# Patient Record
Sex: FEMALE | Race: WHITE | Employment: OTHER | URBAN - METROPOLITAN AREA
[De-identification: names, ages, dates, MRNs, and addresses within clinical notes are randomized per-mention and may not be internally consistent; named-entity substitution may affect disease eponyms.]

---

## 2017-01-12 ENCOUNTER — PRE VISIT (OUTPATIENT)
Dept: CARDIOLOGY | Facility: CLINIC | Age: 82
End: 2017-01-12

## 2017-01-23 ENCOUNTER — OFFICE VISIT (OUTPATIENT)
Dept: CARDIOLOGY | Facility: CLINIC | Age: 82
End: 2017-01-23
Attending: INTERNAL MEDICINE
Payer: COMMERCIAL

## 2017-01-23 VITALS
SYSTOLIC BLOOD PRESSURE: 130 MMHG | BODY MASS INDEX: 28.71 KG/M2 | DIASTOLIC BLOOD PRESSURE: 78 MMHG | HEIGHT: 64 IN | WEIGHT: 168.2 LBS | HEART RATE: 60 BPM

## 2017-01-23 DIAGNOSIS — I87.2 VENOUS INSUFFICIENCY: Primary | ICD-10-CM

## 2017-01-23 DIAGNOSIS — I05.9 MITRAL VALVE DISORDER: ICD-10-CM

## 2017-01-23 DIAGNOSIS — R60.9 EDEMA, UNSPECIFIED TYPE: ICD-10-CM

## 2017-01-23 PROCEDURE — 99214 OFFICE O/P EST MOD 30 MIN: CPT | Mod: 25 | Performed by: INTERNAL MEDICINE

## 2017-01-23 PROCEDURE — 93005 ELECTROCARDIOGRAM TRACING: CPT | Performed by: INTERNAL MEDICINE

## 2017-01-23 NOTE — PROGRESS NOTES
HPI and Plan:   See dictation    Orders Placed This Encounter   Procedures     TSH     Follow-Up with Cardiologist     EKG 12-lead complete w/read - Clinics (performed today)     Echocardiogram       No orders of the defined types were placed in this encounter.       There are no discontinued medications.      Encounter Diagnoses   Name Primary?     Venous insufficiency Yes     Edema, unspecified type      Mitral valve disorder        CURRENT MEDICATIONS:  Current Outpatient Prescriptions   Medication Sig Dispense Refill     lidocaine (LIDODERM) 5 % patch Place 1 patch onto the skin every 24 hours       timolol hemihydrate (BETIMOL) 0.5 % SOLN ophthalmic solution Place 1 drop into both eyes 2 times daily       amLODIPine (NORVASC) 5 MG tablet Take 1 tablet (5 mg) by mouth daily 90 tablet 3     dofetilide (TIKOSYN) 500 MCG capsule Take 1 capsule (500 mcg) by mouth every 12 hours 60 capsule 11     irbesartan (AVAPRO) 300 MG tablet Take 1 tablet (300 mg) by mouth daily 90 tablet 3     metoprolol (TOPROL XL) 100 MG 24 hr tablet Take 1 tablet (100 mg) by mouth daily 90 tablet 3     simvastatin (ZOCOR) 40 MG tablet Take 1 tablet (40 mg) by mouth At Bedtime 90 tablet 3     multivitamin, therapeutic (THERA-VIT) TABS Take 1 tablet by mouth daily.         Omega-3 Fatty Acids (OMEGA-3 FISH OIL PO) Take 1,200 mg by mouth daily.         sertraline (ZOLOFT) 50 MG tablet Take 50 mg by mouth every evening.         aspirin 81 MG EC tablet Take 1 tablet by mouth daily. 30 tablet 11     SUMAtriptan Succinate (IMITREX PO) Take 25 mg by mouth as needed.         calcium carbonate-vitamin D 500-400 MG-UNIT TABS Take 1 tablet by mouth 2 times daily.         magnesium gluconate (MAGONATE) 500 MG tablet Take 500 mg by mouth daily.           ALLERGIES     Allergies   Allergen Reactions     Aspirin      Coumadin [Warfarin]      Spontaneous retroperitoneal bleed.     Penicillins        PAST MEDICAL HISTORY:  Past Medical History   Diagnosis  Date     Back pain      Diabetes mellitus (H)      Hypertension      Atrial fibrillation (H)      not on anticoagulation, hx RP bleed     Hyperlipidaemia      Chest pain      CAD (coronary artery disease)      CT angio: mild lesion in the LAD, as well as 1st diagonal, and mild plaque in the proximal and  mid RCA     Venous insufficiency      Tachy-susan syndrome (H) 2012     PPM       PAST SURGICAL HISTORY:  Past Surgical History   Procedure Laterality Date     Anesthesia cardioversion  7/23/2012     Procedure: ANESTHESIA CARDIOVERSION;;  Surgeon: Provider, Generic Anesthesia;  Location:  ANESTHESIA - [facility] - DO NOT SCHEDULE     Implant pacemaker  11/2012     Dual chamber       FAMILY HISTORY:  Family History   Problem Relation Age of Onset     HEART DISEASE Mother 65     mi     HEART DISEASE Father 45     pnemonia       SOCIAL HISTORY:  Social History     Social History     Marital Status:      Spouse Name: N/A     Number of Children: N/A     Years of Education: N/A     Social History Main Topics     Smoking status: Never Smoker      Smokeless tobacco: None     Alcohol Use: No     Drug Use: No     Sexual Activity: Not Asked     Other Topics Concern     Caffeine Concern Yes     no caffeine intake     Special Diet Yes     no sugar, salt or fats      Exercise Yes     treadmill, swimming daily      Seat Belt Yes     Social History Narrative       Review of Systems:  Skin:  Negative       Eyes:  Negative glasses    ENT:  Negative      Respiratory:  Negative   ERICKSON the past 1-2 Months   Cardiovascular:    palpitations;Positive for  (left ankle and foot)  Gastroenterology: Negative hematochezia;melena    Genitourinary:  Negative      Musculoskeletal:  Positive for arthritis;nocturnal cramping Negative for Statin Related Myalgias  Neurologic:  Positive for numbness or tingling of hands    Psychiatric:  Negative depression    Heme/Lymph/Imm:  Negative bleeding disorder    Endocrine:  Positive for diabetes      Physical  "Exam:  Vitals: /78 mmHg  Pulse 60  Ht 1.626 m (5' 4\")  Wt 76.295 kg (168 lb 3.2 oz)  BMI 28.86 kg/m2    Constitutional:  alert and oriented;in no acute distress        Skin:  warm and dry to the touch;no apparent skin lesions or masses noted        Head:  normocephalic        Eyes:           ENT:  no pallor or cyanosis        Neck:  carotid pulses are full and equal bilaterally;JVP normal;no carotid bruit        Chest:  clear to auscultation          Cardiac: regular rhythm;normal S1 and S2;no murmurs, gallops or rubs detected                  Abdomen:  abdomen soft;non-tender        Vascular: pulses full and equal, no bruits auscultated                                        Extremities and Back:      bilateral LE edema;1+;R greater than L          Neurological:  no gross motor deficits;affect appropriate, oriented to time, person and place              CC  Kingsley Brandon MD   [facility]  [address]  [place] [address]                "

## 2017-01-23 NOTE — LETTER
1/23/2017    Alton Willoughby  [address]  [facility] [address]    RE: [place]       Dear Colleague,    REASON FOR VISIT:  Followup for lower extremity edema.       PRIMARY CARE PROVIDER:  Dr. Alton Willoughby.      I had the pleasure of seeing Zayda Hawkins at the [facility] in [place] this afternoon.  She is a very pleasant 84-year-old female with history of atrial fibrillation, coronary artery disease and hypertension.  I evaluated her 2 years ago for bilateral lower extremity edema.  She reported significant left lower extremity varicosities which have been present for many years.  She reports undergoing laser ablation of the left great saphenous vein many years ago.  This helped her varicose veins in the left.  However, over the last few years she developed progressive bilateral lower extremity edema which at times was worse on the right and at times is worse on left.      She has been wearing compressions stockings for several years now and believes it has helped.  She should venous ultrasound a couple of years ago which showed significant reflux on the right great saphenous vein at the midthigh level.  She also had reflux on the right common femoral and right popliteal veins.  The left great saphenous vein had been surgically removed from the saphenofemoral junction to the ankle.      Since our last visit, the patient's lower extremity symptoms have remained stable.  She continues to wear compression stockings.  She attempts to walk on a daily basis.      She also had questions regarding her cardiac status.  Her last echocardiogram was from 2015.  That echo demonstrated normal LV size and function with an EF of 55%-60%, no wall motion abnormality and mild to moderate MR and TR.     Outpatient Encounter Prescriptions as of 1/23/2017   Medication Sig Dispense Refill     lidocaine (LIDODERM) 5 % patch Place 1 patch onto the skin  every 24 hours       timolol hemihydrate (BETIMOL) 0.5 % SOLN ophthalmic solution Place 1 drop into both eyes 2 times daily       amLODIPine (NORVASC) 5 MG tablet Take 1 tablet (5 mg) by mouth daily 90 tablet 3     dofetilide (TIKOSYN) 500 MCG capsule Take 1 capsule (500 mcg) by mouth every 12 hours 60 capsule 11     irbesartan (AVAPRO) 300 MG tablet Take 1 tablet (300 mg) by mouth daily 90 tablet 3     metoprolol (TOPROL XL) 100 MG 24 hr tablet Take 1 tablet (100 mg) by mouth daily 90 tablet 3     simvastatin (ZOCOR) 40 MG tablet Take 1 tablet (40 mg) by mouth At Bedtime 90 tablet 3     multivitamin, therapeutic (THERA-VIT) TABS Take 1 tablet by mouth daily.         Omega-3 Fatty Acids (OMEGA-3 FISH OIL PO) Take 1,200 mg by mouth daily.         sertraline (ZOLOFT) 50 MG tablet Take 50 mg by mouth every evening.         aspirin 81 MG EC tablet Take 1 tablet by mouth daily. 30 tablet 11     SUMAtriptan Succinate (IMITREX PO) Take 25 mg by mouth as needed.         calcium carbonate-vitamin D 500-400 MG-UNIT TABS Take 1 tablet by mouth 2 times daily.         magnesium gluconate (MAGONATE) 500 MG tablet Take 500 mg by mouth daily.         Facility-Administered Encounter Medications as of 1/23/2017   Medication Dose Route Frequency Provider Last Rate Last Dose     sodium chloride (PF) 0.9% PF flush 10 mL  10 mL Intravenous Q10 Min PRN Ip, Vladimir Peña MD   10 mL at 07/02/14 1110     sodium chloride bacteriostatic 0.9 % flush 0-1 mL  0-1 mL Intradermal Once PRN Ip, Vladimir Peña MD         sodium chloride (PF) 0.9% PF flush 5-10 mL  5-10 mL Intravenous q1 min prn Ip, Vladimir Peña MD         albuterol (PROAIR HFA, PROVENTIL HFA, VENTOLIN HFA) inhaler 2 puff  2 puff Inhalation Q5 Min PRN Ip, Vladimir Peña MD         aminophylline injection  mg   mg Intravenous Once PRN Ip, Vladimir Peña MD          IMPRESSION, REPORT AND PLAN:   1.  Bilateral lower extremity edema likely due to chronic venous  insufficiency.   2.  Atrial fibrillation, stable.   3.  Coronary artery disease, stable.   4.  Hypertension, stable.   5.  Mild valvular insufficiencies.        She remains stable from a lower extremity venous disease standpoint.  She continues to wear compression stockings, and her edema and symptoms remain stable.  As such, I recommended that she continue to wear compression stockings.  If symptoms progress in the future, we will offer her endovenous ablation.      She had an echo a few years ago which showed mild valvular insufficiencies.  We will repeat another echocardiogram in the next few weeks to follow up on the mitral and tricuspid valve regurgitations.      I appreciate the opportunity to be part of this patient's care.     Sincerely,    Kingsley Brandon MD    [facility]

## 2017-01-24 NOTE — PROGRESS NOTES
REASON FOR VISIT:  Followup for lower extremity edema.       PRIMARY CARE PROVIDER:  Dr. Alton Willoughby.      HISTORY OF PRESENT ILLNESS:  I had the pleasure of seeing Zayda Hawkins at the [facility] in [place] this afternoon.  She is a very pleasant 84-year-old female with history of atrial fibrillation, coronary artery disease and hypertension.  I evaluated her 2 years ago for bilateral lower extremity edema.  She reported significant left lower extremity varicosities which have been present for many years.  She reports undergoing laser ablation of the left great saphenous vein many years ago.  This helped her varicose veins in the left.  However, over the last few years she developed progressive bilateral lower extremity edema which at times was worse on the right and at times is worse on left.      She has been wearing compressions stockings for several years now and believes it has helped.  She should venous ultrasound a couple of years ago which showed significant reflux on the right great saphenous vein at the midthigh level.  She also had reflux on the right common femoral and right popliteal veins.  The left great saphenous vein had been surgically removed from the saphenofemoral junction to the ankle.      Since our last visit, the patient's lower extremity symptoms have remained stable.  She continues to wear compression stockings.  She attempts to walk on a daily basis.      She also had questions regarding her cardiac status.  Her last echocardiogram was from 2015.  That echo demonstrated normal LV size and function with an EF of 55%-60%, no wall motion abnormality and mild to moderate MR and TR.      IMPRESSION, REPORT AND PLAN:   1.  Bilateral lower extremity edema likely due to chronic venous insufficiency.   2.  Atrial fibrillation, stable.   3.  Coronary artery disease, stable.   4.  Hypertension, stable.   5.  Mild valvular insufficiencies.        She remains  stable from a lower extremity venous disease standpoint.  She continues to wear compression stockings, and her edema and symptoms remain stable.  As such, I recommended that she continue to wear compression stockings.  If symptoms progress in the future, we will offer her endovenous ablation.      She had an echo a few years ago which showed mild valvular insufficiencies.  We will repeat another echocardiogram in the next few weeks to follow up on the mitral and tricuspid valve regurgitations.      I appreciate the opportunity to be part of this patient's care.         DO BARKLEY MD             D: 2017 17:08   T: 2017 22:46   MT: [id number]      Name:     JOVANNA DE LEÓN   MRN:      [id number]        Account:      [id number]   :      1932           Service Date: 2017      Document: [id number]

## 2017-01-31 ENCOUNTER — HOSPITAL ENCOUNTER (OUTPATIENT)
Dept: CARDIOLOGY | Facility: CLINIC | Age: 82
Discharge: HOME | End: 2017-01-31
Attending: INTERNAL MEDICINE | Admitting: INTERNAL MEDICINE
Payer: COMMERCIAL

## 2017-01-31 DIAGNOSIS — R60.9 EDEMA, UNSPECIFIED TYPE: ICD-10-CM

## 2017-01-31 DIAGNOSIS — I05.9 MITRAL VALVE DISORDER: ICD-10-CM

## 2017-01-31 PROCEDURE — 93306 TTE W/DOPPLER COMPLETE: CPT

## 2017-01-31 PROCEDURE — 93306 TTE W/DOPPLER COMPLETE: CPT | Mod: 26 | Performed by: INTERNAL MEDICINE

## 2017-02-02 ENCOUNTER — TELEPHONE (OUTPATIENT)
Dept: CARDIOLOGY | Facility: CLINIC | Age: 82
End: 2017-02-02

## 2017-02-02 NOTE — TELEPHONE ENCOUNTER
Pts  called back. Relayed Dr. Brandon's result review below. Verbalized understanding and will inform patient. No further questions.

## 2017-02-02 NOTE — TELEPHONE ENCOUNTER
Notes Recorded by Kingsley Brandon MD on 2/2/2017 at 3:24 PM  Echo reviewed. Mild to mod MR and TR. No significant change since last study    Tried to called patients . Left voicemail to call team 4 back with direct number.

## 2017-02-06 ENCOUNTER — ALLIED HEALTH/NURSE VISIT (OUTPATIENT)
Dept: CARDIOLOGY | Facility: CLINIC | Age: 82
End: 2017-02-06
Payer: COMMERCIAL

## 2017-02-06 DIAGNOSIS — R60.9 EDEMA, UNSPECIFIED TYPE: ICD-10-CM

## 2017-02-06 DIAGNOSIS — Z95.0 CARDIAC PACEMAKER IN SITU: Primary | ICD-10-CM

## 2017-02-06 LAB — TSH SERPL DL<=0.05 MIU/L-ACNC: 1.4 MU/L (ref 0.4–4)

## 2017-02-06 PROCEDURE — 36415 COLL VENOUS BLD VENIPUNCTURE: CPT | Performed by: INTERNAL MEDICINE

## 2017-02-06 PROCEDURE — 93293 PM PHONE R-STRIP DEVICE EVAL: CPT | Performed by: INTERNAL MEDICINE

## 2017-02-06 PROCEDURE — 84443 ASSAY THYROID STIM HORMONE: CPT | Performed by: INTERNAL MEDICINE

## 2017-02-06 NOTE — PROGRESS NOTES
~ 90 day office teletrace ~  AP/VS and AP/ at time of check. Magnet response WNL. F/U scheduled q 3 months. Zeferino SHEIKH

## 2017-02-07 ENCOUNTER — TELEPHONE (OUTPATIENT)
Dept: CARDIOLOGY | Facility: CLINIC | Age: 82
End: 2017-02-07

## 2017-02-07 NOTE — Clinical Note
February 9, 2017       TO: Zayda Hawkins   [address]  [facility] [address]       Dear Ms. Asha Hawkins,    The results of your recent Laboratory tests.    Results for orders placed or performed in visit on 02/06/17   TSH   Result Value Ref Range    TSH 1.40 0.40 - 4.00 mU/L           Your test results fall within the expected range(s) or remain unchanged from previous results.  Please continue with current treatment plan.      Sincerely,    [facility]

## 2017-02-07 NOTE — TELEPHONE ENCOUNTER
"Called Mary Ann per chart \"Please call - Mary Ann (Jair Yuan) [phone] To relay messages\" to inform her Dr. Brandon reviewed the lab and stated \"Normal TSH\".  Mary Ann did not answer. Left a message to call team 4 with the direct number.   "

## 2017-02-28 DIAGNOSIS — I10 HYPERTENSION: ICD-10-CM

## 2017-02-28 RX ORDER — IRBESARTAN 300 MG/1
300 TABLET ORAL DAILY
Qty: 90 TABLET | Refills: 3 | Status: SHIPPED | OUTPATIENT
Start: 2017-02-28 | End: 2018-03-09

## 2017-06-06 ENCOUNTER — DOCUMENTATION ONLY (OUTPATIENT)
Dept: CARDIOLOGY | Facility: CLINIC | Age: 82
End: 2017-06-06

## 2017-06-16 DIAGNOSIS — I48.91 ATRIAL FIBRILLATION WITH RAPID VENTRICULAR RESPONSE (H): ICD-10-CM

## 2017-06-16 DIAGNOSIS — I25.10 CAD (CORONARY ARTERY DISEASE): Primary | ICD-10-CM

## 2017-06-16 RX ORDER — DOFETILIDE 0.5 MG/1
500 CAPSULE ORAL EVERY 12 HOURS
Qty: 60 CAPSULE | Refills: 11 | Status: SHIPPED | OUTPATIENT
Start: 2017-06-16 | End: 2017-06-20

## 2017-06-20 DIAGNOSIS — I48.91 ATRIAL FIBRILLATION WITH RAPID VENTRICULAR RESPONSE (H): ICD-10-CM

## 2017-06-20 RX ORDER — DOFETILIDE 0.5 MG/1
500 CAPSULE ORAL 2 TIMES DAILY
Qty: 60 CAPSULE | Refills: 11 | Status: SHIPPED | OUTPATIENT
Start: 2017-06-20 | End: 2018-04-06

## 2017-06-21 ENCOUNTER — DOCUMENTATION ONLY (OUTPATIENT)
Dept: CARDIOLOGY | Facility: CLINIC | Age: 82
End: 2017-06-21

## 2017-06-21 NOTE — PROGRESS NOTES
"[facility] pharmacy calling back for new Rx with [employer] on Tikosyn name brand. They stated patient had tried the Dofetilide and did not \"feel well\" while taking it. Prefers Brand name. I will attempt PA for Brand name. Message to be routed to PA team. Geovanna Krishna  "

## 2017-07-11 ENCOUNTER — ALLIED HEALTH/NURSE VISIT (OUTPATIENT)
Dept: CARDIOLOGY | Facility: CLINIC | Age: 82
End: 2017-07-11
Payer: COMMERCIAL

## 2017-07-11 DIAGNOSIS — Z95.0 CARDIAC PACEMAKER IN SITU: Primary | ICD-10-CM

## 2017-07-11 PROCEDURE — 93293 PM PHONE R-STRIP DEVICE EVAL: CPT | Performed by: INTERNAL MEDICINE

## 2017-07-11 NOTE — MR AVS SNAPSHOT
"              After Visit Summary   7/11/2017    Zayda Hawkins    MRN: [id number]           Patient Information     Date Of Birth          12/24/1932        Visit Information        Provider Department      7/11/2017 9:30 AM MULTILINGUAL WORD; KAIT MATIAS1 [facility]        Today's Diagnoses     Cardiac pacemaker in situ    -  1       Follow-ups after your visit        Your next 10 appointments already scheduled     Oct 10, 2017 10:30 AM CDT   Pacemaker Check with KAIT ARELLANON   [facility] ([facility])    [address] [place] [id number] [phone]              Who to contact     If you have questions or need follow up information about today's clinic visit or your schedule please contact [facility] directly at [phone].  Normal or non-critical lab and imaging results will be communicated to you by [employer], letter or phone within 4 business days after the clinic has received the results. If you do not hear from us within 7 days, please contact the clinic through MyChart or phone. If you have a critical or abnormal lab result, we will notify you by phone as soon as possible.  Submit refill requests through [employer] or call your pharmacy and they will forward the refill request to us. Please allow 3 business days for your refill to be completed.          Additional Information About Your Visit        MyChart Information     [employer] lets you send messages to your doctor, view your test results, renew your prescriptions, schedule appointments and more. To sign up, go to [email or web address] . Click on \"Log in\" on the left side of the screen, which will take you to the Welcome page. Then click on \"Sign up Now\" on the right side of the page.     You will be asked to enter the access code listed below, as well as some personal information. Please follow the " directions to create your username and password.     Your access code is: [email or web address]  Expires: 10/9/2017  9:49 AM     Your access code will  in 90 days. If you need help or a new code, please call your [facility] clinic or [phone].        Care EveryWhere ID     This is your Care EveryWhere ID. This could be used by other organizations to access your [facility] medical records  [id number]         Blood Pressure from Last 3 Encounters:   17 130/78   10/26/16 141/83   16 134/80    Weight from Last 3 Encounters:   17 76.3 kg (168 lb 3.2 oz)   16 77.4 kg (170 lb 9.6 oz)   16 76.2 kg (168 lb)              We Performed the Following     PM PHONE R STRIP EVAL UP TO 90 DAYS ([phone])        Primary Care Provider Office Phone # Fax #    Altondiamante Willoughby [phone]       [facility] [address] [address]        Equal Access to Services     [facility] Hospital[facility] : Hadii aad ku hadasho Soomaali, waaxda luqadaha, qaybta kaalmada adejoanieyamagalys, karissa mcmahon . So [facility] [phone].    ATENCIÓN: Si habla español, tiene a hoffmann disposición servicios gratuitos de asistencia lingüística. Ll[facility]rec[facility] [phone].    We comply with applicable federal civil rights laws and [place] laws. We do not discriminate on the basis of race, color, national origin, age, disability sex, sexual orientation or gender identity.            Thank you!     Thank you for choosing [facility]  for your care. Our goal is always to provide you with excellent care. Hearing back from our patients is one way we can continue to improve our services. Please take a few minutes to complete the written survey that you may receive in the mail after your visit with us. Thank you!             Your Updated Medication List - Protect others around you: Learn how to safely use, store and throw away your medicines at www.disposemymeds.org.           This list is accurate as of: 7/11/17  9:49 AM.  Always use your most recent med list.                   Brand Name Dispense Instructions for use Diagnosis    amLODIPine 5 MG tablet    NORVASC    90 tablet    Take 1 tablet (5 mg) by mouth daily    Benign essential hypertension       aspirin 81 MG EC tablet     30 tablet    Take 1 tablet by mouth daily.    Atrial fibrillation with rapid ventricular response (H)       calcium carbonate-vitamin D 500-400 MG-UNIT Tabs per tablet      Take 1 tablet by mouth 2 times daily.        IMITREX PO      Take 25 mg by mouth as needed.        irbesartan 300 MG tablet    AVAPRO    90 tablet    Take 1 tablet (300 mg) by mouth daily    Hypertension       lidocaine 5 % Patch    LIDODERM     Place 1 patch onto the skin every 24 hours        magnesium gluconate 500 MG tablet    MAGONATE     Take 500 mg by mouth daily.        metoprolol 100 MG 24 hr tablet    TOPROL XL    90 tablet    Take 1 tablet (100 mg) by mouth daily    Atrial fibrillation with rapid ventricular response (H), Hypertension, ACS (acute coronary syndrome) (H)       multivitamin, therapeutic Tabs tablet      Take 1 tablet by mouth daily.        OMEGA-3 FISH OIL PO      Take 1,200 mg by mouth daily.        sertraline 50 MG tablet    ZOLOFT     Take 50 mg by mouth every evening.        simvastatin 40 MG tablet    ZOCOR    90 tablet    Take 1 tablet (40 mg) by mouth At Bedtime    CAD (coronary artery disease)       TIKOSYN 500 MCG capsule   Generic drug:  dofetilide     60 capsule    Take 1 capsule (500 mcg) by mouth 2 times daily    Atrial fibrillation with rapid ventricular response (H)       timolol hemihydrate 0.5 % Soln ophthalmic solution    BETIMOL     Place 1 drop into both eyes 2 times daily

## 2017-07-11 NOTE — PROGRESS NOTES
~ 90 day office teletrace ~  Appropriate AP/VS at time of check. Magnet response WNL. Annual threshold scheduled in October. Zeferino SHEIKH  .

## 2017-10-10 ENCOUNTER — ALLIED HEALTH/NURSE VISIT (OUTPATIENT)
Dept: CARDIOLOGY | Facility: CLINIC | Age: 82
End: 2017-10-10
Payer: COMMERCIAL

## 2017-10-10 DIAGNOSIS — I49.5 SSS (SICK SINUS SYNDROME) (H): ICD-10-CM

## 2017-10-10 DIAGNOSIS — Z95.0 CARDIAC PACEMAKER IN SITU: Primary | ICD-10-CM

## 2017-10-10 PROCEDURE — 99207 ZZC NO CHARGE LOS: CPT | Performed by: INTERNAL MEDICINE

## 2017-10-10 NOTE — MR AVS SNAPSHOT
"              After Visit Summary   10/10/2017    Zayda Hawkins    MRN: [id number]           Patient Information     Date Of Birth          1932        Visit Information        Provider Department      10/10/2017 10:15 AM MULTILINGUAL WORD; KAIT SALMERON [facility]        Today's Diagnoses     Cardiac pacemaker in situ    -  1    SSS (sick sinus syndrome) (H)           Follow-ups after your visit        Who to contact     If you have questions or need follow up information about today's clinic visit or your schedule please contact [facility] directly at [phone].  Normal or non-critical lab and imaging results will be communicated to you by Opezhart, letter or phone within 4 business days after the clinic has received the results. If you do not hear from us within 7 days, please contact the clinic through Opezhart or phone. If you have a critical or abnormal lab result, we will notify you by phone as soon as possible.  Submit refill requests through Roambi or call your pharmacy and they will forward the refill request to us. Please allow 3 business days for your refill to be completed.          Additional Information About Your Visit        MyChart Information     Roambi lets you send messages to your doctor, view your test results, renew your prescriptions, schedule appointments and more. To sign up, go to [email or web address] . Click on \"Log in\" on the left side of the screen, which will take you to the Welcome page. Then click on \"Sign up Now\" on the right side of the page.     You will be asked to enter the access code listed below, as well as some personal information. Please follow the directions to create your username and password.     Your access code is: [email or web address]  Expires: 2018 10:39 AM     Your access code will  in 90 days. If you need help or a new code, please call your [place] clinic or " [phone].        Care EveryWhere ID     This is your Care EveryWhere ID. This could be used by other organizations to access your [place] medical records  [id number]         Blood Pressure from Last 3 Encounters:   01/23/17 130/78   10/26/16 141/83   05/31/16 134/80    Weight from Last 3 Encounters:   01/23/17 76.3 kg (168 lb 3.2 oz)   05/31/16 77.4 kg (170 lb 9.6 oz)   01/20/16 76.2 kg (168 lb)              Today, you had the following     No orders found for display       Primary Care Provider Office Phone # Fax #    Alton Willoughby [phone]       [facility] [address] [address]        Equal Access to Services     JOSE MARTEL : Hadii jermaine mayo hadasho Soomaali, waaxda luqadaha, qaybta kaalmada adeegyada, waxaric mcmahon . So [facility] [phone].    ATENCIÓN: Si habla español, tiene a hoffmann disposición servicios gratuitos de asistencia lingüística. Daniel al [phone].    We comply with applicable federal civil rights laws and [place] laws. We do not discriminate on the basis of race, color, national origin, age, disability, sex, sexual orientation, or gender identity.            Thank you!     Thank you for choosing [facility] [place]  for your care. Our goal is always to provide you with excellent care. Hearing back from our patients is one way we can continue to improve our services. Please take a few minutes to complete the written survey that you may receive in the mail after your visit with us. Thank you!             Your Updated Medication List - Protect others around you: Learn how to safely use, store and throw away your medicines at www.disposemymeds.org.          This list is accurate as of: 10/10/17 10:39 AM.  Always use your most recent med list.                   Brand Name Dispense Instructions for use Diagnosis    amLODIPine 5 MG tablet    NORVASC    90 tablet    Take 1 tablet (5 mg) by  mouth daily    Benign essential hypertension       aspirin 81 MG EC tablet     30 tablet    Take 1 tablet by mouth daily.    Atrial fibrillation with rapid ventricular response (H)       calcium carbonate-vitamin D 500-400 MG-UNIT Tabs per tablet      Take 1 tablet by mouth 2 times daily.        IMITREX PO      Take 25 mg by mouth as needed.        irbesartan 300 MG tablet    AVAPRO    90 tablet    Take 1 tablet (300 mg) by mouth daily    Hypertension       lidocaine 5 % Patch    LIDODERM     Place 1 patch onto the skin every 24 hours        magnesium gluconate 500 MG tablet    MAGONATE     Take 500 mg by mouth daily.        metoprolol 100 MG 24 hr tablet    TOPROL XL    90 tablet    Take 1 tablet (100 mg) by mouth daily    Atrial fibrillation with rapid ventricular response (H), Hypertension, ACS (acute coronary syndrome) (H)       multivitamin, therapeutic Tabs tablet      Take 1 tablet by mouth daily.        OMEGA-3 FISH OIL PO      Take 1,200 mg by mouth daily.        sertraline 50 MG tablet    ZOLOFT     Take 50 mg by mouth every evening.        simvastatin 40 MG tablet    ZOCOR    90 tablet    Take 1 tablet (40 mg) by mouth At Bedtime    CAD (coronary artery disease)       TIKOSYN 500 MCG capsule   Generic drug:  dofetilide     60 capsule    Take 1 capsule (500 mcg) by mouth 2 times daily    Atrial fibrillation with rapid ventricular response (H)       timolol hemihydrate 0.5 % Soln ophthalmic solution    BETIMOL     Place 1 drop into both eyes 2 times daily

## 2017-10-10 NOTE — PROGRESS NOTES
Patient refused to be seen for a device check today because Mary Ann was not the . Patient was already at scheduling discussing this and trying to reschedule when Mary Ann would be available. Brittny

## 2017-10-17 ENCOUNTER — TELEPHONE (OUTPATIENT)
Dept: CARDIOLOGY | Facility: CLINIC | Age: 82
End: 2017-10-17

## 2017-10-17 ENCOUNTER — ALLIED HEALTH/NURSE VISIT (OUTPATIENT)
Dept: CARDIOLOGY | Facility: CLINIC | Age: 82
End: 2017-10-17
Payer: COMMERCIAL

## 2017-10-17 DIAGNOSIS — Z95.0 CARDIAC PACEMAKER IN SITU: Primary | ICD-10-CM

## 2017-10-17 PROCEDURE — 93280 PM DEVICE PROGR EVAL DUAL: CPT | Performed by: INTERNAL MEDICINE

## 2017-10-17 NOTE — PROGRESS NOTES
[facility] [employer] [facility] [address] DR Pacemaker Device Check  AP: 93 % : 11 %  Mode: DDDR  bpm        Underlying Rhythm: SB, hx PAF  Heart Rate: Heart rate stable with good variability.  Sensing: WNL    Pacing Threshold: WNL   Impedance: WNL  Battery Status: Approximately 8.5-9.6 years longevity.  Atrial Arrhythmia: 12,743 mode switches comprising of 4.9% of the time, EGM showed PAF with -135 bpm, longest lasting 14 minutes. Patient is on ASA, no other AC due to bleeding issues. Patient remains on Tikosyn.  Ventricular Arrhythmia: 3 High Ventricular Rates logged, EGMs showed RVR response to AF, lasting 57 seconds.  Setting Change: 0    Care Plan: Follow up with Q 3 month office teletraces.     .

## 2017-10-17 NOTE — MR AVS SNAPSHOT
"              After Visit Summary   10/17/2017    Zayda Hawkins    MRN: [id number]           Patient Information     Date Of Birth          12/24/1932        Visit Information        Provider Department      10/17/2017 3:00 PM MULTILINGUAL WORD; KAIT SALMERON [facility]        Today's Diagnoses     Cardiac pacemaker in situ    -  1       Follow-ups after your visit        Your next 10 appointments already scheduled     Jan 19, 2018 11:30 AM CST   Teletrace with CARBALLO TECH1   [facility] ([facility])    [address] [place] [id number] [phone]              Who to contact     If you have questions or need follow up information about today's clinic visit or your schedule please contact [facility] directly at [phone].  Normal or non-critical lab and imaging results will be communicated to you by Hardide Coatingshart, letter or phone within 4 business days after the clinic has received the results. If you do not hear from us within 7 days, please contact the clinic through MyChart or phone. If you have a critical or abnormal lab result, we will notify you by phone as soon as possible.  Submit refill requests through "[employer] or call your pharmacy and they will forward the refill request to us. Please allow 3 business days for your refill to be completed.          Additional Information About Your Visit        MyChart Information     "[employer] lets you send messages to your doctor, view your test results, renew your prescriptions, schedule appointments and more. To sign up, go to [email or web address] . Click on \"Log in\" on the left side of the screen, which will take you to the Welcome page. Then click on \"Sign up Now\" on the right side of the page.     You will be asked to enter the access code listed below, as well as some personal information. Please follow the " directions to create your username and password.     Your access code is: [email or web address]  Expires: 2018 10:39 AM     Your access code will  in 90 days. If you need help or a new code, please call your [place] clinic or [phone].        Care EveryWhere ID     This is your Care EveryWhere ID. This could be used by other organizations to access your [place] medical records  [id number]         Blood Pressure from Last 3 Encounters:   17 130/78   10/26/16 141/83   16 134/80    Weight from Last 3 Encounters:   17 76.3 kg (168 lb 3.2 oz)   16 77.4 kg (170 lb 9.6 oz)   16 76.2 kg (168 lb)              We Performed the Following     PM DEVICE PROGRAMMING EVAL, DUAL LEAD PACER (93343)        Primary Care Provider Office Phone # Fax #    Altonankit Willoughby [phone]       [facility] [address] [address]        Equal Access to Services     [facility] : Hadii aad ku hadasho Soomaali, waaxda luqadaha, qaybta kaalmada adeegyada, karissa espinoza haysheila mcmahon . So [facility] [place] [phone].    ATENCIÓN: Si habla español, tiene a hoffmann disposición servicios gratuitos de asistencia lingüística. Llame al [phone].    We comply with applicable federal civil rights laws and [place] laws. We do not discriminate on the basis of race, color, national origin, age, disability, sex, sexual orientation, or gender identity.            Thank you!     Thank you for choosing [facility] [place]  for your care. Our goal is always to provide you with excellent care. Hearing back from our patients is one way we can continue to improve our services. Please take a few minutes to complete the written survey that you may receive in the mail after your visit with us. Thank you!             Your Updated Medication List - Protect others around you: Learn how to safely use, store and throw away your medicines at  www.disposemymeds.org.          This list is accurate as of: 10/17/17  4:20 PM.  Always use your most recent med list.                   Brand Name Dispense Instructions for use Diagnosis    amLODIPine 5 MG tablet    NORVASC    90 tablet    Take 1 tablet (5 mg) by mouth daily    Benign essential hypertension       aspirin 81 MG EC tablet     30 tablet    Take 1 tablet by mouth daily.    Atrial fibrillation with rapid ventricular response (H)       calcium carbonate-vitamin D 500-400 MG-UNIT Tabs per tablet      Take 1 tablet by mouth 2 times daily.        IMITREX PO      Take 25 mg by mouth as needed.        irbesartan 300 MG tablet    AVAPRO    90 tablet    Take 1 tablet (300 mg) by mouth daily    Hypertension       lidocaine 5 % Patch    LIDODERM     Place 1 patch onto the skin every 24 hours        magnesium gluconate 500 MG tablet    MAGONATE     Take 500 mg by mouth daily.        metoprolol 100 MG 24 hr tablet    TOPROL XL    90 tablet    Take 1 tablet (100 mg) by mouth daily    Atrial fibrillation with rapid ventricular response (H), Hypertension, ACS (acute coronary syndrome) (H)       multivitamin, therapeutic Tabs tablet      Take 1 tablet by mouth daily.        OMEGA-3 FISH OIL PO      Take 1,200 mg by mouth daily.        sertraline 50 MG tablet    ZOLOFT     Take 50 mg by mouth every evening.        simvastatin 40 MG tablet    ZOCOR    90 tablet    Take 1 tablet (40 mg) by mouth At Bedtime    CAD (coronary artery disease)       TIKOSYN 500 MCG capsule   Generic drug:  dofetilide     60 capsule    Take 1 capsule (500 mcg) by mouth 2 times daily    Atrial fibrillation with rapid ventricular response (H)       timolol hemihydrate 0.5 % Soln ophthalmic solution    BETIMOL     Place 1 drop into both eyes 2 times daily

## 2017-10-17 NOTE — TELEPHONE ENCOUNTER
Dr Claire WARD this patient had slight increase in PAF burden to 4.9% of the time compared to 2%. She is on Tikosyn 500 mg bid and Toprol  mgs daily. Please advise. Thanks Daria    [facility] [employer] [address]  Pacemaker Device Check  AP: 93 % : 11 %  Mode: DDDR  bpm        Underlying Rhythm: SB, hx PAF  Heart Rate: Heart rate stable with good variability.  Sensing: WNL    Pacing Threshold: WNL   Impedance: WNL  Battery Status: Approximately 8.5-9.6 years longevity.  Atrial Arrhythmia: 12,743 mode switches comprising of 4.9% of the time, EGM showed PAF with -135 bpm, longest lasting 14 minutes. Patient is on ASA, no other AC due to bleeding issues. Patient remains on Tikosyn.  Ventricular Arrhythmia: 3 High Ventricular Rates logged, EGMs showed RVR response to AF, lasting 57 seconds.  Setting Change: 0

## 2017-10-25 NOTE — TELEPHONE ENCOUNTER
Called and spoke with the  Mary Ann who will contact the patient of  with Dr. Rangel to discuss PAF and the Watchman procedure. Mary Ann states understanding and will be with there with the  patient at .

## 2017-10-27 ENCOUNTER — OFFICE VISIT (OUTPATIENT)
Dept: CARDIOLOGY | Facility: CLINIC | Age: 82
End: 2017-10-27
Payer: COMMERCIAL

## 2017-10-27 VITALS
HEIGHT: 64 IN | WEIGHT: 159 LBS | HEART RATE: 72 BPM | SYSTOLIC BLOOD PRESSURE: 124 MMHG | DIASTOLIC BLOOD PRESSURE: 66 MMHG | BODY MASS INDEX: 27.14 KG/M2

## 2017-10-27 DIAGNOSIS — I48.0 PAROXYSMAL ATRIAL FIBRILLATION (H): Primary | ICD-10-CM

## 2017-10-27 PROCEDURE — 99214 OFFICE O/P EST MOD 30 MIN: CPT | Performed by: INTERNAL MEDICINE

## 2017-10-27 NOTE — MR AVS SNAPSHOT
"              After Visit Summary   10/27/2017    Zayda Hawkins    MRN: [id number]           Patient Information     Date Of Birth          12/24/1932        Visit Information        Provider Department      10/27/2017 2:45 PM Julio C Rangel MD; MULTILINGUAL WORD [facility]        Today's Diagnoses     Paroxysmal atrial fibrillation (H)    -  1       Follow-ups after your visit        Your next 10 appointments already scheduled     Jan 19, 2018 11:30 AM CST   Teletrace with CARBALLO TECH1   [facility] ([facility])    [address] [place] [id number] [phone]              Who to contact     If you have questions or need follow up information about today's clinic visit or your schedule please contact [facility] directly at [phone].  Normal or non-critical lab and imaging results will be communicated to you by MyChart, letter or phone within 4 business days after the clinic has received the results. If you do not hear from us within 7 days, please contact the clinic through MyChart or phone. If you have a critical or abnormal lab result, we will notify you by phone as soon as possible.  Submit refill requests through [employer] or call your pharmacy and they will forward the refill request to us. Please allow 3 business days for your refill to be completed.          Additional Information About Your Visit        MyChart Information     [employer] lets you send messages to your doctor, view your test results, renew your prescriptions, schedule appointments and more. To sign up, go to [email or web address] . Click on \"Log in\" on the left side of the screen, which will take you to the Welcome page. Then click on \"Sign up Now\" on the right side of the page.     You will be asked to enter the access code listed below, as well as some personal information. " "Please follow the directions to create your username and password.     Your access code is: [email or web address]  Expires: 2018 10:39 AM     Your access code will  in 90 days. If you need help or a new code, please call your [place] clinic or [phone].        Care EveryWhere ID     This is your Care EveryWhere ID. This could be used by other organizations to access your [place] medical records  [id number]        Your Vitals Were     Pulse Height BMI (Body Mass Index)             72 1.626 m (5' 4\") 27.29 kg/m2          Blood Pressure from Last 3 Encounters:   10/27/17 124/66   17 130/78   10/26/16 141/83    Weight from Last 3 Encounters:   10/27/17 72.1 kg (159 lb)   17 76.3 kg (168 lb 3.2 oz)   16 77.4 kg (170 lb 9.6 oz)              Today, you had the following     No orders found for display       Primary Care Provider Office Phone # Fax #    Altonankit Willoughby [phone]       [facility] [address] [address]        Equal Access to Services     JOSE MARTEL : Hadii aad ku hadasho Soomaali, waaxda luqadaha, qaybta kaalmada adeegyada, waxay idiin haydanikan quan yousif. So [facility] [place] [phone].    ATENCIÓN: Si habla español, tiene a hoffmann disposición servicios gratuitos de asistencia lingüística. Llame al [phone].    We comply with applicable federal civil rights laws and [place] laws. We do not discriminate on the basis of race, color, national origin, age, disability, sex, sexual orientation, or gender identity.            Thank you!     Thank you for choosing [facility] [place]  for your care. Our goal is always to provide you with excellent care. Hearing back from our patients is one way we can continue to improve our services. Please take a few minutes to complete the written survey that you may receive in the mail after your visit with us. Thank you!             Your Updated Medication List - " Protect others around you: Learn how to safely use, store and throw away your medicines at www.disposemymeds.org.          This list is accurate as of: 10/27/17  3:59 PM.  Always use your most recent med list.                   Brand Name Dispense Instructions for use Diagnosis    amLODIPine 5 MG tablet    NORVASC    90 tablet    Take 1 tablet (5 mg) by mouth daily    Benign essential hypertension       aspirin 81 MG EC tablet     30 tablet    Take 1 tablet by mouth daily.    Atrial fibrillation with rapid ventricular response (H)       calcium carbonate-vitamin D 500-400 MG-UNIT Tabs per tablet      Take 1 tablet by mouth 2 times daily.        IMITREX PO      Take 25 mg by mouth as needed.        irbesartan 300 MG tablet    AVAPRO    90 tablet    Take 1 tablet (300 mg) by mouth daily    Hypertension       lidocaine 5 % Patch    LIDODERM     Place 1 patch onto the skin every 24 hours        magnesium gluconate 500 MG tablet    MAGONATE     Take 500 mg by mouth daily.        metoprolol 100 MG 24 hr tablet    TOPROL XL    90 tablet    Take 1 tablet (100 mg) by mouth daily    Atrial fibrillation with rapid ventricular response (H), Hypertension, ACS (acute coronary syndrome) (H)       multivitamin, therapeutic Tabs tablet      Take 1 tablet by mouth daily.        OMEGA-3 FISH OIL PO      Take 1,200 mg by mouth daily.        sertraline 50 MG tablet    ZOLOFT     Take 50 mg by mouth every evening.        simvastatin 40 MG tablet    ZOCOR    90 tablet    Take 1 tablet (40 mg) by mouth At Bedtime    CAD (coronary artery disease)       TIKOSYN 500 MCG capsule   Generic drug:  dofetilide     60 capsule    Take 1 capsule (500 mcg) by mouth 2 times daily    Atrial fibrillation with rapid ventricular response (H)       timolol hemihydrate 0.5 % Soln ophthalmic solution    BETIMOL     Place 1 drop into both eyes 2 times daily

## 2017-10-27 NOTE — LETTER
10/27/2017    Alton REYES [facility]  [address]  [place] [address]    RE: Zayda Hawkins       Dear Colleague,    I had the pleasure of seeing Zayda Hawkins in the [facility].     Thank you for allowing me to participate in the care of your delightful patient.  As you know, the patient is an 84-year-old female with a history of tachybrady syndrome requiring pacemaker and has been on Tikosyn for atrial tachyarrhythmia including atrial fibrillation and atrial flutter with excellent control with the burden ranging from 2% to 4%.  She has not been on anticoagulation because of a history of spontaneous retroperitoneal bleed.  Patient does have a CHADS-VASc score of at least 4 (age, gender, hypertension).  I asked her to come to see me today to discuss the option of a left atrial appendage occluder, a Watchman.         Outpatient Encounter Prescriptions as of 10/27/2017   Medication Sig Dispense Refill     TIKOSYN 500 MCG capsule Take 1 capsule (500 mcg) by mouth 2 times daily 60 capsule 11     irbesartan (AVAPRO) 300 MG tablet Take 1 tablet (300 mg) by mouth daily 90 tablet 3     lidocaine (LIDODERM) 5 % patch Place 1 patch onto the skin every 24 hours       amLODIPine (NORVASC) 5 MG tablet Take 1 tablet (5 mg) by mouth daily 90 tablet 3     metoprolol (TOPROL XL) 100 MG 24 hr tablet Take 1 tablet (100 mg) by mouth daily 90 tablet 3     simvastatin (ZOCOR) 40 MG tablet Take 1 tablet (40 mg) by mouth At Bedtime 90 tablet 3     calcium carbonate-vitamin D 500-400 MG-UNIT TABS Take 1 tablet by mouth 2 times daily.         magnesium gluconate (MAGONATE) 500 MG tablet Take 500 mg by mouth daily.         multivitamin, therapeutic (THERA-VIT) TABS Take 1 tablet by mouth daily.         Omega-3 Fatty Acids (OMEGA-3 FISH OIL PO) Take 1,200 mg by mouth daily.         sertraline (ZOLOFT) 50 MG tablet Take 50 mg by mouth every evening.         aspirin 81  MG EC tablet Take 1 tablet by mouth daily. 30 tablet 11     SUMAtriptan Succinate (IMITREX PO) Take 25 mg by mouth as needed.         timolol hemihydrate (BETIMOL) 0.5 % SOLN ophthalmic solution Place 1 drop into both eyes 2 times daily       Facility-Administered Encounter Medications as of 10/27/2017   Medication Dose Route Frequency Provider Last Rate Last Dose     sodium chloride (PF) 0.9% PF flush 10 mL  10 mL Intravenous Q10 Min PRN Ip, Vladimir Peña MD   10 mL at 07/02/14 1110     sodium chloride bacteriostatic 0.9 % flush 0-1 mL  0-1 mL Intradermal Once PRN Ip, Vladimir Peña MD         sodium chloride (PF) 0.9% PF flush 5-10 mL  5-10 mL Intravenous q1 min prn Ip, Vladimir Peña MD         albuterol (PROAIR HFA, PROVENTIL HFA, VENTOLIN HFA) inhaler 2 puff  2 puff Inhalation Q5 Min PRN Ip, Vladimir Peña MD         aminophylline injection  mg   mg Intravenous Once PRN Ip, Vladimir Peña MD         I explained the rationale that she is at risk of having a stroke on the order of 4%  a year given her high CHADS-VASc score of 4, and normally she would benefit from long-term anticoagulation.  However, I do share the concern of her risk of recurrent retroperitoneal bleed, as it happened spontaneously.  I showed her and the  the animation of the Watchman.  I went over the procedure in details with risks including but not limited to vascular injury, cardiac perforation and CVA.  I did emphasize that she would need to be on anticoagulation for about 6-1/2 weeks post-procedure along with aspirin, and after that a JUSTIN or heart CT can be done to look at the device.  If there are no clots on the device nor significant leak around it, then Plavix can be substituted for the anticoagulant along with continuation of aspirin until 6 months post-implant.  At that time if the patient is doing clinically quite well, we can stop the Plavix and she will continue with at that point her aspirin.      The  patient would like to discuss the case with her daughter, who is an MD.  I told the patient that I would be more than happy to talk with her daughter as well if she has any questions.      Again, thank you for allowing me to participate in the care of your patient.      Sincerely,    Julio C Palacios MD     [facility]

## 2017-10-27 NOTE — PROGRESS NOTES
HPI and Plan:   See dictation  [id number]  No orders of the defined types were placed in this encounter.      No orders of the defined types were placed in this encounter.      There are no discontinued medications.      No diagnosis found.    CURRENT MEDICATIONS:  Current Outpatient Prescriptions   Medication Sig Dispense Refill     TIKOSYN 500 MCG capsule Take 1 capsule (500 mcg) by mouth 2 times daily 60 capsule 11     irbesartan (AVAPRO) 300 MG tablet Take 1 tablet (300 mg) by mouth daily 90 tablet 3     lidocaine (LIDODERM) 5 % patch Place 1 patch onto the skin every 24 hours       amLODIPine (NORVASC) 5 MG tablet Take 1 tablet (5 mg) by mouth daily 90 tablet 3     metoprolol (TOPROL XL) 100 MG 24 hr tablet Take 1 tablet (100 mg) by mouth daily 90 tablet 3     simvastatin (ZOCOR) 40 MG tablet Take 1 tablet (40 mg) by mouth At Bedtime 90 tablet 3     calcium carbonate-vitamin D 500-400 MG-UNIT TABS Take 1 tablet by mouth 2 times daily.         magnesium gluconate (MAGONATE) 500 MG tablet Take 500 mg by mouth daily.         multivitamin, therapeutic (THERA-VIT) TABS Take 1 tablet by mouth daily.         Omega-3 Fatty Acids (OMEGA-3 FISH OIL PO) Take 1,200 mg by mouth daily.         sertraline (ZOLOFT) 50 MG tablet Take 50 mg by mouth every evening.         aspirin 81 MG EC tablet Take 1 tablet by mouth daily. 30 tablet 11     SUMAtriptan Succinate (IMITREX PO) Take 25 mg by mouth as needed.         timolol hemihydrate (BETIMOL) 0.5 % SOLN ophthalmic solution Place 1 drop into both eyes 2 times daily         ALLERGIES     Allergies   Allergen Reactions     Aspirin      Coumadin [Warfarin]      Spontaneous retroperitoneal bleed.     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     Venous insufficiency        PAST SURGICAL HISTORY:  Past Surgical History:   Procedure Laterality Date     ANESTHESIA CARDIOVERSION  7/23/2012    Procedure: ANESTHESIA CARDIOVERSION;;  Surgeon: Provider, Generic Anesthesia;  Location:  ANESTHESIA - [facility] - DO NOT SCHEDULE     IMPLANT PACEMAKER  11/2012    Dual chamber       FAMILY HISTORY:  Family History   Problem Relation Age of Onset     HEART DISEASE Mother 65     mi     HEART DISEASE Father 45     pnemonia       SOCIAL HISTORY:  Social History     Social History     Marital status:      Spouse name: N/A     Number of children: N/A     Years of education: N/A     Social History Main Topics     Smoking status: Never Smoker     Smokeless tobacco: None     Alcohol use No     Drug use: No     Sexual activity: Not Asked     Other Topics Concern     Caffeine Concern Yes     no caffeine intake     Special Diet Yes     no sugar, salt or fats      Exercise Yes     treadmill, swimming daily      Seat Belt Yes     Social History Narrative       Review of Systems:  Skin:  Negative       Eyes:  Negative glasses    ENT:  Negative      Respiratory:  Negative   ERICKSON the past 1-2 Months   Cardiovascular:    palpitations;Positive for  (left ankle and foot)  Gastroenterology: Negative hematochezia;melena    Genitourinary:  Negative      Musculoskeletal:  Positive for arthritis;nocturnal cramping Negative for Statin Related Myalgias  Neurologic:  Positive for numbness or tingling of hands    Psychiatric:  Negative depression    Heme/Lymph/Imm:  Negative bleeding disorder    Endocrine:  Positive for diabetes      Physical Exam:  Vitals: /66  Pulse 72  Ht 1.626 m (5' 4\")  Wt 72.1 kg (159 lb)  BMI 27.29 kg/m2    Constitutional:  cooperative, alert and oriented, well developed, well nourished, in no acute distress appears " younger than stated age      Skin:  warm and dry to the touch, no apparent skin lesions or masses noted        Head:  normocephalic, no masses or lesions        Eyes:  pupils equal and round, conjunctivae and lids unremarkable, sclera white, no xanthalasma, EOMS intact, no nystagmus        ENT:  no pallor or cyanosis, dentition good        Neck:  carotid pulses are full and equal bilaterally, JVP normal, no carotid bruit, no thyromegaly        Chest:  normal breath sounds, clear to auscultation, normal A-P diameter, normal symmetry, normal respiratory excursion, no use of accessory muscles          Cardiac: regular rhythm, normal S1/S2, no S3 or S4, apical impulse not displaced, no murmurs, gallops or rubs                  Abdomen:  abdomen soft, non-tender, BS normoactive, no mass, no HSM, no bruits        Vascular: pulses full and equal, no bruits auscultated                                        Extremities and Back:  no deformities, clubbing, cyanosis, erythema observed              Neurological:  affect appropriate, oriented to time, person and place              RELL REYES [facility]  [address]  [place] [address]

## 2017-11-01 ENCOUNTER — TELEPHONE (OUTPATIENT)
Dept: CARDIOLOGY | Facility: CLINIC | Age: 82
End: 2017-11-01

## 2017-11-01 NOTE — TELEPHONE ENCOUNTER
Pt son called back and had many questions pertaining to the Watchman. States that he had reviewed book that pt took home after OV. States that pt does not understand why she is having the procedure.  Pt does have a [id number]-BAHc score of 4 (Gender, age and HTN) which puts pt at risk for stroke.  Pt son also wondering why as he has been told in the past that pt blood is to thin and she is bleeding, and that is why she has bruises on her arms and her buttocks.  This writer feels that pt son understands the reason for the Watchman, but not why his mother would need one because of her already thin blood. Pt stated that he and pt did not know that she was going in to discuss the Watchman device at OV and would like to talk to Dr Rangel. Will message Dr Rangel to see if he can call pt on his cell phone to discuss. Ricarda

## 2017-11-01 NOTE — PROGRESS NOTES
HISTORY OF PRESENT ILLNESS:  Thank you for allowing me to participate in the care of your delightful patient.  As you know, the patient is an 84-year-old female with a history of tachybrady syndrome requiring pacemaker and has been on Tikosyn for atrial tachyarrhythmia including atrial fibrillation and atrial flutter with excellent control with the burden ranging from 2% to 4%.  She has not been on anticoagulation because of a history of spontaneous retroperitoneal bleed.  Patient does have a CHADS-VASc score of at least 4 (age, gender, hypertension).  I asked her to come to see me today to discuss the option of a left atrial appendage occluder, a Watchman.      I explained the rationale that she is at risk of having a stroke on the order of 4%  a year given her high CHADS-VASc score of 4, and normally she would benefit from long-term anticoagulation.  However, I do share the concern of her risk of recurrent retroperitoneal bleed, as it happened spontaneously.  I showed her and the  the animation of the Watchman.  I went over the procedure in details with risks including but not limited to vascular injury, cardiac perforation and CVA.  I did emphasize that she would need to be on anticoagulation for about 6-1/2 weeks post-procedure along with aspirin, and after that a JUSTIN or heart CT can be done to look at the device.  If there are no clots on the device nor significant leak around it, then Plavix can be substituted for the anticoagulant along with continuation of aspirin until 6 months post-implant.  At that time if the patient is doing clinically quite well, we can stop the Plavix and she will continue with at that point her aspirin.      The patient would like to discuss the case with her daughter, who is an MD.  I told the patient that I would be more than happy to talk with her daughter as well if she has any questions.      cc:   Alton Willoughby MD    [address]    [place]  [address]         MARTA VELA MD             D: 10/27/2017 15:56   T: 10/27/2017 19:25   MT: ELIZABET      Name:     JOVANNA DE LEÓN   MRN:      [id number]        Account:      [id number]   :      1932           Service Date: 10/27/2017      Document: [id number]

## 2017-11-01 NOTE — TELEPHONE ENCOUNTER
Pt son has some further questions about the watchman procedure, such as the efficacy. LM for pt son La to call back. [email or web address]

## 2017-11-29 NOTE — TELEPHONE ENCOUNTER
JANAK for pt son Chris to call back to discuss if a decision has been made about Watchman for pt. JNelsonRN

## 2018-01-19 ENCOUNTER — ALLIED HEALTH/NURSE VISIT (OUTPATIENT)
Dept: CARDIOLOGY | Facility: CLINIC | Age: 83
End: 2018-01-19
Payer: COMMERCIAL

## 2018-01-19 DIAGNOSIS — Z95.0 CARDIAC PACEMAKER IN SITU: Primary | ICD-10-CM

## 2018-01-19 PROCEDURE — 93293 PM PHONE R-STRIP DEVICE EVAL: CPT | Performed by: INTERNAL MEDICINE

## 2018-01-19 NOTE — MR AVS SNAPSHOT
"              After Visit Summary   1/19/2018    Zayda Hawkins    MRN: [id number]           Patient Information     Date Of Birth          12/24/1932        Visit Information        Provider Department      1/19/2018 11:15 AM MULTILINGUAL WORD; KAIT SMITH [facility]        Today's Diagnoses     Cardiac pacemaker in situ    -  1       Follow-ups after your visit        Your next 10 appointments already scheduled     Mar 01, 2018  1:45 PM CST   Return Visit with Kingsley Brandon MD   [facility] ([facility])    [address]  [facility] [address] [phone]            Apr 20, 2018  1:15 PM CDT   Teletrace with KAIT SMITH   [facility] ([facility] [address] [facility])    [id number]  [facility] [id number] [phone]              Who to contact     If you have questions or need follow up information about today's clinic visit or your schedule please contact [facility] directly at [phone].  Normal or non-critical lab and imaging results will be communicated to you by [employer], letter or phone within 4 business days after the clinic has received the results. If you do not hear from us within 7 days, please contact the clinic through [employer] or phone. If you have a critical or abnormal lab result, we will notify you by phone as soon as possible.  Submit refill requests through [employer] or call your pharmacy and they will forward the refill request to us. Please allow 3 business days for your refill to be completed.          Additional Information About Your Visit        [employer] Information     [employer]Page lets you send messages to your doctor, view your test results, renew your prescriptions, schedule appointments and more. To sign up, go to [email or web address] . Click on \"Log in\" on the left side of the screen, which will " "take you to the Welcome page. Then click on \"Sign up Now\" on the right side of the page.     You will be asked to enter the access code listed below, as well as some personal information. Please follow the directions to create your username and password.     Your access code is: [email or web address]  Expires: 2018 11:43 AM     Your access code will  in 90 days. If you need help or a new code, please call your [place] clinic or [phone].        Care EveryWhere ID     This is your Care EveryWhere ID. This could be used by other organizations to access your [place] medical records  [id number]         Blood Pressure from Last 3 Encounters:   10/27/17 124/66   17 130/78   10/26/16 141/83    Weight from Last 3 Encounters:   10/27/17 72.1 kg (159 lb)   17 76.3 kg (168 lb 3.2 oz)   16 77.4 kg (170 lb 9.6 oz)              We Performed the Following     PM PHONE R STRIP EVAL UP TO 90 DAYS ([phone])        Primary Care Provider Office Phone # Fax #    Altonankit Willoughby [phone]       [place] [address] [address]        Equal Access to Services     JOSE MARTEL : Hadii aad ku hadasho Soomaali, waaxda luqadaha, qaybta kaalmada adeegyada, waxay idiin hayaan quan mcmahon . So [facility] [phone].    ATENCIÓN: Si habla español, tiene a hoffmann disposición servicios gratuitos de asistencia lingüística. Llame al [phone].    We comply with applicable federal civil rights laws and [place] laws. We do not discriminate on the basis of race, color, national origin, age, disability, sex, sexual orientation, or gender identity.            Thank you!     Thank you for choosing [facility]  for your care. Our goal is always to provide you with excellent care. Hearing back from our patients is one way we can continue to improve our services. Please take a few minutes to complete the written survey that you may receive in " the mail after your visit with us. Thank you!             Your Updated Medication List - Protect others around you: Learn how to safely use, store and throw away your medicines at www.disposemymeds.org.          This list is accurate as of: 1/19/18 11:43 AM.  Always use your most recent med list.                   Brand Name Dispense Instructions for use Diagnosis    amLODIPine 5 MG tablet    NORVASC    90 tablet    Take 1 tablet (5 mg) by mouth daily    Benign essential hypertension       aspirin 81 MG EC tablet     30 tablet    Take 1 tablet by mouth daily.    Atrial fibrillation with rapid ventricular response (H)       calcium carbonate-vitamin D 500-400 MG-UNIT Tabs per tablet      Take 1 tablet by mouth 2 times daily.        IMITREX PO      Take 25 mg by mouth as needed.        irbesartan 300 MG tablet    AVAPRO    90 tablet    Take 1 tablet (300 mg) by mouth daily    Hypertension       lidocaine 5 % Patch    LIDODERM     Place 1 patch onto the skin every 24 hours        magnesium gluconate 500 MG tablet    MAGONATE     Take 500 mg by mouth daily.        metoprolol succinate 100 MG 24 hr tablet    TOPROL XL    90 tablet    Take 1 tablet (100 mg) by mouth daily    Atrial fibrillation with rapid ventricular response (H), Hypertension, ACS (acute coronary syndrome) (H)       multivitamin, therapeutic Tabs tablet      Take 1 tablet by mouth daily.        OMEGA-3 FISH OIL PO      Take 1,200 mg by mouth daily.        sertraline 50 MG tablet    ZOLOFT     Take 50 mg by mouth every evening.        simvastatin 40 MG tablet    ZOCOR    90 tablet    Take 1 tablet (40 mg) by mouth At Bedtime    CAD (coronary artery disease)       TIKOSYN 500 MCG capsule   Generic drug:  dofetilide     60 capsule    Take 1 capsule (500 mcg) by mouth 2 times daily    Atrial fibrillation with rapid ventricular response (H)       timolol hemihydrate 0.5 % Soln ophthalmic solution    BETIMOL     Place 1 drop into both eyes 2 times daily

## 2018-01-19 NOTE — PROGRESS NOTES
~ 90 day office teletrace ~  Appropriate AP/VS at time of check. Magnet response WNL. F/U teletrace q 3 months. Pt scheduled to see Dr. Brandon in March. Zeferino SHEIKH

## 2018-03-07 ENCOUNTER — OFFICE VISIT (OUTPATIENT)
Dept: CARDIOLOGY | Facility: CLINIC | Age: 83
End: 2018-03-07
Attending: INTERNAL MEDICINE
Payer: COMMERCIAL

## 2018-03-07 VITALS
WEIGHT: 160 LBS | BODY MASS INDEX: 27.31 KG/M2 | DIASTOLIC BLOOD PRESSURE: 93 MMHG | SYSTOLIC BLOOD PRESSURE: 166 MMHG | HEART RATE: 103 BPM | HEIGHT: 64 IN

## 2018-03-07 DIAGNOSIS — I87.2 VENOUS INSUFFICIENCY: Primary | ICD-10-CM

## 2018-03-07 DIAGNOSIS — R60.9 EDEMA, UNSPECIFIED TYPE: ICD-10-CM

## 2018-03-07 PROCEDURE — 99213 OFFICE O/P EST LOW 20 MIN: CPT | Mod: 25 | Performed by: INTERNAL MEDICINE

## 2018-03-07 PROCEDURE — 93000 ELECTROCARDIOGRAM COMPLETE: CPT | Performed by: INTERNAL MEDICINE

## 2018-03-07 NOTE — LETTER
3/7/2018    Alton [facility] [address]  [facility] [address]    RE: Zayda Hawkins       Dear Colleague,    I had the pleasure of seeing Zayda Hawkins in the [facility].    HPI and Plan:   See dictation    Orders Placed This Encounter   Procedures     EKG 12-lead complete w/read - Clinics (performed today)       No orders of the defined types were placed in this encounter.      There are no discontinued medications.      Encounter Diagnoses   Name Primary?     Venous insufficiency Yes     Edema, unspecified type        CURRENT MEDICATIONS:  Current Outpatient Prescriptions   Medication Sig Dispense Refill     TIKOSYN 500 MCG capsule Take 1 capsule (500 mcg) by mouth 2 times daily 60 capsule 11     irbesartan (AVAPRO) 300 MG tablet Take 1 tablet (300 mg) by mouth daily 90 tablet 3     lidocaine (LIDODERM) 5 % patch Place 1 patch onto the skin every 24 hours       timolol hemihydrate (BETIMOL) 0.5 % SOLN ophthalmic solution Place 1 drop into both eyes 2 times daily       amLODIPine (NORVASC) 5 MG tablet Take 1 tablet (5 mg) by mouth daily 90 tablet 3     metoprolol (TOPROL XL) 100 MG 24 hr tablet Take 1 tablet (100 mg) by mouth daily 90 tablet 3     simvastatin (ZOCOR) 40 MG tablet Take 1 tablet (40 mg) by mouth At Bedtime 90 tablet 3     calcium carbonate-vitamin D 500-400 MG-UNIT TABS Take 1 tablet by mouth 2 times daily.         magnesium gluconate (MAGONATE) 500 MG tablet Take 500 mg by mouth daily.         multivitamin, therapeutic (THERA-VIT) TABS Take 1 tablet by mouth daily.         Omega-3 Fatty Acids (OMEGA-3 FISH OIL PO) Take 1,200 mg by mouth daily.         sertraline (ZOLOFT) 50 MG tablet Take 50 mg by mouth every evening.         aspirin 81 MG EC tablet Take 1 tablet by mouth daily. 30 tablet 11     SUMAtriptan Succinate (IMITREX PO) Take 25 mg by mouth as needed.           ALLERGIES     Allergies   Allergen Reactions      Aspirin      Coumadin [Warfarin]      Spontaneous retroperitoneal bleed.     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     Venous insufficiency        PAST SURGICAL HISTORY:  Past Surgical History:   Procedure Laterality Date     ANESTHESIA CARDIOVERSION  7/23/2012    Procedure: ANESTHESIA CARDIOVERSION;;  Surgeon: Provider, Generic Anesthesia;  Location:  ANESTHESIA - [facility] - DO NOT SCHEDULE     IMPLANT PACEMAKER  11/2012    Dual chamber       FAMILY HISTORY:  Family History   Problem Relation Age of Onset     HEART DISEASE Mother 65     mi     HEART DISEASE Father 45     pnemonia       SOCIAL HISTORY:  Social History     Social History     Marital status:      Spouse name: N/A     Number of children: N/A     Years of education: N/A     Social History Main Topics     Smoking status: Never Smoker     Smokeless tobacco: Never Used     Alcohol use No     Drug use: No     Sexual activity: Not Asked     Other Topics Concern     Caffeine Concern Yes     no caffeine intake     Special Diet Yes     no sugar, salt or fats      Exercise Yes     treadmill, swimming daily      Seat Belt Yes     Social History Narrative       Review of Systems:  Skin:  Negative       Eyes:  Negative      ENT:  Negative      Respiratory:  Negative       Cardiovascular:    Positive for;palpitations    Gastroenterology: Negative      Genitourinary:  Negative      Musculoskeletal:  Positive for arthritis;nocturnal cramping    Neurologic:  Negative      Psychiatric:  Negative      Heme/Lymph/Imm:  Negative      Endocrine:  Positive for diabetes      Physical  "Exam:  Vitals: BP (!) 166/93  Pulse 103  Ht 1.626 m (5' 4\")  Wt 72.6 kg (160 lb)  BMI 27.46 kg/m2    Constitutional:  cooperative;in no acute distress        Skin:  warm and dry to the touch;no apparent skin lesions or masses noted          Head:  normocephalic, no masses or lesions        Eyes:  conjunctivae and lids unremarkable        Lymph:No Cervical lymphadenopathy present     ENT:  no pallor or cyanosis, dentition good        Neck:  carotid pulses are full and equal bilaterally;JVP normal;no carotid bruit        Respiratory:  clear to auscultation         Cardiac: regular rhythm;normal S1 and S2;no murmurs, gallops or rubs detected                pulses full and equal, no bruits auscultated                                        GI:  abdomen soft;non-tender        Extremities and Muscular Skeletal:  no edema;no deformities, clubbing, cyanosis, erythema observed              Neurological:  no gross motor deficits        Psych:  affect appropriate, oriented to time, person and place        CC  Kingsley Brandon MD  [address]  [place] [id number]                Thank you for allowing me to participate in the care of your patient.      Sincerely,     Kingsley Brandon MD, MD     [facility]    cc:   Kingsley Brandon MD  [address]  [place] [id number]        "

## 2018-03-07 NOTE — PROGRESS NOTES
Service Date: 2018      REASON FOR VISIT:  Follow up for lower extremity edema and chronic venous insufficiency.      HISTORY OF PRESENT ILLNESS:  I had the pleasure of seeing Zayda Hawkins at the [facility] in [place] this afternoon.  She is a very pleasant, 85-year-old female with a history of chronic lower extremity venous insufficiency, paroxysmal atrial fibrillation, coronary artery disease and hypertension.  We have been following her for the last several years for chronic lower extremity edema.  She was placed on compression stockings a few years ago, and her symptoms have improved dramatically.      She remains active and goes to the [facility] on a daily basis.  She walks up to an hour a day.  She does not endorse any significant lower extremity symptoms at this point.      IMPRESSION AND PLAN:   1.  Bilateral lower extremity edema likely due to chronic venous insufficiency, resolved.   2.  Atrial fibrillation  3.  Coronary artery disease, stable   4.  Hypertension, stable.      She remains stable from a lower extremity venous disease point of view.  I recommended that she continue to wear compression stockings.  I have also encouraged her to remain active.      I have asked her to see me as needed in the near future.  There is no indication for her to see me on an annual basis at this point since her symptoms have resolved.        I appreciate the opportunity to be part of this patient's care.         DO BARKLEY MD             D: 2018   T: 2018   MT: bandar      Name:     ZAYDA DE LEÓN   MRN:      [id number]        Account:      [id number]   :      1932           Service Date: 2018      Document: [id number]

## 2018-03-07 NOTE — LETTER
3/7/2018      [facility] [address]  [facility] [address]      RE: Zayda Hawkins       Dear Colleague,    I had the pleasure of seeing Zayda Hawkins in the [facility].    Service Date: 2018      REASON FOR VISIT:  Follow up for lower extremity edema and chronic venous insufficiency.      HISTORY OF PRESENT ILLNESS:  I had the pleasure of seeing Zayda Hawkins at the [facility] in [place] this afternoon.  She is a very pleasant, 85-year-old female with a history of chronic lower extremity venous insufficiency, paroxysmal atrial fibrillation, coronary artery disease and hypertension.  We have been following her for the last several years for chronic lower extremity edema.  She was placed on compression stockings a few years ago, and her symptoms have improved dramatically.      She remains active and goes to the [facility] on a daily basis.  She walks up to an hour a day.  She does not endorse any significant lower extremity symptoms at this point.      IMPRESSION AND PLAN:   1.  Bilateral lower extremity edema likely due to chronic venous insufficiency, resolved.   2.  Atrial fibrillation, ***   3.  Coronary artery disease, stable   4.  Hypertension, stable.      She remains stable from a lower extremity venous disease point of view.  I recommended that she continue to wear compression stockings.  I have also encouraged her to remain active.      I have asked her to see me as needed in the near future.  There is no indication for her to see me on an annual basis at this point since her symptoms have resolved.        I appreciate the opportunity to be part of this patient's care.         DO BARKLEY MD             D: 2018   T: 2018   MT: bandar      Name:     ZAYDA DE LEÓN   MRN:      [id number]        Account:      [id number]   :      1932           Service Date:  03/07/2018      Document: [id number]         Outpatient Encounter Prescriptions as of 3/7/2018   Medication Sig Dispense Refill     TIKOSYN 500 MCG capsule Take 1 capsule (500 mcg) by mouth 2 times daily 60 capsule 11     [DISCONTINUED] irbesartan (AVAPRO) 300 MG tablet Take 1 tablet (300 mg) by mouth daily 90 tablet 3     lidocaine (LIDODERM) 5 % patch Place 1 patch onto the skin every 24 hours       timolol hemihydrate (BETIMOL) 0.5 % SOLN ophthalmic solution Place 1 drop into both eyes 2 times daily       amLODIPine (NORVASC) 5 MG tablet Take 1 tablet (5 mg) by mouth daily 90 tablet 3     metoprolol (TOPROL XL) 100 MG 24 hr tablet Take 1 tablet (100 mg) by mouth daily 90 tablet 3     simvastatin (ZOCOR) 40 MG tablet Take 1 tablet (40 mg) by mouth At Bedtime 90 tablet 3     calcium carbonate-vitamin D 500-400 MG-UNIT TABS Take 1 tablet by mouth 2 times daily.         magnesium gluconate (MAGONATE) 500 MG tablet Take 500 mg by mouth daily.         multivitamin, therapeutic (THERA-VIT) TABS Take 1 tablet by mouth daily.         Omega-3 Fatty Acids (OMEGA-3 FISH OIL PO) Take 1,200 mg by mouth daily.         sertraline (ZOLOFT) 50 MG tablet Take 50 mg by mouth every evening.         aspirin 81 MG EC tablet Take 1 tablet by mouth daily. 30 tablet 11     SUMAtriptan Succinate (IMITREX PO) Take 25 mg by mouth as needed.         Facility-Administered Encounter Medications as of 3/7/2018   Medication Dose Route Frequency Provider Last Rate Last Dose     sodium chloride (PF) 0.9% PF flush 10 mL  10 mL Intravenous Q10 Min PRN Ip, Vladimir Peña MD   10 mL at 07/02/14 1110     sodium chloride bacteriostatic 0.9 % flush 0-1 mL  0-1 mL Intradermal Once PRN Ip, Vladimir Peña MD         sodium chloride (PF) 0.9% PF flush 5-10 mL  5-10 mL Intravenous q1 min prn Ip, Vladimir Peña MD         albuterol (PROAIR HFA, PROVENTIL HFA, VENTOLIN HFA) inhaler 2 puff  2 puff Inhalation Q5 Min PRN Ip, Vladimir Peña MD          aminophylline injection  mg   mg Intravenous Once PRN Ip, Vladimir Peña MD           Again, thank you for allowing me to participate in the care of your patient.      Sincerely,    Kingsley Brandon MD, MD     [facility]

## 2018-03-07 NOTE — LETTER
3/7/2018      [facility] [address]  [facility] [address]      RE: Zayda Hawkins       Dear Colleague,    I had the pleasure of seeing Zayda Hawkins in the [facility].    Service Date: 2018      REASON FOR VISIT:  Follow up for lower extremity edema and chronic venous insufficiency.      HISTORY OF PRESENT ILLNESS:  I had the pleasure of seeing Zayda Hawkins at the [facility] in [place] this afternoon.  She is a very pleasant, 85-year-old female with a history of chronic lower extremity venous insufficiency, paroxysmal atrial fibrillation, coronary artery disease and hypertension.  We have been following her for the last several years for chronic lower extremity edema.  She was placed on compression stockings a few years ago, and her symptoms have improved dramatically.      She remains active and [facility] basis.  She walks up to an hour a day.  She does not endorse any significant lower extremity symptoms at this point.      IMPRESSION AND PLAN:   1.  Bilateral lower extremity edema likely due to chronic venous insufficiency, resolved.   2.  Atrial fibrillation, ***   3.  Coronary artery disease, stable   4.  Hypertension, stable.      She remains stable from a lower extremity venous disease point of view.  I recommended that she continue to wear compression stockings.  I have also encouraged her to remain active.      I have asked her to see me as needed in the near future.  There is no indication for her to see me on an annual basis at this point since her symptoms have resolved.        I appreciate the opportunity to be part of this patient's care.         DO BARKLEY MD             D: 2018   T: 2018   MT: bandar      Name:     ZAYDA DE LEÓN   MRN:      [id number]        Account:      [id number]   :      1932           Service Date:  03/07/2018      Document: [id number]         Outpatient Encounter Prescriptions as of 3/7/2018   Medication Sig Dispense Refill     TIKOSYN 500 MCG capsule Take 1 capsule (500 mcg) by mouth 2 times daily 60 capsule 11     [DISCONTINUED] irbesartan (AVAPRO) 300 MG tablet Take 1 tablet (300 mg) by mouth daily 90 tablet 3     lidocaine (LIDODERM) 5 % patch Place 1 patch onto the skin every 24 hours       timolol hemihydrate (BETIMOL) 0.5 % SOLN ophthalmic solution Place 1 drop into both eyes 2 times daily       amLODIPine (NORVASC) 5 MG tablet Take 1 tablet (5 mg) by mouth daily 90 tablet 3     metoprolol (TOPROL XL) 100 MG 24 hr tablet Take 1 tablet (100 mg) by mouth daily 90 tablet 3     simvastatin (ZOCOR) 40 MG tablet Take 1 tablet (40 mg) by mouth At Bedtime 90 tablet 3     calcium carbonate-vitamin D 500-400 MG-UNIT TABS Take 1 tablet by mouth 2 times daily.         magnesium gluconate (MAGONATE) 500 MG tablet Take 500 mg by mouth daily.         multivitamin, therapeutic (THERA-VIT) TABS Take 1 tablet by mouth daily.         Omega-3 Fatty Acids (OMEGA-3 FISH OIL PO) Take 1,200 mg by mouth daily.         sertraline (ZOLOFT) 50 MG tablet Take 50 mg by mouth every evening.         aspirin 81 MG EC tablet Take 1 tablet by mouth daily. 30 tablet 11     SUMAtriptan Succinate (IMITREX PO) Take 25 mg by mouth as needed.         Facility-Administered Encounter Medications as of 3/7/2018   Medication Dose Route Frequency Provider Last Rate Last Dose     sodium chloride (PF) 0.9% PF flush 10 mL  10 mL Intravenous Q10 Min PRN Ip, Vladimir Peña MD   10 mL at 07/02/14 1110     sodium chloride bacteriostatic 0.9 % flush 0-1 mL  0-1 mL Intradermal Once PRN Ip, Vladimir Peña MD         sodium chloride (PF) 0.9% PF flush 5-10 mL  5-10 mL Intravenous q1 min prn Ip, Vladimir Peña MD         albuterol (PROAIR HFA, PROVENTIL HFA, VENTOLIN HFA) inhaler 2 puff  2 puff Inhalation Q5 Min PRN Ip, Vladimir Peña MD          aminophylline injection  mg   mg Intravenous Once PRN Ip, Vladimir Peña MD           Again, thank you for allowing me to participate in the care of your patient.      Sincerely,    Kingsley Brandon MD, MD     [facility]

## 2018-03-07 NOTE — PROGRESS NOTES
HPI and Plan:   See dictation    Orders Placed This Encounter   Procedures     EKG 12-lead complete w/read - Clinics (performed today)       No orders of the defined types were placed in this encounter.      There are no discontinued medications.      Encounter Diagnoses   Name Primary?     Venous insufficiency Yes     Edema, unspecified type        CURRENT MEDICATIONS:  Current Outpatient Prescriptions   Medication Sig Dispense Refill     TIKOSYN 500 MCG capsule Take 1 capsule (500 mcg) by mouth 2 times daily 60 capsule 11     irbesartan (AVAPRO) 300 MG tablet Take 1 tablet (300 mg) by mouth daily 90 tablet 3     lidocaine (LIDODERM) 5 % patch Place 1 patch onto the skin every 24 hours       timolol hemihydrate (BETIMOL) 0.5 % SOLN ophthalmic solution Place 1 drop into both eyes 2 times daily       amLODIPine (NORVASC) 5 MG tablet Take 1 tablet (5 mg) by mouth daily 90 tablet 3     metoprolol (TOPROL XL) 100 MG 24 hr tablet Take 1 tablet (100 mg) by mouth daily 90 tablet 3     simvastatin (ZOCOR) 40 MG tablet Take 1 tablet (40 mg) by mouth At Bedtime 90 tablet 3     calcium carbonate-vitamin D 500-400 MG-UNIT TABS Take 1 tablet by mouth 2 times daily.         magnesium gluconate (MAGONATE) 500 MG tablet Take 500 mg by mouth daily.         multivitamin, therapeutic (THERA-VIT) TABS Take 1 tablet by mouth daily.         Omega-3 Fatty Acids (OMEGA-3 FISH OIL PO) Take 1,200 mg by mouth daily.         sertraline (ZOLOFT) 50 MG tablet Take 50 mg by mouth every evening.         aspirin 81 MG EC tablet Take 1 tablet by mouth daily. 30 tablet 11     SUMAtriptan Succinate (IMITREX PO) Take 25 mg by mouth as needed.           ALLERGIES     Allergies   Allergen Reactions     Aspirin      Coumadin [Warfarin]      Spontaneous retroperitoneal bleed.     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     Venous insufficiency        PAST SURGICAL HISTORY:  Past Surgical History:   Procedure Laterality Date     ANESTHESIA CARDIOVERSION  7/23/2012    Procedure: ANESTHESIA CARDIOVERSION;;  Surgeon: Provider, Generic Anesthesia;  Location:  ANESTHESIA - RH - DO NOT SCHEDULE     IMPLANT PACEMAKER  11/2012    Dual chamber       FAMILY HISTORY:  Family History   Problem Relation Age of Onset     HEART DISEASE Mother 65     mi     HEART DISEASE Father 45     pnemonia       SOCIAL HISTORY:  Social History     Social History     Marital status:      Spouse name: N/A     Number of children: N/A     Years of education: N/A     Social History Main Topics     Smoking status: Never Smoker     Smokeless tobacco: Never Used     Alcohol use No     Drug use: No     Sexual activity: Not Asked     Other Topics Concern     Caffeine Concern Yes     no caffeine intake     Special Diet Yes     no sugar, salt or fats      Exercise Yes     treadmill, swimming daily      Seat Belt Yes     Social History Narrative       Review of Systems:  Skin:  Negative       Eyes:  Negative      ENT:  Negative      Respiratory:  Negative       Cardiovascular:    Positive for;palpitations    Gastroenterology: Negative      Genitourinary:  Negative      Musculoskeletal:  Positive for arthritis;nocturnal cramping    Neurologic:  Negative      Psychiatric:  Negative      Heme/Lymph/Imm:  Negative      Endocrine:  Positive for diabetes      Physical Exam:  Vitals: BP (!) 166/93  Pulse 103  Ht 1.626 m (5' 4\")  Wt 72.6 kg (160 lb)  BMI 27.46 kg/m2    Constitutional:  cooperative;in no acute distress        Skin:  warm and dry to the touch;no apparent skin lesions or masses noted          Head:  " normocephalic, no masses or lesions        Eyes:  conjunctivae and lids unremarkable        Lymph:No Cervical lymphadenopathy present     ENT:  no pallor or cyanosis, dentition good        Neck:  carotid pulses are full and equal bilaterally;JVP normal;no carotid bruit        Respiratory:  clear to auscultation         Cardiac: regular rhythm;normal S1 and S2;no murmurs, gallops or rubs detected                pulses full and equal, no bruits auscultated                                        GI:  abdomen soft;non-tender        Extremities and Muscular Skeletal:  no edema;no deformities, clubbing, cyanosis, erythema observed              Neurological:  no gross motor deficits        Psych:  affect appropriate, oriented to time, person and place        CC  Kingsley Brandon MD  [address]  [place] [address]

## 2018-03-07 NOTE — LETTER
3/7/2018      [facility] [address]  [facility] [address]      RE: Zayda Hawkins       Dear Colleague,    I had the pleasure of seeing Zayda Hawkins in the [facility].    Service Date: 2018      REASON FOR VISIT:  Follow up for lower extremity edema and chronic venous insufficiency.      HISTORY OF PRESENT ILLNESS:  I had the pleasure of seeing Zayda Hawkins at the [facility] in [place] this afternoon.  She is a very pleasant, 85-year-old female with a history of chronic lower extremity venous insufficiency, paroxysmal atrial fibrillation, coronary artery disease and hypertension.  We have been following her for the last several years for chronic lower extremity edema.  She was placed on compression stockings a few years ago, and her symptoms have improved dramatically.      She remains active and goes to the [facility] on a daily basis.  She walks up to an hour a day.  She does not endorse any significant lower extremity symptoms at this point.      IMPRESSION AND PLAN:   1.  Bilateral lower extremity edema likely due to chronic venous insufficiency, resolved.   2.  Atrial fibrillation  3.  Coronary artery disease, stable   4.  Hypertension, stable.      She remains stable from a lower extremity venous disease point of view.  I recommended that she continue to wear compression stockings.  I have also encouraged her to remain active.      I have asked her to see me as needed in the near future.  There is no indication for her to see me on an annual basis at this point since her symptoms have resolved.        I appreciate the opportunity to be part of this patient's care.         DO BARKLEY MD             D: 2018   T: 2018   MT: bandar      Name:     ZAYDA DE LEÓN   MRN:      [id number]        Account:      [id number]   :      1932           Service Date: 2018       Document: [id number]           Outpatient Encounter Prescriptions as of 3/7/2018   Medication Sig Dispense Refill     TIKOSYN 500 MCG capsule Take 1 capsule (500 mcg) by mouth 2 times daily 60 capsule 11     [DISCONTINUED] irbesartan (AVAPRO) 300 MG tablet Take 1 tablet (300 mg) by mouth daily 90 tablet 3     lidocaine (LIDODERM) 5 % patch Place 1 patch onto the skin every 24 hours       timolol hemihydrate (BETIMOL) 0.5 % SOLN ophthalmic solution Place 1 drop into both eyes 2 times daily       amLODIPine (NORVASC) 5 MG tablet Take 1 tablet (5 mg) by mouth daily 90 tablet 3     metoprolol (TOPROL XL) 100 MG 24 hr tablet Take 1 tablet (100 mg) by mouth daily 90 tablet 3     simvastatin (ZOCOR) 40 MG tablet Take 1 tablet (40 mg) by mouth At Bedtime 90 tablet 3     calcium carbonate-vitamin D 500-400 MG-UNIT TABS Take 1 tablet by mouth 2 times daily.         magnesium gluconate (MAGONATE) 500 MG tablet Take 500 mg by mouth daily.         multivitamin, therapeutic (THERA-VIT) TABS Take 1 tablet by mouth daily.         Omega-3 Fatty Acids (OMEGA-3 FISH OIL PO) Take 1,200 mg by mouth daily.         sertraline (ZOLOFT) 50 MG tablet Take 50 mg by mouth every evening.         aspirin 81 MG EC tablet Take 1 tablet by mouth daily. 30 tablet 11     SUMAtriptan Succinate (IMITREX PO) Take 25 mg by mouth as needed.         Facility-Administered Encounter Medications as of 3/7/2018   Medication Dose Route Frequency Provider Last Rate Last Dose     sodium chloride (PF) 0.9% PF flush 10 mL  10 mL Intravenous Q10 Min PRN Ip, Vladimir Peña MD   10 mL at 07/02/14 1110     sodium chloride bacteriostatic 0.9 % flush 0-1 mL  0-1 mL Intradermal Once PRN Ip, Vladimir Peña MD         sodium chloride (PF) 0.9% PF flush 5-10 mL  5-10 mL Intravenous q1 min prn Ip, Vladimir Peña MD         albuterol (PROAIR HFA, PROVENTIL HFA, VENTOLIN HFA) inhaler 2 puff  2 puff Inhalation Q5 Min PRN Ip, Vladimir Peña MD         aminophylline injection   mg   mg Intravenous Once PRN Ip, Vladimir Peña MD           Again, thank you for allowing me to participate in the care of your patient.      Sincerely,    Kingsley Brandon MD, MD     [facility]

## 2018-03-07 NOTE — MR AVS SNAPSHOT
"              After Visit Summary   3/7/2018    Zayda Hawkins    MRN: [id number]           Patient Information     Date Of Birth          12/24/1932        Visit Information        Provider Department      3/7/2018 12:30 PM Kingsley Brandon MD; M[facility]IL[facility]        Today's Diagnoses     Venous insufficiency    -  1    Edema, unspecified type           Follow-ups after your visit        Your next 10 appointments already scheduled     Apr 20, 2018  1:15 PM CDT   [place] with CARBALLO TECH1   [facility] ([facility])    [address] [place] [address] [address]  [facility] [id number] [phone]              Who to contact     If you have questions or need follow up information about today's clinic visit or your schedule please contact [facility] directly at [phone].  Normal or non-critical lab and imaging results will be communicated to you by MyChart, letter or phone within 4 business days after the clinic has received the results. If you do not hear from us within 7 days, please contact the clinic through MyChart or phone. If you have a critical or abnormal lab result, we will notify you by phone as soon as possible.  Submit refill requests through [employer] or call your pharmacy and they will forward the refill request to us. Please allow 3 business days for your refill to be completed.          Additional Information About Your Visit        MyChart Information     [employer] lets you send messages to your doctor, view your test results, renew your prescriptions, schedule appointments and more. To sign up, go to [email or web address] . Click on \"Log in\" on the left side of the screen, which will take you to the Welcome page. Then click on \"Sign up Now\" on the right side of the page.     You will be asked to enter the access code listed below, as well as some personal " "information. Please follow the directions to create your username and password.     Your access code is: [email or web address]  Expires: 2018 11:43 AM     Your access code will  in 90 days. If you need help or a new code, please call your [place] clinic or [phone].        Care EveryWhere ID     This is your Care EveryWhere ID. This could be used by other organizations to access your [facility] medical records  [id number]        Your Vitals Were     Pulse Height BMI (Body Mass Index)             103 1.626 m (5' 4\") 27.46 kg/m2          Blood Pressure from Last 3 Encounters:   18 (!) 166/93   10/27/17 124/66   17 130/78    Weight from Last 3 Encounters:   18 72.6 kg (160 lb)   10/27/17 72.1 kg (159 lb)   17 76.3 kg (168 lb 3.2 oz)              We Performed the Following     EKG 12-lead complete w/read - Clinics (performed today)     Follow-Up with Cardiologist        Primary Care Provider Office Phone # Fax #    Al[place] Willoughby [phone]       [place] [address] [address] [address]        Equal Access to Services     JOSE MARTEL AH: Hadii jermaine ku hadasho Soomaali, waaxda luqadaha, qaybta kaalmada adeegyada, waxay idiin haysheila yousif. So [facility] [phone].    ATENCIÓN: Si habla español, tiene a hoffmann disposición servicios gratuitos de asistencia lingüística. [facility] [phone].    We comply with applicable federal civil rights laws and [place] laws. We do not discriminate on the basis of race, color, national origin, age, disability, sex, sexual orientation, or gender identity.            Thank you!     Thank you for choosing [facility]  for your care. Our goal is always to provide you with excellent care. Hearing back from our patients is one way we can continue to improve our services. Please take a few minutes to complete the written survey that you may receive in the mail after your visit " with us. Thank you!             Your Updated Medication List - Protect others around you: Learn how to safely use, store and throw away your medicines at www.disposemymeds.org.          This list is accurate as of 3/7/18  1:20 PM.  Always use your most recent med list.                   Brand Name Dispense Instructions for use Diagnosis    amLODIPine 5 MG tablet    NORVASC    90 tablet    Take 1 tablet (5 mg) by mouth daily    Benign essential hypertension       aspirin 81 MG EC tablet     30 tablet    Take 1 tablet by mouth daily.    Atrial fibrillation with rapid ventricular response (H)       calcium carbonate-vitamin D 500-400 MG-UNIT Tabs per tablet      Take 1 tablet by mouth 2 times daily.        IMITREX PO      Take 25 mg by mouth as needed.        irbesartan 300 MG tablet    AVAPRO    90 tablet    Take 1 tablet (300 mg) by mouth daily    Hypertension       lidocaine 5 % Patch    LIDODERM     Place 1 patch onto the skin every 24 hours        magnesium gluconate 500 MG tablet    MAGONATE     Take 500 mg by mouth daily.        metoprolol succinate 100 MG 24 hr tablet    TOPROL XL    90 tablet    Take 1 tablet (100 mg) by mouth daily    Atrial fibrillation with rapid ventricular response (H), Hypertension, ACS (acute coronary syndrome) (H)       multivitamin, therapeutic Tabs tablet      Take 1 tablet by mouth daily.        OMEGA-3 FISH OIL PO      Take 1,200 mg by mouth daily.        sertraline 50 MG tablet    ZOLOFT     Take 50 mg by mouth every evening.        simvastatin 40 MG tablet    ZOCOR    90 tablet    Take 1 tablet (40 mg) by mouth At Bedtime    CAD (coronary artery disease)       TIKOSYN 500 MCG capsule   Generic drug:  dofetilide     60 capsule    Take 1 capsule (500 mcg) by mouth 2 times daily    Atrial fibrillation with rapid ventricular response (H)       timolol hemihydrate 0.5 % Soln ophthalmic solution    BETIMOL     Place 1 drop into both eyes 2 times daily

## 2018-03-09 DIAGNOSIS — I10 ESSENTIAL HYPERTENSION: ICD-10-CM

## 2018-03-09 RX ORDER — IRBESARTAN 300 MG/1
300 TABLET ORAL DAILY
Qty: 90 TABLET | Refills: 3 | Status: ON HOLD | OUTPATIENT
Start: 2018-03-09 | End: 2018-09-27

## 2018-04-06 DIAGNOSIS — I48.91 ATRIAL FIBRILLATION WITH RAPID VENTRICULAR RESPONSE (H): ICD-10-CM

## 2018-04-06 RX ORDER — DOFETILIDE 0.5 MG/1
500 CAPSULE ORAL 2 TIMES DAILY
Qty: 60 CAPSULE | Refills: 8 | Status: SHIPPED | OUTPATIENT
Start: 2018-04-06 | End: 2018-06-01 | Stop reason: ALTCHOICE

## 2018-06-01 DIAGNOSIS — I48.0 PAROXYSMAL ATRIAL FIBRILLATION (H): ICD-10-CM

## 2018-06-01 RX ORDER — DOFETILIDE 0.5 MG/1
500 CAPSULE ORAL 2 TIMES DAILY
Qty: 60 CAPSULE | Refills: 8 | Status: SHIPPED | OUTPATIENT
Start: 2018-06-01 | End: 2018-10-13

## 2018-06-11 ENCOUNTER — ALLIED HEALTH/NURSE VISIT (OUTPATIENT)
Dept: CARDIOLOGY | Facility: CLINIC | Age: 83
End: 2018-06-11
Payer: COMMERCIAL

## 2018-06-11 DIAGNOSIS — Z95.0 CARDIAC PACEMAKER IN SITU: Primary | ICD-10-CM

## 2018-06-11 PROCEDURE — 93293 PM PHONE R-STRIP DEVICE EVAL: CPT | Performed by: INTERNAL MEDICINE

## 2018-06-11 NOTE — MR AVS SNAPSHOT
"              After Visit Summary   6/11/2018    Zayda Hawkins    MRN: [id number]           Patient Information     Date Of Birth          12/24/1932        Visit Information        Provider Department      6/11/2018 3:15 PM KAIT TECH1 [facility]        Today's Diagnoses     Cardiac pacemaker in situ    -  1       Follow-ups after your visit        Your next 10 appointments already scheduled     Sep 05, 2018  3:15 PM CDT   Pacemaker Check with KAIT ARELLANON   [facility] ([facility])    [address] [place], [place] [id number]  [facility] [id number] [phone] OPT 2              Who to contact     If you have questions or need follow up information about today's clinic visit or your schedule please contact [facility] directly at [phone].  Normal or non-critical lab and imaging results will be communicated to you by [employer], letter or phone within 4 business days after the clinic has received the results. If you do not hear from us within 7 days, please contact the clinic through [employer] or phone. If you have a critical or abnormal lab result, we will notify you by phone as soon as possible.  Submit refill requests through [employer] or call your pharmacy and they will forward the refill request to us. Please allow 3 business days for your refill to be completed.          Additional Information About Your Visit        MyChart Information     [employer] lets you send messages to your doctor, view your test results, renew your prescriptions, schedule appointments and more. To sign up, go to [email or web address] . Click on \"Log in\" on the left side of the screen, which will take you to the Welcome page. Then click on \"Sign up Now\" on the right side of the page.     You will be asked to enter the access code listed below, as well as some personal information. Please follow the directions to create " your username and password.     Your access code is: [email or web address]  Expires: 2018  3:25 PM     Your access code will  in 90 days. If you need help or a new code, please call your [place] clinic or [phone].        Care EveryWhere ID     This is your Care EveryWhere ID. This could be used by other organizations to access your [place] medical records  [id number]         Blood Pressure from Last 3 Encounters:   18 (!) 166/93   10/27/17 124/66   17 130/78    Weight from Last 3 Encounters:   18 72.6 kg (160 lb)   10/27/17 72.1 kg (159 lb)   17 76.3 kg (168 lb 3.2 oz)              We Performed the Following     PM PHONE R STRIP EVAL UP TO 90 DAYS ([id number])        Primary Care Provider Office Phone # Fax #    Alton Willoughby [phone]       [facility] [address] [address]        Equal Access to Services     [facility]: Hadii aad ku hadasho Soomaali, waaxda luqadaha, qaybta kaalmada adeerik, karissa mcmahon . So [facility] [phone].    ATENCIÓN: Si habla español, tiene a hoffmann disposición servicios gratuitos de asistencia lingüística. Daniel al [phone].    We comply with applicable federal civil rights laws and [place] laws. We do not discriminate on the basis of race, color, national origin, age, disability, sex, sexual orientation, or gender identity.            Thank you!     Thank you for choosing [facility]  for your care. Our goal is always to provide you with excellent care. Hearing back from our patients is one way we can continue to improve our services. Please take a few minutes to complete the written survey that you may receive in the mail after your visit with us. Thank you!             Your Updated Medication List - Protect others around you: Learn how to safely use, store and throw away your medicines at www.disposemymeds.org.          This list is  accurate as of 6/11/18  3:25 PM.  Always use your most recent med list.                   Brand Name Dispense Instructions for use Diagnosis    amLODIPine 5 MG tablet    NORVASC    90 tablet    Take 1 tablet (5 mg) by mouth daily    Benign essential hypertension       aspirin 81 MG EC tablet     30 tablet    Take 1 tablet by mouth daily.    Atrial fibrillation with rapid ventricular response (H)       calcium carbonate-vitamin D 500-400 MG-UNIT Tabs per tablet      Take 1 tablet by mouth 2 times daily.        dofetilide 500 MCG capsule    TIKOSYN    60 capsule    Take 1 capsule (500 mcg) by mouth 2 times daily    Paroxysmal atrial fibrillation (H)       IMITREX PO      Take 25 mg by mouth as needed.        irbesartan 300 MG tablet    AVAPRO    90 tablet    Take 1 tablet (300 mg) by mouth daily    Essential hypertension       lidocaine 5 % Patch    LIDODERM     Place 1 patch onto the skin every 24 hours        magnesium gluconate 500 MG tablet    MAGONATE     Take 500 mg by mouth daily.        metoprolol succinate 100 MG 24 hr tablet    TOPROL XL    90 tablet    Take 1 tablet (100 mg) by mouth daily    Atrial fibrillation with rapid ventricular response (H), Hypertension, ACS (acute coronary syndrome) (H)       multivitamin, therapeutic Tabs tablet      Take 1 tablet by mouth daily.        OMEGA-3 FISH OIL PO      Take 1,200 mg by mouth daily.        sertraline 50 MG tablet    ZOLOFT     Take 50 mg by mouth every evening.        simvastatin 40 MG tablet    ZOCOR    90 tablet    Take 1 tablet (40 mg) by mouth At Bedtime    CAD (coronary artery disease)       timolol hemihydrate 0.5 % Soln ophthalmic solution    BETIMOL     Place 1 drop into both eyes 2 times daily

## 2018-06-11 NOTE — PROGRESS NOTES
~90 DAY CLINIC TELETRACE  Normal pacemaker function. AP/VS at time of check. Thirty second strips for presenting, magnet, and post magnet modes.  Six second strips were saved in each mode.  Normal function post magnet. F/u annual threshold in 3 months.KORI Carmona

## 2018-09-04 ENCOUNTER — TRANSFERRED RECORDS (OUTPATIENT)
Dept: HEALTH INFORMATION MANAGEMENT | Facility: CLINIC | Age: 83
End: 2018-09-04

## 2018-09-05 ENCOUNTER — ALLIED HEALTH/NURSE VISIT (OUTPATIENT)
Dept: CARDIOLOGY | Facility: CLINIC | Age: 83
End: 2018-09-05
Payer: COMMERCIAL

## 2018-09-05 DIAGNOSIS — I49.5 SSS (SICK SINUS SYNDROME) (H): ICD-10-CM

## 2018-09-05 DIAGNOSIS — Z95.0 CARDIAC PACEMAKER IN SITU: Primary | ICD-10-CM

## 2018-09-05 PROCEDURE — 93280 PM DEVICE PROGR EVAL DUAL: CPT | Performed by: INTERNAL MEDICINE

## 2018-09-05 NOTE — MR AVS SNAPSHOT
After Visit Summary   9/5/2018    Zayda Hawkins    MRN: [id number]           Patient Information     Date Of Birth          12/24/1932        Visit Information        Provider Department      9/5/2018 3:00 PM LANGUAGE BANC; KAIT ARELLANON [facility]        Today's Diagnoses     Cardiac pacemaker in situ    -  1    SSS (sick sinus syndrome) (H)           Follow-ups after your visit        Your next 10 appointments already scheduled     Dec 06, 2018  3:30 PM CST   Teletrace with CARBALLO TECH2   [facility] ([facility])    [address] [place] [address] [address]  [facility] [id number] [phone] OPT 2              Who to contact     If you have questions or need follow up information about today's clinic visit or your schedule please contact [facility] directly at [phone].  Normal or non-critical lab and imaging results will be communicated to you by MyChart, letter or phone within 4 business days after the clinic has received the results. If you do not hear from us within 7 days, please contact the clinic through MyChart or phone. If you have a critical or abnormal lab result, we will notify you by phone as soon as possible.  Submit refill requests through [employer] or call your pharmacy and they will forward the refill request to us. Please allow 3 business days for your refill to be completed.          Additional Information About Your Visit        Care EveryWhere ID     This is your Care EveryWhere ID. This could be used by other organizations to access your [place] medical records  [id number]         Blood Pressure from Last 3 Encounters:   03/07/18 (!) 166/93   10/27/17 124/66   01/23/17 130/78    Weight from Last 3 Encounters:   03/07/18 72.6 kg (160 lb)   10/27/17 72.1 kg (159 lb)   01/23/17 76.3 kg (168 lb 3.2 oz)              We Performed the Following     PM DEVICE  PROGRAMMING EVAL, DUAL LEAD PACER ([id number])        Primary Care Provider Office Phone # Fax #    Alton Willoughby [phone]       [facility] [address]  [facility] [address]        Equal Access to Services     [facility]: Hadii aad ku hadasho Soomaali, waaxda luqadaha, qaybta kaalmada adeegyada, waxay idiin hayaan adeeg khajay lamiguel yousif. So [facility] [phone].    ATENCIÓN: Si habla español, tiene a hoffmann disposición servicios gratuitos de asistencia lingüística. Llame al [phone].    We comply with applicable federal civil rights laws and [place] laws. We do not discriminate on the basis of race, color, national origin, age, disability, sex, sexual orientation, or gender identity.            Thank you!     Thank you for choosing [facility]  for your care. Our goal is always to provide you with excellent care. Hearing back from our patients is one way we can continue to improve our services. Please take a few minutes to complete the written survey that you may receive in the mail after your visit with us. Thank you!             Your Updated Medication List - Protect others around you: Learn how to safely use, store and throw away your medicines at www.disposemymeds.org.          This list is accurate as of 9/5/18  3:50 PM.  Always use your most recent med list.                   Brand Name Dispense Instructions for use Diagnosis    amLODIPine 5 MG tablet    NORVASC    90 tablet    Take 1 tablet (5 mg) by mouth daily    Benign essential hypertension       aspirin 81 MG EC tablet     30 tablet    Take 1 tablet by mouth daily.    Atrial fibrillation with rapid ventricular response (H)       calcium carbonate-vitamin D 500-400 MG-UNIT Tabs per tablet      Take 1 tablet by mouth 2 times daily.        dofetilide 500 MCG capsule    TIKOSYN    60 capsule    Take 1 capsule (500 mcg) by mouth 2 times daily    Paroxysmal atrial fibrillation (H)        IMITREX PO      Take 25 mg by mouth as needed.        irbesartan 300 MG tablet    AVAPRO    90 tablet    Take 1 tablet (300 mg) by mouth daily    Essential hypertension       lidocaine 5 % Patch    LIDODERM     Place 1 patch onto the skin every 24 hours        magnesium gluconate 500 MG tablet    MAGONATE     Take 500 mg by mouth daily.        metoprolol succinate 100 MG 24 hr tablet    TOPROL XL    90 tablet    Take 1 tablet (100 mg) by mouth daily    Atrial fibrillation with rapid ventricular response (H), Hypertension, ACS (acute coronary syndrome) (H)       multivitamin, therapeutic Tabs tablet      Take 1 tablet by mouth daily.        OMEGA-3 FISH OIL PO      Take 1,200 mg by mouth daily.        sertraline 50 MG tablet    ZOLOFT     Take 50 mg by mouth every evening.        simvastatin 40 MG tablet    ZOCOR    90 tablet    Take 1 tablet (40 mg) by mouth At Bedtime    CAD (coronary artery disease)       timolol hemihydrate 0.5 % Soln ophthalmic solution    BETIMOL     Place 1 drop into both eyes 2 times daily

## 2018-09-05 NOTE — PROGRESS NOTES
St Ion Accent (D) Pacemaker Device Check  AP: 97 % : 10 %  Mode: DDDR 60/120        Underlying Rhythm: SB, rate in the low 40's  Heart Rate: Stable with adequate variability, pt denies exercise intolerance  Sensing: WNL    Pacing Threshold: WNL   Impedance: WNL  Battery Status: Estimated at 6.6-7 years remaining longevity  Device Site: Not observed  Atrial Arrhythmia: frequent short MS totaling just 2 % in past year. Recent stored EGM's show PAT, Longest event in past year was back in March (No EGM to confirm rhythm) event lasted 1 hour and 31 minutes. 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  Ventricular Arrhythmia: 0  Setting Change: None    Care Plan: Sees Aleksandr in the Spring. Return to  3 month office teletraces. CHICHO Krishna

## 2018-09-26 ENCOUNTER — APPOINTMENT (OUTPATIENT)
Dept: CT IMAGING | Facility: CLINIC | Age: 83
End: 2018-09-26
Attending: EMERGENCY MEDICINE
Payer: COMMERCIAL

## 2018-09-26 ENCOUNTER — HOSPITAL ENCOUNTER (EMERGENCY)
Facility: CLINIC | Age: 83
Discharge: ANOTHER HOSPITAL | End: 2018-09-26
Attending: EMERGENCY MEDICINE | Admitting: EMERGENCY MEDICINE
Payer: COMMERCIAL

## 2018-09-26 ENCOUNTER — HOSPITAL ENCOUNTER (INPATIENT)
Facility: CLINIC | Age: 83
LOS: 5 days | Discharge: HOME | DRG: 063 | End: 2018-10-02
Attending: HOSPITALIST | Admitting: HOSPITALIST
Payer: COMMERCIAL

## 2018-09-26 VITALS
SYSTOLIC BLOOD PRESSURE: 168 MMHG | RESPIRATION RATE: 18 BRPM | OXYGEN SATURATION: 94 % | HEART RATE: 96 BPM | WEIGHT: 155.2 LBS | TEMPERATURE: 97.8 F | HEIGHT: 66 IN | BODY MASS INDEX: 24.94 KG/M2 | DIASTOLIC BLOOD PRESSURE: 72 MMHG

## 2018-09-26 DIAGNOSIS — I10 HYPERTENSION: ICD-10-CM

## 2018-09-26 DIAGNOSIS — I24.9 ACS (ACUTE CORONARY SYNDROME) (H): ICD-10-CM

## 2018-09-26 DIAGNOSIS — I48.91 ATRIAL FIBRILLATION WITH RAPID VENTRICULAR RESPONSE (H): ICD-10-CM

## 2018-09-26 DIAGNOSIS — H53.461 HOMONYMOUS HEMIANOPIA, RIGHT: ICD-10-CM

## 2018-09-26 DIAGNOSIS — I48.0 PAROXYSMAL ATRIAL FIBRILLATION (H): Primary | ICD-10-CM

## 2018-09-26 DIAGNOSIS — I63.9 CEREBROVASCULAR ACCIDENT (CVA), UNSPECIFIED MECHANISM (H): ICD-10-CM

## 2018-09-26 LAB
ANION GAP SERPL CALCULATED.3IONS-SCNC: 8 MMOL/L (ref 3–14)
APTT PPP: 30 SEC (ref 22–37)
BASOPHILS # BLD AUTO: 0 10E9/L (ref 0–0.2)
BASOPHILS NFR BLD AUTO: 0.4 %
BUN SERPL-MCNC: 31 MG/DL (ref 7–30)
CALCIUM SERPL-MCNC: 8.5 MG/DL (ref 8.5–10.1)
CHLORIDE SERPL-SCNC: 108 MMOL/L (ref 94–109)
CO2 SERPL-SCNC: 25 MMOL/L (ref 20–32)
CREAT SERPL-MCNC: 1.27 MG/DL (ref 0.52–1.04)
DIFFERENTIAL METHOD BLD: NORMAL
EOSINOPHIL # BLD AUTO: 0.1 10E9/L (ref 0–0.7)
EOSINOPHIL NFR BLD AUTO: 1.1 %
ERYTHROCYTE [DISTWIDTH] IN BLOOD BY AUTOMATED COUNT: 13.9 % (ref 10–15)
GFR SERPL CREATININE-BSD FRML MDRD: 40 ML/MIN/1.7M2
GLUCOSE BLDC GLUCOMTR-MCNC: 177 MG/DL (ref 70–99)
GLUCOSE SERPL-MCNC: 192 MG/DL (ref 70–99)
HCT VFR BLD AUTO: 40.2 % (ref 35–47)
HGB BLD-MCNC: 13.2 G/DL (ref 11.7–15.7)
IMM GRANULOCYTES # BLD: 0 10E9/L (ref 0–0.4)
IMM GRANULOCYTES NFR BLD: 0.3 %
INR PPP: 1.05 (ref 0.86–1.14)
LYMPHOCYTES # BLD AUTO: 2.5 10E9/L (ref 0.8–5.3)
LYMPHOCYTES NFR BLD AUTO: 32.7 %
MCH RBC QN AUTO: 29.6 PG (ref 26.5–33)
MCHC RBC AUTO-ENTMCNC: 32.8 G/DL (ref 31.5–36.5)
MCV RBC AUTO: 90 FL (ref 78–100)
MONOCYTES # BLD AUTO: 0.7 10E9/L (ref 0–1.3)
MONOCYTES NFR BLD AUTO: 9.1 %
NEUTROPHILS # BLD AUTO: 4.3 10E9/L (ref 1.6–8.3)
NEUTROPHILS NFR BLD AUTO: 56.4 %
NRBC # BLD AUTO: 0 10*3/UL
NRBC BLD AUTO-RTO: 0 /100
PLATELET # BLD AUTO: 225 10E9/L (ref 150–450)
POTASSIUM SERPL-SCNC: 3.9 MMOL/L (ref 3.4–5.3)
RBC # BLD AUTO: 4.46 10E12/L (ref 3.8–5.2)
SODIUM SERPL-SCNC: 141 MMOL/L (ref 133–144)
TROPONIN I SERPL-MCNC: <0.015 UG/L (ref 0–0.04)
WBC # BLD AUTO: 7.6 10E9/L (ref 4–11)

## 2018-09-26 PROCEDURE — 70470 CT HEAD/BRAIN W/O & W/DYE: CPT | Mod: XS

## 2018-09-26 PROCEDURE — 85610 PROTHROMBIN TIME: CPT | Performed by: EMERGENCY MEDICINE

## 2018-09-26 PROCEDURE — 96376 TX/PRO/DX INJ SAME DRUG ADON: CPT | Mod: 59

## 2018-09-26 PROCEDURE — 93005 ELECTROCARDIOGRAM TRACING: CPT

## 2018-09-26 PROCEDURE — 80048 BASIC METABOLIC PNL TOTAL CA: CPT | Performed by: EMERGENCY MEDICINE

## 2018-09-26 PROCEDURE — 96365 THER/PROPH/DIAG IV INF INIT: CPT | Mod: 59

## 2018-09-26 PROCEDURE — 70450 CT HEAD/BRAIN W/O DYE: CPT

## 2018-09-26 PROCEDURE — 85730 THROMBOPLASTIN TIME PARTIAL: CPT | Performed by: EMERGENCY MEDICINE

## 2018-09-26 PROCEDURE — 25000128 H RX IP 250 OP 636: Performed by: EMERGENCY MEDICINE

## 2018-09-26 PROCEDURE — 96361 HYDRATE IV INFUSION ADD-ON: CPT

## 2018-09-26 PROCEDURE — 85025 COMPLETE CBC W/AUTO DIFF WBC: CPT | Performed by: EMERGENCY MEDICINE

## 2018-09-26 PROCEDURE — 99285 EMERGENCY DEPT VISIT HI MDM: CPT | Mod: 25

## 2018-09-26 PROCEDURE — 70498 CT ANGIOGRAPHY NECK: CPT

## 2018-09-26 PROCEDURE — 00000146 ZZHCL STATISTIC GLUCOSE BY METER IP

## 2018-09-26 PROCEDURE — 84484 ASSAY OF TROPONIN QUANT: CPT | Performed by: EMERGENCY MEDICINE

## 2018-09-26 PROCEDURE — G0427 INPT/ED TELECONSULT70: HCPCS | Performed by: PSYCHIATRY & NEUROLOGY

## 2018-09-26 RX ORDER — IOPAMIDOL 755 MG/ML
500 INJECTION, SOLUTION INTRAVASCULAR ONCE
Status: COMPLETED | OUTPATIENT
Start: 2018-09-26 | End: 2018-09-26

## 2018-09-26 RX ADMIN — IOPAMIDOL 120 ML: 755 INJECTION, SOLUTION INTRAVENOUS at 21:29

## 2018-09-26 RX ADMIN — SODIUM CHLORIDE 80 ML: 9 INJECTION, SOLUTION INTRAVENOUS at 21:29

## 2018-09-26 RX ADMIN — ALTEPLASE 57.02 MG: KIT at 23:00

## 2018-09-26 RX ADMIN — ALTEPLASE 6.34 MG: KIT at 22:52

## 2018-09-26 RX ADMIN — SODIUM CHLORIDE 500 ML: 9 INJECTION, SOLUTION INTRAVENOUS at 22:19

## 2018-09-26 ASSESSMENT — ENCOUNTER SYMPTOMS
EYE PAIN: 0
SPEECH DIFFICULTY: 0
HEADACHES: 1

## 2018-09-26 NOTE — IP AVS SNAPSHOT
[address]    [place] [address]    Phone:  [phone]                                       After Visit Summary   9/26/2018    Zayda Hawkins    MRN: [id number]           After Visit Summary Signature Page     I have received my discharge instructions, and my questions have been answered. I have discussed any challenges I see with this plan with the nurse or doctor.    ..........................................................................................................................................  Patient/Patient Representative Signature      ..........................................................................................................................................  Patient Representative Print Name and Relationship to Patient    ..................................................               ................................................  Date                                   Time    ..........................................................................................................................................  Reviewed by Signature/Title    ...................................................              ..............................................  Date                                               Time          22EPIC Rev 08/18

## 2018-09-26 NOTE — IP AVS SNAPSHOT
MRN:[id number]                      After Visit Summary   9/26/2018    Zayda Hawkins    MRN: [id number]           Thank you!     Thank you for choosing [facility] for your care. Our goal is always to provide you with excellent care. Hearing back from our patients is one way we can continue to improve our services. Please take a few minutes to complete the written survey that you may receive in the mail after you visit with us. Thank you!        Patient Information     Date Of Birth          12/24/1932        Designated Caregiver       Most Recent Value    Caregiver    Will someone help with your care after discharge? no      About your hospital stay     You were admitted on:  September 26, 2018 You last received care in the:  [facility] [address] [facility]    You were discharged on:  October 2, 2018        Reason for your hospital stay       Stroke                  Who to Call     For medical emergencies, please call 911.  For non-urgent questions about your medical care, please call your primary care provider or clinic, [phone]          Attending Provider     Provider Specialty    Carlton Peralta MD Internal Medicine       Primary Care Provider Office Phone # Fax #    Alton Willoughby [phone]      After Care Instructions     Activity       Your activity upon discharge: activity as tolerated            Diet       Follow this diet upon discharge: Orders Placed This Encounter      Advance Diet as Tolerated: Regular Diet Adult; 9136-0239 Calories: High Consistent CHO (4-7 CHO units/meal); Low Saturated Fat Na <2400mg Diet                  Follow-up Appointments     Follow-up and recommended labs and tests        Follow up with primary care provider, Alton Willoughby, within 7 days for hospital follow- up.  The following labs/tests are recommended: chem 8 cbc ECG.                  Your next 10 appointments already scheduled     Oct 15, 2018  1:10 PM CDT   Return Visit  "with BARBARA Huntley CNP   [facility] ([facility])    [address]  [place] [id number] [phone] OPT 2            Dec 06, 2018  3:30 PM CST   [place] with CARBALLO TECH2   [facility] ([facility])    [address]  [place] [id number] [phone] OPT 2            Dec 06, 2018  3:45 PM CST   UMP EP RETURN with Julio C Palacios MD   [facility])    [address]  [place] [id number] [phone] OPT 2              Pending Results     Date and Time Order Name Status Description    10/1/2018 0918 EKG 12-lead, tracing only Preliminary             Statement of Approval     Ordered          10/02/18 0748  I have reviewed and agree with all the recommendations and orders detailed in this document.  EFFECTIVE NOW     Approved and electronically signed by:  Cm Caldwell MD             Admission Information     Date & Time Provider Department Dept. Phone    9/26/2018 Carlton Peralta MD [address] [phone]      Your Vitals Were     Blood Pressure Pulse Temperature Respirations Height Weight    119/77 (BP Location: Left arm) 108 97.1  F (36.2  C) (Oral) 18 1.676 m (5' 6\") 71.1 kg (156 lb 11.2 oz)    Pulse Oximetry BMI (Body Mass Index)                98% 25.29 kg/m2          Care EveryWhere ID     This is your Care EveryWhere ID. This could be used by other organizations to access your [facility] medical records  [id number]        Equal Access to Services     JOSE MARTEL AH: Hadii jermaine Seth, wabarda luqadaha, qaybta kaalmada diana, karissa yousif. So [facility] [phone].    ATENCIÓN: Si habla español, tiene a carballo disposición servicios gratuitos de asistencia lingüística. Llame al [phone].    We comply with applicable " federal civil rights laws and [place] laws. We do not discriminate on the basis of race, color, national origin, age, disability, sex, sexual orientation, or gender identity.               Review of your medicines      START taking        Dose / Directions    dabigatran ANTICOAGULANT 150 MG capsule   Commonly known as:  PRADAXA   Used for:  Paroxysmal atrial fibrillation (H)        Dose:  150 mg   Take 1 capsule (150 mg) by mouth 2 times daily Store in original 's bottle or blister pack; use within 120 days of opening.   Quantity:  60 capsule   Refills:  0       diltiazem 240 MG 24 hr capsule   Used for:  Paroxysmal atrial fibrillation (H)        Dose:  240 mg   Start taking on:  10/3/2018   Take 1 capsule (240 mg) by mouth daily   Quantity:  30 capsule   Refills:  0         CONTINUE these medicines which may have CHANGED, or have new prescriptions. If we are uncertain of the size of tablets/capsules you have at home, strength may be listed as something that might have changed.        Dose / Directions    metoprolol succinate 50 MG 24 hr tablet   Commonly known as:  TOPROL XL   This may have changed:    - medication strength  - how much to take  - how to take this  - when to take this  - additional instructions   Used for:  Atrial fibrillation with rapid ventricular response (H)        Take 2 tabs(100mg) qam and 1 tab(50mg) qpm   Quantity:  90 tablet   Refills:  0         CONTINUE these medicines which have NOT CHANGED        Dose / Directions    calcium carbonate-vitamin D 500-400 MG-UNIT Tabs per tablet        Dose:  1 tablet   Take 1 tablet by mouth 2 times daily.   Refills:  0       dofetilide 500 MCG capsule   Commonly known as:  TIKOSYN   Used for:  Paroxysmal atrial fibrillation (H)        Dose:  500 mcg   Take 1 capsule (500 mcg) by mouth 2 times daily   Quantity:  60 capsule   Refills:  8       dorzolamide-timolol PF 22.3-6.8 MG/ML opthalmic solution   Commonly known as:  COSOPT        Dose:   1 drop   Place 1 drop into both eyes 2 times daily   Refills:  0       IMITREX PO        Dose:  25 mg   Take 25 mg by mouth as needed.   Refills:  0       latanoprost 0.005 % ophthalmic solution   Commonly known as:  XALATAN        Dose:  1 drop   Place 1 drop into both eyes At Bedtime   Refills:  0       magnesium gluconate 500 MG tablet   Commonly known as:  MAGONATE        Dose:  500 mg   Take 500 mg by mouth daily.   Refills:  0       metFORMIN 500 MG 24 hr tablet   Commonly known as:  GLUCOPHAGE-XR        Dose:  500 mg   Take 500 mg by mouth daily   Refills:  0       multivitamin, therapeutic Tabs tablet        Dose:  1 tablet   Take 1 tablet by mouth daily.   Refills:  0       sertraline 50 MG tablet   Commonly known as:  ZOLOFT        Dose:  50 mg   Take 50 mg by mouth every evening.   Refills:  0       simvastatin 40 MG tablet   Commonly known as:  ZOCOR   Used for:  CAD (coronary artery disease)        Dose:  40 mg   Take 1 tablet (40 mg) by mouth At Bedtime   Quantity:  90 tablet   Refills:  3         STOP taking     amLODIPine 5 MG tablet   Commonly known as:  NORVASC           aspirin 81 MG EC tablet                Where to get your medicines      Some of these will need a paper prescription and others can be bought over the counter. Ask your nurse if you have questions.     Bring a paper prescription for each of these medications     dabigatran ANTICOAGULANT 150 MG capsule    diltiazem 240 MG 24 hr capsule    metoprolol succinate 50 MG 24 hr tablet                Protect others around you: Learn how to safely use, store and throw away your medicines at www.disposemymeds.org.             Medication List: This is a list of all your medications and when to take them. Check marks below indicate your daily home schedule. Keep this list as a reference.      Medications           Morning Afternoon Evening Bedtime As Needed    calcium carbonate-vitamin D 500-400 MG-UNIT Tabs per tablet   Take 1 tablet by mouth  2 times daily.                                dabigatran ANTICOAGULANT 150 MG capsule   Commonly known as:  PRADAXA   Take 1 capsule (150 mg) by mouth 2 times daily Store in original 's bottle or blister pack; use within 120 days of opening.   Last time this was given:  150 mg on 10/2/2018  8:18 AM                                diltiazem 240 MG 24 hr capsule   Take 1 capsule (240 mg) by mouth daily   Start taking on:  10/3/2018   Last time this was given:  180 mg on 10/2/2018  8:18 AM                                dofetilide 500 MCG capsule   Commonly known as:  TIKOSYN   Take 1 capsule (500 mcg) by mouth 2 times daily   Last time this was given:  500 mcg on 10/1/2018  8:14 AM                                dorzolamide-timolol PF 22.3-6.8 MG/ML opthalmic solution   Commonly known as:  COSOPT   Place 1 drop into both eyes 2 times daily   Last time this was given:  1 drop on 10/2/2018  8:18 AM                                IMITREX PO   Take 25 mg by mouth as needed.                                latanoprost 0.005 % ophthalmic solution   Commonly known as:  XALATAN   Place 1 drop into both eyes At Bedtime   Last time this was given:  1 drop on 10/1/2018  9:00 PM                                magnesium gluconate 500 MG tablet   Commonly known as:  MAGONATE   Take 500 mg by mouth daily.   Last time this was given:  500 mg on 10/2/2018  8:18 AM                                metFORMIN 500 MG 24 hr tablet   Commonly known as:  GLUCOPHAGE-XR   Take 500 mg by mouth daily                                metoprolol succinate 50 MG 24 hr tablet   Commonly known as:  TOPROL XL   Take 2 tabs(100mg) qam and 1 tab(50mg) qpm   Last time this was given:  100 mg on 10/2/2018  8:17 AM                                multivitamin, therapeutic Tabs tablet   Take 1 tablet by mouth daily.   Last time this was given:  1 tablet on 10/2/2018  8:18 AM                                sertraline 50 MG tablet   Commonly known as:   ZOLOFT   Take 50 mg by mouth every evening.                                simvastatin 40 MG tablet   Commonly known as:  ZOCOR   Take 1 tablet (40 mg) by mouth At Bedtime   Last time this was given:  40 mg on 9/30/2018  8:43 PM                                          More Information        Dabigatran etexilate Oral capsule  What is this medicine?  DABIGATRAN (DA bi GAT ran) is an anticoagulant (blood thinner). It is used to lower the chance of stroke in people with a medical condition called atrial fibrillation. It is also used to treat or prevent blood clots in the lungs or in the veins.  This medicine may be used for other purposes; ask your health care provider or pharmacist if you have questions.  What should I tell my health care provider before I take this medicine?  They need to know if you have any of these conditions:    bleeding disorders    history of stomach bleeding    mechanical heart valve    kidney disease    recent or planned spinal or epidural procedure    an allergic reaction to dabigatran, other medicines, foods, dyes, or preservatives    pregnant or trying to get pregnant    breast-feeding  How should I use this medicine?  Take this medicine by mouth with a full glass of water. Follow the directions on the prescription label. Swallow the capsules whole. Do not break, chew, or empty the contents from the capsule. You can take it with or without food. If it upsets your stomach, take it with food. Take your medicine at regular intervals. Do not take it more often than directed. Do not stop taking except on your doctor's advice. Stopping this medicine may increase your risk of a blot clot. Be sure to refill your prescription before you run out of medicine.  A special MedGuide will be given to you by the pharmacist with each prescription and refill. Be sure to read this information carefully each time.  Talk to your pediatrician regarding the use of this medicine in children. Special care may be  needed.  Overdosage: If you think you have taken too much of this medicine contact a poison control center or emergency room at once.  NOTE: This medicine is only for you. Do not share this medicine with others.  What if I miss a dose?  If you miss a dose, take it as soon as you can. If your next dose is less than 6 hours away, skip the missed dose. Do not take double or extra doses.  What may interact with this medicine?  Do not take this medicine with any of the following medications:    certain medicines that treat or prevent blood clots like warfarin, enoxaparin, and dalteparin    mifepristone, RU-486  This medicine may also interact with the following:    carbamazepine    certain medicines for depression    dronedarone    ketoconazole    NSAIDs, medicines for pain and inflammation, like ibuprofen or naproxen    quinidine    rifampin    tipranavir  This list may not describe all possible interactions. Give your health care provider a list of all the medicines, herbs, non-prescription drugs, or dietary supplements you use. Also tell them if you smoke, drink alcohol, or use illegal drugs. Some items may interact with your medicine.  What should I watch for while using this medicine?  Visit your doctor or health care professional for regular check ups.  Notify your doctor or health care professional and seek emergency treatment if you develop breathing problems; changes in vision; chest pain; severe, sudden headache; pain, swelling, warmth in the leg; trouble speaking; sudden numbness or weakness of the face, arm, or leg. These can be signs that your condition has gotten worse.  If you are going to have surgery, tell your doctor or health care professional that you are taking this medicine.  Tell your health care professional that you use this medicine before you have a spinal or epidural procedure. Sometimes people who take this medicine have bleeding problems around the spine when they have a spinal or epidural  procedure. This bleeding is very rare. If you have a spinal or epidural procedure while on this medicine, call your health care professional immediately if you have back pain, numbness or tingling (especially in your legs and feet), muscle weakness, paralysis, or loss of bladder or bowel control.  Avoid sports and activities that might cause injury while you are using this medicine. Severe falls or injuries can cause unseen bleeding. Be careful when using sharp tools or knives. Consider using an electric razor. Take special care brushing or flossing your teeth. Report any injuries, bruising, or red spots on the skin to your doctor or health care professional.  What side effects may I notice from receiving this medicine?  Side effects that you should report to your doctor or health care professional as soon as possible:    allergic reactions like skin rash, itching or hives, swelling of the face, lips, or tongue    feeling faint or lightheaded, falls    signs and symptoms of bleeding such as bloody or black, tarry stools; red or dark-brown urine; spitting up blood or brown material that looks like coffee grounds; red spots on the skin; unusual bruising or bleeding from the eye, gums, or nose  Side effects that usually do not require medical attention (report these to your doctor or health care professional if they continue or are bothersome):    stomach pain    upset stomach  This list may not describe all possible side effects. Call your doctor for medical advice about side effects. You may report side effects to FDA at 1-529-FDA-7521.  Where should I keep my medicine?  Keep out of the reach of children.  Store at room temperature between 15 and 30 degrees C (59 and 86 degrees F). Keep capsules in the original container. Do not store or place capsules in any other container, such as pill boxes or pill organizers. After opening the bottle, use within 4 months. Throw away any unused medicine after 4 months.  NOTE:  This sheet is a summary. It may not cover all possible information. If you have questions about this medicine, talk to your doctor, pharmacist, or health care provider.  NOTE:This sheet is a summary. It may not cover all possible information. If you have questions about this medicine, talk to your doctor, pharmacist, or health care provider. Copyright  2016 [employer]                Dabigatran oral capsules  Brand Name: Pradaxa  What is this medicine?  DABIGATRAN (DA bi GAT ran) is an anticoagulant (blood thinner). It is used to lower the chance of stroke in people with a medical condition called atrial fibrillation. It is also used to treat or prevent blood clots in the lungs or in the veins.  How should I use this medicine?  Take this medicine by mouth with a full glass of water. Follow the directions on the prescription label. Do not break, chew, or empty the contents from the capsule. Opening the capsule can increase your dose, which increases your risk of bleeding. You can take it with or without food. If it upsets your stomach, take it with food. Take your medicine at regular intervals. Do not take it more often than directed. Do not stop taking except on your doctor's advice. Stopping this medicine may increase your risk of a blot clot. Be sure to refill your prescription before you run out of medicine.  A special MedGuide will be given to you by the pharmacist with each prescription and refill. Be sure to read this information carefully each time.  Talk to your pediatrician regarding the use of this medicine in children. Special care may be needed.  What side effects may I notice from receiving this medicine?  Side effects that you should report to your doctor or health care professional as soon as possible:    allergic reactions like skin rash, itching or hives, swelling of the face, lips, or tongue    feeling faint or lightheaded, falls    signs and symptoms of bleeding such as bloody or black, tarry  stools; red or dark-brown urine; spitting up blood or brown material that looks like coffee grounds; red spots on the skin; unusual bruising or bleeding from the eye, gums, or nose  Side effects that usually do not require medical attention (report to your doctor or health care professional if they continue or are bothersome):    stomach pain    upset stomach  What may interact with this medicine?  Do not take this medicine with any of the following medications:    certain medicines that treat or prevent blood clots like warfarin, enoxaparin, and dalteparin    mifepristone, RU-486  This medicine may also interact with the following:    carbamazepine    certain medicines for depression    dronedarone    ketoconazole    NSAIDs, medicines for pain and inflammation, like ibuprofen or naproxen    quinidine    rifampin    tipranavir  What if I miss a dose?  If you miss a dose, take it as soon as you can. If your next dose is less than 6 hours away, skip the missed dose. Do not take double or extra doses.  Where should I keep my medicine?  Keep out of the reach of children.  Store between 20 and 25 degrees C (68 and 77 degrees F). Keep this medicine in the original container. Throw away any unused medicine after 4 months.  What should I tell my health care provider before I take this medicine?  They need to know if you have any of these conditions:    bleeding disorders    history of stomach bleeding    mechanical heart valve    kidney disease    recent or planned spinal or epidural procedure    an allergic reaction to dabigatran, other medicines, foods, dyes, or preservatives    pregnant or trying to get pregnant    breast-feeding  What should I watch for while using this medicine?  Visit your doctor or health care professional for regular checks on your progress.  Notify your doctor or health care professional and seek emergency treatment if you develop breathing problems; changes in vision; chest pain; severe, sudden  headache; pain, swelling, warmth in the leg; trouble speaking; sudden numbness or weakness of the face, arm or leg. These can be signs that your condition has gotten worse.  If you are going to have surgery or other procedure, tell your doctor that you are taking this medicine.  NOTE:This sheet is a summary. It may not cover all possible information. If you have questions about this medicine, talk to your doctor, pharmacist, or health care provider. Copyright  2018 [employer]

## 2018-09-27 PROBLEM — I63.9 STROKE (H): Status: ACTIVE | Noted: 2018-09-27

## 2018-09-27 LAB
ALBUMIN SERPL-MCNC: 3.1 G/DL (ref 3.4–5)
ALP SERPL-CCNC: 157 U/L (ref 40–150)
ALT SERPL W P-5'-P-CCNC: 152 U/L (ref 0–50)
AST SERPL W P-5'-P-CCNC: 141 U/L (ref 0–45)
BILIRUB DIRECT SERPL-MCNC: 0.2 MG/DL (ref 0–0.2)
BILIRUB SERPL-MCNC: 0.5 MG/DL (ref 0.2–1.3)
CHOLEST SERPL-MCNC: 125 MG/DL
CK SERPL-CCNC: 64 U/L (ref 30–225)
GLUCOSE BLDC GLUCOMTR-MCNC: 121 MG/DL (ref 70–99)
GLUCOSE BLDC GLUCOMTR-MCNC: 121 MG/DL (ref 70–99)
GLUCOSE BLDC GLUCOMTR-MCNC: 127 MG/DL (ref 70–99)
GLUCOSE BLDC GLUCOMTR-MCNC: 140 MG/DL (ref 70–99)
GLUCOSE BLDC GLUCOMTR-MCNC: 153 MG/DL (ref 70–99)
GLUCOSE BLDC GLUCOMTR-MCNC: 186 MG/DL (ref 70–99)
HBA1C MFR BLD: 6.5 % (ref 0–5.6)
HDLC SERPL-MCNC: 65 MG/DL
INTERPRETATION ECG - MUSE: NORMAL
LDLC SERPL CALC-MCNC: 51 MG/DL
NONHDLC SERPL-MCNC: 60 MG/DL
PROT SERPL-MCNC: 6.5 G/DL (ref 6.8–8.8)
TRIGL SERPL-MCNC: 47 MG/DL
TROPONIN I SERPL-MCNC: 0.02 UG/L (ref 0–0.04)
TROPONIN I SERPL-MCNC: 0.02 UG/L (ref 0–0.04)

## 2018-09-27 PROCEDURE — 25000128 H RX IP 250 OP 636: Performed by: HOSPITALIST

## 2018-09-27 PROCEDURE — 80061 LIPID PANEL: CPT | Performed by: HOSPITALIST

## 2018-09-27 PROCEDURE — 00000146 ZZHCL STATISTIC GLUCOSE BY METER IP

## 2018-09-27 PROCEDURE — 36415 COLL VENOUS BLD VENIPUNCTURE: CPT | Performed by: HOSPITALIST

## 2018-09-27 PROCEDURE — 82550 ASSAY OF CK (CPK): CPT | Performed by: HOSPITALIST

## 2018-09-27 PROCEDURE — 83036 HEMOGLOBIN GLYCOSYLATED A1C: CPT | Performed by: HOSPITALIST

## 2018-09-27 PROCEDURE — 3E04317 INTRODUCTION OF OTHER THROMBOLYTIC INTO CENTRAL VEIN, PERCUTANEOUS APPROACH: ICD-10-PCS | Performed by: HOSPITALIST

## 2018-09-27 PROCEDURE — 80076 HEPATIC FUNCTION PANEL: CPT | Performed by: HOSPITALIST

## 2018-09-27 PROCEDURE — 84484 ASSAY OF TROPONIN QUANT: CPT | Performed by: HOSPITALIST

## 2018-09-27 PROCEDURE — 25000132 ZZH RX MED GY IP 250 OP 250 PS 637: Performed by: HOSPITALIST

## 2018-09-27 PROCEDURE — 99223 1ST HOSP IP/OBS HIGH 75: CPT | Mod: AI | Performed by: HOSPITALIST

## 2018-09-27 PROCEDURE — 99232 SBSQ HOSP IP/OBS MODERATE 35: CPT | Performed by: PSYCHIATRY & NEUROLOGY

## 2018-09-27 PROCEDURE — 40000895 ZZH STATISTIC SLP IP EVAL DEFER: Performed by: SPEECH-LANGUAGE PATHOLOGIST

## 2018-09-27 PROCEDURE — 20000003 ZZH R&B ICU

## 2018-09-27 RX ORDER — LIDOCAINE 40 MG/G
CREAM TOPICAL
Status: DISCONTINUED | OUTPATIENT
Start: 2018-09-27 | End: 2018-10-02 | Stop reason: HOSPADM

## 2018-09-27 RX ORDER — POTASSIUM CL/LIDO/0.9 % NACL 10MEQ/0.1L
10 INTRAVENOUS SOLUTION, PIGGYBACK (ML) INTRAVENOUS
Status: DISCONTINUED | OUTPATIENT
Start: 2018-09-27 | End: 2018-10-02 | Stop reason: HOSPADM

## 2018-09-27 RX ORDER — NICOTINE POLACRILEX 4 MG
15-30 LOZENGE BUCCAL
Status: DISCONTINUED | OUTPATIENT
Start: 2018-09-27 | End: 2018-10-02 | Stop reason: HOSPADM

## 2018-09-27 RX ORDER — SODIUM CHLORIDE 9 MG/ML
INJECTION, SOLUTION INTRAVENOUS CONTINUOUS
Status: DISCONTINUED | OUTPATIENT
Start: 2018-09-27 | End: 2018-10-01

## 2018-09-27 RX ORDER — POTASSIUM CHLORIDE 1.5 G/1.58G
20-40 POWDER, FOR SOLUTION ORAL
Status: DISCONTINUED | OUTPATIENT
Start: 2018-09-27 | End: 2018-10-02 | Stop reason: HOSPADM

## 2018-09-27 RX ORDER — METFORMIN HCL 500 MG
500 TABLET, EXTENDED RELEASE 24 HR ORAL EVERY MORNING
COMMUNITY

## 2018-09-27 RX ORDER — ONDANSETRON 4 MG/1
4 TABLET, ORALLY DISINTEGRATING ORAL EVERY 6 HOURS PRN
Status: DISCONTINUED | OUTPATIENT
Start: 2018-09-27 | End: 2018-10-02 | Stop reason: HOSPADM

## 2018-09-27 RX ORDER — DOFETILIDE 0.25 MG/1
500 CAPSULE ORAL 2 TIMES DAILY
Status: DISCONTINUED | OUTPATIENT
Start: 2018-09-27 | End: 2018-10-01

## 2018-09-27 RX ORDER — LATANOPROST 50 UG/ML
1 SOLUTION/ DROPS OPHTHALMIC AT BEDTIME
COMMUNITY

## 2018-09-27 RX ORDER — AMLODIPINE BESYLATE 5 MG/1
5 TABLET ORAL DAILY
Status: DISCONTINUED | OUTPATIENT
Start: 2018-09-27 | End: 2018-10-01

## 2018-09-27 RX ORDER — POTASSIUM CHLORIDE 29.8 MG/ML
20 INJECTION INTRAVENOUS
Status: DISCONTINUED | OUTPATIENT
Start: 2018-09-27 | End: 2018-10-02 | Stop reason: HOSPADM

## 2018-09-27 RX ORDER — TIMOLOL MALEATE 5 MG/ML
1 SOLUTION/ DROPS OPHTHALMIC 2 TIMES DAILY
Status: ON HOLD | COMMUNITY
End: 2018-09-28

## 2018-09-27 RX ORDER — POTASSIUM CHLORIDE 7.45 MG/ML
10 INJECTION INTRAVENOUS
Status: DISCONTINUED | OUTPATIENT
Start: 2018-09-27 | End: 2018-10-02 | Stop reason: HOSPADM

## 2018-09-27 RX ORDER — METOPROLOL SUCCINATE 50 MG/1
100 TABLET, EXTENDED RELEASE ORAL DAILY
Status: DISCONTINUED | OUTPATIENT
Start: 2018-09-27 | End: 2018-10-02 | Stop reason: HOSPADM

## 2018-09-27 RX ORDER — ONDANSETRON 2 MG/ML
4 INJECTION INTRAMUSCULAR; INTRAVENOUS EVERY 6 HOURS PRN
Status: DISCONTINUED | OUTPATIENT
Start: 2018-09-27 | End: 2018-10-02 | Stop reason: HOSPADM

## 2018-09-27 RX ORDER — BISACODYL 10 MG
10 SUPPOSITORY, RECTAL RECTAL DAILY PRN
Status: DISCONTINUED | OUTPATIENT
Start: 2018-09-27 | End: 2018-10-02 | Stop reason: HOSPADM

## 2018-09-27 RX ORDER — POTASSIUM CHLORIDE 1500 MG/1
20-40 TABLET, EXTENDED RELEASE ORAL
Status: DISCONTINUED | OUTPATIENT
Start: 2018-09-27 | End: 2018-10-02 | Stop reason: HOSPADM

## 2018-09-27 RX ORDER — ACETAMINOPHEN 325 MG/1
650 TABLET ORAL EVERY 4 HOURS PRN
Status: DISCONTINUED | OUTPATIENT
Start: 2018-09-27 | End: 2018-10-02 | Stop reason: HOSPADM

## 2018-09-27 RX ORDER — POLYETHYLENE GLYCOL 3350 17 G/17G
17 POWDER, FOR SOLUTION ORAL DAILY PRN
Status: DISCONTINUED | OUTPATIENT
Start: 2018-09-27 | End: 2018-10-02 | Stop reason: HOSPADM

## 2018-09-27 RX ORDER — DEXTROSE MONOHYDRATE 25 G/50ML
25-50 INJECTION, SOLUTION INTRAVENOUS
Status: DISCONTINUED | OUTPATIENT
Start: 2018-09-27 | End: 2018-10-02 | Stop reason: HOSPADM

## 2018-09-27 RX ORDER — NALOXONE HYDROCHLORIDE 0.4 MG/ML
.1-.4 INJECTION, SOLUTION INTRAMUSCULAR; INTRAVENOUS; SUBCUTANEOUS
Status: DISCONTINUED | OUTPATIENT
Start: 2018-09-27 | End: 2018-10-02 | Stop reason: HOSPADM

## 2018-09-27 RX ORDER — SIMVASTATIN 40 MG
40 TABLET ORAL AT BEDTIME
Status: DISCONTINUED | OUTPATIENT
Start: 2018-09-27 | End: 2018-10-01

## 2018-09-27 RX ADMIN — METOPROLOL SUCCINATE 100 MG: 50 TABLET, EXTENDED RELEASE ORAL at 08:57

## 2018-09-27 RX ADMIN — SODIUM CHLORIDE: 9 INJECTION, SOLUTION INTRAVENOUS at 00:49

## 2018-09-27 RX ADMIN — SIMVASTATIN 40 MG: 40 TABLET, FILM COATED ORAL at 01:22

## 2018-09-27 RX ADMIN — DOFETILIDE 500 MCG: 0.25 CAPSULE ORAL at 01:09

## 2018-09-27 RX ADMIN — DOFETILIDE 500 MCG: 0.25 CAPSULE ORAL at 20:56

## 2018-09-27 RX ADMIN — AMLODIPINE BESYLATE 5 MG: 5 TABLET ORAL at 08:57

## 2018-09-27 RX ADMIN — DOFETILIDE 500 MCG: 0.25 CAPSULE ORAL at 08:57

## 2018-09-27 RX ADMIN — SIMVASTATIN 40 MG: 40 TABLET, FILM COATED ORAL at 20:57

## 2018-09-27 RX ADMIN — SODIUM CHLORIDE: 9 INJECTION, SOLUTION INTRAVENOUS at 11:17

## 2018-09-27 ASSESSMENT — PAIN DESCRIPTION - DESCRIPTORS: DESCRIPTORS: DISCOMFORT

## 2018-09-27 ASSESSMENT — ACTIVITIES OF DAILY LIVING (ADL)
ADLS_ACUITY_SCORE: 10

## 2018-09-27 NOTE — ED NOTES
"Pt transferred in stable condition, TPA is infusing, pt neurologically intact and unchanged, pt denies headache, NV, and additional changes in vision. Pts VS consistent with previous. Pt says she \"feels good.\" No evidence of adverse reactions to TPA at this time. EMS inserting second IV.   "

## 2018-09-27 NOTE — PROGRESS NOTES
Neurology Progress Note    Subjective: Pt reports better vision.  No new concerns.    Vitals: Temp: 97.2  F (36.2  C) Temp  Min: 97.2  F (36.2  C)  Max: 97.8  F (36.6  C)  Resp: 18 Resp  Min: 14  Max: 36  SpO2: 94 % SpO2  Min: 91 %  Max: 99 %    Pulse  Min: 96  Max: 96  Heart Rate: 60 Heart Rate  Min: 59  Max: 96  BP: 139/62 Systolic (24hrs), Av , Min:120 , Max:178   Diastolic (24hrs), Av, Min:60, Max:133        Physical Examination:  General Exam: Awake, alert, NAD.  Neck: supple without meningismus.  Neuro:   HCF's:  Apparently normal language, memory, and concentration (through her son translating).   CN's:  VF's apparently full to confrontation.  Pupils were 3 mm, reactive with no RAPD.  Fundoscopic examination limited by small pupils and pt compliance.  EOMI without nystagmus.  Facial sensation was normal.  Face was symmetrical without weakness.  Hearing was intact to finger rustle bilaterally.  Tongue protruded midline, palate whitney symmetrically.  SCM's and traps were normal.   Motor:  Normal bulk, tone, and strength throughout.  MSR's trace.  Toes down-going to plantar stim bilaterally.   Sensory: intact to all modalities in all extremities.   Cerebellar: F to N normal.   Gait: deferred due to pt condition.    Imaging:  I reviewed the images and agree with the report.    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  Cannot r/o a migraine (though unlikely) or a primary eye problem.  If the MRI shows evidence of infarct, that would be dispositive.  If negative, then this constellation of symptoms still could have been an impending infarct that was reversed with tPA.  The symptoms appear completely resolved.  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  However, pt did have a h/o a fib, so that would also be a potential source of stroke.    Recommendations: Will await the interrogation of the pacemaker to see if pt has had any a fib.  If  she has had a fib, then we will need to have a discussion about anticoagulation.  If there is no evidence of anticoagulation, then I would recommend DAPT as pt appears to have some intracranial stenosis.  This can be started after 0030 tomorrow morning as that will be the 24-hour point from completion of tPA.  Pt should have a head CT prior to initiation of antiplatelets to r/o hemorrhage.  I recommended DAPT to pt and son, and they said they will think about that and let us know tomorrow.  Will f/u tomorrow.

## 2018-09-27 NOTE — PROGRESS NOTES
ICU Multi-Disciplinary Note  Patient condition reviewed and discussed while on multidisciplinary rounds today.  86 y/o admitted with acute ischemic stroke now s/p TPA.  Hospitalist primary team  The Critical Care service will continue to follow peripherally while patient is within the ICU. We are readily available should issues arise.  Please feel free to contact us for critical care issues with which we may be of assistance. For all other concerns, please contact primary service first.     Ida Day

## 2018-09-27 NOTE — H&P
Admitted:     09/26/2018      DATE OF ADMISSION: 09/27/2018      PRIMARY CARE PHYSICIAN: Alton Willoughby MD      CHIEF COMPLAINT:  Vision loss.      HISTORY OF PRESENT ILLNESS:  The patient is an 85-year-old female with past medical history of atrial fibrillation (not on anticoagulation), coronary artery disease, hyperlipidemia, diabetes, tachybrady syndrome, and hypertension who presents to the Emergency Department with sudden onset of headache and partial vision loss.  The patient had onset of left-sided headache at approximately 7 p.m. with associated right peripheral vision loss.  The patient has known underlying atrial fibrillation on Tikosyn, but not on anticoagulation due to prior retroperitoneal bleed.  She came in to the Emergency Department for evaluation within the time period for intervention.  Stroke Code was called.  She was deemed appropriate for tPA.  tPA was initiated at the outside emergency department and thus transferred to ICU for further evaluation and care.  The patient otherwise denies chest pain, cough, congestion, shortness of breath, and has otherwise been in normal state of health until the onset of these symptoms.  She reports feeling like her eyesight is improving since initiation of the tPA.      PAST MEDICAL HISTORY:   1.  Paroxysmal atrial fibrillation on Tikosyn, not on anticoagulation.   2.  Hyperlipidemia.   3.  Hypertension.   4.  Type 2 diabetes.   5.  Retroperitoneal hematoma and bleed on anticoagulation in 07/2012.        PAST SURGICAL HISTORY:   1.  Dual-chamber pacemaker.   2.  Cardioversion.      PRIOR TO ADMIT MEDICATIONS:   1.  Amlodipine 5 mg daily.   2.  Aspirin 81 mg daily.   3.  Calcium carbonate 500/400 1 tablet b.i.d.   4.  Tikosyn 500 mcg b.i.d.   5.  Avapro 300 mg daily.   6.  Magnesium gluconate 500 mg daily.   7.  Toprol- mg daily.   8.  Multivitamin.   9.  Omega-3 fatty acids.   10.  Sertraline 50 mg at bedtime.   11.  Zocor 40 mg at bedtime.   12.  Imitrex  25 mg p.r.n.   13.  Timolol ophthalmic solution.      ALLERGIES:  ASPIRIN (REACTION NOT DOCUMENTED).  COUMADIN (NOT DOCUMENTED). PENICILLIN (NOT DOCUMENTED).      FAMILY HISTORY:  Significant for MI.      SOCIAL HISTORY:  The patient lives independently.  Son and daughter are at the bedside.  She is a never smoker.  Does not use smokeless tobacco.  No alcohol.      REVIEW OF SYSTEMS:  Ten-point review of systems is obtained with pertinent positives and negatives per HPI, otherwise negative.      PHYSICAL EXAMINATION:   VITAL SIGNS:  Blood pressure 97.6, heart rate 60, respiratory rate 18, blood pressure 147/78, sats are 91% on room air.   CONSTITUTIONAL:  Patient lying in bed in no acute distress, alert and oriented x 3.   HEENT:  Pupils are equal and react to light.  Extraocular movements intact. Slight right partial peripheral visual loss.   NECK: No cervical lymphadenopathy.     PULMONARY:  Clear to auscultation bilaterally.  No wheeze, rhonchi or rales.   CARDIAC:  Normal S1, S2, regular rate and rhythm.   ABDOMEN:  Soft, nontender, nondistended, positive bowel sounds.   MUSCULOSKELETAL:  Range of motion intact in all extremities.   NEUROLOGIC:  Cranial nerves II-XII grossly intact.  Strength and sensation equal in all extremities.  Slight right bilateral hemianopsia.   PSYCHIATRIC:  Appropriate affect.      LABORATORY DATA:  Sodium 131, potassium 3.9, BUN 31, creatinine is 1.27.  Troponin is 0.015.  WBC 7.6, hemoglobin ___, platelets are 225.  INR is 1.05.      IMAGING: CT of the head without contrast: No acute intracranial abnormality.  No evidence of ischemia or hemorrhage.      CT of the head with contrast: Left posterior cerebral artery transit compatible with acute ischemia.      CT of the neck with contrast: Asymmetric decreased flow with distal left posterior cerebral artery.  Noted right-sided lung nodules.      ASSESSMENT AND PLAN:  The patient is an 85-year-old female with a past medical history of  paroxysmal atrial fibrillation on Tikosyn (not on anticoagulation) with pacemaker placement, coronary artery disease, type 2 diabetes, hyperlipidemia, hypertension, tachybrady syndrome, and prior retroperitoneal hematoma in the context of anticoagulation who presents with stroke.   1.  Visual defect/cerebrovascular accident:  CT head negative for bleed.  CT of the head with contrast with left posterior cerebral artery ischemia.  tPA initiated at outside hospital. Continues today in the ICU.  Otherwise, neurologically intact.  Appears to generally be improving.  We will continue with tPA algorithm.  We will have a bedside speech/swallow evaluation.  Otherwise, will be on bed rest and continue with close monitoring with neuro checks.  Critical Care Neurology consulted.  We will hold on further orders pending their review and appropriate timing.  Discussed with family.   2.  Paroxysmal atrial fibrillation:  Patient is on Tikosyn, which will be important to get on board, so will place order of n.p.o. with exception of meds.  Will initiate back on Tikosyn.  Also will have pharmacy review PTA medication list.   3.  Hypertension:  Controlled.  We will hold prior to admit irbesartan ___ renal insufficiency noted on labs.  Restart as able.  There is a nicardipine drip p.r.n. order placed. Will also continue on prior to admit Toprol with hold parameters.   4.  Hyperlipidemia:  Continue on prior to admit simvastatin.   5.  Depression: Will hold prior to admit Zoloft.   6.  Pulmonary nodule:  Recommend followup CT chest done as an outpatient.   7.  History of retroperitoneal bleed:  Clear to give tPA.  Anticoagulation otherwise per Neurology.   8.  FEN:  Slight elevation in creatinine.  We will give fluids.  Recheck in the morning.  Otherwise n.p.o. with exception of meds so she is able to get her cardiac medications.  Otherwise, strictly n.p.o. until cleared by Speech.   9.  Type 2 diabetes:  Since n.p.o., will place on q.4h  Accu-Cheks with medium sliding scale insulin and hypoglycemia protocol.   10.  CODE STATUS:  FULL.      DISPOSITION:  Inpatient.         ANIRUDH TEJADA MD             D: 2018   T: 2018   MT:       Name:     JOVANNA DE LEÓN   MRN:      [id number]        Account:      [id number]   :      1932        Admitted:     2018                   Document: [id number]

## 2018-09-27 NOTE — ED PROVIDER NOTES
History     Chief Complaint:  Headache and Loss of Vision    HPI   Zayda Hawkins is a 85 year old female with a history of atrial fibrillation, coronary artery disease, hyperlipidemia, and hypertension who presents to the emergency department today with sudden onset headache and vision loss. The patient has sudden onset left sided headache since 1900 tonight and had some right eye peripheral vision loss. The patient denies any anticoagulants due to a history of abdominal bleeding (prior bleed on on coumadin). The patient states that the headache has resolved now in the emergency department although still has some peripheral vision loss. The patient denies any eye pain, chest pain, speech inability, or any other symptoms her in the emergency department.  No trauma.  No current or recent bleeding symptoms.  No recent procedures.  No hx of intracranial hemorrhage, mass or prior CVA.    Allergies:  Aspirin  Coumadin [Warfarin]  Penicillins     Medications:    Amlodipine  dofeltide   Avapro  Magonate   Metoprolol  Zoloft  Zocor   Sumatriptan succinate     Past Medical History:    Atrial fibrillation   Back pain   Coronary artery disease   Chest pain   Diabetes mellitus   Hyperlipidemia   Hypertension   Tachy-bradycardia syndrome   Venous insufficieny   Hematoma     Past Surgical History:    Anesthesia cardioversion  Implant pacemaker    Family History:    Myocardial infarction  Pneumonia     Social History:  The patient was accompanied to the ED by her son and daughter.  Smoking Status: Never smoker   Smokeless Tobacco: Never used   Alcohol Use: No   Marital Status:       Review of Systems   Eyes: Positive for visual disturbance. Negative for pain.   Cardiovascular: Negative for chest pain.   Neurological: Positive for headaches. Negative for speech difficulty.   All other systems reviewed and are negative.    Physical Exam   First Vitals:  Patient Vitals for the past 24 hrs:   BP Temp Pulse Heart Rate  "Resp SpO2 Height Weight   09/26/18 2226 - - - - - - 1.676 m (5' 6\") 70.4 kg (155 lb 3.3 oz)   09/26/18 2200 160/70 - - 60 - 94 % - -   09/26/18 2145 178/87 - - - - - - -   09/26/18 2123 (!) 163/133 - - - - - - -   09/26/18 2116 - 97.8  F (36.6  C) 96 96 18 99 % - -     Physical Exam   Gen: alert  HEENT: PERRL, oropharynx clear, EOMI, scleral clear  Neck: normal ROM  CV: RRR, no murmurs  Pulm: breath sounds equal, lungs clear  Abd: Soft, nontender  Back: no evidence of injury, no cva tenderness  MSK: no deformity, moves all extremities  Skin: no rash  Neuro: alert, appropriate conversation and interaction- please see stroke scale below  Emergency Department Course   ECG:  Indication: Headache, loss of vision  Completed at 2119.  Read at 2130.   Atrial-placed rhythm with prolonged AV conduction   Abnormal ECG    Rate 60 bpm. MA interval 216. QRS duration 78. QT/QTc 432/432. P-R-T axes 34 33 69.    Imaging:  Radiology findings were communicated with the patient and family who voiced understanding of the findings.    CT Head w/o Contrast:   IMPRESSION:  No acute intracranial abnormality. No evidence of  ischemia or hemorrhage.  Report per radiology     CT Head w Contrast:   IMPRESSION: Left posterior cerebral artery transit time prolongation  compatible with acute ischemia.  Report per radiology     CTA Head Neck with Contrast:   IMPRESSION:   1. Asymmetrically decreased flow within the distal left posterior  cerebral artery.  2. 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  Report per radiology     Laboratory:  Laboratory findings were communicated with the patient and family who voiced understanding of the findings.    BMP: Glucose 192 (H), BUN 31 (H), Creatinine 1.27 (H), GFR Estimate 40 (L) o/wWNL     CBC: AWNL. (WBC 7.6, HGB 13.2, )     INR: 1.05    Partial " thromboplastin time: 30    Troponin (Collected 2124): <0.015    Glucose by meter: 177 (H)    Interventions:  2129: NS 1L IV Bolus   2219: NS 1L IV Bolus      Emergency Department Course:  Nursing notes and vitals reviewed.  2120: I performed an exam of the patient as documented above.     2120: Code stroke was called.     2235: I spoke with Dr. Mckenna of the neurology service regarding patient's presentation, findings, and plan of care.    Findings and plan explained to the Patient and son and daughter. Patient will be transferred to [facility] via EMS. Discussed the case with Dr. Peralta, who will admit the patient to a monitored bed for further monitoring, evaluation, and treatment.    I personally reviewed the laboratory and imaging results with the Patient and son and daughter and answered all related questions prior to transfer.    Impression & Plan    CMS Diagnoses: The patient has stroke symptoms:           ED Stroke specific documentation           NIHSS PDF          Protocol PDF     Patient last known well time: 1900  ED Provider first to bedside at: 2117  CT Results received at: 2230  Patient was treated with TPA.  The risks and benefits of TPA were reviewed using the risk information document.  These risks included bleeding complications such as intracranial bleeding and death. The patient or patient s surrogate s decisional capacity was assessed to be intact. TPA decision was made after this informed consent.    National Institutes of Health Stroke Scale (Baseline)  Time Performed: 2117      Score    Level of consciousness: (0)   Alert, keenly responsive    LOC questions: (0)   Answers both questions correctly    LOC commands: (0)   Performs both tasks correctly    Best gaze: (0)   Normal    Visual: (2)   Complete hemianopia    Facial palsy: (0)   Normal symmetrical movements    Motor arm (left): (0)   No drift    Motor arm (right): (0)   No drift    Motor leg (left): (0)   No drift    Motor leg  (right): (0)   No drift    Limb ataxia: (0)   Absent    Sensory: (0)   Normal- no sensory loss    Best language: (0)   Normal- no aphasia    Dysarthria: (0)   Normal    Extinction and inattention: (0)   No abnormality        Total Score:  2        Stroke Mimics were considered (including migraine headache, seizure disorder, hypoglycemia (or hyperglycemia), head or spinal trauma, CNS infection, Toxin ingestion and shock state (e.g. sepsis) .              National Institutes of Health Stroke Scale  Time Performed: 2230  Total Score: 2  (unchanged from last stroke score)            Medical Decision Making:  Zayda Hawkins is a 85 year old female who presents to the emergency department today with right visual field cut. Patient underwent code stroke protocol. She had persistent deficit right hemianopia here. CTA showed a perfusion deficit in the occipital lobe. This is consistent with the patient's symptoms. The patient was evaluated by Dr. Mckenna. stroke neurologist via telestroke service. We had a detailed conversation regarding patient's presentation, history of retroperitoneal bleeding on coumadin, and risks and benefits of TPA. I expressed my concern given mild deficit and history of bleeding, however, neurologist recognized this and recommended to proceed as he did not recognize any overt TPA indications. The patient and family were consented for TPA by the stroke neurologist and I was present for this conversation.  The need for the neurologist to review in detail this patient's imaging with the radiologist as well as multiple conversations with family regarding risk benefits and treatment including conversation over the phone with patient's daughter-in-law who was not present there was a mild delay in decision to provide TPA.   Bleeding risks were specifically discussed including life threatening hemorrhage. Patient and family consented to TPA. TPA was administered and patient was transferred to  [facility].     Critical Care time exclusive of procedures 30 minutes in the evaluate and treatment of a neurologic emergency, consultation with the stroke neurologist and conversations regarding treatment with patient and family.    Diagnosis:    ICD-10-CM    1. Cerebrovascular accident (CVA), unspecified mechanism (H) I63.9    2. 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 providers found based on my observations and the provider's statements to me.        Sima Goyal MD  09/27/18 0000

## 2018-09-27 NOTE — PHARMACY-ADMISSION MEDICATION HISTORY
Admission medication history interview status for the 9/26/2018  admission is complete. See EPIC admission navigator for prior to admission medications     Medication history source reliability:Good    Actions taken by pharmacist (provider contacted, etc):None     Additional medication history information not noted on PTA med list :None    Medication reconciliation/reorder completed by provider prior to medication history? No    Time spent in this activity: 15 min    Prior to Admission medications    Medication Sig Last Dose Taking? Auth Provider   amLODIPine (NORVASC) 5 MG tablet Take 1 tablet (5 mg) by mouth daily 9/26/2018 at Unknown time Yes Julio C Rangel MD   aspirin 81 MG EC tablet Take 1 tablet by mouth daily. 9/26/2018 at Unknown time Yes Jorge Duran MD   calcium carbonate-vitamin D 500-400 MG-UNIT TABS Take 1 tablet by mouth 2 times daily.   9/26/2018 at Unknown time Yes Reported, Patient   dofetilide (TIKOSYN) 500 MCG capsule Take 1 capsule (500 mcg) by mouth 2 times daily 9/26/2018 at am Yes Julio C Rangel MD   irbesartan (AVAPRO) 300 MG tablet Take 1 tablet (300 mg) by mouth daily 9/26/2018 at Unknown time Yes Kingsley Brandon MD   magnesium gluconate (MAGONATE) 500 MG tablet Take 500 mg by mouth daily.   9/26/2018 at Unknown time Yes Reported, Patient   metoprolol (TOPROL XL) 100 MG 24 hr tablet Take 1 tablet (100 mg) by mouth daily 9/26/2018 at Unknown time Yes Heriberto Meyer MD   multivitamin, therapeutic (THERA-VIT) TABS Take 1 tablet by mouth daily.   9/26/2018 at Unknown time Yes Reported, Patient   Omega-3 Fatty Acids (OMEGA-3 FISH OIL PO) Take 1,200 mg by mouth daily.   9/26/2018 at Unknown time Yes Reported, Patient   sertraline (ZOLOFT) 50 MG tablet Take 50 mg by mouth every evening.   9/25/2018 at Unknown time Yes Reported, Patient   simvastatin (ZOCOR) 40 MG tablet Take 1 tablet (40 mg) by mouth At Bedtime 9/25/2018 Yes Heriberto Meyer MD   SUMAtriptan  Succinate (IMITREX PO) Take 25 mg by mouth as needed.    Yes Unknown, Entered By History   timolol hemihydrate (BETIMOL) 0.5 % SOLN ophthalmic solution Place 1 drop into both eyes 2 times daily 9/26/2018 at am Yes Reported, Patient

## 2018-09-27 NOTE — CONSULTS
[facility]     Stroke Note    HPI  Zayda Hawkins is a 85 year old female with pmhx of atrial fibrillation not anticoagulated due to hx of retroperitoneal bleed when in warfarin many years ago. HTN, CAD and DLP. Presented with Rt visual field cut. She was cooking then suddenly at 19:00 patient had Rt visual field cut and some left occipital headache. The headache resolved but the Rt visual field cut persisted. She denies any other symptoms.  She is very active lady. Lives alone, excercise in the Gym daily, goes swimming and drives a car. Highly functioning.   CT: No bleed, no early signs of stroke  CTA: Probable distal Left PCA. Chronic left transverse sinus narrowing calcification unchanged compared to 2014. Not occluded.   CTP: Decrease perfusion MTT and TMax left occipital area.    Impression:    1. Acute Lt PCA stroke. Disabling stroke. Highly functioning patient. Remote hx of abd bleeding. No IV tPA contraindications. I discussed the risks of IV tPA with son and daughter, and daughter in law who is an internal medicine attending(Tele-phone). Some delay with giving IV tPA due to discussion with radiology regarding the left transverse sinus which he thinks is chronic narrowing without complete occlusion (same to 2014 CT), family discussions, and discussion with ER attending due to concern of risk of bleeding.  I think IV tPA is indicated due to disabling symptom in a highly functioning patient.    2. Lt transverse sinus chronic narrowing (complete occlusion is not seen (I reviewed the images with radiology)). Unchanged compared to 2014, no recommendation to do CTV as CTA is sufficient.     Recommendations:  Acute Ischemic Stroke (post tPA) Recommendations  - Close monitoring and neurochecks for any evidence of hemorrhagic transformation or allergic reaction  - Labetalol PRN to maintain BP < 180/105  - Euthermia, Euglycemia  - Head of bed elevated  - Hold aspirin and pharmacologic DVT  prophylaxis for at least 24 hrs post-tPA  - Statin  - Repeat HCT 24 hrs post-tPA  - MRI/MRA Stroke Protocol  - TTE with Bubble Study  - Telemetry, EKG  - Close watch for bleeding. Patient has history of Abdomenal bleeding in the past.  - Bedside Glucose Monitoring  - A1c, Lipid Panel, Troponin x 3  - PT/OT/SLP  - PM&R  - Stroke Education    Radha Mckenna MD  Stroke Neurology  ____________________________________     STROKE DATA    Stroke Code:        Stroke code activated      21.21   First stroke provider response      21.24   Tele service began      21.33   Tele service ended      22.33   Last known normal      19:00   Time of discovery (or onset of symptoms)       19:00   Head CT read by me      21.43   Was stroke code de-escalated?      no     Telestroke Service Details  Type of service telemedicine diagnostic assessment of acute neurological changes   Reason telemedicine is appropriate patient requires assessment with a specialist for diagnosis and treatment of neurological symptoms   Mode of transmission secure interactive audio and video communication per [employer]   Originating site (patient location) [facility]    Distant site (provider location) [facility]     TPA Treatment   Administered yes. Risks and benefits of tPA were discussed with Patient and Family prior to administration.    Endovascular Treatment  Not initiated due to absence of proximal vessel occlusion     ____________________________________    Physical Examination:  Vital Signs:  B/P: 163/133,  T: 97.8,  P: 96,  R: 18      Stroke Scales      National Institutes of Health Stroke Scale (at presentation)   NIHSS done at:  time patient seen      Score    Level of consciousness:  (0)   Alert, keenly responsive    LOC questions:  (0)   Answers both questions correctly    LOC commands:  (0)   Performs both tasks correctly    Best gaze:  (0)   Normal    Visual:  (2)   Complete hemianopia     Facial palsy:  (0)   Normal symmetrical movements    Motor arm (left):  (0)   No drift    Motor arm (right):  (0)   No drift    Motor leg (left):  (0)   No drift    Motor leg (right):  (0)   No drift    Limb ataxia:  (0)   Absent    Sensory:  (0)   Normal- no sensory loss    Best language:  (0)   Normal- no aphasia    Dysarthria:  (0)   Normal    Extinction and inattention:  (0)   No abnormality        NIHSS Total Score:  2            Labs/Studies:    CBC     Recent Labs   Lab Test  09/26/18   2124   WBC  7.6   RBC  4.46   HGB  13.2   HCT  40.2   PLT  225       Basic Metabolic Panel  Recent Labs   Lab Test  10/26/16   0836   NA  139   POTASSIUM  3.7   CHLORIDE  105   CO2  27   BUN  16   CR  0.75   GLC  142*   MADDISON  8.4*       Liver panel  Recent Labs   Lab Test  03/10/15   1142   07/28/12   2050   PROTTOTAL   --    --   7.0   ALBUMIN   --    --   3.9   BILITOTAL   --    --   1.7*   ALKPHOS   --    --   97   AST   --    --   48*   ALT  <5*   < >  24    < > = values in this interval not displayed.       INR    Recent Labs   Lab Test  10/26/16   0836   INR  1.04        Lipid Profile  Recent Labs   Lab Test  03/10/15   1142   CHOL  145   HDL  66   LDL  69*   TRIG  48   CHOLHDLRATIO  2.2       A1C  Recent Labs   Lab Test 10/28/15   A1C  6.4*       Troponin I  Recent Labs   Lab Test  09/24/12   2208  09/24/12   1658   TROPI  <0.012  <0.012       Glucose  Lab Results   Component Value Date     (H) 10/26/2016       Imaging  Preliminary imaging:  See above    Past Medical History   Past Medical History:   Diagnosis Date     Atrial fibrillation (H)     not on anticoagulation, hx RP bleed     Back pain      CAD (coronary artery disease)     CT angio: mild lesion in the LAD, as well as 1st diagonal, and mild plaque in the proximal and  mid RCA     Chest pain      Diabetes mellitus (H)      Hyperlipidaemia      Hypertension      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     Venous insufficiency        Past Surgical History    Past Surgical History:   Procedure Laterality Date     ANESTHESIA CARDIOVERSION  7/23/2012    Procedure: ANESTHESIA CARDIOVERSION;;  Surgeon: Provider, Generic Anesthesia;  Location:  ANESTHESIA - [facility] - DO NOT SCHEDULE     IMPLANT PACEMAKER  11/2012    Dual chamber       Family History   Family History   Problem Relation Age of Onset     HEART DISEASE Mother 65     mi     HEART DISEASE Father 45     pnemonia       Social History   Social History     Social History     Marital status:      Spouse name: N/A     Number of children: N/A     Years of education: N/A     Occupational History     Not on file.     Social History Main Topics     Smoking status: Never Smoker     Smokeless tobacco: Never Used     Alcohol use No     Drug use: No     Sexual activity: Not on file     Other Topics Concern     Caffeine Concern Yes     no caffeine intake     Special Diet Yes     no sugar, salt or fats      Exercise Yes     treadmill, swimming daily      Seat Belt Yes     Social History Narrative          Allergies   Allergen Reactions     Aspirin      Coumadin [Warfarin]      Spontaneous retroperitoneal bleed.     Penicillins        MedicationsCurrent Outpatient Prescriptions   Medication Sig Dispense Refill     amLODIPine (NORVASC) 5 MG tablet Take 1 tablet (5 mg) by mouth daily 90 tablet 3     aspirin 81 MG EC tablet Take 1 tablet by mouth daily. 30 tablet 11     calcium carbonate-vitamin D 500-400 MG-UNIT TABS Take 1 tablet by mouth 2 times daily.         dofetilide (TIKOSYN) 500 MCG capsule Take 1 capsule (500 mcg) by mouth 2 times daily 60 capsule 8     irbesartan (AVAPRO) 300 MG tablet Take 1 tablet (300 mg) by mouth daily 90 tablet 3     lidocaine (LIDODERM) 5 % patch Place 1 patch onto the skin every 24 hours       magnesium gluconate (MAGONATE) 500 MG tablet Take 500 mg by mouth daily.         metoprolol (TOPROL XL) 100 MG 24 hr tablet Take 1 tablet (100 mg) by mouth daily 90 tablet 3     multivitamin, therapeutic  (THERA-VIT) TABS Take 1 tablet by mouth daily.         Omega-3 Fatty Acids (OMEGA-3 FISH OIL PO) Take 1,200 mg by mouth daily.         sertraline (ZOLOFT) 50 MG tablet Take 50 mg by mouth every evening.         simvastatin (ZOCOR) 40 MG tablet Take 1 tablet (40 mg) by mouth At Bedtime 90 tablet 3     SUMAtriptan Succinate (IMITREX PO) Take 25 mg by mouth as needed.         timolol hemihydrate (BETIMOL) 0.5 % SOLN ophthalmic solution Place 1 drop into both eyes 2 times daily         Please contact the Stroke Service with any questions.  Thank you.    Radha Mckenna MD     I spent 70 minutes of critical care time supervising the patient's code stroke activation. I personally reviewed all relevant labs and neuroimaging.

## 2018-09-28 ENCOUNTER — APPOINTMENT (OUTPATIENT)
Dept: OCCUPATIONAL THERAPY | Facility: CLINIC | Age: 83
DRG: 063 | End: 2018-09-28
Attending: PSYCHIATRY & NEUROLOGY
Payer: COMMERCIAL

## 2018-09-28 ENCOUNTER — APPOINTMENT (OUTPATIENT)
Dept: CT IMAGING | Facility: CLINIC | Age: 83
DRG: 063 | End: 2018-09-28
Attending: PSYCHIATRY & NEUROLOGY
Payer: COMMERCIAL

## 2018-09-28 LAB
ANION GAP SERPL CALCULATED.3IONS-SCNC: 9 MMOL/L (ref 3–14)
BUN SERPL-MCNC: 17 MG/DL (ref 7–30)
CALCIUM SERPL-MCNC: 8 MG/DL (ref 8.5–10.1)
CHLORIDE SERPL-SCNC: 114 MMOL/L (ref 94–109)
CO2 SERPL-SCNC: 24 MMOL/L (ref 20–32)
CREAT SERPL-MCNC: 0.9 MG/DL (ref 0.52–1.04)
ERYTHROCYTE [DISTWIDTH] IN BLOOD BY AUTOMATED COUNT: 14 % (ref 10–15)
GFR SERPL CREATININE-BSD FRML MDRD: 59 ML/MIN/1.7M2
GLUCOSE BLDC GLUCOMTR-MCNC: 112 MG/DL (ref 70–99)
GLUCOSE BLDC GLUCOMTR-MCNC: 117 MG/DL (ref 70–99)
GLUCOSE BLDC GLUCOMTR-MCNC: 125 MG/DL (ref 70–99)
GLUCOSE BLDC GLUCOMTR-MCNC: 131 MG/DL (ref 70–99)
GLUCOSE BLDC GLUCOMTR-MCNC: 138 MG/DL (ref 70–99)
GLUCOSE BLDC GLUCOMTR-MCNC: 144 MG/DL (ref 70–99)
GLUCOSE SERPL-MCNC: 131 MG/DL (ref 70–99)
HCT VFR BLD AUTO: 38.5 % (ref 35–47)
HGB BLD-MCNC: 12.7 G/DL (ref 11.7–15.7)
MCH RBC QN AUTO: 29.1 PG (ref 26.5–33)
MCHC RBC AUTO-ENTMCNC: 33 G/DL (ref 31.5–36.5)
MCV RBC AUTO: 88 FL (ref 78–100)
PLATELET # BLD AUTO: 192 10E9/L (ref 150–450)
POTASSIUM SERPL-SCNC: 3.7 MMOL/L (ref 3.4–5.3)
RBC # BLD AUTO: 4.36 10E12/L (ref 3.8–5.2)
SODIUM SERPL-SCNC: 147 MMOL/L (ref 133–144)
WBC # BLD AUTO: 6.3 10E9/L (ref 4–11)

## 2018-09-28 PROCEDURE — 85027 COMPLETE CBC AUTOMATED: CPT | Performed by: HOSPITALIST

## 2018-09-28 PROCEDURE — 40000133 ZZH STATISTIC OT WARD VISIT: Performed by: OCCUPATIONAL THERAPIST

## 2018-09-28 PROCEDURE — 12000000 ZZH R&B MED SURG/OB

## 2018-09-28 PROCEDURE — 25000132 ZZH RX MED GY IP 250 OP 250 PS 637: Performed by: HOSPITALIST

## 2018-09-28 PROCEDURE — 25000125 ZZHC RX 250: Performed by: INTERNAL MEDICINE

## 2018-09-28 PROCEDURE — 36415 COLL VENOUS BLD VENIPUNCTURE: CPT | Performed by: HOSPITALIST

## 2018-09-28 PROCEDURE — 97535 SELF CARE MNGMENT TRAINING: CPT | Mod: GO | Performed by: OCCUPATIONAL THERAPIST

## 2018-09-28 PROCEDURE — 99222 1ST HOSP IP/OBS MODERATE 55: CPT | Performed by: INTERNAL MEDICINE

## 2018-09-28 PROCEDURE — 00000146 ZZHCL STATISTIC GLUCOSE BY METER IP

## 2018-09-28 PROCEDURE — 99233 SBSQ HOSP IP/OBS HIGH 50: CPT | Performed by: INTERNAL MEDICINE

## 2018-09-28 PROCEDURE — 97165 OT EVAL LOW COMPLEX 30 MIN: CPT | Mod: GO | Performed by: OCCUPATIONAL THERAPIST

## 2018-09-28 PROCEDURE — 25000128 H RX IP 250 OP 636: Performed by: HOSPITALIST

## 2018-09-28 PROCEDURE — 25000132 ZZH RX MED GY IP 250 OP 250 PS 637: Performed by: INTERNAL MEDICINE

## 2018-09-28 PROCEDURE — 80048 BASIC METABOLIC PNL TOTAL CA: CPT | Performed by: HOSPITALIST

## 2018-09-28 PROCEDURE — 70450 CT HEAD/BRAIN W/O DYE: CPT

## 2018-09-28 RX ORDER — TIMOLOL MALEATE 5 MG/ML
1 SOLUTION/ DROPS OPHTHALMIC 2 TIMES DAILY
Status: DISCONTINUED | OUTPATIENT
Start: 2018-09-28 | End: 2018-09-28 | Stop reason: CLARIF

## 2018-09-28 RX ORDER — LATANOPROST 50 UG/ML
1 SOLUTION/ DROPS OPHTHALMIC AT BEDTIME
Status: DISCONTINUED | OUTPATIENT
Start: 2018-09-28 | End: 2018-10-02 | Stop reason: HOSPADM

## 2018-09-28 RX ADMIN — METOPROLOL SUCCINATE 100 MG: 50 TABLET, EXTENDED RELEASE ORAL at 09:48

## 2018-09-28 RX ADMIN — DORZOLAMIDE HYDROCHLORIDE AND TIMOLOL MALEATE 1 DROP: 20; 5 SOLUTION/ DROPS OPHTHALMIC at 22:48

## 2018-09-28 RX ADMIN — SODIUM CHLORIDE: 9 INJECTION, SOLUTION INTRAVENOUS at 00:09

## 2018-09-28 RX ADMIN — DORZOLAMIDE HYDROCHLORIDE AND TIMOLOL MALEATE 1 DROP: 20; 5 SOLUTION/ DROPS OPHTHALMIC at 09:51

## 2018-09-28 RX ADMIN — AMLODIPINE BESYLATE 5 MG: 5 TABLET ORAL at 09:48

## 2018-09-28 RX ADMIN — TIMOLOL MALEATE 1 DROP: 5 SOLUTION OPHTHALMIC at 09:49

## 2018-09-28 RX ADMIN — DOFETILIDE 500 MCG: 0.25 CAPSULE ORAL at 22:27

## 2018-09-28 RX ADMIN — LATANOPROST 1 DROP: 50 SOLUTION/ DROPS OPHTHALMIC at 22:49

## 2018-09-28 RX ADMIN — SIMVASTATIN 40 MG: 40 TABLET, FILM COATED ORAL at 22:29

## 2018-09-28 RX ADMIN — DOFETILIDE 500 MCG: 0.25 CAPSULE ORAL at 09:47

## 2018-09-28 ASSESSMENT — ACTIVITIES OF DAILY LIVING (ADL)
ADLS_ACUITY_SCORE: 10
PREVIOUS_RESPONSIBILITIES: MEAL PREP;HOUSEKEEPING;LAUNDRY;SHOPPING;MEDICATION MANAGEMENT;FINANCES;DRIVING

## 2018-09-28 ASSESSMENT — PAIN DESCRIPTION - DESCRIPTORS
DESCRIPTORS: ACHING;SORE
DESCRIPTORS: ACHING;SORE

## 2018-09-28 NOTE — CONSULTS
[facility]    Cardiac Electrophysiology Consultation     Date of Admission:  9/26/2018  Date of Consult (When I saw the patient): 09/28/18    Assessment & Plan   Zayda Hawkins is a 85 year old female who was admitted on 9/26/2018. I was asked to see the patient for Watchman, BERTRAM occluder. Well known to me from previous visit with h/o tachybrady syndrome, s/p ppm and I actually had a discussion with her a year ago about Watchman as pt had retroperitoneal bleed while taking Warfarin and given high CHADSVASC in light of her atrial fib. Pt deferred until discussion with her daughter in law who's an MD. She presented with right visual field cut and left sided headache to ER right away. d'x with acute LtPCA stroke and was given TPA with resolution of deficit soon thereafter.    Recent device interrogation on 9/5/18 shows AFAT burden 2% with recent episode occurred recently (no exact details on Epic). Studies have shown that there may not a direct temporal relationship between onset of atrial arrhythmia and CVA. As long as pt have a h/o atrial fib risk of stroke is mostly dependent on CHADSVASC score which was 4(age, htn and gender) and now 6 in light of CVA. If not other causes identified this is likely cardioembolic.     For short term treatment I would recommend Pradaxa (due to reversibility) and has less risk of major bleeding in comparison to Warfarin. Timing of Pradaxa deferred to neurology. For long term management she is a candidate for Watchman. Continue with Dofetilide.  Pt does NOT have a MRI compatible pacemaker. However, she is not pacer dependent. MRI can be considered if outcome greatly changes management. Otherwise, I would not recommending MRI due to risk of causing harm to pacemaker system.      Julio C Palacios    Code Status    Full Code    Primary Care Physician   Alton Willoughby    History is obtained from the patient    Past Medical History   I have reviewed this patient's  medical history and updated it with pertinent information if needed.   Past Medical History:   Diagnosis Date     Atrial fibrillation (H)     not on anticoagulation, hx RP bleed     Back pain      CAD (coronary artery disease)     CT angio: mild lesion in the LAD, as well as 1st diagonal, and mild plaque in the proximal and  mid RCA     Chest pain      Diabetes mellitus (H)      Hyperlipidaemia      Hypertension      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     Venous insufficiency        Past Surgical History   I have reviewed this patient's surgical history and updated it with pertinent information if needed.  Past Surgical History:   Procedure Laterality Date     ANESTHESIA CARDIOVERSION  7/23/2012    Procedure: ANESTHESIA CARDIOVERSION;;  Surgeon: Provider, Generic Anesthesia;  Location:  ANESTHESIA - [facility] - DO NOT SCHEDULE     IMPLANT PACEMAKER  11/2012    Dual chamber       Prior to Admission Medications   Prior to Admission Medications   Prescriptions Last Dose Informant Patient Reported? Taking?   SUMAtriptan Succinate (IMITREX PO)  Spouse/Significant Other Yes Yes   Sig: Take 25 mg by mouth as needed.     amLODIPine (NORVASC) 5 MG tablet 9/26/2018 at Unknown time Spouse/Significant Other No Yes   Sig: Take 1 tablet (5 mg) by mouth daily   aspirin 81 MG EC tablet 9/26/2018 at Unknown time Spouse/Significant Other No Yes   Sig: Take 1 tablet by mouth daily.   calcium carbonate-vitamin D 500-400 MG-UNIT TABS 9/26/2018 at Unknown time Spouse/Significant Other Yes Yes   Sig: Take 1 tablet by mouth 2 times daily.     dofetilide (TIKOSYN) 500 MCG capsule 9/26/2018 at am Spouse/Significant Other No Yes   Sig: Take 1 capsule (500 mcg) by mouth 2 times daily   dorzolamide-timolol PF (COSOPT) 22.3-6.8 MG/ML opthalmic solution   Yes Yes   Sig: Place 1 drop into both eyes 2 times daily   latanoprost (XALATAN) 0.005 % ophthalmic solution   Yes Yes   Sig: Place 1 drop into both eyes At Bedtime   magnesium gluconate (MAGONATE)  500 MG tablet 9/26/2018 at Unknown time Spouse/Significant Other Yes Yes   Sig: Take 500 mg by mouth daily.     metFORMIN (GLUCOPHAGE-XR) 500 MG 24 hr tablet   Yes Yes   Sig: Take 500 mg by mouth daily   metoprolol (TOPROL XL) 100 MG 24 hr tablet 9/26/2018 at Unknown time Spouse/Significant Other No Yes   Sig: Take 1 tablet (100 mg) by mouth daily   multivitamin, therapeutic (THERA-VIT) TABS 9/26/2018 at Unknown time Spouse/Significant Other Yes Yes   Sig: Take 1 tablet by mouth daily.     sertraline (ZOLOFT) 50 MG tablet 9/25/2018 at Unknown time Spouse/Significant Other Yes Yes   Sig: Take 50 mg by mouth every evening.     simvastatin (ZOCOR) 40 MG tablet 9/25/2018 Spouse/Significant Other No Yes   Sig: Take 1 tablet (40 mg) by mouth At Bedtime   timolol (TIMOPTIC) 0.5 % ophthalmic solution   Yes Yes   Sig: Place 1 drop into both eyes 2 times daily      Facility-Administered Medications: None     Allergies   Allergies   Allergen Reactions     Aspirin      Coumadin [Warfarin]      Spontaneous retroperitoneal bleed.     Penicillins        Social History   I have reviewed this patient's social history and updated it with pertinent information if needed. Zayda Hawkins  reports that she has never smoked. She has never used smokeless tobacco. She reports that she does not drink alcohol or use illicit drugs.    Family History   I have reviewed this patient's family history and updated it with pertinent information if needed.   Family History   Problem Relation Age of Onset     HEART DISEASE Mother 65     mi     HEART DISEASE Father 45     pnemonia       Review of Systems   Comprehensive review of systems was performed with pertinent positives and negatives listed in assessment and plan section.    Physical Exam   Temp: 98  F (36.7  C) Temp src: Oral BP: 142/61   Heart Rate: 61 Resp: 22 SpO2: 97 % O2 Device: None (Room air)    Vital Signs with Ranges  Temp:  [97.7  F (36.5  C)-98.1  F (36.7  C)] 98  F (36.7   C)  Heart Rate:  [60-73] 61  Resp:  [13-25] 22  BP: (117-174)/(55-90) 142/61  SpO2:  [91 %-98 %] 97 %  154 lbs 15.73 oz    Constitutional: awake, alert, cooperative, no apparent distress, and appears stated age  Eyes: Lids and lashes normal, pupils equal, round and reactive to light, extra ocular muscles intact, sclera clear, conjunctiva normal  ENT: Normocephalic, without obvious abnormality, atraumatic, sinuses nontender on palpation, external ears without lesions, oral pharynx with moist mucous membranes, tonsils without erythema or exudates, gums normal and good dentition.  Hematologic / Lymphatic: no cervical lymphadenopathy  Respiratory: No increased work of breathing, good air exchange, clear to auscultation bilaterally, no crackles or wheezing  Cardiovascular: Normal apical impulse, regular rate and rhythm, normal S1 and S2, no S3 or S4, and no murmur noted  GI: No scars, normal bowel sounds, soft, non-distended, non-tender, no masses palpated, no hepatosplenomegally  Skin: no bruising or bleeding  Musculoskeletal: There is no redness, warmth, or swelling of the joints.  Full range of motion noted.    Neurologic: Awake, alert, oriented to name, place and time.    Neuropsychiatric: General: normal, calm and normal eye contact    Data   I personally reviewed all recent ECGs and images.  Results for orders placed or performed during the hospital encounter of 09/26/18 (from the past 24 hour(s))   Glucose by meter   Result Value Ref Range    Glucose 121 (H) 70 - 99 mg/dL   Troponin I   Result Value Ref Range    Troponin I ES 0.017 0.000 - 0.045 ug/L   Glucose by meter   Result Value Ref Range    Glucose 121 (H) 70 - 99 mg/dL   Glucose by meter   Result Value Ref Range    Glucose 140 (H) 70 - 99 mg/dL   Glucose by meter   Result Value Ref Range    Glucose 117 (H) 70 - 99 mg/dL   CT Head w/o Contrast    Narrative    CT OF THE HEAD WITHOUT CONTRAST  9/28/2018 1:16 AM     COMPARISON: Head CT 9/26/2018.    HISTORY:   Stroke, post-tPA. Rule out bleed.     TECHNIQUE: 5 mm thick axial CT images of the head were acquired  without IV contrast material.    FINDINGS:  There is mild diffuse cerebral volume loss. There are  subtle patchy areas of decreased density in the cerebral white matter  bilaterally that are consistent with sequela of chronic small vessel  ischemic disease.     The ventricles and basal cisterns are within normal limits in  configuration given the degree of cerebral volume loss.  There is no  midline shift. There are no extra-axial fluid collections.     No intracranial hemorrhage, mass or recent infarct.    The visualized paranasal sinuses are well aerated. There is no  mastoiditis. There are no fractures of the visualized bones.       Impression    IMPRESSION:  Diffuse cerebral volume loss and cerebral white matter  changes consistent with chronic small vessel ischemic disease. No  evidence for acute intracranial pathology.     Radiation dose for this scan was reduced using automated exposure  control, adjustment of the mA and/or kV according to patient size, or  iterative reconstruction technique.   Glucose by meter   Result Value Ref Range    Glucose 131 (H) 70 - 99 mg/dL   Basic metabolic panel   Result Value Ref Range    Sodium 147 (H) 133 - 144 mmol/L    Potassium 3.7 3.4 - 5.3 mmol/L    Chloride 114 (H) 94 - 109 mmol/L    Carbon Dioxide 24 20 - 32 mmol/L    Anion Gap 9 3 - 14 mmol/L    Glucose 131 (H) 70 - 99 mg/dL    Urea Nitrogen 17 7 - 30 mg/dL    Creatinine 0.90 0.52 - 1.04 mg/dL    GFR Estimate 59 (L) >60 mL/min/1.7m2    GFR Estimate If Black 72 >60 mL/min/1.7m2    Calcium 8.0 (L) 8.5 - 10.1 mg/dL   CBC with platelets   Result Value Ref Range    WBC 6.3 4.0 - 11.0 10e9/L    RBC Count 4.36 3.8 - 5.2 10e12/L    Hemoglobin 12.7 11.7 - 15.7 g/dL    Hematocrit 38.5 35.0 - 47.0 %    MCV 88 78 - 100 fl    MCH 29.1 26.5 - 33.0 pg    MCHC 33.0 31.5 - 36.5 g/dL    RDW 14.0 10.0 - 15.0 %    Platelet Count 192 150  - 450 10e9/L   Glucose by meter   Result Value Ref Range    Glucose 138 (H) 70 - 99 mg/dL

## 2018-09-28 NOTE — PROGRESS NOTES
09/28/18 1034   Quick Adds   Type of Visit Initial Occupational Therapy Evaluation   Living Environment   Lives With alone   Living Arrangements apartment   Home Accessibility no concerns   Number of Stairs to Enter Home 0   Number of Stairs Within Home 0   Living Environment Comment lives alone in an apt with an elevator. very Ind, no assist but family check on her daily   Self-Care   Usual Activity Tolerance excellent   Current Activity Tolerance good   Regular Exercise yes   Activity/Exercise Type swimming;walking   Exercise Amount/Frequency daily;greater than 1 hr   Activity/Exercise/Self-Care Comment I with all ADL's and IADL's including driving   Functional Level Prior   Fall history within last six months no   General Information   Onset of Illness/Injury or Date of Surgery - Date 09/26/18   Referring Physician Barron Machado MD    Patient/Family Goals Statement I would like to go home   Additional Occupational Profile Info/Pertinent History of Current Problem pt is transfered from [facility] to [facility] where she presents to ER with headache and partial vision loss.  was given tPA for potential CVA/thrombus, likely L PCA distribution infarct. vision now resolved. PMhx: pacemaker, a-fib, DM2, HTN   Precautions/Limitations fall precautions   General Observations pt resting in bed, agreeable to OT eval   General Info Comments daughter present   Cognitive Status Examination   Orientation orientation to person, place and time   Level of Consciousness alert   Able to Follow Commands WNL/WFL   Personal Safety (Cognitive) WNL/WFL   Visual Perception   Visual Perception No deficits were identified   Visual Perception Comments pt reports that all visual deficits have resolved   Sensory Examination   Sensory Quick Adds No deficits were identified   Pain Assessment   Patient Currently in Pain No   Integumentary/Edema   Integumentary/Edema Comments some edema noted in b LE's   Posture   Posture not impaired   Range of Motion (ROM)   ROM  Quick Adds No deficits were identified   Strength   Manual Muscle Testing Quick Adds No deficits were identified   Strength Comments overall pt is 5/5   Hand Strength   Hand Strength Comments 5/5   Muscle Tone Assessment   Muscle Tone Comments wfl   Mobility   Bed Mobility Comments SBA   Transfer Skills   Transfer Comments SBA   Transfer Skill: Bed to Chair/Chair to Bed   Level of [place]: Bed to Chair stand-by assist   Transfer Skill: Sit to Stand   Level of [place]: Sit/Stand stand-by assist   Physical Assist/Nonphysical Assist: Sit/Stand supervision   Toilet Transfer   Toilet Transfer Comments SBA   Upper Body Dressing   Level of [place]: Dress Upper Body stand-by assist   Physical Assist/Nonphysical Assist: Dress Upper Body (HR up to 118 with any activity)   Lower Body Dressing   Level of [place]: Dress Lower Body stand-by assist   Instrumental Activities of Daily Living (IADL)   Previous Responsibilities meal prep;housekeeping;laundry;shopping;medication management;finances;driving   Activities of Daily Living Analysis   Impairments Contributing to Impaired Activities of Daily Living flexibility decreased   General Therapy Interventions   Planned Therapy Interventions ADL retraining   Clinical Impression   Criteria for Skilled Therapeutic Interventions Met yes, treatment indicated   OT Diagnosis decreased I with ADL's   Influenced by the following impairments decreased flexibility and change in vitals   Assessment of Occupational Performance 1-3 Performance Deficits   Identified Performance Deficits decreased home mgmt and dressing   Clinical Decision Making (Complexity) Low complexity   Therapy Frequency daily   Predicted Duration of Therapy Intervention (days/wks) 1-2 sessions   Anticipated Equipment Needs at Discharge dressing equipment   Anticipated Discharge Disposition Home   Risks and Benefits of Treatment have been explained. Yes   Patient, Family & other staff in agreement with plan  "of care Yes   [facility] AM-PAC TM \"6 Clicks\"   2016, Trustees of [facility], under license to [employer].  All rights reserved.   6 Clicks Short Forms Daily Activity Inpatient Short Form   [facility]-PAC  \"6 Clicks\" Daily Activity Inpatient Short Form   1. Putting on and taking off regular lower body clothing? 3 - A Little   2. Bathing (including washing, rinsing, drying)? 3 - A Little   3. Toileting, which includes using toilet, bedpan or urinal? 4 - None   4. Putting on and taking off regular upper body clothing? 4 - None   5. Taking care of personal grooming such as brushing teeth? 4 - None   6. Eating meals? 4 - None   Daily Activity Raw Score (Score out of 24.Lower scores equate to lower levels of function) 22   Total Evaluation Time   Total Evaluation Time (Minutes) 12     "

## 2018-09-28 NOTE — PROGRESS NOTES
Per cardiology, pt not a candidate for MRI, so no further neuro w/u warranted.  I agree with anticoagulation per cardiology recommendation if family is ok with that.  Typically we prefer Eliquis, but if pt and family want Pradaxa because it has a reversal antibody, that is fine.  Please call with any further questions.

## 2018-09-28 NOTE — PROGRESS NOTES
Please see admit H&P from earlier in day    In brief, Mrs. Barry is a very pleasant 84 y/o female who is normally very active. With onset of visual cut, given tPA for potential CVA/ thrombus. At the time of my visit later in the day, pt with largely resolved symptoms    A/P:  Possible CVA vs migraine  - received tPA  - further imaging as per neurology  - had afib per ppm the weekend prior  - will consult cardiology as will need to have discussion re: anticoagulation in setting of possible CVA with h/o retroperitoneal bleed  - BP control, labs, statin, echo, telemetry as per admit H&P  - therapies  - will continue with close monitoring in ICU overnight through 24 hours post tPA    afib  On tikosyn, metoprolol as outpatient  - as above noted to be in afib the weekend prior to event  - cardiology consult  - hold a/c for now given recent tPA    Hypertension  - prn meds/ nicardipine gtt to keep pressures under 180/115  - amlodipine, metoprolol    DM  Monitor, sliding scale insulin  Hold metformin for now    Loy Braun M.D.  Hospitalist  Pager [phone]  Text Page until 6 pm (after call answering service)

## 2018-09-28 NOTE — PROGRESS NOTES
[facility]    Hospitalist Progress Note    Date of Service (when I saw the patient): 09/28/2018    Assessment & Plan   Mrs. Barry is a very pleasant 84 y/o female who presents to the hospital after having a   R sided field cut. Medical history includes DM, HTN, afib (dofetilide, ppm, metoprolol), CAD (CT  Angio), and spontanoeous retroperitoneal bleed on coumadin. CT angio with L posterior circulation acute ischemic event. Received tPA, sx of CVA resolved.    R visual cut  Acute ischemia on CT angio 9/26 s/p tPA  Presented to [facility] with CT evidence of acute ischemic event. tPA administered with resolutions of sx. Likely CVA but on ddx is migraine (unlikely per Dr. Machado). Unable to undergo MRI 2/2 pacemaker (electric components apparently would get damaged per Dr. Rangel).   - greatly appreciate neurology and cardiology input  - s/p tPA infusion as above  - trops ,0.015, 0.022, 0.017  - telemetry  - A1C 6.5%, TSH 1.4  - Lipids HDL at 65, LDL at 51, TG at 47  - unable to do MRI 2/2 ppm per Dr. Rangel/ cards  - bps fine, on amiodarone, metoprolol. No need for nicardipine. BPs less than 180/105  - bloods sugars reasonable, holding metformin, SSI  - simvastatin 40 mg  - anticoag pending (DAPT vs pradaxa). Per Dr. Rangel afib not as predictive as [id number] score (now 6) as well as neuro clearance for a/c (outside tPA risk window)  - unable MRI as above, echo if desired by neuro/ cards  - therapies. Passed swallow study so Diabetic, low Na diet  - transfer to neuro floor    Atrial fibrillation  CAD seen on CT angiogram  Outpatient with ppm, on metoprolol, tikosyn  - cardiology consulted since afib seen with interrogation of ppm. Per Dr. Rangel may not be predictive of CVA; better marker would be CHADSVASC score  - on ASA 81 as outpatient, currently on hold given tPA    Hypertension  H/o hypertension on metoprolol, amlodipine  - continue metoprolol, amlodipine  - prn nicardipine infusion (not needed  and transferring out of ICU  - pressures less than 185/115 per neuro    DM  A1C at 6.5% this admission  - holding metformin  - sliding scale insulin  - BS acceptable     HLD  As above, statin    Glaucoma  Cosopt, xalatan    Depression  On zoloft as outpatient, restart when able      # Pain Assessment:  Current Pain Score 9/28/2018   Patient currently in pain? denies   Pain score (0-10) -   Pain location -   Pain descriptors -   Anis s pain level was assessed and she currently denies pain.      FEN (fluids, electrolytes and nutrition):  Discussed with nursing, pt and son.  DVT Prophylaxis: no a/c given tPA, further anticoag as per neuro and cards agreement  Code Status: Full Code    Disposition: Expected discharge pending the above    Loy Braun MD  [phone] (P)  [phone] (C)  Text Page until 6 pm (after call answering service)    Interval History   Doing quite well. Denies any residual CVA sx. No cp/sob    -Data reviewed today: I reviewed all new labs and imaging results over the last 24 hours. I personally reviewed no images or EKG's today.    Physical Exam   Temp: 98.1  F (36.7  C) Temp src: Oral BP: 154/73   Heart Rate: 64 Resp: 17 SpO2: 98 % O2 Device: None (Room air)    Vitals:    09/27/18 0000   Weight: 70.3 kg (154 lb 15.7 oz)     Vital Signs with Ranges  Temp:  [97.7  F (36.5  C)-98.1  F (36.7  C)] 98.1  F (36.7  C)  Heart Rate:  [60-73] 64  Resp:  [13-28] 17  BP: (117-174)/(55-88) 154/73  SpO2:  [91 %-99 %] 98 %  I/O last 3 completed shifts:  In: 2285 [P.O.:810; I.V.:1475]  Out: 3425 [Urine:3425]    Constitutional: Alert, oriented, no acute distress  Respiratory: Lungs clear to auscultation bilaterally, no wheezes, no crackles  Cardiovascular: Regular rate and rhythm, no murmurs  GI: Soft, non-tender, non-disteneded, good bowel sounds  Skin/Integumen: No erythema, cyanosis or edema  Other:      Medications     niCARdipine 40 mg in 200 mL 0.9% NaCl       - MEDICATION INSTRUCTIONS -       -  MEDICATION INSTRUCTIONS -       sodium chloride Stopped (09/28/18 0835)     - MEDICATION INSTRUCTIONS -         amLODIPine  5 mg Oral Daily     dofetilide  500 mcg Oral BID     dorzolamide-timolol PF  1 drop Both Eyes BID     insulin aspart  1-6 Units Subcutaneous Q4H     latanoprost  1 drop Both Eyes At Bedtime     metoprolol succinate  100 mg Oral Daily     simvastatin  40 mg Oral At Bedtime     sodium chloride (PF)  3 mL Intracatheter Q8H       Data     Recent Labs  Lab 09/28/18  0420 09/27/18  1425 09/27/18  0558 09/26/18  2124   WBC 6.3  --   --  7.6   HGB 12.7  --   --  13.2   MCV 88  --   --  90     --   --  225   INR  --   --   --  1.05   *  --   --  141   POTASSIUM 3.7  --   --  3.9   CHLORIDE 114*  --   --  108   CO2 24  --   --  25   BUN 17  --   --  31*   CR 0.90  --   --  1.27*   ANIONGAP 9  --   --  8   MADDISON 8.0*  --   --  8.5   *  --   --  192*   ALBUMIN  --   --  3.1*  --    PROTTOTAL  --   --  6.5*  --    BILITOTAL  --   --  0.5  --    ALKPHOS  --   --  157*  --    ALT  --   --  152*  --    AST  --   --  141*  --    TROPI  --  0.017 0.022 <0.015       Recent Results (from the past 24 hour(s))   CT Head w/o Contrast    Narrative    CT OF THE HEAD WITHOUT CONTRAST  9/28/2018 1:16 AM     COMPARISON: Head CT 9/26/2018.    HISTORY:  Stroke, post-tPA. Rule out bleed.     TECHNIQUE: 5 mm thick axial CT images of the head were acquired  without IV contrast material.    FINDINGS:  There is mild diffuse cerebral volume loss. There are  subtle patchy areas of decreased density in the cerebral white matter  bilaterally that are consistent with sequela of chronic small vessel  ischemic disease.     The ventricles and basal cisterns are within normal limits in  configuration given the degree of cerebral volume loss.  There is no  midline shift. There are no extra-axial fluid collections.     No intracranial hemorrhage, mass or recent infarct.    The visualized paranasal sinuses are well  aerated. There is no  mastoiditis. There are no fractures of the visualized bones.       Impression    IMPRESSION:  Diffuse cerebral volume loss and cerebral white matter  changes consistent with chronic small vessel ischemic disease. No  evidence for acute intracranial pathology.     Radiation dose for this scan was reduced using automated exposure  control, adjustment of the mA and/or kV according to patient size, or  iterative reconstruction technique.    ELIZABETH ARROYO MD

## 2018-09-29 ENCOUNTER — APPOINTMENT (OUTPATIENT)
Dept: OCCUPATIONAL THERAPY | Facility: CLINIC | Age: 83
DRG: 063 | End: 2018-09-29
Attending: HOSPITALIST
Payer: COMMERCIAL

## 2018-09-29 LAB
ANION GAP SERPL CALCULATED.3IONS-SCNC: 8 MMOL/L (ref 3–14)
BUN SERPL-MCNC: 13 MG/DL (ref 7–30)
CALCIUM SERPL-MCNC: 8.2 MG/DL (ref 8.5–10.1)
CHLORIDE SERPL-SCNC: 110 MMOL/L (ref 94–109)
CO2 SERPL-SCNC: 24 MMOL/L (ref 20–32)
CREAT SERPL-MCNC: 0.85 MG/DL (ref 0.52–1.04)
GFR SERPL CREATININE-BSD FRML MDRD: 64 ML/MIN/1.7M2
GLUCOSE BLDC GLUCOMTR-MCNC: 118 MG/DL (ref 70–99)
GLUCOSE BLDC GLUCOMTR-MCNC: 125 MG/DL (ref 70–99)
GLUCOSE BLDC GLUCOMTR-MCNC: 128 MG/DL (ref 70–99)
GLUCOSE BLDC GLUCOMTR-MCNC: 129 MG/DL (ref 70–99)
GLUCOSE BLDC GLUCOMTR-MCNC: 150 MG/DL (ref 70–99)
GLUCOSE SERPL-MCNC: 129 MG/DL (ref 70–99)
POTASSIUM SERPL-SCNC: 3.7 MMOL/L (ref 3.4–5.3)
SODIUM SERPL-SCNC: 142 MMOL/L (ref 133–144)

## 2018-09-29 PROCEDURE — 40000133 ZZH STATISTIC OT WARD VISIT: Performed by: OCCUPATIONAL THERAPIST

## 2018-09-29 PROCEDURE — 25000132 ZZH RX MED GY IP 250 OP 250 PS 637: Performed by: HOSPITALIST

## 2018-09-29 PROCEDURE — 00000146 ZZHCL STATISTIC GLUCOSE BY METER IP

## 2018-09-29 PROCEDURE — 36415 COLL VENOUS BLD VENIPUNCTURE: CPT | Performed by: INTERNAL MEDICINE

## 2018-09-29 PROCEDURE — 80048 BASIC METABOLIC PNL TOTAL CA: CPT | Performed by: INTERNAL MEDICINE

## 2018-09-29 PROCEDURE — 25000128 H RX IP 250 OP 636: Performed by: HOSPITALIST

## 2018-09-29 PROCEDURE — 12000000 ZZH R&B MED SURG/OB

## 2018-09-29 PROCEDURE — 99232 SBSQ HOSP IP/OBS MODERATE 35: CPT | Performed by: HOSPITALIST

## 2018-09-29 PROCEDURE — 97535 SELF CARE MNGMENT TRAINING: CPT | Mod: GO | Performed by: OCCUPATIONAL THERAPIST

## 2018-09-29 PROCEDURE — 97110 THERAPEUTIC EXERCISES: CPT | Mod: GO | Performed by: OCCUPATIONAL THERAPIST

## 2018-09-29 RX ORDER — DABIGATRAN ETEXILATE 150 MG/1
150 CAPSULE ORAL 2 TIMES DAILY
Status: DISCONTINUED | OUTPATIENT
Start: 2018-09-29 | End: 2018-10-02 | Stop reason: HOSPADM

## 2018-09-29 RX ADMIN — DOFETILIDE 500 MCG: 0.25 CAPSULE ORAL at 08:05

## 2018-09-29 RX ADMIN — DABIGATRAN ETEXILATE MESYLATE 150 MG: 150 CAPSULE ORAL at 20:22

## 2018-09-29 RX ADMIN — LATANOPROST 1 DROP: 50 SOLUTION/ DROPS OPHTHALMIC at 20:26

## 2018-09-29 RX ADMIN — METOPROLOL SUCCINATE 100 MG: 50 TABLET, EXTENDED RELEASE ORAL at 08:05

## 2018-09-29 RX ADMIN — AMLODIPINE BESYLATE 5 MG: 5 TABLET ORAL at 08:05

## 2018-09-29 RX ADMIN — DORZOLAMIDE HYDROCHLORIDE AND TIMOLOL MALEATE 1 DROP: 20; 5 SOLUTION/ DROPS OPHTHALMIC at 20:24

## 2018-09-29 RX ADMIN — DOFETILIDE 500 MCG: 0.25 CAPSULE ORAL at 20:22

## 2018-09-29 RX ADMIN — SODIUM CHLORIDE: 9 INJECTION, SOLUTION INTRAVENOUS at 10:08

## 2018-09-29 RX ADMIN — SIMVASTATIN 40 MG: 40 TABLET, FILM COATED ORAL at 20:22

## 2018-09-29 ASSESSMENT — ACTIVITIES OF DAILY LIVING (ADL)
ADLS_ACUITY_SCORE: 7
ADLS_ACUITY_SCORE: 10
ADLS_ACUITY_SCORE: 10
TOILETING: 0-->INDEPENDENT
ADLS_ACUITY_SCORE: 7
ADLS_ACUITY_SCORE: 10
ADLS_ACUITY_SCORE: 7

## 2018-09-29 ASSESSMENT — PAIN DESCRIPTION - DESCRIPTORS: DESCRIPTORS: SORE

## 2018-09-29 NOTE — PROGRESS NOTES
Cross cover:    Patient started diet, so will change sliding scale insulin to 4 times daily with Accu-Cheks before meals and at bedtime.  Also question regarding starting Pradaxa.  It appears this is been approved by both cardiology and neurology, however I am unsure about the timing, if it is okay to start tonight.  Will defer to tomorrow's rounder to start Pradaxa.    Vladimir Lal PA-C

## 2018-09-29 NOTE — PROGRESS NOTES
[facility]    Hospitalist Progress Note    Date of Service (when I saw the patient): 09/29/2018    Assessment & Plan   Mrs. Barry is a very pleasant 84 y/o female who presents to the hospital after having a   R sided field cut. Medical history includes DM, HTN, afib (dofetilide, ppm, metoprolol), CAD (CT  Angio), and spontanoeous retroperitoneal bleed on coumadin. CT angio with L posterior circulation acute ischemic event. Received tPA, sx of CVA resolved.    R visual cut  Acute ischemia on CT angio 9/26 s/p tPA  Presented to [facility] with CT evidence of acute ischemic event. tPA administered with resolutions of sx. Likely CVA but on ddx is migraine (unlikely per Dr. Machado). Unable to undergo MRI 2/2 pacemaker (electric components apparently would get damaged per Dr. Rangel).   - greatly appreciate neurology and cardiology input  - s/p tPA infusion as above  - trops ,0.015, 0.022, 0.017  - telemetry  - A1C 6.5%, TSH 1.4  - Lipids HDL at 65, LDL at 51, TG at 47  - unable to do MRI 2/2 ppm per Dr. Rangel/ cards  - bloods sugars reasonable, holding metformin, SSI  - simvastatin 40 mg  - anticoag pending (DAPT vs pradaxa). Per Dr. Rangel, afib not as predictive as [id number] score (now 6) as well as neuro clearance for a/c (outside tPA risk window)    Ordered Pradaxa per Neurology reccs  - unable MRI as above, echo if desired by neuro/ cards  - therapies. Passed swallow study so Diabetic, low Na diet  - transfer to neuro floor    Atrial fibrillation s/p PPM  CAD seen on CT angiogram  Pt was noted to have a uncontrolled ventricular response (HR;120s)   Continue  on metoprolol, Dofetilide.  - cardiology consulted since afib seen with interrogation of ppm. Per Dr. Rangel may not be predictive of CVA; better marker would be CHADSVASC score  - on ASA 81 as outpatient, currently on hold given tPA    Hypertension  H/o hypertension on metoprolol, amlodipine  - continue metoprolol, amlodipine  - prn nicardipine  infusion (not needed and transferring out of ICU  - pressures less than 185/115 per neuro    DM  A1C at 6.5% this admission  - holding metformin  - sliding scale insulin  - BS acceptable     HLD  As above, statin    Glaucoma  Cosopt, xalatan    Depression  On zoloft as outpatient, restart when able      # Pain Assessment:  Current Pain Score 9/29/2018   Patient currently in pain? yes   Pain score (0-10) 3   Pain location Hand   Pain descriptors Sore   Anis s pain level was assessed and she currently denies pain.      FEN (fluids, electrolytes and nutrition):  Discussed with nursing,   DVT Prophylaxis: no a/c given tPA, further anticoag as per neuro and cards agreement  Code Status: Full Code    Disposition: Expected discharge pending the above    Justino Grullon MD  Internal Medicine, Hospitalist  Pager#; [phone]        Interval History   Feels better, slept well no chest pain palpitations,  quite well. Denies any residual CVA sx. No cp/sob    -Data reviewed today: I reviewed all new labs and imaging results over the last 24 hours. I personally reviewed no images or EKG's today.    Physical Exam   Temp: 97.3  F (36.3  C) Temp src: Oral BP: (!) 143/93   Heart Rate: 122 Resp: 16 SpO2: 93 % O2 Device: None (Room air)    Vitals:    09/27/18 0000 09/29/18 0700   Weight: 70.3 kg (154 lb 15.7 oz) 71.3 kg (157 lb 1.6 oz)     Vital Signs with Ranges  Temp:  [97.3  F (36.3  C)-98.2  F (36.8  C)] 97.3  F (36.3  C)  Heart Rate:  [] 122  Resp:  [13-28] 16  BP: (138-175)/(61-93) 143/93  SpO2:  [93 %-99 %] 93 %  I/O last 3 completed shifts:  In: 900 [P.O.:700; I.V.:200]  Out: 400 [Urine:400]    Constitutional: Alert, oriented, no acute distress  Respiratory: Lungs clear to auscultation bilaterally, no wheezes, no crackles  Cardiovascular: Regular rate and rhythm, no murmurs  GI: Soft, non-tender, non-disteneded, good bowel sounds  Skin/Integumen: No erythema, cyanosis or edema  Other:      Medications     - MEDICATION  INSTRUCTIONS -       - MEDICATION INSTRUCTIONS -       sodium chloride Stopped (09/28/18 0835)     - MEDICATION INSTRUCTIONS -         amLODIPine  5 mg Oral Daily     dofetilide  500 mcg Oral BID     dorzolamide-timolol PF  1 drop Both Eyes BID     insulin aspart  1-7 Units Subcutaneous TID AC     insulin aspart  1-5 Units Subcutaneous At Bedtime     latanoprost  1 drop Both Eyes At Bedtime     metoprolol succinate  100 mg Oral Daily     simvastatin  40 mg Oral At Bedtime     sodium chloride (PF)  3 mL Intracatheter Q8H       Data     Recent Labs  Lab 09/29/18  0642 09/28/18  0420 09/27/18  1425 09/27/18  0558 09/26/18  2124   WBC  --  6.3  --   --  7.6   HGB  --  12.7  --   --  13.2   MCV  --  88  --   --  90   PLT  --  192  --   --  225   INR  --   --   --   --  1.05    147*  --   --  141   POTASSIUM 3.7 3.7  --   --  3.9   CHLORIDE 110* 114*  --   --  108   CO2 24 24  --   --  25   BUN 13 17  --   --  31*   CR 0.85 0.90  --   --  1.27*   ANIONGAP 8 9  --   --  8   MADDISON 8.2* 8.0*  --   --  8.5   * 131*  --   --  192*   ALBUMIN  --   --   --  3.1*  --    PROTTOTAL  --   --   --  6.5*  --    BILITOTAL  --   --   --  0.5  --    ALKPHOS  --   --   --  157*  --    ALT  --   --   --  152*  --    AST  --   --   --  141*  --    TROPI  --   --  0.017 0.022 <0.015       No results found for this or any previous visit (from the past 24 hour(s)).

## 2018-09-29 NOTE — PROGRESS NOTES
Pt updated that she will be transferring to station [id number]. Asked if she would like daughter updated. She requested that RN staff update her tomorrow morning.

## 2018-09-29 NOTE — PROGRESS NOTES
1931: Paged  Regarding blood glucose management (q4hrs vs with meals, bedtime, and 0200), also to discuss ordering Pradaxa for patient.

## 2018-09-30 LAB
GLUCOSE BLDC GLUCOMTR-MCNC: 116 MG/DL (ref 70–99)
GLUCOSE BLDC GLUCOMTR-MCNC: 117 MG/DL (ref 70–99)
GLUCOSE BLDC GLUCOMTR-MCNC: 123 MG/DL (ref 70–99)
GLUCOSE BLDC GLUCOMTR-MCNC: 133 MG/DL (ref 70–99)
GLUCOSE BLDC GLUCOMTR-MCNC: 145 MG/DL (ref 70–99)

## 2018-09-30 PROCEDURE — 25000132 ZZH RX MED GY IP 250 OP 250 PS 637: Performed by: HOSPITALIST

## 2018-09-30 PROCEDURE — 99233 SBSQ HOSP IP/OBS HIGH 50: CPT | Performed by: HOSPITALIST

## 2018-09-30 PROCEDURE — 25000128 H RX IP 250 OP 636: Performed by: HOSPITALIST

## 2018-09-30 PROCEDURE — 00000146 ZZHCL STATISTIC GLUCOSE BY METER IP

## 2018-09-30 PROCEDURE — 25000125 ZZHC RX 250: Performed by: INTERNAL MEDICINE

## 2018-09-30 PROCEDURE — 25000132 ZZH RX MED GY IP 250 OP 250 PS 637: Performed by: INTERNAL MEDICINE

## 2018-09-30 PROCEDURE — 12000000 ZZH R&B MED SURG/OB

## 2018-09-30 PROCEDURE — 99232 SBSQ HOSP IP/OBS MODERATE 35: CPT | Performed by: INTERNAL MEDICINE

## 2018-09-30 RX ORDER — MULTIVITAMIN,THERAPEUTIC
1 TABLET ORAL DAILY
Status: DISCONTINUED | OUTPATIENT
Start: 2018-09-30 | End: 2018-10-02 | Stop reason: HOSPADM

## 2018-09-30 RX ORDER — METOPROLOL SUCCINATE 50 MG/1
50 TABLET, EXTENDED RELEASE ORAL
Status: DISCONTINUED | OUTPATIENT
Start: 2018-09-30 | End: 2018-10-02 | Stop reason: HOSPADM

## 2018-09-30 RX ORDER — CHLORAL HYDRATE 500 MG
1 CAPSULE ORAL DAILY
Status: DISCONTINUED | OUTPATIENT
Start: 2018-09-30 | End: 2018-10-02 | Stop reason: HOSPADM

## 2018-09-30 RX ORDER — METOPROLOL TARTRATE 1 MG/ML
2.5 INJECTION, SOLUTION INTRAVENOUS EVERY 4 HOURS PRN
Status: DISCONTINUED | OUTPATIENT
Start: 2018-09-30 | End: 2018-10-02 | Stop reason: HOSPADM

## 2018-09-30 RX ORDER — DILTIAZEM HYDROCHLORIDE 30 MG/1
30 TABLET, FILM COATED ORAL EVERY 6 HOURS SCHEDULED
Status: DISCONTINUED | OUTPATIENT
Start: 2018-09-30 | End: 2018-09-30

## 2018-09-30 RX ORDER — UREA 10 %
500 LOTION (ML) TOPICAL DAILY
Status: DISCONTINUED | OUTPATIENT
Start: 2018-09-30 | End: 2018-10-02 | Stop reason: HOSPADM

## 2018-09-30 RX ADMIN — AMLODIPINE BESYLATE 5 MG: 5 TABLET ORAL at 08:05

## 2018-09-30 RX ADMIN — SIMVASTATIN 40 MG: 40 TABLET, FILM COATED ORAL at 20:43

## 2018-09-30 RX ADMIN — SODIUM CHLORIDE: 9 INJECTION, SOLUTION INTRAVENOUS at 03:24

## 2018-09-30 RX ADMIN — DORZOLAMIDE HYDROCHLORIDE AND TIMOLOL MALEATE 1 DROP: 20; 5 SOLUTION/ DROPS OPHTHALMIC at 20:38

## 2018-09-30 RX ADMIN — THERA TABS 1 TABLET: TAB at 16:33

## 2018-09-30 RX ADMIN — METOPROLOL SUCCINATE 50 MG: 50 TABLET, EXTENDED RELEASE ORAL at 20:38

## 2018-09-30 RX ADMIN — METOPROLOL SUCCINATE 100 MG: 50 TABLET, EXTENDED RELEASE ORAL at 08:05

## 2018-09-30 RX ADMIN — Medication 500 MG: at 16:33

## 2018-09-30 RX ADMIN — DABIGATRAN ETEXILATE MESYLATE 150 MG: 150 CAPSULE ORAL at 20:38

## 2018-09-30 RX ADMIN — DOFETILIDE 500 MCG: 0.25 CAPSULE ORAL at 20:38

## 2018-09-30 RX ADMIN — LATANOPROST 1 DROP: 50 SOLUTION/ DROPS OPHTHALMIC at 08:49

## 2018-09-30 RX ADMIN — METOPROLOL TARTRATE 2.5 MG: 5 INJECTION, SOLUTION INTRAVENOUS at 00:38

## 2018-09-30 RX ADMIN — DOFETILIDE 500 MCG: 0.25 CAPSULE ORAL at 08:06

## 2018-09-30 RX ADMIN — LATANOPROST 1 DROP: 50 SOLUTION/ DROPS OPHTHALMIC at 20:49

## 2018-09-30 RX ADMIN — OMEGA-3 FATTY ACIDS CAP 1000 MG 1 G: 1000 CAP at 16:33

## 2018-09-30 RX ADMIN — DORZOLAMIDE HYDROCHLORIDE AND TIMOLOL MALEATE 1 DROP: 20; 5 SOLUTION/ DROPS OPHTHALMIC at 08:42

## 2018-09-30 RX ADMIN — DABIGATRAN ETEXILATE MESYLATE 150 MG: 150 CAPSULE ORAL at 08:06

## 2018-09-30 ASSESSMENT — ACTIVITIES OF DAILY LIVING (ADL)
ADLS_ACUITY_SCORE: 7

## 2018-09-30 NOTE — PROGRESS NOTES
[facility]  Cardiology Progress Note     Interval History   Mrs.Maboudi Hawkins is a 86 yo female with PMHx of pAFib on Tikosyn and metoprolol and tachybrady syndrome s/p pacemaker implantation. She has history of retroperitoneal bleed while on warfarin and thus anti-coagulation was held. She presented on 9/26 with right visual field defect and was found to have an acute left PCA stroke.  She was given TPA with resolution of her symptoms.  Electrophysiology was consulted to assess her candidacy for watchman device.  They recommended short-term use of Pradaxa and then reconsideration of watchman device at a later date.     Cardiology has been reconsulted as the patient has episodes of atrial fibrillation with RVR over the last day to have been difficult to control.  Heart rates have ranged from the 90s-120s.  The patient remains on Tikosyn and Toprol- mg daily.  She is asymptomatic from atrial fibrillation and denies presyncope, shortness of breath or chest pain.  She reports she is eating and drinking well.  She denies anxiety or pain.  She is not grossly volume overloaded for the hospital stay.    Physical Exam   Vital Signs with Ranges  Temp:  [95.9  F (35.5  C)-97.5  F (36.4  C)] 97.5  F (36.4  C)  Heart Rate:  [] 117  Resp:  [16] 16  BP: (118-140)/(64-90) 118/77  SpO2:  [90 %-99 %] 96 %  I/O last 3 completed shifts:  In: 1380 [P.O.:560; I.V.:820]  Out: -   Patient Active Problem List   Diagnosis     Low back pain     Atrial fibrillation (H)     Hematoma     Retroperitoneal bleed     ACS (acute coronary syndrome) (H)     Chest pain     Hypertension     Hyperlipidemia     CAD (coronary artery disease)     Venous insufficiency     Stroke (H)     Vitals:    09/27/18 0000 09/29/18 0700   Weight: 70.3 kg (154 lb 15.7 oz) 71.3 kg (157 lb 1.6 oz)       Constitutional: No apparent distress.   Respiratory: Clear to auscultation bilaterally. No crackles or wheezes.  Cardiovascular: irregular  rate and rhythm. Normal S1 and S2. No murmurs. No extra heart sounds. No JVD.   Extremities: No peripheral edema.  Psychiatric: Alert and oriented. Answers questions appropriately.     Data   Potassium 3.7.  Creatinine 0.85.  Negative troponins from admission.  Left  Assessment & Plan   Zayda Hawkins is a 85 year old female who was admitted on 9/26/2018 with left PCA stroke status post TPA.  Cardiology was consulted for assistance with A. fib management.    #1 Paroxysmal atrial fibrillation  #2 History of retroperitoneal bleed on warfarin  #3 Recent Ischemic stroke s/p tPA (9/26)  #4 [id number] score 6  #5 Hypertension  #6 Tachybrady Syndrome s/p PPM    Patient has an extended period of time and paroxysmal atrial fibrillation with rates between 90 and 120 bpm.  She is asymptomatic from this and therefore I would not recommend aggressive heart rate control.  Will recommend to continue Tikosyn 500 mg twice daily.  We will increase her metoprolol 200 mg extended release in the a.m. and 50 mg extended release in the p.m.  Would not recommend initiation of diltiazem.  If the patient remains in atrial fibrillation tomorrow, will consider proceeding with JUSTIN guided cardioversion.  She currently is on Pradaxa for anticoagulation and therefore this could be continued for 4 weeks post cardioversion and at that point reevaluation for watchman device could be considered.  If the patient does not respond with A. fib control after cardioversion and with higher doses of metoprolol, consider switching to an alternate antiarrhythmic versus proceeding with AV node ablation given that she Elliot has a pacemaker in place.    EP will continue to follow tomorrow.  Please keep patient n.p.o. in preparation for possible JUSTIN guided cardioversion in the morning.     -Continue Tikosyn 500 mg twice daily  -Continue Pradaxa  -Increase metoprolol  mg in the a.m. and 50 mg in the p.m.  -Would not recommend aggressive heart rate  control in the interim given patient is asymptomatic  -If she remains in atrial fibrillation tomorrow, will consider JUSTIN guided cardioversion  -Please keep patient n.p.o. after midnight  -Will require reevaluation of watchman device as an outpatient, patient will likely require dual antiplatelet therapy for 6 months after this rather than warfarin given her history of bleeding complications.    Chely Todd MD    Text Page

## 2018-09-30 NOTE — PROGRESS NOTES
HOSPITALIST SERVICE CROSS-COVER NOTE:    Ordered prn IV metoprolol for HR>120.    Maksim Garcia MD  Hospitalist  Pager # [phone]

## 2018-09-30 NOTE — PROGRESS NOTES
[facility]    Hospitalist Progress Note      Assessment & Plan   Mrs. Barry is a very pleasant 86 y/o female who presents to the hospital after having a   R sided field cut. Medical history includes DM, HTN, afib (dofetilide, ppm, metoprolol), CAD (CT  Angio), and spontanoeous retroperitoneal bleed on coumadin. CT angio with L posterior circulation acute ischemic event. Received tPA, sx of CVA resolved.  Patient is almost stable for discharge but today is having atrial fibrillation with a rapid ventricular rate.  She will be started on oral diltiazem in addition to the metoprolol and dofetilide.  We will ask electrophysiology to reconsult.     R visual cut  Acute ischemia on CT angio 9/26 s/p tPA  Presented to [facility] with CT evidence of acute ischemic event. tPA administered with resolutions of sx. Likely CVA but on ddx is migraine (unlikely per Dr. Machado). Unable to undergo MRI 2/2 pacemaker (electric components apparently would get damaged per Dr. Rangel).   - greatly appreciate neurology and cardiology input  - s/p tPA infusion as above  - trops ,0.015, 0.022, 0.017  - telemetry  - A1C 6.5%, TSH 1.4  - Lipids HDL at 65, LDL at 51, TG at 47  - unable to do MRI 2/2 ppm per Dr. Rangel/ cards  - bloods sugars reasonable, holding metformin, SSI  - simvastatin 40 mg  - anticoag pending (DAPT vs pradaxa). Per Dr. Rangel, afib not as predictive as [id number] score (now 6) as well as neuro clearance for a/c (outside tPA risk window)    Ordered Pradaxa per Neurology reccs  - unable MRI as above, echo if desired by neuro/ cards  - therapies. Passed swallow study so Diabetic, low Na diet  - transfer to neuro floor  September 30-no new acute neurologic issues     Atrial fibrillation s/p PPM  CAD seen on CT angiogram  Pt was noted to have a uncontrolled ventricular response (HR;120s)   Continue  on metoprolol, Dofetilide.  - cardiology consulted since afib seen with interrogation of ppm. Per Dr. Rangel may  not be predictive of CVA; better marker would be CHADSVASC score  - on ASA 81 as outpatient, currently on hold given tPA  September 30-she is in atrial fibrillation with a ventricular rate in the 120s.  Heart rate did not improve with her dose of Toprol-XL this morning.  She continues on dofetilide.  I will add oral diltiazem and ask electrophysiology to see her again     Hypertension  H/o hypertension on metoprolol, amlodipine  - continue metoprolol, amlodipine  - prn nicardipine infusion (not needed and transferring out of ICU  - pressures less than 185/115 per neurology  September 30-blood pressure stable     DM  A1C at 6.5% this admission  - holding metformin  - sliding scale insulin  - BS acceptable  September 30-stable      HLD  As above, statin     Glaucoma  Cosopt, xalatan     Depression  On zoloft as outpatient, restart when able  # Pain Assessment:  Current Pain Score 9/30/2018   Patient currently in pain? denies   Pain score (0-10) -   Pain location -   Pain descriptors -   Anis s pain level was assessed and she currently denies pain.      DVT Prophylaxis: full anticoagulation   Code Status: Full Code    Disposition: Expected discharge in 1-2 days when atrial fibrillation is controlled    Cm Caldwell MD  Text Page (7am - 6pm)    Interval History   Patient has no pain or complaints.  She has no palpitations or chest pain.    -Data reviewed today: I reviewed all new labs and imaging results over the last 24 hours. I personally reviewed no images or EKG's today.    Physical Exam   Temp: 97.5  F (36.4  C) Temp src: Oral BP: 118/77   Heart Rate: 117 (tele) Resp: 16 SpO2: 96 % O2 Device: None (Room air)    Vitals:    09/27/18 0000 09/29/18 0700   Weight: 70.3 kg (154 lb 15.7 oz) 71.3 kg (157 lb 1.6 oz)     Vital Signs with Ranges  Temp:  [95.9  F (35.5  C)-97.5  F (36.4  C)] 97.5  F (36.4  C)  Heart Rate:  [] 117  Resp:  [16] 16  BP: (118-140)/(64-90) 118/77  SpO2:  [90 %-99 %] 96 %  I/O last 3  completed shifts:  In: 1380 [P.O.:560; I.V.:820]  Out: -     Constitutional: Awake, alert, cooperative, no apparent distress  Respiratory: Clear to auscultation bilaterally, no crackles or wheezing  Cardiovascular: Tachycardic and irregular.  S1-S2 audible  GI: Normal bowel sounds, soft, non-distended, non-tender  Skin/Integumen: No rashes, no cyanosis, no edema  Other:     Medications     - MEDICATION INSTRUCTIONS -       - MEDICATION INSTRUCTIONS -       sodium chloride 50 mL/hr at 09/30/18 0324     - MEDICATION INSTRUCTIONS -         amLODIPine  5 mg Oral Daily     dabigatran ANTICOAGULANT  150 mg Oral BID     diltiazem  30 mg Oral Q6H OLIVIER     dofetilide  500 mcg Oral BID     dorzolamide-timolol PF  1 drop Both Eyes BID     fish oil-omega-3 fatty acids  1 g Oral Daily     insulin aspart  1-7 Units Subcutaneous TID AC     insulin aspart  1-5 Units Subcutaneous At Bedtime     latanoprost  1 drop Both Eyes At Bedtime     magnesium gluconate  500 mg Oral Daily     metoprolol succinate  100 mg Oral Daily     multivitamin, therapeutic  1 tablet Oral Daily     simvastatin  40 mg Oral At Bedtime     sodium chloride (PF)  3 mL Intracatheter Q8H       Data     Recent Labs  Lab 09/29/18  0642 09/28/18  0420 09/27/18  1425 09/27/18  0558 09/26/18  2124   WBC  --  6.3  --   --  7.6   HGB  --  12.7  --   --  13.2   MCV  --  88  --   --  90   PLT  --  192  --   --  225   INR  --   --   --   --  1.05    147*  --   --  141   POTASSIUM 3.7 3.7  --   --  3.9   CHLORIDE 110* 114*  --   --  108   CO2 24 24  --   --  25   BUN 13 17  --   --  31*   CR 0.85 0.90  --   --  1.27*   ANIONGAP 8 9  --   --  8   MADDISON 8.2* 8.0*  --   --  8.5   * 131*  --   --  192*   ALBUMIN  --   --   --  3.1*  --    PROTTOTAL  --   --   --  6.5*  --    BILITOTAL  --   --   --  0.5  --    ALKPHOS  --   --   --  157*  --    ALT  --   --   --  152*  --    AST  --   --   --  141*  --    TROPI  --   --  0.017 0.022 <0.015       No results found for  this or any previous visit (from the past 24 hour(s)).

## 2018-09-30 NOTE — PROVIDER NOTIFICATION
MD Notification    Notified Person: MD    Notified Person Name: Dr Garcia    Notification Date/Time: 9/30/18 @ 1210    Notification Interaction: Talked via phone    Purpose of Notification: Uncontrolled A fib to 120s at times. Pt has PPM. PRN Metoprolol to control HR?    Orders Received: PRN IV metoprolol for HR >120 q4hrs    Comments: pt is asymptomatic at this time.

## 2018-10-01 ENCOUNTER — TELEPHONE (OUTPATIENT)
Dept: CARDIOLOGY | Facility: CLINIC | Age: 83
End: 2018-10-01

## 2018-10-01 LAB
GLUCOSE BLDC GLUCOMTR-MCNC: 120 MG/DL (ref 70–99)
GLUCOSE BLDC GLUCOMTR-MCNC: 121 MG/DL (ref 70–99)
GLUCOSE BLDC GLUCOMTR-MCNC: 143 MG/DL (ref 70–99)
GLUCOSE BLDC GLUCOMTR-MCNC: 151 MG/DL (ref 70–99)

## 2018-10-01 PROCEDURE — 93005 ELECTROCARDIOGRAM TRACING: CPT

## 2018-10-01 PROCEDURE — 25000132 ZZH RX MED GY IP 250 OP 250 PS 637: Performed by: INTERNAL MEDICINE

## 2018-10-01 PROCEDURE — 25000132 ZZH RX MED GY IP 250 OP 250 PS 637: Performed by: NURSE PRACTITIONER

## 2018-10-01 PROCEDURE — 00000146 ZZHCL STATISTIC GLUCOSE BY METER IP

## 2018-10-01 PROCEDURE — 25000132 ZZH RX MED GY IP 250 OP 250 PS 637: Performed by: HOSPITALIST

## 2018-10-01 PROCEDURE — 93010 ELECTROCARDIOGRAM REPORT: CPT | Performed by: INTERNAL MEDICINE

## 2018-10-01 PROCEDURE — 12000000 ZZH R&B MED SURG/OB

## 2018-10-01 PROCEDURE — 99233 SBSQ HOSP IP/OBS HIGH 50: CPT | Performed by: INTERNAL MEDICINE

## 2018-10-01 PROCEDURE — 99232 SBSQ HOSP IP/OBS MODERATE 35: CPT | Performed by: HOSPITALIST

## 2018-10-01 RX ORDER — POTASSIUM CHLORIDE 1500 MG/1
40 TABLET, EXTENDED RELEASE ORAL
Status: CANCELLED | OUTPATIENT
Start: 2018-10-01

## 2018-10-01 RX ORDER — POTASSIUM CHLORIDE 1500 MG/1
20 TABLET, EXTENDED RELEASE ORAL
Status: CANCELLED | OUTPATIENT
Start: 2018-10-01

## 2018-10-01 RX ORDER — FENTANYL CITRATE 50 UG/ML
25 INJECTION, SOLUTION INTRAMUSCULAR; INTRAVENOUS
Status: DISCONTINUED | OUTPATIENT
Start: 2018-10-01 | End: 2018-10-01

## 2018-10-01 RX ORDER — DILTIAZEM HYDROCHLORIDE 30 MG/1
60 TABLET, FILM COATED ORAL ONCE
Status: COMPLETED | OUTPATIENT
Start: 2018-10-01 | End: 2018-10-01

## 2018-10-01 RX ORDER — POTASSIUM CHLORIDE 1500 MG/1
40 TABLET, EXTENDED RELEASE ORAL
Status: DISCONTINUED | OUTPATIENT
Start: 2018-10-01 | End: 2018-10-01 | Stop reason: CLARIF

## 2018-10-01 RX ORDER — ATORVASTATIN CALCIUM 20 MG/1
20 TABLET, FILM COATED ORAL EVERY EVENING
Status: DISCONTINUED | OUTPATIENT
Start: 2018-10-01 | End: 2018-10-02 | Stop reason: HOSPADM

## 2018-10-01 RX ORDER — METOPROLOL SUCCINATE 25 MG/1
25 TABLET, EXTENDED RELEASE ORAL DAILY
Status: DISCONTINUED | OUTPATIENT
Start: 2018-10-01 | End: 2018-10-01 | Stop reason: DRUGHIGH

## 2018-10-01 RX ORDER — FENTANYL CITRATE 50 UG/ML
25-50 INJECTION, SOLUTION INTRAMUSCULAR; INTRAVENOUS
Status: DISCONTINUED | OUTPATIENT
Start: 2018-10-01 | End: 2018-10-01

## 2018-10-01 RX ORDER — MAGNESIUM SULFATE HEPTAHYDRATE 40 MG/ML
2 INJECTION, SOLUTION INTRAVENOUS
Status: CANCELLED | OUTPATIENT
Start: 2018-10-01

## 2018-10-01 RX ORDER — METOPROLOL SUCCINATE 50 MG/1
50 TABLET, EXTENDED RELEASE ORAL ONCE
Status: COMPLETED | OUTPATIENT
Start: 2018-10-01 | End: 2018-10-01

## 2018-10-01 RX ORDER — ATROPINE SULFATE 0.1 MG/ML
.5-1 INJECTION INTRAVENOUS
Status: DISCONTINUED | OUTPATIENT
Start: 2018-10-01 | End: 2018-10-01

## 2018-10-01 RX ORDER — POTASSIUM CHLORIDE 1500 MG/1
20 TABLET, EXTENDED RELEASE ORAL
Status: DISCONTINUED | OUTPATIENT
Start: 2018-10-01 | End: 2018-10-01 | Stop reason: CLARIF

## 2018-10-01 RX ORDER — GLYCOPYRROLATE 0.2 MG/ML
0.1 INJECTION, SOLUTION INTRAMUSCULAR; INTRAVENOUS ONCE
Status: DISCONTINUED | OUTPATIENT
Start: 2018-10-01 | End: 2018-10-01

## 2018-10-01 RX ORDER — DILTIAZEM HYDROCHLORIDE 180 MG/1
180 CAPSULE, EXTENDED RELEASE ORAL DAILY
Status: DISCONTINUED | OUTPATIENT
Start: 2018-10-02 | End: 2018-10-02

## 2018-10-01 RX ORDER — FLUMAZENIL 0.1 MG/ML
0.2 INJECTION, SOLUTION INTRAVENOUS
Status: DISCONTINUED | OUTPATIENT
Start: 2018-10-01 | End: 2018-10-02 | Stop reason: HOSPADM

## 2018-10-01 RX ORDER — SODIUM CHLORIDE 9 MG/ML
1000 INJECTION, SOLUTION INTRAVENOUS CONTINUOUS
Status: DISCONTINUED | OUTPATIENT
Start: 2018-10-01 | End: 2018-10-01

## 2018-10-01 RX ORDER — MAGNESIUM SULFATE HEPTAHYDRATE 40 MG/ML
2 INJECTION, SOLUTION INTRAVENOUS
Status: DISCONTINUED | OUTPATIENT
Start: 2018-10-01 | End: 2018-10-01 | Stop reason: CLARIF

## 2018-10-01 RX ORDER — NALOXONE HYDROCHLORIDE 0.4 MG/ML
.1-.4 INJECTION, SOLUTION INTRAMUSCULAR; INTRAVENOUS; SUBCUTANEOUS
Status: DISCONTINUED | OUTPATIENT
Start: 2018-10-01 | End: 2018-10-02 | Stop reason: HOSPADM

## 2018-10-01 RX ADMIN — METOPROLOL SUCCINATE 50 MG: 50 TABLET, EXTENDED RELEASE ORAL at 20:32

## 2018-10-01 RX ADMIN — ATORVASTATIN CALCIUM 20 MG: 20 TABLET, FILM COATED ORAL at 20:32

## 2018-10-01 RX ADMIN — DABIGATRAN ETEXILATE MESYLATE 150 MG: 150 CAPSULE ORAL at 08:14

## 2018-10-01 RX ADMIN — METOPROLOL SUCCINATE 100 MG: 50 TABLET, EXTENDED RELEASE ORAL at 08:15

## 2018-10-01 RX ADMIN — LATANOPROST 1 DROP: 50 SOLUTION/ DROPS OPHTHALMIC at 21:00

## 2018-10-01 RX ADMIN — DORZOLAMIDE HYDROCHLORIDE AND TIMOLOL MALEATE 1 DROP: 20; 5 SOLUTION/ DROPS OPHTHALMIC at 08:28

## 2018-10-01 RX ADMIN — METOPROLOL SUCCINATE 50 MG: 50 TABLET, EXTENDED RELEASE ORAL at 12:30

## 2018-10-01 RX ADMIN — OMEGA-3 FATTY ACIDS CAP 1000 MG 1 G: 1000 CAP at 08:15

## 2018-10-01 RX ADMIN — DABIGATRAN ETEXILATE MESYLATE 150 MG: 150 CAPSULE ORAL at 20:32

## 2018-10-01 RX ADMIN — DILTIAZEM HYDROCHLORIDE 60 MG: 30 TABLET, FILM COATED ORAL at 18:47

## 2018-10-01 RX ADMIN — THERA TABS 1 TABLET: TAB at 08:15

## 2018-10-01 RX ADMIN — AMLODIPINE BESYLATE 5 MG: 5 TABLET ORAL at 08:15

## 2018-10-01 RX ADMIN — DOFETILIDE 500 MCG: 0.25 CAPSULE ORAL at 08:14

## 2018-10-01 RX ADMIN — DORZOLAMIDE HYDROCHLORIDE AND TIMOLOL MALEATE 1 DROP: 20; 5 SOLUTION/ DROPS OPHTHALMIC at 20:58

## 2018-10-01 RX ADMIN — Medication 500 MG: at 08:15

## 2018-10-01 ASSESSMENT — ACTIVITIES OF DAILY LIVING (ADL)
ADLS_ACUITY_SCORE: 10
ADLS_ACUITY_SCORE: 7
ADLS_ACUITY_SCORE: 10

## 2018-10-01 NOTE — CONSULTS
[facility]    History and Physical  Cardiology     Date of Admission:  9/26/2018    Assessment & Plan   Zayda Hawkins is a 85 year old Farsi speaking female from [place] who presented with right visual cut and acute ischemia on CT angio on 9/26 s/p tPA and symptoms have resolved. She was unable to undergo MRI due to pacemaker (electric components apparently would get damaged per Dr. Rangel).  Her cardiologist is Dr. Brandon and Dr. Rangel.   EP was consulted again on 9/30/2018 to help control her atrial fibrillation.  Had been consulted prior due for anticoagulation recommendations.        Atrial fibrillation  Spoke with Lucina Mitchell (patient's son and daughter in law of note is a internal medicine physician) on the phone regarding plan of care.  They would prefer to speak with Dr. Mcnulty for discuss rhythm control method verses a rate control method.  As they were concerned with medication management of rate control medication potentially causing hypotension.   They elected not to sign any consent until they spoke with Dr. Mcnulty to make a solid plan.     EKG today shows atrial fibrillation with RVR.   Continue tikosyn 500 mcg twice daily  Will increase metoprolol XL from 150 mg daily to 200 mg daily (100 mg BID)  Will have metoprolol XL 50 mg given now.     Continue Dabigatran (pradaxa) 150 mg twice daily for CHADS VASC 6 (female, age++, CVA++, HTN).   She has a hx of retroperitoneal bleed while on warfarin.  Will require reevaluation of watchman device as an outpatient.    NPO starting a midnight.      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  She presented on  with right visual field defect and was found to have an acute left PCA stroke.  She was given TPA with resolution of her symptoms. It was recommended short-term use of Pradaxa and then reconsideration of watchman device at a later date.   Initially electrophysiology was asked to see her for evaluation of Watchman device due to history of retroperitoneal bleed while on warfarin and thus anti-coagulation was held.      She went into atrial fibrillation on 2018.  Cardiology was consulted due to difficult to control atrial fibrillation with RVR.  Her HR have been in the 90-120s and she remains on Tikosyn and Toprol  mg daily.   She is asymptomatic with her atrial fibrillation.  Oral diltiazem was added and subsequently discontinued.  On 2018, her Toprol XL 50 mg was added in the evening.      Her VS reviewed overnight reveal HR  bpm.  Afebrile, -150/67-88, 90-98% oxygen on room air.  I&O reveal up 1L since admission, weight stable since admission.      During her last visit with Dr. Rangel (2017) she had atrial tachyarrhythmia including atrial fibrillation and atrial flutter with excellent control with the burden ranging from 2% to 4% while on Tikosyn which was initiated in  at which point she underwent a DCCV.   ECHO (2017) revealed EF 55-60% with grade III diastolic dysfunction, dilated ascending aorta, mild to moderate MR and TR.   Nuclear stress test () was negative for ischemia.     Her social and family history includes being [place] and speaks Farsi.  She has never used tobacco and does not consume alcohol.   She exercises daily at the [employer].  Her mother  of the myocardia infarction at age 45 years old.  No other family history of heart disease.      Patient Active Problem List   Diagnosis     Low back pain     Atrial fibrillation (H)     Hematoma     Retroperitoneal bleed     ACS (acute coronary  syndrome) (H)     Chest pain     Hypertension     Hyperlipidemia     CAD (coronary artery disease)     Venous insufficiency     Stroke (H)       Past Medical History    I have reviewed this patient's medical history and updated it with pertinent information if needed.   Past Medical History:   Diagnosis Date     Atrial fibrillation (H)     not on anticoagulation, hx RP bleed     Back pain      CAD (coronary artery disease)     CT angio: mild lesion in the LAD, as well as 1st diagonal, and mild plaque in the proximal and  mid RCA     Chest pain      Diabetes mellitus (H)      Hyperlipidaemia      Hypertension      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     Venous insufficiency        Past Surgical History   I have reviewed this patient's surgical history and updated it with pertinent information if needed.  Past Surgical History:   Procedure Laterality Date     ANESTHESIA CARDIOVERSION  7/23/2012    Procedure: ANESTHESIA CARDIOVERSION;;  Surgeon: Provider, Generic Anesthesia;  Location:  ANESTHESIA -  - DO NOT SCHEDULE     IMPLANT PACEMAKER  11/2012    Dual chamber       Prior to Admission Medications   Prior to Admission Medications   Prescriptions Last Dose Informant Patient Reported? Taking?   SUMAtriptan Succinate (IMITREX PO)  Spouse/Significant Other Yes Yes   Sig: Take 25 mg by mouth as needed.     amLODIPine (NORVASC) 5 MG tablet 9/26/2018 at Unknown time Spouse/Significant Other No Yes   Sig: Take 1 tablet (5 mg) by mouth daily   aspirin 81 MG EC tablet 9/26/2018 at Unknown time Spouse/Significant Other No Yes   Sig: Take 1 tablet by mouth daily.   calcium carbonate-vitamin D 500-400 MG-UNIT TABS 9/26/2018 at Unknown time Spouse/Significant Other Yes Yes   Sig: Take 1 tablet by mouth 2 times daily.     dofetilide (TIKOSYN) 500 MCG capsule 9/26/2018 at am Spouse/Significant Other No Yes   Sig: Take 1 capsule (500 mcg) by mouth 2 times daily   dorzolamide-timolol PF (COSOPT) 22.3-6.8 MG/ML opthalmic solution    Yes Yes   Sig: Place 1 drop into both eyes 2 times daily   latanoprost (XALATAN) 0.005 % ophthalmic solution   Yes Yes   Sig: Place 1 drop into both eyes At Bedtime   magnesium gluconate (MAGONATE) 500 MG tablet 9/26/2018 at Unknown time Spouse/Significant Other Yes Yes   Sig: Take 500 mg by mouth daily.     metFORMIN (GLUCOPHAGE-XR) 500 MG 24 hr tablet   Yes Yes   Sig: Take 500 mg by mouth daily   metoprolol (TOPROL XL) 100 MG 24 hr tablet 9/26/2018 at Unknown time Spouse/Significant Other No Yes   Sig: Take 1 tablet (100 mg) by mouth daily   multivitamin, therapeutic (THERA-VIT) TABS 9/26/2018 at Unknown time Spouse/Significant Other Yes Yes   Sig: Take 1 tablet by mouth daily.     sertraline (ZOLOFT) 50 MG tablet 9/25/2018 at Unknown time Spouse/Significant Other Yes Yes   Sig: Take 50 mg by mouth every evening.     simvastatin (ZOCOR) 40 MG tablet 9/25/2018 Spouse/Significant Other No Yes   Sig: Take 1 tablet (40 mg) by mouth At Bedtime      Facility-Administered Medications: None     Current Facility-Administered Medications   Medication Dose Route Frequency     amLODIPine  5 mg Oral Daily     dabigatran ANTICOAGULANT  150 mg Oral BID     dofetilide  500 mcg Oral BID     dorzolamide-timolol PF  1 drop Both Eyes BID     fish oil-omega-3 fatty acids  1 g Oral Daily     insulin aspart  1-7 Units Subcutaneous TID AC     insulin aspart  1-5 Units Subcutaneous At Bedtime     latanoprost  1 drop Both Eyes At Bedtime     magnesium gluconate  500 mg Oral Daily     metoprolol succinate  100 mg Oral Daily     metoprolol succinate  50 mg Oral Daily at 8 pm     multivitamin, therapeutic  1 tablet Oral Daily     simvastatin  40 mg Oral At Bedtime     sodium chloride (PF)  3 mL Intracatheter Q8H     Current Facility-Administered Medications   Medication Last Rate     - MEDICATION INSTRUCTIONS -       - MEDICATION INSTRUCTIONS -       sodium chloride 50 mL/hr at 09/30/18 1864     - MEDICATION INSTRUCTIONS -       Allergies    Allergies   Allergen Reactions     Aspirin      Coumadin [Warfarin]      Spontaneous retroperitoneal bleed.     Penicillins          Review of Systems   Review Of Systems  Skin: negative  Eyes: negative  Ears/Nose/Throat: negative  Respiratory: No shortness of breath, dyspnea on exertion, cough, or hemoptysis  Cardiovascular: negative, palpitations and irregular heart beat  Gastrointestinal: negative  Genitourinary: negative  Musculoskeletal: negative  Neurologic: negative  Psychiatric: negative  Hematologic/Lymphatic/Immunologic: negative  Endocrine: negative        Physical Exam   Vital Signs with Ranges  Temp:  [96.2  F (35.7  C)-98.4  F (36.9  C)] 97.9  F (36.6  C)  Heart Rate:  [] 99  Resp:  [16-18] 16  BP: (118-150)/(67-88) 138/83  SpO2:  [90 %-98 %] 97 %  154 lbs 5.15 oz    Constitutional:   awake, alert, cooperative, no apparent distress, and appears stated age     Lungs:   No increased work of breathing, good air exchange, clear to auscultation bilaterally, no crackles or wheezing     Cardiovascular:    irregularly irregular rhythm     Abdomen:    normal bowel sounds, soft, non-distended, non-tender     Musculoskeletal:   There is no redness, warmth, or swelling of the joints.       Neurologic:   Awake, alert, oriented to name, place and time.          Skin:   normal skin color, texture, turgor         Recent Labs  Lab 09/27/18  1425 09/27/18  0558 09/26/18 2124   TROPI 0.017 0.022 <0.015         Recent Labs  Lab 09/29/18  0642 09/28/18  0420 09/27/18  1425 09/27/18  0558 09/26/18 2124   WBC  --  6.3  --   --  7.6   HGB  --  12.7  --   --  13.2   MCV  --  88  --   --  90   PLT  --  192  --   --  225   INR  --   --   --   --  1.05    147*  --   --  141   POTASSIUM 3.7 3.7  --   --  3.9   CHLORIDE 110* 114*  --   --  108   CO2 24 24  --   --  25   BUN 13 17  --   --  31*   CR 0.85 0.90  --   --  1.27*   GFRESTIMATED 64 59*  --   --  40*   GFRESTBLACK 77 72  --   --  48*   ANIONGAP 8 9  --   --   8   MADDISON 8.2* 8.0*  --   --  8.5   * 131*  --   --  192*   ALBUMIN  --   --   --  3.1*  --    PROTTOTAL  --   --   --  6.5*  --    BILITOTAL  --   --   --  0.5  --    ALKPHOS  --   --   --  157*  --    ALT  --   --   --  152*  --    AST  --   --   --  141*  --    TROPI  --   --  0.017 0.022 <0.015     Recent Labs   Lab Test  09/27/18   0558  03/10/15   1142   09/04/14   1130   CHOL  125  145   < >  145   HDL  65  66   < >  64   LDL  51  69*   < >  69*   TRIG  47  48   < >  58   CHOLHDLRATIO   --   2.2   --   2.3    < > = values in this interval not displayed.       Recent Labs  Lab 09/28/18 0420 09/26/18 2124   WBC 6.3 7.6   HGB 12.7 13.2   HCT 38.5 40.2   MCV 88 90    225     No results for input(s): PH, PHV, PO2, PO2V, SAT, PCO2, PCO2V, HCO3, HCO3V in the last 168 hours.  No results for input(s): NTBNPI, NTBNP in the last 168 hours.  No results for input(s): DD in the last 168 hours.  No results for input(s): SED, CRP in the last 168 hours.    Recent Labs  Lab 09/28/18 0420 09/26/18 2124    225     No results for input(s): TSH in the last 168 hours.  No results for input(s): COLOR, APPEARANCE, URINEGLC, URINEBILI, URINEKETONE, SG, UBLD, URINEPH, PROTEIN, UROBILINOGEN, NITRITE, LEUKEST, RBCU, WBCU in the last 168 hours.    No results found for this or any previous visit (from the past 48 hour(s)).    No results found for this or any previous visit (from the past 4320 hour(s)).

## 2018-10-01 NOTE — TELEPHONE ENCOUNTER
"RN received phone call from patient's son inquiring about plan of care during patient's hospital stay. Patient's son expressed some concern regarding hearing \"different information\" from different providers regarding patient's plan of care. RN reviewed with patient's son that according to notes it appears that Dr. Mcnulty was going to speak with either him or his wife today. Patient's son acknowledged understanding and ask that I inform Dr. Brandon as well in case he would like to advise on the situation. RN informed patient's son that Dr. Brandon was in [place] clinic today and in the cath lab the rest of the week so may not be able to round on patient. Patient's son verbalized understanding and inquired if Dr. Rangel would be around to weigh in on patient's plan of care. RN advised patient's son that RN would inquire about Dr. Rangel and advised that RN would also message Dr. Brandon. Patient's son was appreciative of this and verbalized recognition that Dr. Brandon/Dr. Rangel may not be able to round on patient.     RN subsequently reviewed situation with EP and Dr. Mcnulty informed this RN directly that he would review plan of care with patient's son or daughter in law today after clinic.         "

## 2018-10-01 NOTE — PROVIDER NOTIFICATION
Prescriber Notification Note    The pharmacist has communicated with this patient's provider regarding a concern or therapy recommendation.    Notified Person: ABIGAIL Domínguez  Date/Time of Notification: 10/1/18 4244  Interaction: phone  Concern/Recommendation:discussed metoprolol orders     Comments/Additional Details:Patient had metoprolol 100mg XL this am. Give an additional 50 mg metoprolol XL now. Give 50 mg metoprolol XL this pm (total 200 mg today). Tomorrow, continue with metoprolol 100 mg XL am and 50 mg XL pm.

## 2018-10-01 NOTE — PROGRESS NOTES
[facility]    Hospitalist Progress Note      Assessment & Plan   Mrs. Barry is a very pleasant 86 y/o female who presents to the hospital after having a   R sided field cut. Medical history includes DM, HTN, afib (dofetilide, ppm, metoprolol), CAD (CT  Angio), and spontanoeous retroperitoneal bleed on coumadin. CT angio with L posterior circulation acute ischemic event. Received tPA, sx of CVA resolved.  The patient neurologically stable for discharge but is having atrial fibrillation with rapid ventricular rate.  EP is reconsulting.     R visual cut  Acute ischemia on CT angio 9/26 s/p tPA  Presented to [facility] with CT evidence of acute ischemic event. tPA administered with resolutions of sx. Likely CVA but on ddx is migraine (unlikely per Dr. Machado). Unable to undergo MRI 2/2 pacemaker (electric components apparently would get damaged per Dr. Rangel).   - greatly appreciate neurology and cardiology input  - s/p tPA infusion as above  - trops ,0.015, 0.022, 0.017  - telemetry  - A1C 6.5%, TSH 1.4  - Lipids HDL at 65, LDL at 51, TG at 47  - unable to do MRI 2/2 ppm per Dr. Rangel/ cards  - bloods sugars reasonable, holding metformin, SSI  - simvastatin 40 mg  - anticoag pending (DAPT vs pradaxa). Per Dr. Rangel, afib not as predictive as [id number] score (now 6) as well as neuro clearance for a/c (outside tPA risk window)    Ordered Pradaxa per Neurology reccs  - unable MRI as above, echo if desired by neuro/ cards  - therapies. Passed swallow study so Diabetic, low Na diet  - transfer to neuro floor  September 30-no new acute neurologic issues  October 1- stable      Atrial fibrillation s/p PPM  CAD seen on CT angiogram  Pt was noted to have a uncontrolled ventricular response (HR;120s)   Continue  on metoprolol, Dofetilide.  - cardiology consulted since afib seen with interrogation of ppm. Per Dr. Rangel may not be predictive of CVA; better marker would be CHADSVASC score  - on ASA 81 as  outpatient, currently on hold given tPA  September 30-she is in atrial fibrillation with a ventricular rate in the 120s.  Heart rate did not improve with her dose of Toprol-XL this morning.  She continues on dofetilide.   10/1- EP managing- transesophageal echocardiogram is under consideration     Hypertension  H/o hypertension on metoprolol, amlodipine  - continue metoprolol, amlodipine  - prn nicardipine infusion (not needed and transferring out of ICU  - pressures less than 185/115 per neurology  September 30-blood pressure stable  October 1- stable      DM  A1C at 6.5% this admission  - holding metformin  - sliding scale insulin  - BS acceptable  September 30-stable  October 1- stable       HLD  As above, statin     Glaucoma  Cosopt, xalatan     Depression  On zoloft as outpatient, restart when able    # Pain Assessment:  Current Pain Score 10/1/2018   Patient currently in pain? denies   Pain score (0-10) -   Pain location -   Pain descriptors -   Anis s pain level was assessed and she currently denies pain.      DVT Prophylaxis: full anticoagulation   Code Status: Full Code    Disposition: Expected discharge in 1-2 days when atrial fibrillation is controlled    Cm Caldwell MD  Text Page (7am - 6pm)    Interval History   Patient has no pain or complaints.  She has no palpitations or chest pain.    -Data reviewed today: I reviewed all new labs and imaging results over the last 24 hours. I personally reviewed no images or EKG's today.    Physical Exam   Temp: 97.9  F (36.6  C) Temp src: Oral BP: 147/87   Heart Rate: 117 (after ambulating to bathroom) Resp: 16 SpO2: 98 % O2 Device: None (Room air)    Vitals:    09/27/18 0000 09/29/18 0700 10/01/18 0300   Weight: 70.3 kg (154 lb 15.7 oz) 71.3 kg (157 lb 1.6 oz) 70 kg (154 lb 5.2 oz)     Vital Signs with Ranges  Temp:  [96.2  F (35.7  C)-98.4  F (36.9  C)] 97.9  F (36.6  C)  Heart Rate:  [] 117  Resp:  [16-18] 16  BP: (118-150)/(75-88) 147/87  SpO2:   [95 %-98 %] 98 %       Constitutional: Awake, alert, cooperative, no apparent distress  Respiratory: Clear to auscultation bilaterally, no crackles or wheezing  Cardiovascular: still tachycardic and irregular.  S1-S2 audible  GI: Normal bowel sounds, soft, non-distended, non-tender  Skin/Integumen: No rashes, no cyanosis, no edema  Other:     Medications     - MEDICATION INSTRUCTIONS -       - MEDICATION INSTRUCTIONS -       - MEDICATION INSTRUCTIONS -         amLODIPine  5 mg Oral Daily     dabigatran ANTICOAGULANT  150 mg Oral BID     dofetilide  500 mcg Oral BID     dorzolamide-timolol PF  1 drop Both Eyes BID     fish oil-omega-3 fatty acids  1 g Oral Daily     insulin aspart  1-7 Units Subcutaneous TID AC     insulin aspart  1-5 Units Subcutaneous At Bedtime     latanoprost  1 drop Both Eyes At Bedtime     magnesium gluconate  500 mg Oral Daily     metoprolol succinate  100 mg Oral Daily     metoprolol succinate  50 mg Oral Daily at 8 pm     multivitamin, therapeutic  1 tablet Oral Daily     simvastatin  40 mg Oral At Bedtime     sodium chloride (PF)  3 mL Intracatheter Q8H       Data     Recent Labs  Lab 09/29/18  0642 09/28/18  0420 09/27/18  1425 09/27/18  0558 09/26/18  2124   WBC  --  6.3  --   --  7.6   HGB  --  12.7  --   --  13.2   MCV  --  88  --   --  90   PLT  --  192  --   --  225   INR  --   --   --   --  1.05    147*  --   --  141   POTASSIUM 3.7 3.7  --   --  3.9   CHLORIDE 110* 114*  --   --  108   CO2 24 24  --   --  25   BUN 13 17  --   --  31*   CR 0.85 0.90  --   --  1.27*   ANIONGAP 8 9  --   --  8   MADDISON 8.2* 8.0*  --   --  8.5   * 131*  --   --  192*   ALBUMIN  --   --   --  3.1*  --    PROTTOTAL  --   --   --  6.5*  --    BILITOTAL  --   --   --  0.5  --    ALKPHOS  --   --   --  157*  --    ALT  --   --   --  152*  --    AST  --   --   --  141*  --    TROPI  --   --  0.017 0.022 <0.015       No results found for this or any previous visit (from the past 24 hour(s)).

## 2018-10-02 VITALS
OXYGEN SATURATION: 98 % | SYSTOLIC BLOOD PRESSURE: 119 MMHG | BODY MASS INDEX: 25.18 KG/M2 | HEIGHT: 66 IN | WEIGHT: 156.7 LBS | HEART RATE: 108 BPM | RESPIRATION RATE: 18 BRPM | TEMPERATURE: 97.1 F | DIASTOLIC BLOOD PRESSURE: 77 MMHG

## 2018-10-02 LAB
CREAT SERPL-MCNC: 1 MG/DL (ref 0.52–1.04)
GFR SERPL CREATININE-BSD FRML MDRD: 53 ML/MIN/1.7M2
GLUCOSE BLDC GLUCOMTR-MCNC: 136 MG/DL (ref 70–99)
GLUCOSE BLDC GLUCOMTR-MCNC: 146 MG/DL (ref 70–99)

## 2018-10-02 PROCEDURE — 25000132 ZZH RX MED GY IP 250 OP 250 PS 637: Performed by: HOSPITALIST

## 2018-10-02 PROCEDURE — 99238 HOSP IP/OBS DSCHRG MGMT 30/<: CPT | Performed by: HOSPITALIST

## 2018-10-02 PROCEDURE — 82565 ASSAY OF CREATININE: CPT | Performed by: HOSPITALIST

## 2018-10-02 PROCEDURE — 36415 COLL VENOUS BLD VENIPUNCTURE: CPT | Performed by: HOSPITALIST

## 2018-10-02 PROCEDURE — 25000132 ZZH RX MED GY IP 250 OP 250 PS 637: Performed by: INTERNAL MEDICINE

## 2018-10-02 PROCEDURE — 00000146 ZZHCL STATISTIC GLUCOSE BY METER IP

## 2018-10-02 PROCEDURE — 99232 SBSQ HOSP IP/OBS MODERATE 35: CPT | Performed by: INTERNAL MEDICINE

## 2018-10-02 RX ORDER — METOPROLOL SUCCINATE 50 MG/1
TABLET, EXTENDED RELEASE ORAL
Qty: 90 TABLET | Refills: 0 | Status: ON HOLD | OUTPATIENT
Start: 2018-10-02 | End: 2018-10-14

## 2018-10-02 RX ORDER — DABIGATRAN ETEXILATE 150 MG/1
150 CAPSULE ORAL 2 TIMES DAILY
Qty: 60 CAPSULE | Refills: 0 | Status: SHIPPED | OUTPATIENT
Start: 2018-10-02 | End: 2018-10-10 | Stop reason: ALTCHOICE

## 2018-10-02 RX ORDER — DILTIAZEM HYDROCHLORIDE 240 MG/1
240 CAPSULE, EXTENDED RELEASE ORAL DAILY
Qty: 30 CAPSULE | Refills: 0 | Status: SHIPPED | OUTPATIENT
Start: 2018-10-03 | End: 2018-11-26

## 2018-10-02 RX ORDER — DILTIAZEM HYDROCHLORIDE 180 MG/1
180 CAPSULE, EXTENDED RELEASE ORAL DAILY
Qty: 30 CAPSULE | Refills: 0 | Status: SHIPPED | OUTPATIENT
Start: 2018-10-02 | End: 2018-10-02

## 2018-10-02 RX ORDER — DILTIAZEM HYDROCHLORIDE 240 MG/1
240 CAPSULE, EXTENDED RELEASE ORAL DAILY
Status: DISCONTINUED | OUTPATIENT
Start: 2018-10-03 | End: 2018-10-02 | Stop reason: HOSPADM

## 2018-10-02 RX ADMIN — DABIGATRAN ETEXILATE MESYLATE 150 MG: 150 CAPSULE ORAL at 08:18

## 2018-10-02 RX ADMIN — OMEGA-3 FATTY ACIDS CAP 1000 MG 1 G: 1000 CAP at 08:17

## 2018-10-02 RX ADMIN — DORZOLAMIDE HYDROCHLORIDE AND TIMOLOL MALEATE 1 DROP: 20; 5 SOLUTION/ DROPS OPHTHALMIC at 08:18

## 2018-10-02 RX ADMIN — DILTIAZEM HYDROCHLORIDE 180 MG: 180 CAPSULE, EXTENDED RELEASE ORAL at 08:18

## 2018-10-02 RX ADMIN — Medication 500 MG: at 08:18

## 2018-10-02 RX ADMIN — THERA TABS 1 TABLET: TAB at 08:18

## 2018-10-02 RX ADMIN — METOPROLOL SUCCINATE 100 MG: 50 TABLET, EXTENDED RELEASE ORAL at 08:17

## 2018-10-02 ASSESSMENT — ACTIVITIES OF DAILY LIVING (ADL)
ADLS_ACUITY_SCORE: 10

## 2018-10-02 NOTE — PROGRESS NOTES
"EP Cardiology Progress Note  Ifrah Mcnulty MD    84 yo female with recent CVA, possibly cardioembolic, related to AF.  Has SSS with prior PM implantation (SJM, 2012).  The patient was NOT on AC prior to admission because of h/o retroperitoneal bleed (while on warfarin with high INR).  Has been on Tikosyn 500 mcg bid for AF suppression, long-term.  However, she has had increasing AF burden during September despite Tikosyn.  Had AF episodes on 09/22, 09/26, then was in SR between 9/26 and 9/29 and developed AF again on 09/30.  Rate has been fast at times.         Assessment and Plan:      1.  Paroxysmal AF with RVR despite Tikosyn:  patient unaware of arrhythmia.  Now focusing on rate control.  On Toprol XL.  Dilt  mg (first dose) was given just a few minutes ago.  - on Pradaxa  - increase dilt XR to 240 mg daily  - OK to send home later today if resting HR is <110.  We can fine tune HR as outpatient.    - f/up with MAGALY in our clinic on 10/15/18, Mary Domínguez NP at 13:10 pm.  Dr. Rangel is her EP MD.   - OK to return to the gym on Friday 10/05.        Total Time: 25 min;   15 minutes spent in direct communication with patient / family and care coordination.               Interval History:   no new complaints and doing well; no cp, sob, n/v/d, or abd pain.          Review of Systems:   CONSTITUTIONAL: NEGATIVE for fever, chills, change in weight  ENT/MOUTH: NEGATIVE for ear, mouth and throat problems  RESP: NEGATIVE for significant cough or SOB  CV: NEGATIVE for chest pain, palpitations or peripheral edema          Physical Exam:        Blood pressure 119/77, pulse 108, temperature 97.1  F (36.2  C), temperature source Oral, resp. rate 18, height 1.676 m (5' 6\"), weight 71.1 kg (156 lb 11.2 oz), SpO2 98 %.  Vitals:    09/29/18 0700 10/01/18 0300 10/02/18 0140   Weight: 71.3 kg (157 lb 1.6 oz) 70 kg (154 lb 5.2 oz) 71.1 kg (156 lb 11.2 oz)     Vital Signs with Ranges  Temp:  [96.4  F (35.8  C)-97.9  F (36.6  C)] " 97.1  F (36.2  C)  Pulse:  [108] 108  Heart Rate:  [] 66  Resp:  [16-18] 18  BP: (116-147)/(58-87) 119/77  SpO2:  [95 %-98 %] 98 %  I/O's Last 24 hours  I/O last 3 completed shifts:  In: 480 [P.O.:480]  Out: -     EXAM:  A_O x 3  Lungs: clear  CV: irreg irreg, no S3  ABD: soft, NT  EXTR: no edema         Medications:          atorvastatin  20 mg Oral QPM     dabigatran ANTICOAGULANT  150 mg Oral BID     [START ON 10/3/2018] diltiazem  240 mg Oral Daily     dorzolamide-timolol PF  1 drop Both Eyes BID     fish oil-omega-3 fatty acids  1 g Oral Daily     insulin aspart  1-7 Units Subcutaneous TID AC     insulin aspart  1-5 Units Subcutaneous At Bedtime     latanoprost  1 drop Both Eyes At Bedtime     magnesium gluconate  500 mg Oral Daily     metoprolol succinate  100 mg Oral Daily     metoprolol succinate  50 mg Oral Daily at 8 pm     multivitamin, therapeutic  1 tablet Oral Daily     sodium chloride (PF)  3 mL Intracatheter Q8H            Data:      All new lab and imaging data was reviewed.   Recent Labs   Lab Test  09/28/18   0420  09/26/18   2124  10/26/16   0836   09/24/12   1104   WBC  6.3  7.6  7.8   < >  8.3   HGB  12.7  13.2  14.3   < >  14.3   MCV  88  90  89   < >  92   PLT  192  225  218   < >  175   INR   --   1.05  1.04   --   0.99    < > = values in this interval not displayed.      Recent Labs   Lab Test  10/02/18   0752  09/29/18   0642  09/28/18   0420  09/26/18 2124   NA   --   142  147*  141   POTASSIUM   --   3.7  3.7  3.9   CHLORIDE   --   110*  114*  108   CO2   --   24  24  25   BUN   --   13  17  31*   CR  1.00  0.85  0.90  1.27*   ANIONGAP   --   8  9  8   MADDISON   --   8.2*  8.0*  8.5   GLC   --   129*  131*  192*     Recent Labs   Lab Test  09/27/18   1425  09/27/18   0558  09/26/18 2124 09/24/12   1013  08/05/12   0221   07/13/12   1357   TROPI  0.017  0.022  <0.015   < >   --    --    < >   --    TROPONIN   --    --    --    --   0.18*  0.01   --   0.08    < > = values in this  interval not displayed.         EKG results:  Reviewed      Echo Results:  No results found for this or any previous visit (from the past 4320 hour(s)).        Imaging:   No results found for this or any previous visit (from the past 24 hour(s)).

## 2018-10-02 NOTE — PLAN OF CARE
"Problem: Patient Care Overview  Goal: Plan of Care/Patient Progress Review  Outcome: Improving  ([id number]) Pt's neuros unchanged. 24hr NIH done at 1700, pt scored 1 d/t Rt side visual field cut.  Pt did state to RN that \"left side is normal but right side seems far away.\"  Neuros q1hr through 2300, CT ordered for 0030.  BP remained within parameters.  Hospitalist paged for Cards consult, order placed.  OT consult placed as well per Neuro critical.  Bedrest until 2300.  Pt eating w/o difficulty.  A&Ox4.      "
Problem: Patient Care Overview  Goal: Plan of Care/Patient Progress Review   Pt A&O x4- speaks curtis. VSS- tele: A fib RVR. No pain/nausea on shift. PIV saline locked. Up to bathroom SBA.      
Problem: Patient Care Overview  Goal: Plan of Care/Patient Progress Review   VSS on RA. Slight HTN, no prns. Neuro intact. Up with SBA/GB. Voids. BG at 0200 125. C/O R knee, L shoulder/hand pain but declines intervention. SL        
Problem: Patient Care Overview  Goal: Plan of Care/Patient Progress Review  A&Ox4. VSS on RA. Tele: Afib RVR w/ BBB. Denies pain. LS diminished. Mod carb and cardiac diet tolerating well, NPO at midnight for JUSTIN tomorrow. PIV SL. SBA. Voids in the bathroom      
Problem: Patient Care Overview  Goal: Plan of Care/Patient Progress Review  Discharge Planner SLP   Patient plan for discharge: Did not meet with pt  Current status: SLP: ST orders received. Chart reviewed and discussed with RN. Pt passed nursing screen with no obvious speech/language deficits. No SLP needs identified at this time. Will sign off.   Barriers to return to prior living situation: None from SLP  Recommendations for discharge: Per PT/OT  Rationale for recommendations: ST will sign off       Entered by: Marisol Martin 09/27/2018 10:52 AM         
Problem: Patient Care Overview  Goal: Plan of Care/Patient Progress Review  Outcome: Declining  Pt arrived from [facility] ED at 0000. Upon arrival, infusion of TPA complete. AO x4, R peripheral field cut. Other neuros intact. Denies any pain. 2 L NC. Tele SR. SBP maintained within parameters, nicardipine gtt not started. Voiding well. Pt and family updated on POC.       
Problem: Patient Care Overview  Goal: Plan of Care/Patient Progress Review  Outcome: Improving  A&O x4. Neuros intact. Farsi is patient's primary language, understands English enough to complete assessments.  services offered, but patient stated that she has her own  and that family can interpret as well if needed, so  services was refused. Hypertensive, but within defined parameters, otherwise VSS. Tele 100% A- paced. Mod CHO, heart healthy diet. Up with SBA. Complains of mild right knee and shoulder pain with movement. Plan to start Pradaxa in A.M., nursing to continue to monitor, discharge pending.      
Problem: Patient Care Overview  Goal: Plan of Care/Patient Progress Review  Outcome: Improving  A&Ox4.  VSS ex Tele Afib RVR/CVR w/ occ PVCs.  Tolerating cardiac and high CHO diet.  Independent.  Neuros intact.  Bgm before meals, no insulin needed this shift.  Reports of chronic knee pain, decline interventions.  PRN metoprolol available if order parameters met.  Monitor afib, possible discharge per hospitalist.  Patient requesting multivitamin and supplements if she is to stay another night, report given.  Will continue to monitor.        
Problem: Patient Care Overview  Goal: Plan of Care/Patient Progress Review  Outcome: Improving  Farsi speaking, but fluent in English.  Patient request specific INTER only: Mary Ann Lovelace [phone] otherwise family to assist.  A&Ox4.  VSS.  Room air.  Neuros intact.  Tele: Afib this AM, now 100% A-paced after morning medications.  Hospitalist aware.  Up with SBA.  Tolerating cardiac and high CHO diet.  PIV infusing. Bgm before meals, within parameters.  Voiding spontaneously.  Ambulated around unit.  Showered. C/o chronic knee pain, decline interventions. Plan to start anticoagulant this evening.  Will continue to monitor.        
Problem: Patient Care Overview  Goal: Plan of Care/Patient Progress Review  Outcome: Improving  VSS.  Patient maintaining SBPs 105-180, no nicard drip. Patient neuros intact.  Patient getting up with SBA in room, worked with OT.  Voiding adequately.   Tolerating diet.  Patient and family updated on POC, questions answered.  Patient to transfer to neuro floor.        
Problem: Patient Care Overview  Goal: Plan of Care/Patient Progress Review  Outcome: No Change  A&O x4, neuro unchanged overnight. IVF decreased to 50mL/hr. -160's. Repeat CT of head done, results pending. C/o intermittent pain of right shoulder and right knee, improved with repositioning- pt declines other interventions. Slept between cares. Plan for: Cards consult and OT consult      
Problem: Patient Care Overview  Goal: Plan of Care/Patient Progress Review  Outcome: No Change  A/ox4. Farsi speaking patient but understands and can speak English, family at bedside for most of shift to translate. VSS on RA except tachycardic at times (). Denied pain. Tele A-fib w/ BBB and PVCs. Patient c/o headache, dizziness,  and nausea this evening, neuros intact and VSS at time, cool wash rage and lights dimmed in room - patient declined PRN Tylenol.  Electrophysiology/cardiology re-consulted due to on-set of uncontrolled A-fib and tachycardia with pacemaker today. Per cardiology HS metoprolol increased to 50mg and to be NPO at midnight for potential JUSTIN cardioversion tomorrow. Discharge pending. Continue to monitor.       
Problem: Patient Care Overview  Goal: Plan of Care/Patient Progress Review  Outcome: No Change  Patient alert and oriented x 4, moves all extremities strong and equal. Answers all questions appropriately. All vital signs stable. Patient son at bedside.      
Problem: Patient Care Overview  Goal: Plan of Care/Patient Progress Review  Outcome: No Change  Pt A&OX4, Farsi speaking- refused . Family at bedside. Up with SBA. Walked x1, calls appropriately. BGM, sliding scale insulin not needed. Tolerating carb consistent/ cardiac diet. Voiding well in BR. Tele 100% A paced. Denies pain or nausea. Skin WDL, some bruising. PIV SL'd. MD spoke with family and patient, decided to refused the JUSTIN and Cardioversion. Pt will likely discharge tomorrow to home.      
Problem: Patient Care Overview  Goal: Plan of Care/Patient Progress Review  Outcome: No Change  Pt A&Ox4. VSS on RA. Tele Rapid A fib to 120s at times. Hospitalist notified. PRN iv metoprolol given x1. Pt asymptomatic. Neuros intact. Denies pain, except right knee tenderness with movement, declined intervention. Pradaxa started 9/29/18 @2100, pt/family agreed to start this medication per Neurology. Pt education hand-out given on Pradaxa. No rxn noted at this time.  Tolerating High CHO/cardiac diet. , 145. Up SBA. +BS. Voiding. Neurology/Cardiology following. Possible discharge home today.       
Problem: Patient Care Overview  Goal: Plan of Care/Patient Progress Review  Outcome: Therapy, progress toward functional goals as expected  Discharge Planner OT   Patient plan for discharge: home  Current status: pt has met all OT goals. Mod I with toilet transfer and grooming at sink. Ambulated around unit with SBA, farthest she has been able to go since she has been in the hospital.   Barriers to return to prior living situation: none anticipated   Recommendations for discharge: home with assist as needed from her family for IADL's such as shopping or cleaning  Rationale for recommendations: pt has very supportive family who are all willing to assist her as needed. They feel comfortable with her discharging home       Entered by: Yoselin Bishop 09/29/2018 3:58 PM         Occupational Therapy Discharge Summary    Reason for therapy discharge:    All goals and outcomes met, no further needs identified.    Progress towards therapy goal(s). See [facility] on [facility] in [facility] record for goal details.  Goals met    Therapy recommendation(s):    No further therapy is recommended.      
Problem: Patient Care Overview  Goal: Plan of Care/Patient Progress Review  Outcome: Therapy, progress toward functional goals as expected  Discharge Planner OT   Patient plan for discharge: none in chart  Current status: at baseline lives in an apt I'ly, family is checking on patient daily.  Pt is very ind, works out daily, is I with ADL's and IADL's including driving.   Barriers to return to prior living situation: none anticipated  Recommendations for discharge: home, family to assist as needed with IADL's such as shopping until patient is back to baseline  Rationale for recommendations: patient appears to be close to baseline. Family is very supportive and able to help patient as needed       Entered by: Yoselin Bishop 09/28/2018 11:07 AM         
Problem: Stroke (Ischemic) (Adult)  Goal: Signs and Symptoms of Listed Potential Problems Will be Absent, Minimized or Managed (Stroke)  Signs and symptoms of listed potential problems will be absent, minimized or managed by discharge/transition of care (reference Stroke (Ischemic) (Adult) CPG).   Outcome: No Change   Patient is A&Ox4. Farsi speaking but understands and speaks some English. VSS. Denies pain. Telemetry was A-fib w/ BBB and PVCs. Neuros intact. NPO overnight for possible JUSTIN cardioversion 10/1. Up SBA to bathroom. Calls appropriately.       
Improved

## 2018-10-02 NOTE — PROVIDER NOTIFICATION
Provider notified at 1250 per son's request.  Question of taking Simvastatin with cardiac meds of if she should change to zocor.  Dr. Caldwell states to continue Simvastatin and f/u in office on Friday with cardiology.

## 2018-10-02 NOTE — H&P
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  The patient had onset of left-sided headache at approximately 7 p.m. with associated right peripheral vision loss.  The patient has known underlying atrial fibrillation on Tikosyn, but not on anticoagulation due to prior retroperitoneal bleed.  She came in to the Emergency Department for evaluation within the time period for intervention.  Stroke Code was called.  She was deemed appropriate for tPA.  tPA was initiated at the outside emergency department and thus transferred to ICU for further evaluation and care.  The patient otherwise denies chest pain, cough, congestion, shortness of breath, and has otherwise been in normal state of health until the onset of these symptoms.  She reports feeling like her eyesight is improving since initiation of the tPA.     Hospital Course   Zayda Hawkins was admitted on 9/26/2018.  The following problems were addressed during her hospitalization:    Acute left posterior circulation ischemic stroke status post TPA  Acute right visual field deficit due to acute stroke   Given tPA at outside ER  Seen by by stroke neurology  CT with  contrast showed left posterior cerebral artery transit time prolongation  compatible with acute ischemia.   Anticoagulation for atrial fibrillation recommended.  Discharged on dabigatran.    Atrial fibrillation with rapid ventricular rate    Seen by EP, discharged on an increased dose of Toprol XL and started on Cardizem CD.  Dofetilide continued.    # Discharge Pain Plan:   - Patient currently has NO PAIN and is not being prescribed pain medications on discharge.    Cm Caldwell MD    Significant Results and Procedures       Pending Results   These results will be followed up by primary care provider   Unresulted Labs Ordered in the Past 30 Days of this Admission     No orders found from 7/28/2018 to 9/27/2018.          Code Status   Full Code       Primary Care Physician   Alton Willoughby    Constitutional: Awake, alert, cooperative, no apparent distress  Respiratory: Clear to auscultation bilaterally, no crackles or wheezing  Cardiovascular: Regular rate and rhythm, normal S1 and S2, and no murmur noted  GI: Normal bowel sounds, soft, non-distended, non-tender  Skin/Integumen: No rashes, no cyanosis, no edema  Other:     Discharge Disposition   Discharged to home  Condition at discharge: Stable    Consultations This Hospital Stay   NEUROLOGY IP CONSULT  SPEECH LANGUAGE PATH ADULT IP CONSULT  CARDIOLOGY IP CONSULT  OCCUPATIONAL THERAPY ADULT IP CONSULT  ELECTROPHYSIOLOGY IP CONSULT    Time Spent on this Encounter   I, Cm Caldwell, personally saw the patient today and spent less than or equal to 30 minutes discharging this patient.    Discharge Orders     Reason for your hospital stay   Stroke     Follow-up and recommended labs and tests    Follow up with primary care provider, Alton Willoughby, within 7 days for hospital follow- up.  The following labs/tests are recommended: chem 8 cbc ECG.     Activity   Your activity upon discharge: activity as tolerated     Diet   Follow this diet upon  discharge: Orders Placed This Encounter     Advance Diet as Tolerated: Regular Diet Adult; 6280-6502 Calories: High Consistent CHO (4-7 CHO units/meal); Low Saturated Fat Na <2400mg Diet       Discharge Medications   Discharge Medication List as of 10/2/2018 10:21 AM      START taking these medications    Details   dabigatran ANTICOAGULANT (PRADAXA) 150 MG capsule Take 1 capsule (150 mg) by mouth 2 times daily Store in original 's bottle or blister pack; use within 120 days of opening., Disp-60 capsule, R-0, Local Print         CONTINUE these medications which have CHANGED    Details   diltiazem 240 MG 24 hr capsule Take 1 capsule (240 mg) by mouth daily, Disp-30 capsule, R-0, Local Print      metoprolol succinate (TOPROL-XL) 50 MG 24 hr tablet Take 2 tabs(100mg) qam and 1 tab(50mg) qpm, Disp-90 tablet, R-0, Local Print         CONTINUE these medications which have NOT CHANGED    Details   calcium carbonate-vitamin D 500-400 MG-UNIT TABS Take 1 tablet by mouth 2 times daily.  , Historical      dofetilide (TIKOSYN) 500 MCG capsule Take 1 capsule (500 mcg) by mouth 2 times daily, Disp-60 capsule, R-8, E-Prescribe      dorzolamide-timolol PF (COSOPT) 22.3-6.8 MG/ML opthalmic solution Place 1 drop into both eyes 2 times daily, Historical      latanoprost (XALATAN) 0.005 % ophthalmic solution Place 1 drop into both eyes At Bedtime, Historical      magnesium gluconate (MAGONATE) 500 MG tablet Take 500 mg by mouth daily.  , Historical      metFORMIN (GLUCOPHAGE-XR) 500 MG 24 hr tablet Take 500 mg by mouth daily, Historical      multivitamin, therapeutic (THERA-VIT) TABS Take 1 tablet by mouth daily.  , Historical      sertraline (ZOLOFT) 50 MG tablet Take 50 mg by mouth every evening.  , Historical      simvastatin (ZOCOR) 40 MG tablet Take 1 tablet (40 mg) by mouth At Bedtime, Disp-90 tablet, R-3, E-Prescribe      SUMAtriptan Succinate (IMITREX PO) Take 25 mg by mouth as needed.  , Historical         STOP  taking these medications       amLODIPine (NORVASC) 5 MG tablet Comments:   Reason for Stopping:         aspirin 81 MG EC tablet Comments:   Reason for Stopping:             Allergies   Allergies   Allergen Reactions     Aspirin      Coumadin [Warfarin]      Spontaneous retroperitoneal bleed.     Penicillins      Data   Most Recent 3 CBC's:  Recent Labs   Lab Test  09/28/18   0420  09/26/18   2124  10/26/16   0836   WBC  6.3  7.6  7.8   HGB  12.7  13.2  14.3   MCV  88  90  89   PLT  192  225  218      Most Recent 3 BMP's:  Recent Labs   Lab Test  10/02/18   0752  09/29/18   0642  09/28/18 0420 09/26/18 2124   NA   --   142  147*  141   POTASSIUM   --   3.7  3.7  3.9   CHLORIDE   --   110*  114*  108   CO2   --   24  24  25   BUN   --   13  17  31*   CR  1.00  0.85  0.90  1.27*   ANIONGAP   --   8  9  8   MADDISON   --   8.2*  8.0*  8.5   GLC   --   129*  131*  192*     Most Recent 2 LFT's:  Recent Labs   Lab Test  09/27/18   0558  03/10/15   1142   07/28/12   2050   AST  141*   --    --   48*   ALT  152*  <5*   < >  24   ALKPHOS  157*   --    --   97   BILITOTAL  0.5   --    --   1.7*    < > = values in this interval not displayed.     Most Recent INR's and Anticoagulation Dosing History:  Anticoagulation Dose History     Recent Dosing and Labs Latest Ref Rng & Units 7/22/2012 7/23/2012 7/25/2012 7/28/2012 9/24/2012 10/26/2016 9/26/2018    INR 0.86 - 1.14 1.14 1.05 1.10 1.04 0.99 1.04 1.05        Most Recent 3 Troponin's:  Recent Labs   Lab Test  09/27/18   1425  09/27/18   0558  09/26/18 2124 09/24/12   1013  08/05/12   0221   07/13/12   1357   TROPI  0.017  0.022  <0.015   < >   --    --    < >   --    TROPONIN   --    --    --    --   0.18*  0.01   --   0.08    < > = values in this interval not displayed.     Most Recent Cholesterol Panel:  Recent Labs   Lab Test  09/27/18   0558   CHOL  125   LDL  51   HDL  65   TRIG  47     Most Recent 6 Bacteria Isolates From Any Culture (See EPIC Reports for Culture  Details):No lab results found.  Most Recent TSH, T4 and A1c Labs:  Recent Labs   Lab Test  09/27/18   0558  02/06/17   1101   TSH   --   1.40   A1C  6.5*   --      Results for orders placed or performed during the hospital encounter of 09/26/18   CT Head w/o Contrast    Narrative    CT OF THE HEAD WITHOUT CONTRAST  9/28/2018 1:16 AM     COMPARISON: Head CT 9/26/2018.    HISTORY:  Stroke, post-tPA. Rule out bleed.     TECHNIQUE: 5 mm thick axial CT images of the head were acquired  without IV contrast material.    FINDINGS:  There is mild diffuse cerebral volume loss. There are  subtle patchy areas of decreased density in the cerebral white matter  bilaterally that are consistent with sequela of chronic small vessel  ischemic disease.     The ventricles and basal cisterns are within normal limits in  configuration given the degree of cerebral volume loss.  There is no  midline shift. There are no extra-axial fluid collections.     No intracranial hemorrhage, mass or recent infarct.    The visualized paranasal sinuses are well aerated. There is no  mastoiditis. There are no fractures of the visualized bones.       Impression    IMPRESSION:  Diffuse cerebral volume loss and cerebral white matter  changes consistent with chronic small vessel ischemic disease. No  evidence for acute intracranial pathology.     Radiation dose for this scan was reduced using automated exposure  control, adjustment of the mA and/or kV according to patient size, or  iterative reconstruction technique.    ELIZABETH ARROYO MD

## 2018-10-02 NOTE — DISCHARGE SUMMARY
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  The patient had onset of left-sided headache at approximately 7 p.m. with associated right peripheral vision loss.  The patient has known underlying atrial fibrillation on Tikosyn, but not on anticoagulation due to prior retroperitoneal bleed.  She came in to the Emergency Department for evaluation within the time period for intervention.  Stroke Code was called.  She was deemed appropriate for tPA.  tPA was initiated at the outside emergency department and thus transferred to ICU for further evaluation and care.  The patient otherwise denies chest pain, cough, congestion, shortness of breath, and has otherwise been in normal state of health until the onset of these symptoms.  She reports feeling like her eyesight is improving since initiation of the tPA.     Hospital Course   Zayda Hawkins was admitted on 9/26/2018.  The following problems were addressed during her hospitalization:    Acute left posterior circulation ischemic stroke status post TPA  Acute right visual field deficit due to acute stroke   Given tPA at outside ER  Seen by by stroke neurology  CT with  contrast showed left posterior cerebral artery transit time prolongation  compatible with acute ischemia.   Anticoagulation for atrial fibrillation recommended.  Discharged on dabigatran.    Atrial fibrillation with rapid ventricular rate    Seen by EP, discharged on an increased dose of Toprol XL and started on Cardizem CD.  Dofetilide continued.    # Discharge Pain Plan:   - Patient currently has NO PAIN and is not being prescribed pain medications on discharge.    Cm Caldwell MD    Significant Results and Procedures       Pending Results   These results will be followed up by primary care provider   Unresulted Labs Ordered in the Past 30 Days of this Admission     No orders found from 7/28/2018 to 9/27/2018.          Code Status   Full Code       Primary Care Physician   Alton Willoughby    Constitutional: Awake, alert, cooperative, no apparent distress  Respiratory: Clear to auscultation bilaterally, no crackles or wheezing  Cardiovascular: Regular rate and rhythm, normal S1 and S2, and no murmur noted  GI: Normal bowel sounds, soft, non-distended, non-tender  Skin/Integumen: No rashes, no cyanosis, no edema  Other:     Discharge Disposition   Discharged to home  Condition at discharge: Stable    Consultations This Hospital Stay   NEUROLOGY IP CONSULT  SPEECH LANGUAGE PATH ADULT IP CONSULT  CARDIOLOGY IP CONSULT  OCCUPATIONAL THERAPY ADULT IP CONSULT  ELECTROPHYSIOLOGY IP CONSULT    Time Spent on this Encounter   I, Cm Caldwell, personally saw the patient today and spent less than or equal to 30 minutes discharging this patient.    Discharge Orders     Reason for your hospital stay   Stroke     Follow-up and recommended labs and tests    Follow up with primary care provider, Alton Willoughby, within 7 days for hospital follow- up.  The following labs/tests are recommended: chem 8 cbc ECG.     Activity   Your activity upon discharge: activity as tolerated     Diet   Follow this diet upon  discharge: Orders Placed This Encounter     Advance Diet as Tolerated: Regular Diet Adult; 9574-1043 Calories: High Consistent CHO (4-7 CHO units/meal); Low Saturated Fat Na <2400mg Diet       Discharge Medications   Discharge Medication List as of 10/2/2018 10:21 AM      START taking these medications    Details   dabigatran ANTICOAGULANT (PRADAXA) 150 MG capsule Take 1 capsule (150 mg) by mouth 2 times daily Store in original 's bottle or blister pack; use within 120 days of opening., Disp-60 capsule, R-0, Local Print         CONTINUE these medications which have CHANGED    Details   diltiazem 240 MG 24 hr capsule Take 1 capsule (240 mg) by mouth daily, Disp-30 capsule, R-0, Local Print      metoprolol succinate (TOPROL-XL) 50 MG 24 hr tablet Take 2 tabs(100mg) qam and 1 tab(50mg) qpm, Disp-90 tablet, R-0, Local Print         CONTINUE these medications which have NOT CHANGED    Details   calcium carbonate-vitamin D 500-400 MG-UNIT TABS Take 1 tablet by mouth 2 times daily.  , Historical      dofetilide (TIKOSYN) 500 MCG capsule Take 1 capsule (500 mcg) by mouth 2 times daily, Disp-60 capsule, R-8, E-Prescribe      dorzolamide-timolol PF (COSOPT) 22.3-6.8 MG/ML opthalmic solution Place 1 drop into both eyes 2 times daily, Historical      latanoprost (XALATAN) 0.005 % ophthalmic solution Place 1 drop into both eyes At Bedtime, Historical      magnesium gluconate (MAGONATE) 500 MG tablet Take 500 mg by mouth daily.  , Historical      metFORMIN (GLUCOPHAGE-XR) 500 MG 24 hr tablet Take 500 mg by mouth daily, Historical      multivitamin, therapeutic (THERA-VIT) TABS Take 1 tablet by mouth daily.  , Historical      sertraline (ZOLOFT) 50 MG tablet Take 50 mg by mouth every evening.  , Historical      simvastatin (ZOCOR) 40 MG tablet Take 1 tablet (40 mg) by mouth At Bedtime, Disp-90 tablet, R-3, E-Prescribe      SUMAtriptan Succinate (IMITREX PO) Take 25 mg by mouth as needed.  , Historical         STOP  taking these medications       amLODIPine (NORVASC) 5 MG tablet Comments:   Reason for Stopping:         aspirin 81 MG EC tablet Comments:   Reason for Stopping:             Allergies   Allergies   Allergen Reactions     Aspirin      Coumadin [Warfarin]      Spontaneous retroperitoneal bleed.     Penicillins      Data   Most Recent 3 CBC's:  Recent Labs   Lab Test  09/28/18   0420  09/26/18   2124  10/26/16   0836   WBC  6.3  7.6  7.8   HGB  12.7  13.2  14.3   MCV  88  90  89   PLT  192  225  218      Most Recent 3 BMP's:  Recent Labs   Lab Test  10/02/18   0752  09/29/18   0642  09/28/18 0420 09/26/18 2124   NA   --   142  147*  141   POTASSIUM   --   3.7  3.7  3.9   CHLORIDE   --   110*  114*  108   CO2   --   24  24  25   BUN   --   13  17  31*   CR  1.00  0.85  0.90  1.27*   ANIONGAP   --   8  9  8   MADDISON   --   8.2*  8.0*  8.5   GLC   --   129*  131*  192*     Most Recent 2 LFT's:  Recent Labs   Lab Test  09/27/18   0558  03/10/15   1142   07/28/12   2050   AST  141*   --    --   48*   ALT  152*  <5*   < >  24   ALKPHOS  157*   --    --   97   BILITOTAL  0.5   --    --   1.7*    < > = values in this interval not displayed.     Most Recent INR's and Anticoagulation Dosing History:  Anticoagulation Dose History     Recent Dosing and Labs Latest Ref Rng & Units 7/22/2012 7/23/2012 7/25/2012 7/28/2012 9/24/2012 10/26/2016 9/26/2018    INR 0.86 - 1.14 1.14 1.05 1.10 1.04 0.99 1.04 1.05        Most Recent 3 Troponin's:  Recent Labs   Lab Test  09/27/18   1425  09/27/18   0558  09/26/18 2124 09/24/12   1013  08/05/12   0221   07/13/12   1357   TROPI  0.017  0.022  <0.015   < >   --    --    < >   --    TROPONIN   --    --    --    --   0.18*  0.01   --   0.08    < > = values in this interval not displayed.     Most Recent Cholesterol Panel:  Recent Labs   Lab Test  09/27/18   0558   CHOL  125   LDL  51   HDL  65   TRIG  47     Most Recent 6 Bacteria Isolates From Any Culture (See EPIC Reports for Culture  Details):No lab results found.  Most Recent TSH, T4 and A1c Labs:  Recent Labs   Lab Test  09/27/18   0558  02/06/17   1101   TSH   --   1.40   A1C  6.5*   --      Results for orders placed or performed during the hospital encounter of 09/26/18   CT Head w/o Contrast    Narrative    CT OF THE HEAD WITHOUT CONTRAST  9/28/2018 1:16 AM     COMPARISON: Head CT 9/26/2018.    HISTORY:  Stroke, post-tPA. Rule out bleed.     TECHNIQUE: 5 mm thick axial CT images of the head were acquired  without IV contrast material.    FINDINGS:  There is mild diffuse cerebral volume loss. There are  subtle patchy areas of decreased density in the cerebral white matter  bilaterally that are consistent with sequela of chronic small vessel  ischemic disease.     The ventricles and basal cisterns are within normal limits in  configuration given the degree of cerebral volume loss.  There is no  midline shift. There are no extra-axial fluid collections.     No intracranial hemorrhage, mass or recent infarct.    The visualized paranasal sinuses are well aerated. There is no  mastoiditis. There are no fractures of the visualized bones.       Impression    IMPRESSION:  Diffuse cerebral volume loss and cerebral white matter  changes consistent with chronic small vessel ischemic disease. No  evidence for acute intracranial pathology.     Radiation dose for this scan was reduced using automated exposure  control, adjustment of the mA and/or kV according to patient size, or  iterative reconstruction technique.    ELIZABETH ARROYO MD

## 2018-10-02 NOTE — PROGRESS NOTES
Patient discharged at 10:50 PM to home with son present.  IV was discontinued. Pain at time of discharge was 0. Belongings returned to patient.  Discharge instructions and medications reviewed with patient and son. Does changed on diltalezem so sent script with patient to be filled and sent meds back to discharge pharmacy.  Patient verbalized understanding and all questions were answered.  Prescriptions given to patient.  At time of discharge, patient condition was stable and left the unit esorted by nurse.

## 2018-10-03 ENCOUNTER — TELEPHONE (OUTPATIENT)
Dept: CARDIOLOGY | Facility: CLINIC | Age: 83
End: 2018-10-03

## 2018-10-03 DIAGNOSIS — E78.5 HYPERLIPIDEMIA LDL GOAL <100: Primary | ICD-10-CM

## 2018-10-03 NOTE — TELEPHONE ENCOUNTER
RN received phone call from patients son regarding concerns about patient taking simvastatin and diltiazem together based on medical articles he has read and what was discussed with him and patient prior to discharge from the hospital. Patient's son reports he was informed that patient's simvastatin would be changed to lipitor d/t potential interactions with simvastatin and diltiazem. RN reviewed patient's chart and advised that Dr. Cantu was ok with patient taking simvastatin and diltiazem. Patient's son verbalized understanding, but advised he would like Dr. Brandon to review and advise. RN advise patient's son that Dr. Brandon may not have an answer before the end of the day since he is not in clinic, but RN would call patient's son as soon as Dr. Brandon has reviewed and advised. RN advised patient's son that due to patient's son's concern patient should hold simvastatin, but should take diltazem until Dr. Brandon is able to advise. Patient's son verbalized understanding and is in agreement with plan. RN will send to Dr. Brandon for review and recommendation.

## 2018-10-04 ENCOUNTER — DOCUMENTATION ONLY (OUTPATIENT)
Dept: CARDIOLOGY | Facility: CLINIC | Age: 83
End: 2018-10-04

## 2018-10-04 NOTE — TELEPHONE ENCOUNTER
RN called patient's son and reviewed with him Dr. Brandon's recommendations to have patient discontinue simvastatin and follow up in January with [facility] prior to office visit to see if patient needs to restart statin at that time. Patient's son verbalized understanding and was in agreement with plan. RN offered to transfer patient's son to scheduling to arrange f/u, but patient's son requested phone number to call back to schedule.         Component      Latest Ref Rng & Units 9/27/2018   Cholesterol      <200 mg/dL 125   Triglycerides      <150 mg/dL 47   HDL Cholesterol      >49 mg/dL 65   LDL Cholesterol Calculated      <100 mg/dL 51   Non HDL Cholesterol      <130 mg/dL 60

## 2018-10-04 NOTE — PROGRESS NOTES
"[employer] Accent (D) Pacemaker    ---rep check------    AP: 82% : 13 %  Mode: DDDR         Underlying Rhythm: AS/VS  Heart Rate: good variability   Sensing: WNL    Pacing Threshold: WNL   Impedance: WNL  Battery Status: 6.5-7.3 years   Atrial Arrhythmia: Since 9/5 - 4160 AMS episodes for mode switch burden on 15% and AF burden of 8.1%  Ventricular Arrhythmia: none  Setting Change: \"ICU floor check - device and lead tests ok. Most recent Afib on 9/25/18 - some undersensing due to PVAB makes it difficult to estimate true length - at least 1 hour. Adjusted PVAB for future episodes. SHORTY Miner\"    Care Plan: [facility] on 12/6                 "

## 2018-10-05 LAB — INTERPRETATION ECG - MUSE: NORMAL

## 2018-10-09 ENCOUNTER — TELEPHONE (OUTPATIENT)
Dept: CARDIOLOGY | Facility: CLINIC | Age: 83
End: 2018-10-09

## 2018-10-09 DIAGNOSIS — I48.91 A-FIB (H): Primary | ICD-10-CM

## 2018-10-09 NOTE — TELEPHONE ENCOUNTER
"VM from patient's son, who states that the patient is having some side effects on Pradaxa. Patient was admitted 9/26-10/2 for acute left posterior circulation ischemic stroke. Patient was in afib with [facility] and EP was consulted. Patient was started on pradaxa 150 mg BID and diltiazem 240 mg daily and Toprol XL was increased to 150 mg daily (100 mg in the AM and 50 mg in the PM) Patient's son states that the patient has been experiencing stomach issues since being on Pradaxa. Patient's son also states that the patient has been experiencing palpitations as well. Spoke with patient's son. Patient's son states that ever since the patient was started on diltiazem and pradaxa, the patient has been experiencing stomach burning and stomach pain that is \"affecting her quality of life.\" Patient's son also states that the patient experiences palpitations every day. Patient's son states that her HR has been running in the 90s-low 100s, this morning it was 105 BPM. Patient's son states that her BP is stable, around 120s/70s. Patient's son denies any other symptoms that the patient is experiencing. Patient's son states that this medication regimen is not working for the patient. Patient's son states they have an OV on 10/19/18 with Mary with EP, but patient and son are unwilling to wait that long for a change. Will route to Dr. Brandon for review.   "

## 2018-10-10 NOTE — TELEPHONE ENCOUNTER
RN reviewed with Dr. Brandon and Dr. Brandon's recommendation is to change Pradaxa to Eliquis 5mg BID (no bolus dose needed). RN reviewed with patient's son and patient's son verbalized understanding and is in agreement with plan. Patient's son will call if patient has any further symptoms. RN confirmed patient's f/u with son for 10/19/18 with MAGALY Mary Domínguez.

## 2018-10-13 ENCOUNTER — HOSPITAL ENCOUNTER (INPATIENT)
Facility: CLINIC | Age: 83
LOS: 2 days | Discharge: HOME HEALTH | DRG: 292 | End: 2018-10-16
Attending: EMERGENCY MEDICINE | Admitting: INTERNAL MEDICINE
Payer: COMMERCIAL

## 2018-10-13 ENCOUNTER — APPOINTMENT (OUTPATIENT)
Dept: GENERAL RADIOLOGY | Facility: CLINIC | Age: 83
DRG: 292 | End: 2018-10-13
Attending: EMERGENCY MEDICINE
Payer: COMMERCIAL

## 2018-10-13 DIAGNOSIS — I48.20 CHRONIC ATRIAL FIBRILLATION (H): ICD-10-CM

## 2018-10-13 DIAGNOSIS — I50.9 ACUTE CONGESTIVE HEART FAILURE, UNSPECIFIED HEART FAILURE TYPE (H): ICD-10-CM

## 2018-10-13 DIAGNOSIS — Z79.01 CHRONIC ANTICOAGULATION: ICD-10-CM

## 2018-10-13 DIAGNOSIS — R06.01 ORTHOPNEA: ICD-10-CM

## 2018-10-13 DIAGNOSIS — R60.0 BILATERAL LEG EDEMA: ICD-10-CM

## 2018-10-13 DIAGNOSIS — R79.89 ELEVATED SERUM CREATININE: ICD-10-CM

## 2018-10-13 DIAGNOSIS — E87.6 HYPOKALEMIA: Primary | ICD-10-CM

## 2018-10-13 LAB
ANION GAP SERPL CALCULATED.3IONS-SCNC: 8 MMOL/L (ref 3–14)
BASOPHILS # BLD AUTO: 0 10E9/L (ref 0–0.2)
BASOPHILS NFR BLD AUTO: 0.5 %
BUN SERPL-MCNC: 29 MG/DL (ref 7–30)
CALCIUM SERPL-MCNC: 9 MG/DL (ref 8.5–10.1)
CHLORIDE SERPL-SCNC: 108 MMOL/L (ref 94–109)
CO2 SERPL-SCNC: 24 MMOL/L (ref 20–32)
CREAT SERPL-MCNC: 1.4 MG/DL (ref 0.52–1.04)
DIFFERENTIAL METHOD BLD: NORMAL
EOSINOPHIL # BLD AUTO: 0.1 10E9/L (ref 0–0.7)
EOSINOPHIL NFR BLD AUTO: 1.7 %
ERYTHROCYTE [DISTWIDTH] IN BLOOD BY AUTOMATED COUNT: 14.2 % (ref 10–15)
GFR SERPL CREATININE-BSD FRML MDRD: 36 ML/MIN/1.7M2
GLUCOSE SERPL-MCNC: 138 MG/DL (ref 70–99)
HCT VFR BLD AUTO: 40.7 % (ref 35–47)
HGB BLD-MCNC: 12.9 G/DL (ref 11.7–15.7)
IMM GRANULOCYTES # BLD: 0 10E9/L (ref 0–0.4)
IMM GRANULOCYTES NFR BLD: 0.3 %
LYMPHOCYTES # BLD AUTO: 2.4 10E9/L (ref 0.8–5.3)
LYMPHOCYTES NFR BLD AUTO: 31.2 %
MCH RBC QN AUTO: 28.9 PG (ref 26.5–33)
MCHC RBC AUTO-ENTMCNC: 31.7 G/DL (ref 31.5–36.5)
MCV RBC AUTO: 91 FL (ref 78–100)
MONOCYTES # BLD AUTO: 0.6 10E9/L (ref 0–1.3)
MONOCYTES NFR BLD AUTO: 7.6 %
NEUTROPHILS # BLD AUTO: 4.5 10E9/L (ref 1.6–8.3)
NEUTROPHILS NFR BLD AUTO: 58.7 %
NRBC # BLD AUTO: 0 10*3/UL
NRBC BLD AUTO-RTO: 0 /100
NT-PROBNP SERPL-MCNC: 4101 PG/ML (ref 0–1800)
PLATELET # BLD AUTO: 286 10E9/L (ref 150–450)
POTASSIUM SERPL-SCNC: 3.9 MMOL/L (ref 3.4–5.3)
RBC # BLD AUTO: 4.47 10E12/L (ref 3.8–5.2)
SODIUM SERPL-SCNC: 140 MMOL/L (ref 133–144)
TROPONIN I SERPL-MCNC: <0.015 UG/L (ref 0–0.04)
WBC # BLD AUTO: 7.6 10E9/L (ref 4–11)

## 2018-10-13 PROCEDURE — 71046 X-RAY EXAM CHEST 2 VIEWS: CPT

## 2018-10-13 PROCEDURE — 83735 ASSAY OF MAGNESIUM: CPT | Performed by: EMERGENCY MEDICINE

## 2018-10-13 PROCEDURE — 99285 EMERGENCY DEPT VISIT HI MDM: CPT | Mod: 25

## 2018-10-13 PROCEDURE — 84484 ASSAY OF TROPONIN QUANT: CPT | Performed by: EMERGENCY MEDICINE

## 2018-10-13 PROCEDURE — 93005 ELECTROCARDIOGRAM TRACING: CPT

## 2018-10-13 PROCEDURE — 85025 COMPLETE CBC W/AUTO DIFF WBC: CPT | Performed by: EMERGENCY MEDICINE

## 2018-10-13 PROCEDURE — 80048 BASIC METABOLIC PNL TOTAL CA: CPT | Performed by: EMERGENCY MEDICINE

## 2018-10-13 PROCEDURE — 96374 THER/PROPH/DIAG INJ IV PUSH: CPT

## 2018-10-13 PROCEDURE — 83880 ASSAY OF NATRIURETIC PEPTIDE: CPT | Performed by: EMERGENCY MEDICINE

## 2018-10-13 RX ORDER — LIDOCAINE 40 MG/G
CREAM TOPICAL
Status: DISCONTINUED | OUTPATIENT
Start: 2018-10-13 | End: 2018-10-16 | Stop reason: HOSPADM

## 2018-10-13 ASSESSMENT — ENCOUNTER SYMPTOMS
VOMITING: 0
NAUSEA: 0
DIARRHEA: 0
SHORTNESS OF BREATH: 1

## 2018-10-13 NOTE — LETTER
Transition Communication Hand-off for Care Transitions to Next Level of Care Provider    Name: Zayda Hawkins  : 1932  MRN #: [id number]  Primary Care Provider: Alton Willoughby  Primary Care MD Name: Dr. Alton Willoughby  Primary Clinic: [address]  [facility] [address]  Primary Care Clinic Name: [facility]    Petersen Recommendations:  Patient has not been watching her sodium intake. She is agreeable to monitoring her intake closely from now on. She weighs herself daily at different times of day. CM recommended consistent time daily for her weights. She has a working scale.     Zoë Garcia    AVS/Discharge Summary is the source of truth; this is a helpful guide for improved communication of patient story

## 2018-10-13 NOTE — LETTER
Transition Communication Hand-off for Care Transitions to Next Level of Care Provider    Name: Zayda Hawkins  : 1932  MRN #: [id number]  Primary Care Provider: Alton Willoughby  Primary Care MD Name: Dr. Alton Willoughby  Primary Clinic: [address]  [facility] [address]  Primary Care Clinic Name: [facility]  Reason for Hospitalization:  Orthopnea [R06.01]  Chronic anticoagulation [Z79.01]  Elevated serum creatinine [R79.89]  Chronic atrial fibrillation (H) [I48.2]  Bilateral leg edema [R60.0]  Acute congestive heart failure, unspecified heart failure type (H) [I50.9]  Admit Date/Time: 10/13/2018 11:04 PM  Discharge Date: 10/16/2018  Payor Source: Payor: [employer] / Plan: [employer] [facility] / Product Type: POS /     Readmission Assessment Measure (ZURDO) Risk Score/category: Elevated          Reason for Communication Hand-off Referral: Admission diagnoses: CHF  Fragility    Discharge Plan: Home with HC PT/RN     Discharge Needs Assessment:  Needs       Most Recent Value    Equipment Currently Used at Home none    Transportation Available car, family or friend will provide    Other Resources Other (see comment) [CHF Packet discussed with pt (thru ) & son]        Follow-up plan:  Future Appointments  Date Time Provider Department Center   10/17/2018 9:00 AM LANGUAGE [facility]   10/17/2018 1:30 PM LANGUAGE [facility]   10/18/2018 9:00 AM LANGUAGE [facility]   10/18/2018 1:30 PM LANGUAGE [facility]   10/19/2018 9:00 AM LANGUAGE [facility]   10/19/2018 1:00 PM LANGUAGE [facility]   10/19/2018 1:05 PM Mary Domínguez APRN CNP [facility]   10/29/2018 9:45 AM [facility]   10/29/2018 10:50 AM Mallory Del Angel APRN CNP [facility]   2018 3:30 PM CARBALLO TECH2 [facility]   2018 3:45 PM Julio C Rangel MD [facility]       Any outstanding tests or procedures:        Referrals     Future Labs/Procedures    Home care nursing referral     Comments:    RN skilled nursing visit. RN to assess vital signs and weight, respiratory and cardiac status and patients ability to take and record daily blood pressure, temp and weight.  RN to teach.    Your provider has ordered home care nursing services. If you have not been contacted within 2 days of your discharge please call the inpatient department phone number at [phone] .    Home Care PT Referral for Hospital Discharge     Comments:    PT to eval and treat    Your provider has ordered home care - physical therapy. If you have not been contacted within 2 days of your discharge please call the department phone number listed on the top of this document.    Medication Therapy Management Referral     Comments:    MTM referral reason            Patient has 5 PTA or Discharge Medications AND one of the following   diagnoses: DM,HF,COPD,AMI DX,PULM HTN       This service is designed to help you get the most from your medications.  A specially trained pharmacist will work closely with you and your doctors  to solve any problems related to your medications and to help you get the   best results from taking them.      The Medication Therapy Management staff will call you to schedule an appointment.            Key Recommendations:  Transition Communication Hand-off for Care Transitions to Next Level of Care Provider  Key Recommendations:  Patient has not been watching her sodium intake. She is agreeable to monitoring her intake closely from now on. She weighs herself daily at different times of day. CM recommended consistent time daily for her weights. She has a working scale.     Zoë Garcia    AVS/Discharge Summary is the source of truth; this is a helpful guide for improved communication of patient story

## 2018-10-13 NOTE — IP AVS SNAPSHOT
[place] [address] [facility]    [address]    [facility] [address]    Phone:  [phone]    Fax:  [phone]                                       After Visit Summary   10/13/2018    Zayda Hawkins    MRN: [id number]           After Visit Summary Signature Page     I have received my discharge instructions, and my questions have been answered. I have discussed any challenges I see with this plan with the nurse or doctor.    ..........................................................................................................................................  Patient/Patient Representative Signature      ..........................................................................................................................................  Patient Representative Print Name and Relationship to Patient    ..................................................               ................................................  Date                                   Time    ..........................................................................................................................................  Reviewed by Signature/Title    ...................................................              ..............................................  Date                                               Time          22EPIC Rev 08/18

## 2018-10-13 NOTE — IP AVS SNAPSHOT
MRN:[id number]                      After Visit Summary   10/13/2018    Zayda Hawkins    MRN: [id number]           Thank you!     Thank you for choosing [facility] for your care. Our goal is always to provide you with excellent care. Hearing back from our patients is one way we can continue to improve our services. Please take a few minutes to complete the written survey that you may receive in the mail after you visit. If you would like to speak to someone directly about your visit please contact Patient Relations at [phone]. Thank you!          Patient Information     Date Of Birth          12/24/1932        About your hospital stay     You were admitted on:  October 14, 2018 You last received care in the:  [place] [address] [facility]    You were discharged on:  October 16, 2018        Reason for your hospital stay       Acute heart failure and atrial fibrillation with rapid ventricular response                  Who to Call     For medical emergencies, please call 911.  For non-urgent questions about your medical care, please call your primary care provider or clinic, [phone]          Attending Provider     Provider Specialty    Alfonso Brand MD Emergency Medicine    Bekah Pinzon MD Internal Medicine       Primary Care Provider Office Phone # Fax #    Alton S Fartun [phone]       When to contact your care team       Call if weight gain of 3 lbs in one day or 5 lbs in one week.  Increase in short of breath. Dizziness or chest pain or pressure                  After Care Instructions     Activity       Your activity upon discharge: activity as tolerated            Diet       Follow this diet upon discharge: Orders Placed This Encounter      2 Gram Sodium Diet No Caffeine for 24 hours (once tests completed, may have caffeine)            Monitor and record       Daily weight                  Follow-up Appointments     Follow-up and  recommended labs and tests        Follow up with primary care provider, Alton Willoughby, within 7 days for hospital follow- up.  The following labs/tests are recommended: BMP.  Has follow up with [facility] 10/19 this week                  Your next 10 appointments already scheduled     Oct 19, 2018  1:05 PM CDT   Return Visit with BARBARA Huntley CNP   [facility] ([facility])    [address]  [facility] [address] [phone] OPT 2            Oct 29, 2018  9:45 AM CDT   LAB with RU LAB   [facility] [place] ([facility])    [address]  [facility] [id number] [phone]           Please do not eat 10-12 hours before your appointment if you are coming in fasting for labs on lipids, cholesterol, or glucose (sugar). This does not apply to pregnant women. Water, hot tea and black coffee (with nothing added) are okay. Do not drink other fluids, diet soda or chew gum.            Oct 29, 2018 10:50 AM CDT   [facility] Enrollment with BARBARA [facility] ([facility])    [address]  [facility] [address] [phone]            Dec 06, 2018  3:30 PM CST   Teletrace with CARBALLO TECH2   [facility] ([facility])    [address]  [facility] [id number] [phone] OPT 2            Dec 06, 2018  3:45 PM CST   [facility] EP RETURN with Julio C Palacios MD   [facility] ([facility])    [address]  [facility] [address] [phone] OPT 2              Additional Services     Home Care PT Referral for Hospital Discharge       PT to eval and treat    Your provider has ordered home care - physical therapy. If you have not been contacted within 2 days of your discharge please call the  "department phone number listed on the top of this document.            Home care nursing referral       RN skilled nursing visit. RN to assess vital signs and weight, respiratory and cardiac status and patients ability to take and record daily blood pressure, temp and weight.  RN to teach.    Your provider has ordered home care nursing services. If you have not been contacted within 2 days of your discharge please call the inpatient department phone number at [phone] .            Medication Therapy Management Referral       MTM referral reason            Patient has 5 PTA or Discharge Medications AND one of the following   diagnoses: DM,HF,COPD,AMI DX,PULM HTN       This service is designed to help you get the most from your medications.  A specially trained pharmacist will work closely with you and your doctors  to solve any problems related to your medications and to help you get the   best results from taking them.      The Medication Therapy Management staff will call you to schedule an appointment.                  Pending Results     No orders found from 10/11/2018 to 10/14/2018.            Statement of Approval     Ordered          10/16/18 1246  I have reviewed and agree with all the recommendations and orders detailed in this document.  EFFECTIVE NOW     Approved and electronically signed by:  Hesham Brantley MD             Admission Information     Date & Time Provider Department Dept. Phone    10/13/2018 Bekah Pinzon MD [place] [address] [facility] [phone]      Your Vitals Were     Blood Pressure Pulse Temperature Respirations Height Weight    95/56 (BP Location: Left arm) 69 97  F (36.1  C) (Oral) 20 1.651 m (5' 5\") 65.5 kg (144 lb 6 oz)    Pulse Oximetry BMI (Body Mass Index)                94% 24.03 kg/m2          Care EveryWhere ID     This is your Care EveryWhere ID. This could be used by other organizations to access your [place] medical records  [id number]        Equal " Access to Services     [facility]: Hadii aad ku haddarnellnikolas Seth, waaxda luqadaha, qaybta kaalmamagalys ortiz, karissa yousif. So [facility] [phone].    ATENCIÓN: Si habla español, tiene a hoffmann disposición servicios gratuitos de asistencia lingüística. Llame al [phone].    We comply with applicable federal civil rights laws and [place] laws. We do not discriminate on the basis of race, color, national origin, age, disability, sex, sexual orientation, or gender identity.               Review of your medicines      START taking        Dose / Directions    furosemide 40 MG tablet   Commonly known as:  LASIX        Dose:  40 mg   Start taking on:  10/17/2018   Take 1 tablet (40 mg) by mouth daily   Quantity:  30 tablet   Refills:  0       lisinopril 5 MG tablet   Commonly known as:  PRINIVIL/ZESTRIL        Dose:  5 mg   Take 1 tablet (5 mg) by mouth daily   Quantity:  30 tablet   Refills:  0       potassium chloride SA 10 MEQ CR tablet   Commonly known as:  K-DUR/KLOR-CON M   Used for:  Hypokalemia        Dose:  10 mEq   Take 1 tablet (10 mEq) by mouth daily   Quantity:  15 tablet   Refills:  1         CONTINUE these medicines which may have CHANGED, or have new prescriptions. If we are uncertain of the size of tablets/capsules you have at home, strength may be listed as something that might have changed.        Dose / Directions    metoprolol succinate 100 MG 24 hr tablet   Commonly known as:  TOPROL-XL   This may have changed:    - medication strength  - how much to take  - how to take this  - when to take this  - additional instructions  - Another medication with the same name was removed. Continue taking this medication, and follow the directions you see here.   Used for:  Chronic atrial fibrillation (H)        Dose:  100 mg   Take 1 tablet (100 mg) by mouth 2 times daily   Quantity:  60 tablet   Refills:  0         CONTINUE these medicines which have NOT CHANGED        Dose /  Directions    apixaban ANTICOAGULANT 5 MG tablet   Commonly known as:  ELIQUIS   Used for:  A-fib (H)        Dose:  5 mg   Take 1 tablet (5 mg) by mouth 2 times daily   Quantity:  180 tablet   Refills:  3       calcium carbonate-vitamin D 500-400 MG-UNIT Tabs per tablet        Dose:  1 tablet   Take 1 tablet by mouth 2 times daily.   Refills:  0       diltiazem 240 MG 24 hr capsule   Used for:  Paroxysmal atrial fibrillation (H)        Dose:  240 mg   Take 1 capsule (240 mg) by mouth daily   Quantity:  30 capsule   Refills:  0       dorzolamide-timolol PF 22.3-6.8 MG/ML opthalmic solution   Commonly known as:  COSOPT        Dose:  1 drop   Place 1 drop into both eyes 2 times daily 10 mL vial   Refills:  0       fish oil-omega-3 fatty acids 1000 MG capsule        Dose:  1 g   Take 1 g by mouth daily   Refills:  0       IMITREX PO        Dose:  25 mg   Take 25 mg by mouth as needed.   Refills:  0       latanoprost 0.005 % ophthalmic solution   Commonly known as:  XALATAN        Dose:  1 drop   Place 1 drop into both eyes At Bedtime   Refills:  0       magnesium gluconate 500 MG tablet   Commonly known as:  MAGONATE        Dose:  500 mg   Take 500 mg by mouth daily.   Refills:  0       metFORMIN 500 MG 24 hr tablet   Commonly known as:  GLUCOPHAGE-XR        Dose:  500 mg   Take 500 mg by mouth every morning   Refills:  0       multivitamin, therapeutic Tabs tablet        Dose:  1 tablet   Take 1 tablet by mouth daily.   Refills:  0       sertraline 50 MG tablet   Commonly known as:  ZOLOFT        Dose:  25 mg   Take 25 mg by mouth every evening as needed (Take a half tablet qpm prn)   Refills:  0       timolol 0.5 % ophthalmic solution   Commonly known as:  TIMOPTIC        Dose:  1 drop   Place 1 drop into both eyes every morning   Refills:  0            Where to get your medicines      These medications were sent to [facility] - [facility] - [address]  [address] [address]      Phone:  [phone]     furosemide 40 MG tablet    lisinopril 5 MG tablet    metoprolol succinate 100 MG 24 hr tablet    potassium chloride SA 10 MEQ CR tablet                Protect others around you: Learn how to safely use, store and throw away your medicines at www.disposemymeds.org.             Medication List: This is a list of all your medications and when to take them. Check marks below indicate your daily home schedule. Keep this list as a reference.      Medications           Morning Afternoon Evening Bedtime As Needed    apixaban ANTICOAGULANT 5 MG tablet   Commonly known as:  ELIQUIS   Take 1 tablet (5 mg) by mouth 2 times daily   Last time this was given:  5 mg on 10/16/2018  8:36 AM                                      calcium carbonate-vitamin D 500-400 MG-UNIT Tabs per tablet   Take 1 tablet by mouth 2 times daily.                                      diltiazem 240 MG 24 hr capsule   Take 1 capsule (240 mg) by mouth daily                                   dorzolamide-timolol PF 22.3-6.8 MG/ML opthalmic solution   Commonly known as:  COSOPT   Place 1 drop into both eyes 2 times daily 10 mL vial                                      fish oil-omega-3 fatty acids 1000 MG capsule   Take 1 g by mouth daily                                   furosemide 40 MG tablet   Commonly known as:  LASIX   Take 1 tablet (40 mg) by mouth daily   Start taking on:  10/17/2018   Last time this was given:  40 mg on 10/16/2018  8:36 AM                                   IMITREX PO   Take 25 mg by mouth as needed.                                   latanoprost 0.005 % ophthalmic solution   Commonly known as:  XALATAN   Place 1 drop into both eyes At Bedtime   Last time this was given:  1 drop on 10/15/2018  8:55 PM                                   lisinopril 5 MG tablet   Commonly known as:  PRINIVIL/ZESTRIL   Take 1 tablet (5 mg) by mouth daily   Last time this was given:  10 mg on 10/15/2018  8:16 AM                                    magnesium gluconate 500 MG tablet   Commonly known as:  MAGONATE   Take 500 mg by mouth daily.                                   metFORMIN 500 MG 24 hr tablet   Commonly known as:  GLUCOPHAGE-XR   Take 500 mg by mouth every morning                                   metoprolol succinate 100 MG 24 hr tablet   Commonly known as:  TOPROL-XL   Take 1 tablet (100 mg) by mouth 2 times daily   Last time this was given:  100 mg on 10/15/2018  8:46 PM                                      multivitamin, therapeutic Tabs tablet   Take 1 tablet by mouth daily.   Last time this was given:  1 tablet on 10/16/2018  8:36 AM                                   potassium chloride SA 10 MEQ CR tablet   Commonly known as:  K-DUR/KLOR-CON M   Take 1 tablet (10 mEq) by mouth daily   Last time this was given:  20 mEq on 10/16/2018 12:21 PM                                   sertraline 50 MG tablet   Commonly known as:  ZOLOFT   Take 25 mg by mouth every evening as needed (Take a half tablet qpm prn)                                   timolol 0.5 % ophthalmic solution   Commonly known as:  TIMOPTIC   Place 1 drop into both eyes every morning   Last time this was given:  1 drop on 10/16/2018  5:51 AM                                             More Information        Understanding Atrial Fibrillation    An arrhythmia is any problem with the speed or pattern of the heartbeat. Atrial fibrillation (AFib) is a common type of arrhythmia. It causes fast, chaotic electrical signals in the atria. This leads to poor functioning of the heart. It also affects how much blood your heart can pump out to the body.  Afib may occur once in a while and go away on its own. Or it may continue for longer periods and need treatment.  AFib can lead to serious problems, such as stroke. Your healthcare provider will need to monitor and manage it.  What happens during atrial fibrillation?   The heart has an electrical system that sends signals to control the  heartbeat. As signals move through the heart, they tell the heart s upper chambers (atria) and lower chambers (ventricles) when to squeeze (contract) and relax. This lets blood move through the heart and out to the body and lungs.  With AFib, the atria receive abnormal signals. This causes them to contract in a fast and irregular way, and out of sync with the ventricles. When this happens, the atria also have a harder time moving blood into the ventricles. Blood may then pool in the atria, which increases the risk for blood clots and stroke. The ventricles also may contract too quickly and irregularly. As a result, they may not pump blood to the body and lungs as well as they should. This can weaken the heart muscle over time and cause heart failure.  What causes atrial fibrillation?  AFib is more common in older adults. It has many possible causes including:    Coronary artery disease    Heart valve disease    Heart attack    Heart surgery    High blood pressure    Thyroid disease    Diabetes    Lung disease    Sleep apnea    Heavy alcohol use  In some cases of AFib, doctors do not know the cause.  What are the symptoms of atrial fibrillation?  AFib may or may not cause symptoms. If symptoms do occur, they may include:    A fast, pounding, irregular heartbeat    Shortness of breath    Tiredness    Dizziness or fainting    Chest pain  How is atrial fibrillation treated?  Treatments for AFib can include any of the options below.    Medicines. You may be prescribed:  ? Heart rate medicines to help slow down the heartbeat  ? Heart rhythm medicines to help the heart beat more regularly  ? Anti-clotting medicines to help reduce the risk for blood clots and stroke    Electrical cardioversion. Your healthcare provider uses special pads or paddles to send one or more brief electrical shocks to the heart. This can help reset the heartbeat to normal.    Ablation. Long, thin tubes called catheters are threaded through a blood  vessel to the heart. There, the catheters send out hot or cold energy to the areas causing the abnormal signals. This energy destroys the problem tissue or cells. This improves the chances that your heart will stay in normal rhythm without using medicines. If your heart rate and rhythm can t be controlled, you may need ablation and a pacemaker. These will help control the heart rate and regularity of the heartbeat.    Surgery. During surgery, your healthcare provider may use different methods to create scar tissue in the areas of the heart causing the abnormal signals. The scar tissue disrupts the abnormal signals and may stop AFib from occurring.  What are the complications of atrial fibrillation?  These can include:    Blood clots    Stroke    Heart failure. This problem occurs when the heart muscle weakens so much that it can no longer pump blood well.  When should I call my healthcare provider?  Call your healthcare provider right away if you have any of these:    Symptoms that don t get better with treatment, or get worse    New symptoms   Date Last Reviewed: 5/1/2016 2000-2017 [employer]. [address] [place] [address]. All rights reserved. This information is not intended as a substitute for professional medical care. Always follow your healthcare professional's instructions.                Low-Salt Choices  Eating salt (sodium) can make your body retain too much water. Excess water makes your heart work harder. Canned, packaged, and frozen foods are easy to prepare. But they are often high in sodium. Here are some ideas for low-salt foods you can easily make yourself.    For breakfast    Fruit or 100% fruit juice    Whole-wheat bread or an English muffin. Look for sodium content on Nutrition Facts labels.    Low-fat milk or yogurt    Unsalted eggs    Shredded wheat    Corn tortillas    Unsalted steamed rice    Regular (not instant) hot cereal, made without salt  Stay away  from:    Sausage, starkey, and ham    Flour tortillas    Packaged muffins, pancakes, and biscuits    Instant hot cereals    Cottage cheese  For lunch and dinner    Fresh fish, chicken, turkey, or meat--baked, broiled, or roasted without salt    Dry beans, cooked without salt    Tofu, stir-fried without salt    Unsalted fresh fruit and vegetables, or frozen or canned fruit and vegetables with no added salt  Stay away from:    Lunch or deli meat that is cured or smoked    Cheese    Tomato juice and ketchup    Canned vegetables, soups, and fish not labeled as no-salt-added or reduced sodium    Packaged gravies and sauces    Olives, pickles, and relish    Bottled salad dressings  For snacks and desserts    Yogurt    Unsalted, air-popped popcorn    Unsalted nuts or seeds  Stay away from:    Pies and cakes    Packaged dessert mixes    Pizza    Canned and packaged puddings    Pretzels, chips, crackers, and nuts--unless the label says unsalted  Date Last Reviewed: 6/1/2017 2000-2017 [employer]. [address] [place] [address], [place] [address]. All rights reserved. This information is not intended as a substitute for professional medical care. Always follow your healthcare professional's instructions.

## 2018-10-14 ENCOUNTER — APPOINTMENT (OUTPATIENT)
Dept: PHYSICAL THERAPY | Facility: CLINIC | Age: 83
DRG: 292 | End: 2018-10-14
Attending: INTERNAL MEDICINE
Payer: COMMERCIAL

## 2018-10-14 ENCOUNTER — APPOINTMENT (OUTPATIENT)
Dept: CARDIOLOGY | Facility: CLINIC | Age: 83
DRG: 292 | End: 2018-10-14
Attending: INTERNAL MEDICINE
Payer: COMMERCIAL

## 2018-10-14 PROBLEM — I50.9 CHF (CONGESTIVE HEART FAILURE) (H): Status: ACTIVE | Noted: 2018-10-14

## 2018-10-14 LAB
GLUCOSE BLDC GLUCOMTR-MCNC: 121 MG/DL (ref 70–99)
GLUCOSE BLDC GLUCOMTR-MCNC: 128 MG/DL (ref 70–99)
GLUCOSE BLDC GLUCOMTR-MCNC: 135 MG/DL (ref 70–99)
INTERPRETATION ECG - MUSE: NORMAL
MAGNESIUM SERPL-MCNC: 2.1 MG/DL (ref 1.6–2.3)

## 2018-10-14 PROCEDURE — 99222 1ST HOSP IP/OBS MODERATE 55: CPT | Mod: 25 | Performed by: INTERNAL MEDICINE

## 2018-10-14 PROCEDURE — 25000125 ZZHC RX 250: Performed by: INTERNAL MEDICINE

## 2018-10-14 PROCEDURE — 97110 THERAPEUTIC EXERCISES: CPT | Mod: GP | Performed by: PHYSICAL THERAPIST

## 2018-10-14 PROCEDURE — 99207 ZZC APP CREDIT; MD BILLING SHARED VISIT: CPT | Performed by: INTERNAL MEDICINE

## 2018-10-14 PROCEDURE — 25000128 H RX IP 250 OP 636: Performed by: INTERNAL MEDICINE

## 2018-10-14 PROCEDURE — 12000007 ZZH R&B INTERMEDIATE

## 2018-10-14 PROCEDURE — 93306 TTE W/DOPPLER COMPLETE: CPT | Mod: 26 | Performed by: INTERNAL MEDICINE

## 2018-10-14 PROCEDURE — 97530 THERAPEUTIC ACTIVITIES: CPT | Mod: GP | Performed by: PHYSICAL THERAPIST

## 2018-10-14 PROCEDURE — 25000132 ZZH RX MED GY IP 250 OP 250 PS 637: Performed by: INTERNAL MEDICINE

## 2018-10-14 PROCEDURE — 99223 1ST HOSP IP/OBS HIGH 75: CPT | Mod: AI | Performed by: INTERNAL MEDICINE

## 2018-10-14 PROCEDURE — 93306 TTE W/DOPPLER COMPLETE: CPT

## 2018-10-14 PROCEDURE — 40000193 ZZH STATISTIC PT WARD VISIT: Performed by: PHYSICAL THERAPIST

## 2018-10-14 PROCEDURE — 25000128 H RX IP 250 OP 636: Performed by: EMERGENCY MEDICINE

## 2018-10-14 PROCEDURE — 97161 PT EVAL LOW COMPLEX 20 MIN: CPT | Mod: GP | Performed by: PHYSICAL THERAPIST

## 2018-10-14 PROCEDURE — 00000146 ZZHCL STATISTIC GLUCOSE BY METER IP

## 2018-10-14 RX ORDER — MAGNESIUM SULFATE HEPTAHYDRATE 40 MG/ML
4 INJECTION, SOLUTION INTRAVENOUS EVERY 4 HOURS PRN
Status: DISCONTINUED | OUTPATIENT
Start: 2018-10-14 | End: 2018-10-16 | Stop reason: HOSPADM

## 2018-10-14 RX ORDER — DEXTROSE MONOHYDRATE 25 G/50ML
25-50 INJECTION, SOLUTION INTRAVENOUS
Status: DISCONTINUED | OUTPATIENT
Start: 2018-10-14 | End: 2018-10-16 | Stop reason: HOSPADM

## 2018-10-14 RX ORDER — POTASSIUM CL/LIDO/0.9 % NACL 10MEQ/0.1L
10 INTRAVENOUS SOLUTION, PIGGYBACK (ML) INTRAVENOUS
Status: DISCONTINUED | OUTPATIENT
Start: 2018-10-14 | End: 2018-10-16 | Stop reason: HOSPADM

## 2018-10-14 RX ORDER — POTASSIUM CHLORIDE 1.5 G/1.58G
20-40 POWDER, FOR SOLUTION ORAL
Status: DISCONTINUED | OUTPATIENT
Start: 2018-10-14 | End: 2018-10-16 | Stop reason: HOSPADM

## 2018-10-14 RX ORDER — UREA 10 %
500 LOTION (ML) TOPICAL DAILY
Status: DISCONTINUED | OUTPATIENT
Start: 2018-10-14 | End: 2018-10-16 | Stop reason: HOSPADM

## 2018-10-14 RX ORDER — NICOTINE POLACRILEX 4 MG
15-30 LOZENGE BUCCAL
Status: DISCONTINUED | OUTPATIENT
Start: 2018-10-14 | End: 2018-10-16 | Stop reason: HOSPADM

## 2018-10-14 RX ORDER — DOCUSATE SODIUM 100 MG/1
100 CAPSULE, LIQUID FILLED ORAL 2 TIMES DAILY PRN
Status: DISCONTINUED | OUTPATIENT
Start: 2018-10-14 | End: 2018-10-16 | Stop reason: HOSPADM

## 2018-10-14 RX ORDER — NALOXONE HYDROCHLORIDE 0.4 MG/ML
.1-.4 INJECTION, SOLUTION INTRAMUSCULAR; INTRAVENOUS; SUBCUTANEOUS
Status: DISCONTINUED | OUTPATIENT
Start: 2018-10-14 | End: 2018-10-16 | Stop reason: HOSPADM

## 2018-10-14 RX ORDER — ONDANSETRON 2 MG/ML
4 INJECTION INTRAMUSCULAR; INTRAVENOUS EVERY 6 HOURS PRN
Status: DISCONTINUED | OUTPATIENT
Start: 2018-10-14 | End: 2018-10-16 | Stop reason: HOSPADM

## 2018-10-14 RX ORDER — CHLORAL HYDRATE 500 MG
1 CAPSULE ORAL DAILY
COMMUNITY
End: 2020-11-12

## 2018-10-14 RX ORDER — LISINOPRIL 10 MG/1
10 TABLET ORAL DAILY
Status: DISCONTINUED | OUTPATIENT
Start: 2018-10-14 | End: 2018-10-16 | Stop reason: HOSPADM

## 2018-10-14 RX ORDER — METOPROLOL SUCCINATE 100 MG/1
100 TABLET, EXTENDED RELEASE ORAL DAILY
Status: DISCONTINUED | OUTPATIENT
Start: 2018-10-14 | End: 2018-10-14

## 2018-10-14 RX ORDER — METOPROLOL TARTRATE 1 MG/ML
5 INJECTION, SOLUTION INTRAVENOUS EVERY 4 HOURS PRN
Status: DISCONTINUED | OUTPATIENT
Start: 2018-10-14 | End: 2018-10-16 | Stop reason: HOSPADM

## 2018-10-14 RX ORDER — MULTIVITAMIN,THERAPEUTIC
1 TABLET ORAL DAILY
Status: DISCONTINUED | OUTPATIENT
Start: 2018-10-14 | End: 2018-10-16 | Stop reason: HOSPADM

## 2018-10-14 RX ORDER — METOPROLOL SUCCINATE 50 MG/1
50 TABLET, EXTENDED RELEASE ORAL EVERY EVENING
Status: ON HOLD | COMMUNITY
End: 2018-10-16

## 2018-10-14 RX ORDER — POTASSIUM CHLORIDE 29.8 MG/ML
20 INJECTION INTRAVENOUS
Status: DISCONTINUED | OUTPATIENT
Start: 2018-10-14 | End: 2018-10-16 | Stop reason: HOSPADM

## 2018-10-14 RX ORDER — ONDANSETRON 4 MG/1
4 TABLET, ORALLY DISINTEGRATING ORAL EVERY 6 HOURS PRN
Status: DISCONTINUED | OUTPATIENT
Start: 2018-10-14 | End: 2018-10-16 | Stop reason: HOSPADM

## 2018-10-14 RX ORDER — FUROSEMIDE 10 MG/ML
40 INJECTION INTRAMUSCULAR; INTRAVENOUS ONCE
Status: COMPLETED | OUTPATIENT
Start: 2018-10-14 | End: 2018-10-14

## 2018-10-14 RX ORDER — FUROSEMIDE 10 MG/ML
40 INJECTION INTRAMUSCULAR; INTRAVENOUS EVERY 8 HOURS
Status: DISCONTINUED | OUTPATIENT
Start: 2018-10-14 | End: 2018-10-15

## 2018-10-14 RX ORDER — METOPROLOL SUCCINATE 50 MG/1
50 TABLET, EXTENDED RELEASE ORAL EVERY EVENING
Status: DISCONTINUED | OUTPATIENT
Start: 2018-10-14 | End: 2018-10-14 | Stop reason: DRUGHIGH

## 2018-10-14 RX ORDER — FUROSEMIDE 10 MG/ML
20 INJECTION INTRAMUSCULAR; INTRAVENOUS
Status: DISCONTINUED | OUTPATIENT
Start: 2018-10-14 | End: 2018-10-14

## 2018-10-14 RX ORDER — POTASSIUM CHLORIDE 1500 MG/1
20-40 TABLET, EXTENDED RELEASE ORAL
Status: DISCONTINUED | OUTPATIENT
Start: 2018-10-14 | End: 2018-10-16 | Stop reason: HOSPADM

## 2018-10-14 RX ORDER — POTASSIUM CHLORIDE 7.45 MG/ML
10 INJECTION INTRAVENOUS
Status: DISCONTINUED | OUTPATIENT
Start: 2018-10-14 | End: 2018-10-16 | Stop reason: HOSPADM

## 2018-10-14 RX ORDER — TIMOLOL MALEATE 5 MG/ML
1 SOLUTION/ DROPS OPHTHALMIC 2 TIMES DAILY
Status: DISCONTINUED | OUTPATIENT
Start: 2018-10-14 | End: 2018-10-15

## 2018-10-14 RX ORDER — DORZOLAMIDE HYDROCHLORIDE AND TIMOLOL MALEATE 20; 5 MG/ML; MG/ML
1 SOLUTION/ DROPS OPHTHALMIC 2 TIMES DAILY
Status: DISCONTINUED | OUTPATIENT
Start: 2018-10-14 | End: 2018-10-16 | Stop reason: HOSPADM

## 2018-10-14 RX ORDER — METOPROLOL SUCCINATE 50 MG/1
100 TABLET, EXTENDED RELEASE ORAL EVERY MORNING
Status: ON HOLD | COMMUNITY
End: 2018-10-16

## 2018-10-14 RX ORDER — METOPROLOL SUCCINATE 100 MG/1
100 TABLET, EXTENDED RELEASE ORAL 2 TIMES DAILY
Status: DISCONTINUED | OUTPATIENT
Start: 2018-10-14 | End: 2018-10-16 | Stop reason: HOSPADM

## 2018-10-14 RX ORDER — DILTIAZEM HYDROCHLORIDE 240 MG/1
240 CAPSULE, COATED, EXTENDED RELEASE ORAL DAILY
Status: DISCONTINUED | OUTPATIENT
Start: 2018-10-14 | End: 2018-10-16 | Stop reason: HOSPADM

## 2018-10-14 RX ORDER — LATANOPROST 50 UG/ML
1 SOLUTION/ DROPS OPHTHALMIC AT BEDTIME
Status: DISCONTINUED | OUTPATIENT
Start: 2018-10-14 | End: 2018-10-16 | Stop reason: HOSPADM

## 2018-10-14 RX ORDER — TIMOLOL MALEATE 5 MG/ML
1 SOLUTION/ DROPS OPHTHALMIC EVERY MORNING
COMMUNITY

## 2018-10-14 RX ADMIN — APIXABAN 5 MG: 5 TABLET, FILM COATED ORAL at 11:17

## 2018-10-14 RX ADMIN — DILTIAZEM HYDROCHLORIDE 240 MG: 240 CAPSULE, COATED, EXTENDED RELEASE ORAL at 13:55

## 2018-10-14 RX ADMIN — TIMOLOL MALEATE 1 DROP: 5 SOLUTION/ DROPS OPHTHALMIC at 20:44

## 2018-10-14 RX ADMIN — FUROSEMIDE 20 MG: 10 INJECTION, SOLUTION INTRAMUSCULAR; INTRAVENOUS at 11:17

## 2018-10-14 RX ADMIN — LATANOPROST 1 DROP: 50 SOLUTION OPHTHALMIC at 22:34

## 2018-10-14 RX ADMIN — APIXABAN 5 MG: 5 TABLET, FILM COATED ORAL at 20:42

## 2018-10-14 RX ADMIN — LISINOPRIL 10 MG: 10 TABLET ORAL at 11:42

## 2018-10-14 RX ADMIN — FUROSEMIDE 40 MG: 10 INJECTION, SOLUTION INTRAMUSCULAR; INTRAVENOUS at 00:43

## 2018-10-14 RX ADMIN — FUROSEMIDE 40 MG: 10 INJECTION, SOLUTION INTRAMUSCULAR; INTRAVENOUS at 21:21

## 2018-10-14 RX ADMIN — DORZOLAMIDE HYDROCHLORIDE AND TIMOLOL MALEATE 1 DROP: 20; 5 SOLUTION/ DROPS OPHTHALMIC at 20:42

## 2018-10-14 RX ADMIN — THERA TABS 1 TABLET: TAB at 13:56

## 2018-10-14 RX ADMIN — METOPROLOL SUCCINATE 100 MG: 100 TABLET, EXTENDED RELEASE ORAL at 12:23

## 2018-10-14 ASSESSMENT — ACTIVITIES OF DAILY LIVING (ADL)
ADLS_ACUITY_SCORE: 9

## 2018-10-14 NOTE — PROGRESS NOTES
[facility]    Hospitalist Progress Note    Date of Service (when I saw the patient): 10/14/2018    Assessment & Plan   Zayda Hawkins is a 85 year old female with past medical history significant for atrial fibrillation, hypertension, hyperlipidemia, diabetes mellitus, mild coronary artery disease, recent CVA status post TPA 2 weeks ago presented to the emergency room with increasing leg edema, orthopnea, dyspnea on exertion.       New diagnosis of acute congestive heart failure (HFrEF), likely tachycardia induced  -- telemetry  --Serial troponins to rule out ACS are negative  --Echocardiogram:  Ejection fraction 40-45%, see below  --Increase IV Lasix to 40 mg IV q.8 hours ×3 and then reassess.  --Cardiology consult appreciated, discussed with Dr. Lees, continue Cardizem and Toprol and Eliquis and and Lasix.  Was on dofetilide but discontinued as was still in atrial fibrillation.  --Continue prior to admission aspirin, beta-blocker and anticoagulation     Recent CVA status post TPA 2 weeks ago  --No significant residual weakness  --Continue anticoagulation and blood pressure management     Paroxysmal atrial fibrillation  --On apixaban for anticoagulation.  We will continue  --On metoprolol and diltiazem for rate control  --prn IV lopressor  --Monitor on telemetry  --Rate control seemed inadequate patient with frequent episodes of palpitations     Acute renal failure, likely element of cardiorenal syndrome  --Likely secondary to poor perfusion from heart failure  --We will monitor renal functions closely     Hyperlipidemia  --Was previously on statins now is on diet control     Hypertension  --On metoprolol and diltiazem     Diabetes mellitus  --Prior to admission was on metformin will hold for now  --Sliding scale insulin for the time being  --Restart metformin if doing well tomorrow     History of glaucoma  --Continue prior to admission meds     History of CAD  --CT angios showed mild  disease  --No history of angioplasty     History of tachybradycardia syndrome  --Status post pacemaker plament     DVT Prophylaxis: Patient on apixaban  Code Status: Full Code    Disposition Plan   Expected discharge in 2-3 days to prior living arrangement once heart failure has improved and atrial fibrillation controlled.     Entered: Hesham Brantley 10/14/2018, 1:09 PM         Hesham Brantley MD  Text Page    Interval History   Patient is doing a lot better than when she came in.  She has less shortness of breath.  Prior to admission she has having increased weight gain, lower extremity edema and dyspnea on exertion with orthopnea.  Denies any chest pain or pressure.  No cough, fevers, sweats, chills.      -Data reviewed today: I reviewed all new labs and imaging results over the last 24 hours. I personally reviewed the EKG tracing showing Atrial fibrillation with rapid ventricular response and the chest x-ray image(s) showing Interstitial edema with bilateral pleural effusions mild to moderate.    Physical Exam   Temp: 97.8  F (36.6  C) Temp src: Oral BP: 121/74 Pulse: 100 Heart Rate: 100 Resp: 18 SpO2: 95 % O2 Device: None (Room air)    Vitals:    10/14/18 0234   Weight: 71.7 kg (158 lb)     Vital Signs with Ranges  Temp:  [97.4  F (36.3  C)-97.8  F (36.6  C)] 97.8  F (36.6  C)  Pulse:  [100-108] 100  Heart Rate:  [] 100  Resp:  [15-22] 18  BP: (121-155)/() 121/74  SpO2:  [94 %-100 %] 95 %  I/O last 3 completed shifts:  In: -   Out: 500 [Urine:500]    Constitutional: Awake, alert, cooperative, no apparent distress,    Respiratory: Clear to auscultation bilaterally, diminished breath sounds at the bases    Cardiovascular:  irregular irregular rhythm with tachycardia, normal S1 and S2, and no murmur noted   Abdomen: Normal bowel sounds, soft, non-distended, non-tender, no hepatosplenomegaly    Skin: No rashes, no cyanosis, dry to touch   Neuro: Alert and oriented x3,    Extremities:  1-2+ lower  extremity edema bilaterally    Other(s):        All other systems: Negative       Medications     - MEDICATION INSTRUCTIONS -         apixaban ANTICOAGULANT  5 mg Oral BID     diltiazem  240 mg Oral Daily     dorzolamide-timolol PF  1 drop Both Eyes BID     furosemide  40 mg Intravenous Q8H     insulin aspart  1-7 Units Subcutaneous TID AC     insulin aspart  1-5 Units Subcutaneous At Bedtime     latanoprost  1 drop Both Eyes At Bedtime     lisinopril  10 mg Oral Daily     magnesium gluconate  500 mg Oral Daily     metoprolol succinate  100 mg Oral BID     metoprolol succinate  50 mg Oral QPM     multivitamin, therapeutic  1 tablet Oral Daily     sertraline  50 mg Oral QPM     sodium chloride (PF)  3 mL Intracatheter Q8H     timolol  1 drop Both Eyes BID       Data     Recent Labs  Lab 10/13/18  2324   WBC 7.6   HGB 12.9   MCV 91         POTASSIUM 3.9   CHLORIDE 108   CO2 24   BUN 29   CR 1.40*   ANIONGAP 8   MADDISON 9.0   *   TROPI <0.015       Imaging:  Recent Results (from the past 24 hour(s))   XR Chest 2 Views    Narrative    CHEST TWO VIEWS  10/13/2018 11:46 PM     HISTORY:  Shortness of breath. Leg edema.    COMPARISON: 11/9/2012.      Impression    IMPRESSION: Bilateral pleural effusions, small to moderate on the  right and small on the left, are new since the previous exam. Cardiac  enlargement has a similar appearance to the previous exam. Mild  pulmonary venous congestion is new since the previous exam. Aortic  calcification. Dual-lead cardiac device at left chest wall, with lead  tips projected over the RA and RV.    GENESIS WILLSON MD     Echocardiogram   Left ventricular systolic function is moderately reduced.  The visual ejection fraction is estimated at 40-45%.  The ejection fraction is estimated at 41% by Cano's biplane method.  There is moderate global hypokinesia of the left ventricle.  The right ventricle is normal in structure, function and size.  There is a  catheter/pacemaker lead seen in the right ventricle.  There is severe biatrial enlargement.  Pacer wire in right atrium  There is mild (1+) mitral regurgitation.  There is moderate to mod-severe (2-3+) tricuspid regurgitation.  Normal IVC (1.5-2.5cm) with <50% respiratory collapse; right atrial pressure  is estimated at 10-15mmHg.  Mild (35-45mmHg) pulmonary hypertension is present.  There is mild to moderate (1-2+) aortic regurgitation.  Mild aortic root dilatation (3.8 cm).  The ascending aorta is mildly dilated (4.0 cm).  Small bilateral pleural effusion  The rhythm was atrial fibrillation with controlled ventricular rate at rest.  Compared to the prior study dated 1/31/2017, there are changes as noted. The  rhythm has changed from NSR to atrial fibrillation. Significant global  hypokinesis of the left ventricle is now seen. Pulmonary hypertension is  present but RVSP less significant than previous. Clinical correlation  suggested.

## 2018-10-14 NOTE — PLAN OF CARE
Problem: Patient Care Overview  Goal: Plan of Care/Patient Progress Review  Outcome: No Change  Patient admitted with CHF exacerbation. Denies pain/discomfort. Tele is A. Fib RVR. 2-3+ edema to bilateral LE. Lung sounds clear. Lasix given in ED with good urine output. Patient ambulating with SBA. Alert/oriented.  needs to be scheduled this AM. Patient has specific  service, phone number on white board in room.

## 2018-10-14 NOTE — PROGRESS NOTES
10/14/18 1319   Quick Adds   Type of Visit Initial PT Evaluation   Living Environment   Lives With alone   Living Arrangements apartment   Home Accessibility no concerns   Living Environment Comment Pt lives at [facility].    Self-Care   Activity/Exercise/Self-Care Comment Pt independent with all own cares and IADLs including driving.   Functional Level Prior   Ambulation 0-->independent   Transferring 0-->independent   Toileting 0-->independent   Bathing 0-->independent   Dressing 0-->independent   Eating 0-->independent   Communication 0-->understands/communicates without difficulty   Swallowing 0-->swallows foods/liquids without difficulty   Cognition 0 - no cognition issues reported   Fall history within last six months no   Prior Functional Level Comment IADLs   General Information   Onset of Illness/Injury or Date of Surgery - Date 10/13/18   Referring Physician Dr. Pinzon   Patient/Family Goals Statement Pt and family would like pt to return home with increased services   Pertinent History of Current Problem (include personal factors and/or comorbidities that impact the POC) Zayda Hawkins is a 85 year old female with past medical history significant for atrial fibrillation, hypertension, hyperlipidemia, diabetes mellitus, mild coronary artery disease, recent CVA status post TPA 2 weeks ago presented to the emergency room with increasing leg edema, orthopnea, dyspnea on exertion.  New diagnosis of acute congestive heart failure (HFrEF), likely tachycardia induced.   Cognitive Status Examination   Orientation orientation to person, place and time   Level of Consciousness alert   Follows Commands and Answers Questions 100% of the time   Personal Safety and Judgment intact   Memory intact   Integumentary/Edema   Integumentary/Edema Comments edema in B LEs   Range of Motion (ROM)   ROM Comment lacking full R knee extension   Strength   Strength Comments demoing 3+/5 with functional mobility   Bed  "Mobility   Bed Mobility Comments SBA   Transfer Skills   Transfer Comments SBA, initially with FWW   Gait   Gait Comments CGA with FWW, 200 feet. R knee pain and mild SOB, BP elevated following ambulation see vitals sheet. 155/102 following   Balance   Balance Comments no LOB   General Therapy Interventions   Planned Therapy Interventions balance training;bed mobility training;gait training;strengthening;transfer training;home program guidelines;progressive activity/exercise;risk factor education   Clinical Impression   Criteria for Skilled Therapeutic Intervention yes, treatment indicated   PT Diagnosis decreased functional endurance   Influenced by the following impairments SOB, knee pain, poor CV response   Functional limitations due to impairments decreased activity tolerance   Clinical Presentation Stable/Uncomplicated   Clinical Presentation Rationale improving   Clinical Decision Making (Complexity) Low complexity   Therapy Frequency` 5 times/week   Predicted Duration of Therapy Intervention (days/wks) 3 days   Anticipated Equipment Needs at Discharge front wheeled walker  (could benefit from a 4WW for endurance)   Anticipated Discharge Disposition Home with Assist;Home with Outpatient Therapy   Risk & Benefits of therapy have been explained Yes   Patient, Family & other staff in agreement with plan of care Yes   1x Eval Only-Outpatient/Observation Medicare G-Code   G-code Mobility: Walking & Moving Around   [facility] [employer] \"6 Clicks\"   2016, Trustees of [facility], under license to [employer].  All rights reserved.   6 Clicks Short Forms Basic Mobility Inpatient Short Form   [facility] [employer]  \"6 Clicks\" V.2 Basic Mobility Inpatient Short Form   1. Turning from your back to your side while in a flat bed without using bedrails? 4 - None   2. Moving from lying on your back to sitting on the side of a flat bed without using bedrails? 4 - None   3. Moving to and from a bed to a chair " (including a wheelchair)? 4 - None   4. Standing up from a chair using your arms (e.g., wheelchair, or bedside chair)? 4 - None   5. To walk in hospital room? 4 - None   6. Climbing 3-5 steps with a railing? 3 - A Little   Basic Mobility Raw Score (Score out of 24.Lower scores equate to lower levels of function) 23   Total Evaluation Time   Total Evaluation Time (Minutes) 10

## 2018-10-14 NOTE — PROGRESS NOTES
I have had a 30 minute conversation with the patient's son and daughter-in-law.  It is clear the patient has been symptomatic from her afib with RVR for the last 10 -14 days.  If we are unable to achieve rate control with medications, I would suggest consideration of AV node ablation.  She has a permanent pacemaker in place already.  Additionally, I would hold any statin until 1/2019 given her mild ASCVD and use of serotonin specific reuptake inhibitor.  We discussed treating her BP as well.  Nikolai Lees MD, FACC  October 14, 2018 1:03 PM

## 2018-10-14 NOTE — PHARMACY-ADMISSION MEDICATION HISTORY
Admission medication history interview status for this patient is complete. See [facility] admission navigator for allergy information, prior to admission medications and immunization status.     Medication history interview source(s):Patient  Medication history resources (including written lists, pill bottles, clinic record):Pill bottles  Primary pharmacy:[facility]    Changes made to PTA medication list:  Added: Timolol eye drops, fish oil  Deleted: none  Changed: sertraline from 50 mg qpn ot 25 mg qpm prn.  out metoprolol order to 2 entries (100 mg qam and 50 mg qpm)    Actions taken by pharmacist (provider contacted, etc):None     Additional medication history information:None    Medication reconciliation/reorder completed by provider prior to medication history? Yes    Do you take OTC medications (eg tylenol, ibuprofen, fish oil, eye/ear drops, etc)? Y(Y/N)    For patients on insulin therapy: N (Y/N)    Time spent in this activity: 20 min    Prior to Admission medications    Medication Sig Last Dose Taking? Auth Provider   apixaban ANTICOAGULANT (ELIQUIS) 5 MG tablet Take 1 tablet (5 mg) by mouth 2 times daily 10/13/2018 at pm Yes Kingsley Brandon MD   calcium carbonate-vitamin D 500-400 MG-UNIT TABS Take 1 tablet by mouth 2 times daily.   10/13/2018 at Unknown time Yes Reported, Patient   diltiazem 240 MG 24 hr capsule Take 1 capsule (240 mg) by mouth daily 10/13/2018 at Unknown time Yes Cm Caldwell MD   dorzolamide-timolol PF (COSOPT) 22.3-6.8 MG/ML opthalmic solution Place 1 drop into both eyes 2 times daily 10/13/2018 at Unknown time Yes Unknown, Entered By History   fish oil-omega-3 fatty acids 1000 MG capsule Take 1 g by mouth daily 10/13/2018 at Unknown time Yes Unknown, Entered By History   latanoprost (XALATAN) 0.005 % ophthalmic solution Place 1 drop into both eyes At Bedtime 10/13/2018 at pm Yes Unknown, Entered By History   magnesium gluconate (MAGONATE) 500 MG tablet Take 500 mg  by mouth daily.   10/13/2018 at Unknown time Yes Reported, Patient   metFORMIN (GLUCOPHAGE-XR) 500 MG 24 hr tablet Take 500 mg by mouth every morning  10/13/2018 at Unknown time Yes Unknown, Entered By History   metoprolol succinate (TOPROL-XL) 50 MG 24 hr tablet Take 100 mg by mouth every morning 10/13/2018 at Unknown time Yes Unknown, Entered By History   metoprolol succinate (TOPROL-XL) 50 MG 24 hr tablet Take 50 mg by mouth every evening 10/13/2018 at Unknown time Yes Unknown, Entered By History   multivitamin, therapeutic (THERA-VIT) TABS Take 1 tablet by mouth daily.   10/13/2018 at Unknown time Yes Reported, Patient   sertraline (ZOLOFT) 50 MG tablet Take 25 mg by mouth every evening as needed (Take a half tablet qpm prn)  at prn Yes Unknown, Entered By History   SUMAtriptan Succinate (IMITREX PO) Take 25 mg by mouth as needed.    at prn Yes Unknown, Entered By History   timolol (TIMOPTIC) 0.5 % ophthalmic solution Place 1 drop into both eyes 2 times daily 10/13/2018 at Unknown time Yes Unknown, Entered By History     10-15  Patient informed RN --she only takes timolol eye drops in the AM only, not BID   [facility]

## 2018-10-14 NOTE — PROGRESS NOTES
[facility] Interpretor line called. The patient prefers to use an outside agency for interpreting - name: Mary Ann Moctezuma [phone].  RN is to call this interpretor and ask her to reply to the  interpretor email & have the patient sign the  waiver.

## 2018-10-14 NOTE — H&P
[facility]    Hospitalist History and Physical    Name: Zayda Hawkins    MRN: [id number]  YOB: 1932    Age: 85 year old  Date of Admission:  10/13/2018  Date of Service (when I saw the patient): 10/14/18    Assessment & Plan   Zayda Hawkins is a 85 year old female with past medical history significant for atrial fibrillation, hypertension, hyperlipidemia, diabetes mellitus, mild coronary artery disease, recent CVA status post TPA 2 weeks ago presented to the emergency room with increasing leg edema, orthopnea, dyspnea on exertion.  Workup in the emergency room showed new diagnosis of congestive heart failure.    New diagnosis of congestive heart failure  --Suspect secondary to tachyarrhythmia with uncontrolled atrial fibrillation  --Admit to telemetry  --Serial troponins to rule out ACS  --Echocardiogram  --IV diuresis started Lasix 20 mg twice daily  --Cardiology consult  --Continue prior to admission aspirin, beta-blocker and anticoagulation    Recent CVA status post TPA 2 weeks ago  --No significant residual weakness  --Continue anticoagulation and blood pressure management    Paroxysmal atrial fibrillation  --On apixaban for anticoagulation.  We will continue  --On metoprolol and diltiazem for rate control  --Monitor on telemetry  --Rate control seemed inadequate patient with frequent episodes of palpitations    Acute renal failure  --Likely secondary to poor perfusion  --We will monitor renal functions closely    Hyperlipidemia  --Was previously on statins now is on diet control    Hypertension  --On metoprolol and diltiazem    Diabetes mellitus  --Prior to admission was on metformin will hold for now  --Sliding scale insulin for    History of glaucoma  --Continue prior to admission meds    History of CAD  --CT angios showed mild disease  --No history of angioplasty    History of tachybradycardia syndrome  --Status post pacemaker plament    DVT Prophylaxis:  Patient on apixaban  Code Status: Full Code    Disposition: Admitted for more than 2 midnight stay    Primary Care Physician   Alton Willoughby    Chief Complaint   Increased leg edema, shortness of breath, orthopnea and PND for the last 1-2 days    History is obtained from the patient with his son and daughter helping to interpret.  Patient speaks Macedonian    History of Present Illness   Zayda Hawkins is a 85 year old female with past medical history significant for diabetes mellitus, hypertension, hyperlipidemia, mild CAD, recent CVA, history of atrial fibrillation with recent change in meds presented to the emergency room with increased leg swelling and shortness of breath.  After recent hospitalization for stroke patients medication for atrial fibrillation were adjusted since then she has had intermittent palpitations.  She specifically complained of palpitations when she would bend down to put her stockings.  She noted that her pulse check at home was at times up to 140s.  For last 1 day patient had increasing leg swelling, could not sleep flat required a lot of pillows to sleep.  She woke up with shortness of breath and had dyspnea on minimal exertion.  She denied any chest pain but felt significant palpitations and was uncomfortable with palpitations.  No nausea no diaphoresis no cough no fever no chills.  No recent change in diet.  Review of all the other system   is negative.    In the emergency room patient was found to have elevated BNP with new pleural effusions.  She received IV diuresis and is being admitted for new diagnosis of heart failure.    Past Medical History    Past Medical History:   Diagnosis Date     Atrial fibrillation (H)     not on anticoagulation, hx RP bleed     CAD (coronary artery disease)     CT angio: mild lesion in the LAD, as well as 1st diagonal, and mild plaque in the proximal and  mid RCA     CVA (cerebral vascular accident) (H) 10/2018    Acute left posterior  circulation ischemic stroke      Diabetes mellitus (H)      Hyperlipidemia      Hypertension      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     Venous insufficiency          Past Surgical History   Past Surgical History:   Procedure Laterality Date     ANESTHESIA CARDIOVERSION  7/23/2012    Procedure: ANESTHESIA CARDIOVERSION;;  Surgeon: Provider, Generic Anesthesia;  Location:  ANESTHESIA -  - DO NOT SCHEDULE     BLEPHAROPLASTY  2005     Cataract Extraction with IOL Bilateral 2012     IMPLANT PACEMAKER  11/2012    Dual chamber       Prior to Admission Medications   Prior to Admission Medications   Prescriptions Last Dose Informant Patient Reported? Taking?   SUMAtriptan Succinate (IMITREX PO)  Spouse/Significant Other Yes No   Sig: Take 25 mg by mouth as needed.     apixaban ANTICOAGULANT (ELIQUIS) 5 MG tablet   No No   Sig: Take 1 tablet (5 mg) by mouth 2 times daily   calcium carbonate-vitamin D 500-400 MG-UNIT TABS  Spouse/Significant Other Yes No   Sig: Take 1 tablet by mouth 2 times daily.     diltiazem 240 MG 24 hr capsule   No No   Sig: Take 1 capsule (240 mg) by mouth daily   dorzolamide-timolol PF (COSOPT) 22.3-6.8 MG/ML opthalmic solution   Yes No   Sig: Place 1 drop into both eyes 2 times daily   latanoprost (XALATAN) 0.005 % ophthalmic solution   Yes No   Sig: Place 1 drop into both eyes At Bedtime   magnesium gluconate (MAGONATE) 500 MG tablet  Spouse/Significant Other Yes No   Sig: Take 500 mg by mouth daily.     metFORMIN (GLUCOPHAGE-XR) 500 MG 24 hr tablet   Yes No   Sig: Take 500 mg by mouth daily   metoprolol succinate (TOPROL-XL) 50 MG 24 hr tablet   No No   Sig: Take 2 tabs(100mg) qam and 1 tab(50mg) qpm   multivitamin, therapeutic (THERA-VIT) TABS  Spouse/Significant Other Yes No   Sig: Take 1 tablet by mouth daily.     sertraline (ZOLOFT) 50 MG tablet  Spouse/Significant Other Yes No   Sig: Take 50 mg by mouth every evening.        Facility-Administered Medications: None     Allergies    Allergies   Allergen Reactions     Aspirin      Coumadin [Warfarin]      Spontaneous retroperitoneal bleed.     Penicillins        Social History   Social History   Substance Use Topics     Smoking status: Never Smoker     Smokeless tobacco: Never Used     Alcohol use No     Social History     Social History Narrative     Lives alone.  Denies smoking no use of alcohol.  Patient is from [place] and speaks [place]    Family History   Significant family history of congestive heart failure and sudden cardiac death.  Brother has history of diabetes mellitus    Review of Systems   A Comprehensive greater than 10 system review of systems was carried out.  Pertinent positives and negatives are noted above.  Otherwise negative for contributory information.    Physical Exam   Temp: 97.8  F (36.6  C) Temp src: Oral BP: (!) 130/101 Pulse: 108 Heart Rate: 107 Resp: 15 SpO2: 95 % O2 Device: None (Room air)    Vital Signs with Ranges  Temp:  [97.8  F (36.6  C)] 97.8  F (36.6  C)  Pulse:  [108] 108  Heart Rate:  [107] 107  Resp:  [15-22] 15  BP: (130-141)/() 130/101  SpO2:  [95 %-96 %] 95 %  0 lbs 0 oz    GEN:  Alert, oriented x 3, appears comfortable, no overt distress  HEENT:  Normocephalic/atraumatic, no scleral icterus, no nasal discharge, mouth dry  CV: Tachycardic irregularly irregular no murmur.  Elevated JVD.  S1 + S2 noted, no S3 or S4.  LUNGS: Poor inspiratory effort.  Decreased sounds bibasilar.  Bibasilar crackles. symmetric chest rise on inhalation noted.  ABD:  Active bowel sounds, soft, non-tender/non-distended.  No rebound/guarding/rigidity.  EXT: +3 edema.  No cyanosis.    SKIN:  Dry to touch, no exanthems noted in the visualized areas.  NEURO:  Symmetric muscle strength, sensation to touch grossly intact.  No new focal deficits appreciated.    Data   Data reviewed today:  I personally reviewed the EKG tracing showing Atrial fibrillation at the rate of 130 with nonspecific ST-T changes.    No results for  input(s): PH, PHV, PO2, PO2V, SAT, PCO2, PCO2V, HCO3, HCO3V in the last 168 hours.    Recent Labs  Lab 10/13/18  2324   WBC 7.6   HGB 12.9   HCT 40.7   MCV 91          Recent Labs  Lab 10/13/18  2324      POTASSIUM 3.9   CHLORIDE 108   CO2 24   ANIONGAP 8   *   BUN 29   CR 1.40*   GFRESTIMATED 36*   GFRESTBLACK 43*   MADDISON 9.0     No results for input(s): CULT in the last 168 hours.    Recent Labs  Lab 10/13/18  2324   NTBNPI 4101*     No results for input(s): CKT in the last 168 hours.    Invalid input(s): CK, CK TOTAL  No results for input(s): AST, ALT, GGT, ALKPHOS, BILITOTAL, BILICONJ, BILIDIRECT, KEN in the last 168 hours.    Invalid input(s): BILIRUBININDIRECT  No results for input(s): INR in the last 168 hours.  No results for input(s): LACT in the last 168 hours.  No results for input(s): LIPASE in the last 168 hours.  No results for input(s): TSH in the last 168 hours.    Recent Labs  Lab 10/13/18  2324   TROPI <0.015     No results for input(s): COLOR, APPEARANCE, URINEGLC, URINEBILI, URINEKETONE, SG, UBLD, URINEPH, PROTEIN, UROBILINOGEN, NITRITE, LEUKEST, RBCU, WBCU in the last 168 hours.    Recent Results (from the past 24 hour(s))   XR Chest 2 Views    Narrative    CHEST TWO VIEWS  10/13/2018 11:46 PM     HISTORY:  Shortness of breath. Leg edema.    COMPARISON: 11/9/2012.      Impression    IMPRESSION: Bilateral pleural effusions, small to moderate on the  right and small on the left, are new since the previous exam. Cardiac  enlargement has a similar appearance to the previous exam. Mild  pulmonary venous congestion is new since the previous exam. Aortic  calcification. Dual-lead cardiac device at left chest wall, with lead  tips projected over the RA and RV.

## 2018-10-14 NOTE — PLAN OF CARE
Problem: Patient Care Overview  Goal: Plan of Care/Patient Progress Review  PT: PT eval/Cardiac eval orders received. Pt here with new dx of CHF.    Discharge Planner PT   Patient plan for discharge: Home with evaluation for increased cares next Monday per Son  Current status: Pt was agreeable to CHF teaching and education. Pt was educated on signs and symptoms of CHF, when to consult MD/call MD. Pt was able to perform 200 feet of ambulation, R knee pain and fatigue limiting pt. No FWW needed initially until knee pain increasing. Pt's vitals prior to ambulation: /80, HR 98 . Pt's vitals following ambulation: /102, .  Pt was educated on walking program. Pt son present for all education  Barriers to return to prior living situation: limited endurance- poor tolerance to IADLs  Recommendations for discharge: .home with home PT/OT and assistance for IADLs from family/facility  Rationale for recommendations: Pt is currently functioning below baseline, will benefit from ongoing skilled PT intervention to improve safety and tolerance with mobility skills.       Entered by: Lissy Cunha 10/14/2018 1:33 PM

## 2018-10-14 NOTE — PLAN OF CARE
Problem: Patient Care Overview  Goal: Plan of Care/Patient Progress Review  Outcome: Improving  Heart Failure Care Pathway  GOALS TO BE MET BEFORE DISCHARGE:    1. Decrease congestion and/or edema with diuretic therapy to achieve near      optimal volume status.            Dyspnea improved:  Yes            Edema improved:     No, please explain: still requiring IV lasix        Net I/O and Weights since admission:          09/14 2300 - 10/14 2259  In: 660 [P.O.:660]  Out: 2450 [Urine:2450]  Net: -1790            Vitals:    10/14/18 0234   Weight: 71.7 kg (158 lb)       2.  O2 sats > 92% on RA or at prior home O2 therapy level.          Current oxygenation status:       SpO2: 92 %         O2 Device: None (Room air),            Able to wean O2 this shift to keep sats > 92%:  NA       Does patient use Home O2? No    3.  Tolerates ambulation and mobility near baseline: Yes        How many times did the patient ambulate with nursing staff this shift? 3    Please review the Heart Failure Care Pathway for additional HF goal outcomes.    Dian Patel RN  10/14/2018

## 2018-10-14 NOTE — ED NOTES
[facility]  ED Nurse Handoff Report    Zayda Hawkins is a 85 year old female who presents with worsening SOB w/exertion as well as new swelling to lower extremities.  Blood shows elevated BNP.  Pt is alert and standby assist to ambulate.  ED Chief complaint: Shortness of Breath  . ED Diagnosis:   Final diagnoses:   Acute congestive heart failure   Orthopnea   Bilateral leg edema     Allergies:   Allergies   Allergen Reactions     Aspirin      Coumadin [Warfarin]      Spontaneous retroperitoneal bleed.     Penicillins        Code Status: Full Code  Activity level - Baseline/Home:  Stand with Assist. Activity Level - Current:   Stand with Assist. Lift room needed: No. Bariatric: No   Needed: No   Isolation: No. Infection: Not Applicable.     Vital Signs:   Vitals:    10/13/18 2309 10/13/18 2330 10/14/18 0000   BP: 139/88 141/75 (!) 130/101   Pulse: 108     Resp: 22 15    Temp: 97.8  F (36.6  C)     TempSrc: Oral     SpO2: 95% 96% 95%       Cardiac Rhythm:  ,      Pain level:    Patient confused: No. Patient Falls Risk: Yes.   Elimination Status: Has voided   Patient Report - Initial Complaint: shortness of breath. Focused Assessment: Chinedu pitting edema, labored breathing w/exertion   Tests Performed: blood, xr. Abnormal Results: elevated BNP.   Treatments provided: Lasix  Family Comments: son and daughter at bedside  OBS brochure/video discussed/provided to patient:  Yes  ED Medications:   Medications   lidocaine 1 % 1 mL (not administered)   lidocaine (LMX4) cream (not administered)   sodium chloride (PF) 0.9% PF flush 3 mL (not administered)   sodium chloride (PF) 0.9% PF flush 3 mL (not administered)   furosemide (LASIX) injection 40 mg (not administered)     Drips infusing:  No  For the majority of the shift, the patient's behavior Green. Interventions performed were n/a.     Severe Sepsis OR Septic Shock Diagnosis Present: No      ED Nurse Name/Phone Number: Jonathan Seaver,    12:24 AM      RECEIVING UNIT ED HANDOFF REVIEW    Above ED Nurse Handoff Report was reviewed: Yes  Reviewed by: Emmett Bella on October 14, 2018 at 1:54 AM

## 2018-10-14 NOTE — CONSULTS
Full Cardiology consultation dictated.  Suspect CHF with depressed LV function due to atrial fibrillation with RVR.  Recent hospitalization demonstrating this rhythm.  Tikosyn was discontinued and diltiazem and Toprol doses increased.  Will reassess and work for rate control.  Nikolai Lees MD, FACC  October 14, 2018 10:59 AM  [id number]

## 2018-10-14 NOTE — ED PROVIDER NOTES
History     Chief Complaint:  Shortness of Breath    The history is provided by a relative. A  was used (Family).      Zayda Hawkins is a 85 year old female who presents to the emergency department today with shortness of breath. Per chart review, the patient was admitted for stroke 2 weeks ago, during admission she got TPA, started on blood thinner, and had her blood pressure medications adjusted accordingly. See discharge summary from 10/2. Patient had vision deficits with the stroke that have since resolved. Currently patient reports for worsening leg edema, shortness of breath with walking and exertion. The patient has had difficulty lying flat because that worsens the shortness of breath. Family denies diarrhea, vomiting, nausea.     Allergies:  Aspirin  Coumadin [Warfarin]  Penicillins    Medications:    Eliquis   Calcium carbonate  Diltiazem   Tikosyn   Cosopt   Xalatan  Magonate  Glucophage  Toprol  Thera-vit  Zoloft  Imitrex     Past Medical History:    CVA   Atrial fibrillation  Diabetes  H/o Retroperitoneal bleed  Hematoma  Atrial fibrillation  Low back pain   CAD  Hyperlipidemia   Hypertension   Tachy-susan syndrome  Venous insufficiency     Past Surgical History:    Anesthesia cardioversion  Blepharoplasty  Cataract extraction with IOL   Implant pacemaker     Family History:    Heart disease     Social History:  The patient was accompanied to the ED by family.  Smoking Status: Never  Smokeless Tobacco: Never  Alcohol Use: No    Marital Status:       Review of Systems   Respiratory: Positive for shortness of breath.    Cardiovascular: Positive for leg swelling.   Gastrointestinal: Negative for diarrhea, nausea and vomiting.   All other systems reviewed and are negative.    Physical Exam     Patient Vitals for the past 24 hrs:   BP Temp Temp src Pulse Heart Rate Resp SpO2   10/14/18 0100 (!) 133/96 - - - 105 - 95 %   10/14/18 0000 (!) 130/101 - - - - - 95 %   10/13/18  2330 141/75 - - - 107 15 96 %   10/13/18 2309 139/88 97.8  F (36.6  C) Oral 108 - 22 95 %      Physical Exam  Nursing note and vitals reviewed.  Constitutional: Cooperative.   HENT:   Mouth/Throat: Mucous membranes are normal.   Cardiovascular: Rhythm was irregularly irregular. 3/6 systolic murmur. Pacemaker noted in the chest.  Slightly tachycardic rate.  Pulmonary/Chest: Effort normal and breath sounds normal. No respiratory distress. No wheezes. No rales.   Abdominal: Soft. Normal appearance and bowel sounds are normal. No distension. There is no tenderness. There is no rigidity and no guarding.   Musculoskeletal: 3-4+ bilateral lower extremity edema.    Neurological: Alert. Oriented x4  Skin: Skin is warm and dry.   Psychiatric: Normal mood and affect.     Emergency Department Course     ECG:  Indication: shortness of breath   Completed at 2313.  Read at 2313.   Atrial fibrillation   Possible anterior infract, age undetermined    Rate 100 bpm. KS interval *. QRS duration 74. QT/QTc 350/451. P-R-T axes * 42 110.     Imaging:  Radiology findings were communicated with the patient and family who voiced understanding of the findings.  Chest XR  IMPRESSION: Bilateral pleural effusions, small to moderate on the right and small on the left, are new since the previous exam. Cardiac enlargement has a similar appearance to the previous exam. Mild  pulmonary venous congestion is new since the previous exam. Aortic calcification. Dual-lead cardiac device at left chest wall, with lead tips projected over the RA and RV.  Report per radiology      Laboratory:  Laboratory findings were communicated with the patient and family who voiced understanding of the findings.    CBC: AWNL (WBC 7.6, HGB 12.9, )   BMP: 138(H) Glucose, 36(L) GFR o/w WNL (Creatinine 1.40(H))     Troponin (Collected 2324): <0.015   BNP: 4101 (H)    Interventions:  0043: Lasix 40mg IV     Emergency Department Course:  Nursing notes and vitals  reviewed.  2311: I performed an exam of the patient as documented above.   IV was inserted and blood was drawn for laboratory testing, results above.  The patient was sent for a Chest XR while in the emergency department, results above.   0010: Findings and plan explained to the Patient who consents to admission. Discussed the patient with Dr. Pinzon , who will admit the patient to a Med/Surg bed for further monitoring, evaluation, and treatment.   I personally reviewed the laboratory and imaging results with the Patient and family and answered all related questions prior to admission.      Impression & Plan    Medical Decision Making:  Zayda Hawkins is a 85 year old female with the above history including recent stroke requiring TPA from which she apparently had a full recovery who presents with new onset orthopnea and lower extremity swelling. Findings consistent with new onset heart failure. She does have a systolic heart murmur that family is not aware of though previous echoes do show a valvar regurgitation. BNP is elevated with new pleural effusions in the clinical picture. She will need to be admitted for diuresis and echocardiogram in the morning to assess underlying heart function. She is not hypoxic. Remainder of the workup was reassuring other than a slight elevation in the Creatinine at this time.     Diagnosis:    ICD-10-CM    1. Acute congestive heart failure I50.9    2. Orthopnea R06.01    3. Bilateral leg edema R60.0    4. Elevated serum creatinine R79.89    5. Chronic atrial fibrillation (H) I48.2    6. Chronic anticoagulation Z79.01        Disposition:  Admitted to Med/Surg    Scribe Disclosure:  Pamela CORNELIUS, am serving as a scribe at 11:10 PM on 10/13/2018 to document services personally performed by Alfonso Brand MD based on my observations and the provider's statements to me.    10/13/2018   [facility] EMERGENCY DEPARTMENT       Alfonso Brand MD  10/14/18 0225

## 2018-10-14 NOTE — ED TRIAGE NOTES
Shortness of breath and pitting edema (magdy legs) for last 24hrs, hx afib.  SOB worse with exertion.

## 2018-10-14 NOTE — CONSULTS
Consult Date:  10/14/2018      CARDIOLOGY CONSULTATION       REASON FOR CONSULTATION:  I have been asked by Dr. Bekah Pinzon to see Zayda Hawkins for evaluation of pulmonary edema, peripheral edema and atrial fibrillation with rapid ventricular response.      HISTORY OF PRESENT ILLNESS:  Zayda Hawkins is a delightful 85-year-old female originally from [place] with past medical history significant for atrial fibrillation with rapid ventricular response, hypertension, hyperlipidemia, diabetes mellitus, mild coronary artery disease as detected by a CT calcium score and angiogram in 2012, and recent CVA status post TPA the first week of October, 2 weeks ago.  The patient now presents for 2-3 days of increasing peripheral edema, orthopnea, dyspnea on exertion.  The patient is followed by the EP physicians at [facility] with recent attempts at atrial fibrillation rate control rather than rhythm control.  The patient's Tikosyn was discontinued on 10/02 by Dr. Mcnulty as the patient's rhythm became more prominently atrial fibrillation.  The patient started experiencing tachycardia several days prior to admission.  She noticed her pulse at home was often times in the 140 range.  She also began experiencing leg edema and orthopnea.  She generally is able to exercise by walking on a treadmill for 45 minutes to an hour nearly every day.  She had increasing dyspnea with minimal exertion.  She denied chest discomfort.  No nausea or diaphoresis.  No cough or sputum production.  She denies increasing salt content of her diet.      ALLERGIES:  ASPIRIN, COUMADIN, WITH SPONTANEOUS RUPTURE AND RETROPERITONEAL BLEED 09/08/2014, AND PENICILLIN.      CURRENT MEDICATIONS:   1.  Calcium carbonate plus vitamin D 500/400 mg unit tabs 1 tablet by mouth 2 times daily.   2.  Fish oil 1000 mg capsule daily.   3.  Toprol-XL 50 mg by mouth every morning and evening.   4.  Multivitamin 1 p.o. daily.   5.   Zoloft 50 mg 1/2 tablet by mouth each evening as needed.   6.  Imitrex 25 mg by mouth as needed.    7.  Timolol 0.5% ophthalmic solution 1 drop into each eye 2 times daily.   8.  Apixaban 5 mg 2 times daily.   9.  Diltiazem 240 mg 24-hour capsule daily.     10.  Cosopt ophthalmic solution 1 drop into both eyes 2 times daily.   11.  Xalatan ophthalmic solution 0.005% 1 drop into both eyes at bedtime.   12.  Magnesium gluconate 500 mg daily.   13.  Metformin 500 mg 24-hour tablet by mouth every morning.      PAST MEDICAL HISTORY:   1.  Previous paroxysmal atrial fibrillation becoming more frequent based on permanent pacemaker interrogations.  We have abandoned the rhythm control and are working on rate control.   2.  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   3.  Coronary artery disease with a CT coronary angiogram in 2012 showing mild stenosis in the LAD and first diagonal, as well as the proximal and mid right coronary arteries.   4.  Acute left posterior circulation ischemic stroke early 10/2018.  Treated with TPA.  This is felt to be possibly embolic.  She was started on Eliquis since then.   5.  Diabetes mellitus.   6.  Hyperlipidemia.   7.  Hypertension.   8.  Venous insufficiency with varicose veins and periodic peripheral edema.   9.  Previous cardioversion 07/23/2012.   10.  Blepharoplasty in 2005.   11.  Bilateral cataract extraction in 2012.      No history of peptic ulcer disease, cancer, tuberculosis, kidney stones or disease, hyper or hypothyroidism.      SOCIAL HISTORY:  The patient is very active.  She is retired.  She is a lifelong nonsmoker and does not use alcohol.  The patient is from [place].  She speaks Farsi.      FAMILY HISTORY:  Brother with history of diabetes mellitus, and significant family history of congestive heart failure and sudden cardiac death.      REVIEW OF SYSTEMS:  A 12-point review of system is performed with pertinent positives and  negatives listed in the HPI.  All other review of systems asked and are negative.      PHYSICAL EXAMINATION:   VITAL SIGNS:  Current blood pressure is 145/91, pulse is 98 and irregular, respiratory rate 18, patient is afebrile, current weight is 158 pounds (72 kg).   GENERAL:  The patient is an alert white [place] female in no acute distress.   HEENT:  Normocephalic, atraumatic without xanthelasma.  No arcus senilis.  No oropharyngeal lesions.  Mucous membranes are moist.   NECK:  Supple without thyromegaly.  Carotid upstroke is brisk without bruits.   CHEST:  Demonstrates a few basilar rales, otherwise clear to auscultation without rhonchi or wheezes.  No kyphosis or scoliosis.   CARDIAC:  Tachycardia, irregularly irregular.  There is a 1-2/6 systolic flow murmur, left lower sternal border towards the apex.  No other murmur is heard.  No heave, rub or thrill.  There is JVD 10-12 cm above the sternal notch and HJR is present.  No tenderness to palpation across the precordium.   ABDOMEN:  Soft, nontender without organomegaly.  Bowel sounds are present.  No bruits.   EXTREMITIES:  Without cyanosis or clubbing.  There is 1+ bilateral pretibial edema.   SKIN:  Warm, dry and intact.   NEUROLOGIC:  Alert and oriented x 3.  Cranial nerves II-XII grossly intact.     PSYCH:  Affect is normal.      EKG from 10/13 at 11:13 p.m. demonstrates atrial fibrillation with rate of 100 beats per minute.  No acute ST-T wave abnormalities are seen.  Poor R wave in V1 through V3.      Echocardiography comparing to 01/2017, normal ejection fraction is now 40%-45% with global hypokinesis.  There is mild mitral regurgitation but moderate to moderately severe tricuspid regurgitation.  RVSP is 29 mmHg plus right atrial pressure, which is increased with normal IVC, but less than 50% respiratory collapse.  Mild to moderate aortic insufficiency.  The ascending aorta is mildly dilated at 4 cm.  There are small bilateral pleural effusions.       Chest x-ray:  I personally reviewed the patient's chest x-ray.  There were bilateral pleural effusions which are new since previous x-rays.  There is mild pulmonary vascular congestion.  Aortic calcification is seen.  The dual-chamber pacemaker is noted.      LABORATORY:  Sodium 140, potassium 3.9, chloride 108, carbon dioxide 24, BUN 29, creatinine 1.4.  ProBNP 4101.  Troponin-I ES is less than 0.015.  Glucose 138, white count 7600, hemoglobin 12.9, platelet count 286,000.      ASSESSMENT:   1.  Zayda Hawkins is a delightful 85-year-old female with atrial fibrillation with at times rapid ventricular response and hypertension.  I suspect that this patient's pulmonary edema and peripheral edema as well as orthopnea are now due to ongoing hypertension as well as atrial fibrillation with rapid ventricular response.  We will work to slow her heart with the current medications as well as lower her blood pressure appropriately.  Depending on renal function, lisinopril will be added.  Hydralazine will be necessary if renal function prevents the ACE inhibitor.  I do not believe that rhythm control is possible and we will continue to work on rate control per her previous notes.  If we are unable to achieve adequate rate control with medications or she has significant side effects, we can consider AV node ablation.  2.  Status post recent cerebrovascular accident (CVA) 2 weeks ago.  This patient's CHADS-VASc is 6.  She is currently on Eliquis.  We will watch for any bleeding diathesis.   3.  Diabetes mellitus, reasonably well controlled.   4.  Mild acute renal failure probably secondary to diminished perfusion.   5.  Hyperlipidemia.  Waiting to restart the atorvastatin until 1/2019 with Dr. Brandon.  6.  History of tachybrady syndrome status post permanent pacemaker implantation, which is functioning normally.      SUGGESTIONS:   1.  Continue IV and oral diuresis.   2.  We will add lisinopril 10 mg daily if her renal  function will allow.  Goal blood pressure is less than 130/80.   3.  We will continue with her current Cardizem and beta blocker for atrial fibrillation rate control.   4.  We will continue with Eliquis 5 mg b.i.d.  Although this patient is over 80, her current weight is greater than 60 kilograms and renal function is stable enough.      It is my pleasure to assist in the care of Jovanna Hawkins.  All her questions were answered to her satisfaction.         MD CYDNEY Baker MD, Merged with [facility]             D: 10/14/2018   T: 10/14/2018   MT: CC      Name:     JOVANNA DE LEÓN   MRN:      [id number]        Account:       [id number]   :      1932           Consult Date:  10/14/2018      Document: [id number]       cc: Bekah Willoughby MD

## 2018-10-15 ENCOUNTER — APPOINTMENT (OUTPATIENT)
Dept: PHYSICAL THERAPY | Facility: CLINIC | Age: 83
DRG: 292 | End: 2018-10-15
Payer: COMMERCIAL

## 2018-10-15 DIAGNOSIS — I50.22 CHRONIC SYSTOLIC CONGESTIVE HEART FAILURE (H): Primary | ICD-10-CM

## 2018-10-15 DIAGNOSIS — R06.02 SOB (SHORTNESS OF BREATH): ICD-10-CM

## 2018-10-15 LAB
ANION GAP SERPL CALCULATED.3IONS-SCNC: 7 MMOL/L (ref 3–14)
BUN SERPL-MCNC: 28 MG/DL (ref 7–30)
CALCIUM SERPL-MCNC: 8.7 MG/DL (ref 8.5–10.1)
CHLORIDE SERPL-SCNC: 105 MMOL/L (ref 94–109)
CO2 SERPL-SCNC: 31 MMOL/L (ref 20–32)
CREAT SERPL-MCNC: 1.31 MG/DL (ref 0.52–1.04)
ERYTHROCYTE [DISTWIDTH] IN BLOOD BY AUTOMATED COUNT: 14 % (ref 10–15)
GFR SERPL CREATININE-BSD FRML MDRD: 39 ML/MIN/1.7M2
GLUCOSE BLDC GLUCOMTR-MCNC: 123 MG/DL (ref 70–99)
GLUCOSE BLDC GLUCOMTR-MCNC: 127 MG/DL (ref 70–99)
GLUCOSE BLDC GLUCOMTR-MCNC: 128 MG/DL (ref 70–99)
GLUCOSE BLDC GLUCOMTR-MCNC: 131 MG/DL (ref 70–99)
GLUCOSE BLDC GLUCOMTR-MCNC: 144 MG/DL (ref 70–99)
GLUCOSE BLDC GLUCOMTR-MCNC: 153 MG/DL (ref 70–99)
GLUCOSE SERPL-MCNC: 148 MG/DL (ref 70–99)
HCT VFR BLD AUTO: 43.4 % (ref 35–47)
HGB BLD-MCNC: 13.8 G/DL (ref 11.7–15.7)
MCH RBC QN AUTO: 28.9 PG (ref 26.5–33)
MCHC RBC AUTO-ENTMCNC: 31.8 G/DL (ref 31.5–36.5)
MCV RBC AUTO: 91 FL (ref 78–100)
PLATELET # BLD AUTO: 265 10E9/L (ref 150–450)
POTASSIUM SERPL-SCNC: 3.4 MMOL/L (ref 3.4–5.3)
RBC # BLD AUTO: 4.78 10E12/L (ref 3.8–5.2)
SODIUM SERPL-SCNC: 143 MMOL/L (ref 133–144)
WBC # BLD AUTO: 6.3 10E9/L (ref 4–11)

## 2018-10-15 PROCEDURE — 36415 COLL VENOUS BLD VENIPUNCTURE: CPT | Performed by: INTERNAL MEDICINE

## 2018-10-15 PROCEDURE — 85027 COMPLETE CBC AUTOMATED: CPT | Performed by: INTERNAL MEDICINE

## 2018-10-15 PROCEDURE — 40000193 ZZH STATISTIC PT WARD VISIT: Performed by: PHYSICAL THERAPIST

## 2018-10-15 PROCEDURE — 80048 BASIC METABOLIC PNL TOTAL CA: CPT | Performed by: INTERNAL MEDICINE

## 2018-10-15 PROCEDURE — 00000146 ZZHCL STATISTIC GLUCOSE BY METER IP

## 2018-10-15 PROCEDURE — 25000132 ZZH RX MED GY IP 250 OP 250 PS 637: Performed by: INTERNAL MEDICINE

## 2018-10-15 PROCEDURE — 99232 SBSQ HOSP IP/OBS MODERATE 35: CPT | Performed by: INTERNAL MEDICINE

## 2018-10-15 PROCEDURE — 97110 THERAPEUTIC EXERCISES: CPT | Mod: GP | Performed by: PHYSICAL THERAPIST

## 2018-10-15 PROCEDURE — 99233 SBSQ HOSP IP/OBS HIGH 50: CPT | Performed by: INTERNAL MEDICINE

## 2018-10-15 PROCEDURE — 25000128 H RX IP 250 OP 636: Performed by: INTERNAL MEDICINE

## 2018-10-15 PROCEDURE — 12000007 ZZH R&B INTERMEDIATE

## 2018-10-15 PROCEDURE — 25000131 ZZH RX MED GY IP 250 OP 636 PS 637: Performed by: INTERNAL MEDICINE

## 2018-10-15 RX ORDER — FUROSEMIDE 10 MG/ML
40 INJECTION INTRAMUSCULAR; INTRAVENOUS ONCE
Status: COMPLETED | OUTPATIENT
Start: 2018-10-15 | End: 2018-10-15

## 2018-10-15 RX ORDER — TIMOLOL MALEATE 5 MG/ML
1 SOLUTION/ DROPS OPHTHALMIC DAILY
Status: DISCONTINUED | OUTPATIENT
Start: 2018-10-16 | End: 2018-10-16 | Stop reason: HOSPADM

## 2018-10-15 RX ORDER — FUROSEMIDE 40 MG
40 TABLET ORAL DAILY
Status: DISCONTINUED | OUTPATIENT
Start: 2018-10-16 | End: 2018-10-16 | Stop reason: HOSPADM

## 2018-10-15 RX ADMIN — METOPROLOL SUCCINATE 100 MG: 100 TABLET, EXTENDED RELEASE ORAL at 20:46

## 2018-10-15 RX ADMIN — FUROSEMIDE 40 MG: 10 INJECTION, SOLUTION INTRAMUSCULAR; INTRAVENOUS at 13:55

## 2018-10-15 RX ADMIN — LISINOPRIL 10 MG: 10 TABLET ORAL at 08:16

## 2018-10-15 RX ADMIN — APIXABAN 5 MG: 5 TABLET, FILM COATED ORAL at 08:15

## 2018-10-15 RX ADMIN — THERA TABS 1 TABLET: TAB at 08:15

## 2018-10-15 RX ADMIN — FUROSEMIDE 40 MG: 10 INJECTION, SOLUTION INTRAMUSCULAR; INTRAVENOUS at 04:27

## 2018-10-15 RX ADMIN — APIXABAN 5 MG: 5 TABLET, FILM COATED ORAL at 20:46

## 2018-10-15 RX ADMIN — DORZOLAMIDE HYDROCHLORIDE AND TIMOLOL MALEATE 1 DROP: 20; 5 SOLUTION/ DROPS OPHTHALMIC at 21:28

## 2018-10-15 RX ADMIN — INSULIN ASPART 1 UNITS: 100 INJECTION, SOLUTION INTRAVENOUS; SUBCUTANEOUS at 10:23

## 2018-10-15 RX ADMIN — METOPROLOL SUCCINATE 100 MG: 100 TABLET, EXTENDED RELEASE ORAL at 08:15

## 2018-10-15 RX ADMIN — LATANOPROST 1 DROP: 50 SOLUTION OPHTHALMIC at 20:55

## 2018-10-15 RX ADMIN — DILTIAZEM HYDROCHLORIDE 240 MG: 240 CAPSULE, COATED, EXTENDED RELEASE ORAL at 08:15

## 2018-10-15 RX ADMIN — TIMOLOL MALEATE 1 DROP: 5 SOLUTION/ DROPS OPHTHALMIC at 08:17

## 2018-10-15 RX ADMIN — DORZOLAMIDE HYDROCHLORIDE AND TIMOLOL MALEATE 1 DROP: 20; 5 SOLUTION/ DROPS OPHTHALMIC at 08:18

## 2018-10-15 ASSESSMENT — ACTIVITIES OF DAILY LIVING (ADL)
ADLS_ACUITY_SCORE: 9
ADLS_ACUITY_SCORE: 10
ADLS_ACUITY_SCORE: 9

## 2018-10-15 NOTE — PLAN OF CARE
Problem: Patient Care Overview  Goal: Plan of Care/Patient Progress Review  PT: PT eval/Cardiac eval orders received. Pt here with new dx of CHF.    Discharge Planner PT   Patient plan for discharge: Home with evaluation for increased cares next Monday per Son  Current status: Pt doing well today with activity. Pt able to ambulate 400 feet without a device, inde. Mild R knee pain. BPs low 96/64 following ambulation, manual reading performed on R UE.  Pt agreeable to home therapy to progress endurance before return to working out at [facility] or [facility].   Barriers to return to prior living situation: Will need A with IADLs  Recommendations for discharge: home with home PT and assistance for IADLs from family/facility  Rationale for recommendations: Pt is currently functioning below baseline, will benefit from ongoing skilled PT intervention at home to improve tolerance with mobility skills.       Entered by: Lissy Cunha 10/15/2018 3:51 PM         Physical Therapy Discharge Summary     Reason for therapy discharge:    All goals and outcomes met, no further needs identified.     Progress towards therapy goal(s). See goals on Care Plan in [facility] electronic health record for goal details.  Goals met     Therapy recommendation(s):    Recommend home PT for progression of endurance and improved monitoring of vitals with activity. Pt then may progress to OP cardiac rehab following.   Continue home exercise program.

## 2018-10-15 NOTE — PROGRESS NOTES
[facility]    Hospitalist Progress Note    Date of Service (when I saw the patient): 10/15/2018    Assessment & Plan   Zayda Hawkins is a 85 year old female with past medical history significant for atrial fibrillation, hypertension, hyperlipidemia, diabetes mellitus, mild coronary artery disease, recent CVA status post TPA 2 weeks ago presented to the emergency room with increasing leg edema, orthopnea, dyspnea on exertion.       New diagnosis of acute congestive heart failure (HFrEF), likely tachycardia induced  -- telemetry  --Serial troponins to rule out ACS are negative  --Echocardiogram:  Ejection fraction 40-45%, see below  --Over 3100 mL taken off and will decrease Lasix, give 40 mg IV this afternoon and start 40 mg oral in a.m.  --Cardiology consult appreciated, discussed with Dr. Lees, continue Cardizem and Toprol and Eliquis and and Lasix.  Was on dofetilide but discontinued as was still in atrial fibrillation.  --Continue prior to admission aspirin, beta-blocker and anticoagulation  --Patient may need AV kim ablation if medications feel to control rate.     Recent CVA status post TPA 2 weeks ago  --No significant residual weakness  --Continue anticoagulation and blood pressure management     Paroxysmal atrial fibrillation  --On apixaban for anticoagulation.  We will continue  --On metoprolol and diltiazem for rate control  --prn IV lopressor  --Monitor on telemetry  --Rate control seemed inadequate patient with frequent episodes of palpitations  --As above     Acute renal failure, likely element of cardiorenal syndrome  --Likely secondary to poor perfusion from heart failure  --We will monitor renal functions closely  --Creatinine slightly better today at 1.3  --Basic metabolic and     Hyperlipidemia  --Was previously on statins now is on diet control     Hypertension  --On metoprolol and diltiazem     Diabetes mellitus  --Prior to admission was on metformin will hold for now  due to elevated creatinine  --Sliding scale insulin for the time being, patient is declining insulin  --Follow       History of glaucoma  --Continue prior to admission meds     History of CAD  --CT angios showed mild disease  --No history of angioplasty     History of tachybradycardia syndrome  --Status post pacemaker plament     DVT Prophylaxis: Patient on apixaban  Code Status: Full Code    Disposition Plan   Expected discharge in 2-3 days to prior living arrangement once heart failure has improved and atrial fibrillation controlled.     Entered: Hesham Brantley 10/15/2018, 11:12 AM         Hesham Brantley MD  Text Page    Interval History   The patient is feeling better than upon admission and states lower extremity swelling has improved as has her shortness of breath.  She still does get some dyspnea with exertion.  Denies any chest pain or pressure.  No cough, fevers, sweats, chills.      -Data reviewed today: I reviewed all new labs and imaging results over the last 24 hours.  Telemetry reveals heart rate    Physical Exam   Temp: 97  F (36.1  C) Temp src: Oral BP: 106/88 Pulse: 69 Heart Rate: 94 Resp: 20 SpO2: 98 % O2 Device: None (Room air)    Vitals:    10/14/18 0234 10/15/18 0539   Weight: 71.7 kg (158 lb) 65.5 kg (144 lb 6.4 oz)     Vital Signs with Ranges  Temp:  [96.4  F (35.8  C)-98  F (36.7  C)] 97  F (36.1  C)  Pulse:  [] 69  Heart Rate:  [] 94  Resp:  [18-20] 20  BP: ()/() 106/88  SpO2:  [92 %-98 %] 98 %  I/O last 3 completed shifts:  In: 1000 [P.O.:1000]  Out: 3600 [Urine:3600]    Constitutional: Awake, alert, cooperative, no apparent distress,    Respiratory: Clear to auscultation bilaterally, diminished breath sounds at the bases    Cardiovascular:  irregular irregular rhythm with tachycardia, normal S1 and S2, and no murmur noted   Abdomen: Normal bowel sounds, soft, non-distended, non-tender, no hepatosplenomegaly    Skin: No rashes, no cyanosis, dry to touch   Neuro:  Alert and oriented x3,    Extremities:  1+ lower extremity edema bilaterally , improved    Other(s):        All other systems: Negative       Medications     - MEDICATION INSTRUCTIONS -         apixaban ANTICOAGULANT  5 mg Oral BID     diltiazem  240 mg Oral Daily     dorzolamide-timolol  1 drop Both Eyes BID     furosemide  40 mg Intravenous Once     [START ON 10/16/2018] furosemide  40 mg Oral Daily     insulin aspart  1-7 Units Subcutaneous TID AC     insulin aspart  1-5 Units Subcutaneous At Bedtime     latanoprost  1 drop Both Eyes At Bedtime     lisinopril  10 mg Oral Daily     magnesium gluconate  500 mg Oral Daily     metoprolol succinate  100 mg Oral BID     multivitamin, therapeutic  1 tablet Oral Daily     sodium chloride (PF)  3 mL Intracatheter Q8H     timolol  1 drop Both Eyes BID       Data     Recent Labs  Lab 10/15/18  0546 10/13/18  2324   WBC 6.3 7.6   HGB 13.8 12.9   MCV 91 91    286    140   POTASSIUM 3.4 3.9   CHLORIDE 105 108   CO2 31 24   BUN 28 29   CR 1.31* 1.40*   ANIONGAP 7 8   MADDISON 8.7 9.0   * 138*   TROPI  --  <0.015       Imaging:  No results found for this or any previous visit (from the past 24 hour(s)).  Echocardiogram   Left ventricular systolic function is moderately reduced.  The visual ejection fraction is estimated at 40-45%.  The ejection fraction is estimated at 41% by Cano's biplane method.  There is moderate global hypokinesia of the left ventricle.  The right ventricle is normal in structure, function and size.  There is a catheter/pacemaker lead seen in the right ventricle.  There is severe biatrial enlargement.  Pacer wire in right atrium  There is mild (1+) mitral regurgitation.  There is moderate to mod-severe (2-3+) tricuspid regurgitation.  Normal IVC (1.5-2.5cm) with <50% respiratory collapse; right atrial pressure  is estimated at 10-15mmHg.  Mild (35-45mmHg) pulmonary hypertension is present.  There is mild to moderate (1-2+) aortic  regurgitation.  Mild aortic root dilatation (3.8 cm).  The ascending aorta is mildly dilated (4.0 cm).  Small bilateral pleural effusion  The rhythm was atrial fibrillation with controlled ventricular rate at rest.  Compared to the prior study dated 1/31/2017, there are changes as noted. The  rhythm has changed from NSR to atrial fibrillation. Significant global  hypokinesis of the left ventricle is now seen. Pulmonary hypertension is  present but RVSP less significant than previous. Clinical correlation  suggested.

## 2018-10-15 NOTE — PLAN OF CARE
Problem: Cardiac: Heart Failure (Adult)  Goal: Signs and Symptoms of Listed Potential Problems Will be Absent, Minimized or Managed (Cardiac: Heart Failure)  Signs and symptoms of listed potential problems will be absent, minimized or managed by discharge/transition of care (reference Cardiac: Heart Failure (Adult) CPG).   Outcome: Improving  Heart Failure Care Pathway  GOALS TO BE MET BEFORE DISCHARGE: Decreased HR,  Decrease SOB    1. Decrease congestion and/or edema with diuretic therapy to achieve near      optimal volume status.            Dyspnea improved:  Yes            Edema improved:     Yes, Pt on IV lasix  And voiding  well        Net I/O and Weights since admission:          09/15 1500 - 10/15 1459  In: 1000 [P.O.:1000]  Out: 4100 [Urine:4100]  Net: -3100            Vitals:    10/14/18 0234 10/15/18 0539   Weight: 71.7 kg (158 lb) 65.5 kg (144 lb 6.4 oz)       2.  O2 sats > 92% on RA or at prior home O2 therapy level.          Current oxygenation status:       SpO2: 97 %         O2 Device: None (Room air),            Able to wean O2 this shift to keep sats > 92%:  NA, patient on RA       Does patient use Home O2? No    3.  Tolerates ambulation and mobility near baseline: Yes, Ambulating to bathroom with SBA, no devices  Used.        How many times did the patient ambulate with nursing staff this shift? 5 ambulated to  bathroom    Please review the Heart Failure Care Pathway for additional HF goal outcomes.    Delmi MOE Che, RN  10/15/2018

## 2018-10-15 NOTE — PROGRESS NOTES
D:  Pt is planned to prior living arrangement once heart failure and afib are controlled.  An EW will be completed.  Pt's son Chris requested Sw visit to discuss needed documentation for an EW.    I:  Sw spoke with pt's son and let him know that he will need 3 months worth of bank statements, documentation of the pt's life insurance policy, pension, and savings if the pt has any of them.  Sw informed to have the pt's insurance card as well.  Pt's son said that [employer] will be the ones helping him complete the EW application.    A/P:  Sw will be available as needed.

## 2018-10-15 NOTE — CONSULTS
"NUTRITION ASSESSMENT & EDUCATION NOTE    REASON FOR ASSESSMENT:  Nutrition education on 2 gram sodium diet    NUTRITION HISTORY:  Information obtained from patient and her son  Breakfast: toast with feta cheese (bought from [employer] in bulk, no nutrition facts label)  Lunch: soup, or chicken with fresh vegetables (cooks from scratch)  Dinner: meat, rice, salad.   Son reports he works in healthcare, knowledgeable in reading labels and low sodium diet. Patient reports adequate knowledge base, denies added salt, although son reports this isn't always the case with her choices. Does not eat out often and family cooks low sodium for patient     CURRENT DIET AND NOURISHMENT ORDER:  Diet: 2 gram sodium  Current Intake/Tolerance: Per flow sheet review, % intake documented for meals, no issues noted    ANTHROPOMETRICS  Height: 5' 5\"  Weight: 65 kg   Body mass index is 24.03 kg/(m^2).  Weight Status:  Normal BMI  Ideal body weight: 56.8 kg +/- 10%, 114% of IBW     LABS/MEDICATIONS/PHYSICAL FINDINGS:  Meds reviewed  Labs reviewed:  Creatinine (mg/dL)   Date Value   10/15/2018 1.31 (H)   10/13/2018 1.40 (H)   10/02/2018 1.00   09/29/2018 0.85   09/28/2018 0.90   09/26/2018 1.27 (H)   03/31/2014 0.7     Glucose (mg/dL)   Date Value   10/15/2018 148 (H)   10/13/2018 138 (H)   09/29/2018 129 (H)   09/28/2018 131 (H)   09/26/2018 192 (H)   10/26/2016 142 (H)     Sodium (mmol/L)   Date Value   10/15/2018 143   10/13/2018 140   09/29/2018 142   09/28/2018 147 (H)   09/26/2018 141   10/26/2016 139     Potassium (mmol/L)   Date Value   10/15/2018 3.4   10/13/2018 3.9   09/29/2018 3.7   09/28/2018 3.7   09/26/2018 3.9   10/26/2016 3.7     Magnesium (mg/dL)   Date Value   10/13/2018 2.1   07/26/2012 2.1   07/25/2012 2.1   07/23/2012 2.1   07/22/2012 2.0   07/16/2012 1.9       Lab Results   Component Value Date    A1C 6.5 09/27/2018    A1C 6.4 10/28/2015    A1C 6.4 07/29/2015    A1C 6.1 02/13/2015    A1C 5.7 09/24/2012       Recent " Labs  Lab 10/15/18  0546 10/13/18  2324   * 138*       Cholesterol (mg/dL)   Date Value   09/27/2018 125   03/10/2015 145   02/13/2015 161   09/04/2014 145     Cholesterol/HDL Ratio (no units)   Date Value   03/10/2015 2.2   09/04/2014 2.3   07/14/2012 2.5     HDL Cholesterol (mg/dL)   Date Value   09/27/2018 65   03/10/2015 66   02/13/2015 66   09/04/2014 64     LDL Cholesterol Calculated (mg/dL)   Date Value   09/27/2018 51   03/10/2015 69 (L)   02/13/2015 75   09/04/2014 69 (L)       MALNUTRITION:  Patient does not meet two of the following criteria necessary for diagnosing malnutrition: significant weight loss, reduced intake, subcutaneous fat loss, muscle loss or fluid retention    NUTRITION DIAGNOSIS:  No nutrition related diagnosis at this time    INTERVENTIONS:  Nutrition Prescription:  Recommended Na <2000 mg diet as ordered    Implementation:    Assessed learning needs, learning preferences and willingness to learn     Nutrition Education (Content):  a) Discussed rational for limiting Na for CHF and stressed importance of following 2 gm Na guidelines  b) Denied need for additional handouts     Nutrition Education (Application):  a) Discussed eating habits and recommended alternative food choices - ongoing emphasis of no added salt, cooking from scratch      Anticipate good compliance    Diet Education - refer to Education Flowsheet    Goals:    Patient verbalizes understanding of diet     All of the above goals met during education session    Follow Up/Monitoring:    Recommend Out-Patient Nutrition Referral if further diet instructions are needed - follow up in [facility] clinic    Progress towards goals will be monitored and evaluated per protocol and Practice Guidelines      Anette Rubio, SALINA, LD, CNSC  Pager - 3rd floor/ICU: [phone]  Pager - All other floors: [phone]  Pager - Weekend/holiday: [phone]  Office: [phone]

## 2018-10-15 NOTE — PLAN OF CARE
Problem: Patient Care Overview  Goal: Plan of Care/Patient Progress Review  Outcome: Improving  Heart Failure Care Pathway  GOALS TO BE MET BEFORE DISCHARGE:    1. Decrease congestion and/or edema with diuretic therapy to achieve near      optimal volume status.            Dyspnea improved:  Yes            Edema improved:     Yes        Net I/O and Weights since admission:          09/15 2300 - 10/15 2259  In: 1240 [P.O.:1240]  Out: 4100 [Urine:4100]  Net: -2860            Vitals:    10/14/18 0234 10/15/18 0539   Weight: 71.7 kg (158 lb) 65.5 kg (144 lb 6.4 oz)       2.  O2 sats > 92% on RA or at prior home O2 therapy level.          Current oxygenation status:       SpO2: 98 %         O2 Device: None (Room air),            Able to wean O2 this shift to keep sats > 92%:  Yes       Does patient use Home O2? No    3.  Tolerates ambulation and mobility near baseline: Yes        How many times did the patient ambulate with nursing staff this shift? 2      Please review the Heart Failure Care Pathway for additional HF goal outcomes.    Heydi Guerra RN  10/15/2018   Up with SBA. CHF education continued with son and patient. Patient requesting no . Tele A-fib with HR . Lungs crackles in right base. 1+ lower ext edema. B/P soft. Denies SOB and CP.

## 2018-10-15 NOTE — CONSULTS
Care Transition Initial Assessment - RN    Reason For Consult: care coordination/care conference, discharge planning   Met with: Patient (through  Jacy Stanley) and son.  DATA   Active Problems:    CHF (congestive heart failure) (H)     CHF Packet discussed with pt,  & son at bedside.   Cognitive Status: awake, alert and confused.  Primary Care Clinic Name: [facility]  Primary Care MD Name: Dr. Alton Willoughby  Contact information and PCP information verified: Yes  Lives With: alone  Living Arrangements: apartment     Description of Support System: Supportive, Involved   Who is your support system?: Children       Insurance concerns: No Insurance issues identified  ASSESSMENT  Patient currently receives the following services:  None. She has an appointment with [facility] to evaluate for EW & home care.         Identified issues/concerns regarding health management: Patient has not been watching her sodium intake. She is agreeable to monitoring her intake closely from now on. She weighs herself daily at different times of day. CM recommended consistent time daily for her weights. She has a working scale.   PLAN  Financial costs for the patient was not discussed.  Patient given options and choices for discharge.  Patient/family is agreeable to the plan?  Yes.  Patient anticipates discharging back to home.     Other Resources: Other (see comment) (CHF Packet discussed with pt (thru ) & son)  Patient anticipates needs for home equipment: No  Plan/Disposition: Home   Appointments: Mary Domínguez NP for Dr. Mcnulty on Friday, 10/19/18 at 12 noon.     CM will continue to follow patient until discharge for any additional needs.     Michelle Garcia, RN, BSN, CTS  [facility]  [phone]

## 2018-10-15 NOTE — PROGRESS NOTES
Discharge Planner   Discharge Plans in progress: Yes  Barriers to discharge plan: IV diuresis  Follow up plan: Existing appt with Mary Domínguez NP for Dr. Mcnulty on Friday, 10/19 at 12 noon.        Entered by: Zoë Garcia 10/15/2018 10:54 AM

## 2018-10-15 NOTE — PROGRESS NOTES
Cardiology Progress Note  BARBARA Rajan     Follows with Dr. butts/Aleksandr       Assessment and Plan:   Admit (10/13) w/ 3 day hx of increased DALJIT, orthopnea, ERICKSON, tachycardia  Evidence of Afib w/ RVR, systolic HF exacerbation.  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)    Persistent Asymptomatic Atrial Fibrillation w/ RVR  -PAF hx, failed DCCV and Tikosyn  -recent plan for rate control per EP   -HRs  bpm overnight on Diltiazem 240/Metoprolol  BID  -ambulate in tracey and assess HR/symptoms  -may need future AVNA  -Eliquis recently started   -has f/u scheduled w/ Mary Domínguez NP on Friday 10/19/18 at 1310    NEW Acute Systolic HF exacerbation:  -BNP 4100/bilat pleural effusions  -NEW decline LVEF 40-45% (global), NL RVF, mild PHTN  (was NL 1/2017)  -likely tachy-mediated, however ischemia has not been r/o  -improved symptoms w/ IV diuresis  -(weight down 14lbs)  158--> 144lb  -(-3100 total OP)  -low salt diet  -mild RI improving w/ diuresis  -reduce lasix today  -bblocker/ACE-I    S/p Recent embolic CVA  -s/p tPA  -previous retroperitoneal bleed when on Warfarin--> Eliquis recently started  -no significant bleeding issues    S/p dual chamber PPM for SSS (12)  -infrequent V-paced since admit    Mild multivessel CAD per CTa (12)  -NL troponin/No CP/no significant ST abnormalities  -multiple CVRF  -consider outpt ischemic w/u    HTN:  -elevated on admit, now well controlled               Interval History:   SOB improved  Ambulate tracey w/ mild SOB  Denies CP/lightheadedness.  Improved DALJIT                Medications:       apixaban ANTICOAGULANT  5 mg Oral BID     diltiazem  240 mg Oral Daily     dorzolamide-timolol  1 drop Both Eyes BID     furosemide  40 mg Intravenous Q8H     insulin aspart  1-7 Units Subcutaneous TID AC     insulin aspart  1-5 Units Subcutaneous At Bedtime     latanoprost  1 drop Both Eyes At Bedtime      "lisinopril  10 mg Oral Daily     magnesium gluconate  500 mg Oral Daily     metoprolol succinate  100 mg Oral BID     multivitamin, therapeutic  1 tablet Oral Daily     sodium chloride (PF)  3 mL Intracatheter Q8H     timolol  1 drop Both Eyes BID            Physical Exam:   Blood pressure 106/88, pulse 69, temperature 97  F (36.1  C), temperature source Oral, resp. rate 20, height 1.651 m (5' 5\"), weight 65.5 kg (144 lb 6.4 oz), SpO2 98 %.  Wt Readings from Last 3 Encounters:   10/15/18 65.5 kg (144 lb 6.4 oz)   10/02/18 71.1 kg (156 lb 11.2 oz)   09/26/18 70.4 kg (155 lb 3.3 oz)     I/O last 3 completed shifts:  In: 1000 [P.O.:1000]  Out: 3600 [Urine:3600]    CONST:  Alert and oriented  NAD  LUNGS:  CTA bilat  CARDIO:  Irreg, Irreg  No murmurs  ABD:  Soft +BS  EXT:  1+ ankle edema bilat           Data:   TELE:  Afib  HR  bpm    CBC    Recent Labs  Lab 10/15/18  0546 10/13/18  2324   WBC 6.3 7.6   HGB 13.8 12.9    286       BMP    Recent Labs  Lab 10/15/18  0546 10/13/18  2324    140   POTASSIUM 3.4 3.9   CHLORIDE 105 108   MADDISON 8.7 9.0   CO2 31 24   BUN 28 29   CR 1.31* 1.40*   * 138*     Recent Labs   Lab Test  09/27/18   0558  03/10/15   1142   09/04/14   1130   CHOL  125  145   < >  145   HDL  65  66   < >  64   LDL  51  69*   < >  69*   TRIG  47  48   < >  58   CHOLHDLRATIO   --   2.2   --   2.3    < > = values in this interval not displayed.       TROP  Lab Results   Component Value Date    TROPI <0.015 10/13/2018    TROPI 0.017 09/27/2018    TROPI 0.022 09/27/2018    TROPI <0.015 09/26/2018    TROPI <0.012 09/24/2012    TROPONIN 0.18 (HH) 09/24/2012    TROPONIN 0.01 08/05/2012    TROPONIN 0.08 07/13/2012       BNP    Recent Labs  Lab 10/13/18  2324   NTBNPI 4101*             "

## 2018-10-15 NOTE — CONSULTS
[facility] referral received from Dr Pinzon.     Patient is not currently established in the [facility] Clinic.     Hospital nurse, please give pt [facility] Clinic/HF education.     Please suggest hospitalist to order Care Coordinator to help with discharge planning.     [facility] appointment made for:  Patient has an appointment with Mary Domínguez CNP on Friday October 19 at 12 pm  Appointment for soonest CORE Enrollment with Imtiaz Del Angel CNP on Monday October 29 2018 at 9:40am for labs and 10:40 am to see Imtiaz.   Please suggest to patient to make appointment with primary care provider between appointments with Mary and Imtiaz for hospital follow up.       We look forward to seeing Anis in the clinic.   Please call with questions.     Mamie Sidhu RN  CORE Clinic Care Coordinator  [facility]  [phone]      C.O.R.E[facility] Clinic: Cardiomyopathy, Optimization, Rehabilitation, Education   The C.O.R.E. Clinic is a heart failure specialty clinic within the [facility] where you will work with your cardiologist, nurse practitioners, physician assistants and registered nurses who specialize in heart failure care. They are dedicated to helping patients with heart failure to carefully adjust medications, receive education, and learn who and when to call if symptoms develop. They specialize in helping you better understand your condition, slow the progression of your disease, improve the length and quality of your life, help you detect future heart problems before they become life threatening, and avoid hospitalizations.

## 2018-10-16 VITALS
DIASTOLIC BLOOD PRESSURE: 56 MMHG | HEIGHT: 65 IN | WEIGHT: 144.38 LBS | SYSTOLIC BLOOD PRESSURE: 95 MMHG | BODY MASS INDEX: 24.06 KG/M2 | RESPIRATION RATE: 20 BRPM | HEART RATE: 69 BPM | OXYGEN SATURATION: 94 % | TEMPERATURE: 97 F

## 2018-10-16 LAB
ANION GAP SERPL CALCULATED.3IONS-SCNC: 8 MMOL/L (ref 3–14)
BUN SERPL-MCNC: 26 MG/DL (ref 7–30)
CALCIUM SERPL-MCNC: 8.3 MG/DL (ref 8.5–10.1)
CHLORIDE SERPL-SCNC: 103 MMOL/L (ref 94–109)
CO2 SERPL-SCNC: 28 MMOL/L (ref 20–32)
CREAT SERPL-MCNC: 1.19 MG/DL (ref 0.52–1.04)
GFR SERPL CREATININE-BSD FRML MDRD: 43 ML/MIN/1.7M2
GLUCOSE BLDC GLUCOMTR-MCNC: 115 MG/DL (ref 70–99)
GLUCOSE BLDC GLUCOMTR-MCNC: 151 MG/DL (ref 70–99)
GLUCOSE BLDC GLUCOMTR-MCNC: 154 MG/DL (ref 70–99)
GLUCOSE SERPL-MCNC: 213 MG/DL (ref 70–99)
POTASSIUM SERPL-SCNC: 3.1 MMOL/L (ref 3.4–5.3)
SODIUM SERPL-SCNC: 139 MMOL/L (ref 133–144)

## 2018-10-16 PROCEDURE — 36415 COLL VENOUS BLD VENIPUNCTURE: CPT | Performed by: INTERNAL MEDICINE

## 2018-10-16 PROCEDURE — 99239 HOSP IP/OBS DSCHRG MGMT >30: CPT | Performed by: INTERNAL MEDICINE

## 2018-10-16 PROCEDURE — 00000146 ZZHCL STATISTIC GLUCOSE BY METER IP

## 2018-10-16 PROCEDURE — 80048 BASIC METABOLIC PNL TOTAL CA: CPT | Performed by: INTERNAL MEDICINE

## 2018-10-16 PROCEDURE — 25000132 ZZH RX MED GY IP 250 OP 250 PS 637: Performed by: INTERNAL MEDICINE

## 2018-10-16 RX ORDER — POTASSIUM CHLORIDE 750 MG/1
10 TABLET, EXTENDED RELEASE ORAL DAILY
Qty: 15 TABLET | Refills: 1 | Status: SHIPPED | OUTPATIENT
Start: 2018-10-16 | End: 2018-11-12

## 2018-10-16 RX ORDER — FUROSEMIDE 40 MG
40 TABLET ORAL DAILY
Qty: 30 TABLET | Refills: 0 | Status: SHIPPED | OUTPATIENT
Start: 2018-10-17 | End: 2018-11-12

## 2018-10-16 RX ORDER — METOPROLOL SUCCINATE 100 MG/1
100 TABLET, EXTENDED RELEASE ORAL 2 TIMES DAILY
Qty: 60 TABLET | Refills: 0 | Status: ON HOLD | OUTPATIENT
Start: 2018-10-16 | End: 2018-11-14

## 2018-10-16 RX ORDER — LISINOPRIL 5 MG/1
5 TABLET ORAL DAILY
Qty: 30 TABLET | Refills: 0 | Status: SHIPPED | OUTPATIENT
Start: 2018-10-16 | End: 2018-10-19

## 2018-10-16 RX ADMIN — POTASSIUM CHLORIDE 20 MEQ: 1500 TABLET, EXTENDED RELEASE ORAL at 12:21

## 2018-10-16 RX ADMIN — POTASSIUM CHLORIDE 40 MEQ: 1500 TABLET, EXTENDED RELEASE ORAL at 09:51

## 2018-10-16 RX ADMIN — TIMOLOL MALEATE 1 DROP: 5 SOLUTION/ DROPS OPHTHALMIC at 05:51

## 2018-10-16 RX ADMIN — DORZOLAMIDE HYDROCHLORIDE AND TIMOLOL MALEATE 1 DROP: 20; 5 SOLUTION/ DROPS OPHTHALMIC at 05:55

## 2018-10-16 RX ADMIN — DILTIAZEM HYDROCHLORIDE 240 MG: 240 CAPSULE, COATED, EXTENDED RELEASE ORAL at 08:36

## 2018-10-16 RX ADMIN — APIXABAN 5 MG: 5 TABLET, FILM COATED ORAL at 08:36

## 2018-10-16 RX ADMIN — INSULIN ASPART 1 UNITS: 100 INJECTION, SOLUTION INTRAVENOUS; SUBCUTANEOUS at 08:37

## 2018-10-16 RX ADMIN — FUROSEMIDE 40 MG: 40 TABLET ORAL at 08:36

## 2018-10-16 RX ADMIN — THERA TABS 1 TABLET: TAB at 08:36

## 2018-10-16 ASSESSMENT — ACTIVITIES OF DAILY LIVING (ADL)
ADLS_ACUITY_SCORE: 10

## 2018-10-16 NOTE — PHARMACY - DISCHARGE MEDICATION RECONCILIATION AND EDUCATION
Discharge medication review for this patient is complete. Initiated medication review with patient prior to discharge, however she preferred son be available for this education. Upon return, offered education materials and review. Son declined and acknowledged understanding of changes made.   See EPIC for allergy information, prior to admission medications and immunization status.   Pharmacist assisted with medication reconciliation of discharge medications with PTA medications.    MD was contacted with any questions/concerns: Yes - regarding lisinopril dose. Lisinopril 10 mg daily dose in hospital and patient discharged on lisinopril 5 mg daily due to blood pressure.     Additional medication history information: patient discharged back to [facility]     Discharge Medication List     Review of your medicines      START taking       Dose / Directions    furosemide 40 MG tablet   Commonly known as:  LASIX   Used for:  Acute congestive heart failure, unspecified heart failure type (H)        Dose:  40 mg   Start taking on:  10/17/2018   Take 1 tablet (40 mg) by mouth daily   Quantity:  30 tablet   Refills:  0       lisinopril 5 MG tablet   Commonly known as:  PRINIVIL/ZESTRIL   Used for:  Acute congestive heart failure, unspecified heart failure type (H)        Dose:  5 mg   Take 1 tablet (5 mg) by mouth daily   Quantity:  30 tablet   Refills:  0       potassium chloride SA 10 MEQ CR tablet   Commonly known as:  K-DUR/KLOR-CON M   Used for:  Hypokalemia        Dose:  10 mEq   Take 1 tablet (10 mEq) by mouth daily   Quantity:  15 tablet   Refills:  1         CONTINUE these medicines which may have CHANGED, or have new prescriptions. If we are uncertain of the size of tablets/capsules you have at home, strength may be listed as something that might have changed.       Dose / Directions    metoprolol succinate 100 MG 24 hr tablet   Commonly known as:  TOPROL-XL   This may have changed:    - medication  strength  - how much to take  - how to take this  - when to take this  - additional instructions  - Another medication with the same name was removed. Continue taking this medication, and follow the directions you see here.   Used for:  Chronic atrial fibrillation (H)        Dose:  100 mg   Take 1 tablet (100 mg) by mouth 2 times daily   Quantity:  60 tablet   Refills:  0         CONTINUE these medicines which have NOT CHANGED       Dose / Directions    apixaban ANTICOAGULANT 5 MG tablet   Commonly known as:  ELIQUIS   Used for:  A-fib (H)        Dose:  5 mg   Take 1 tablet (5 mg) by mouth 2 times daily   Quantity:  180 tablet   Refills:  3       calcium carbonate-vitamin D 500-400 MG-UNIT Tabs per tablet        Dose:  1 tablet   Take 1 tablet by mouth 2 times daily.   Refills:  0       diltiazem 240 MG 24 hr capsule   Used for:  Paroxysmal atrial fibrillation (H)        Dose:  240 mg   Take 1 capsule (240 mg) by mouth daily   Quantity:  30 capsule   Refills:  0       dorzolamide-timolol PF 22.3-6.8 MG/ML opthalmic solution   Commonly known as:  COSOPT        Dose:  1 drop   Place 1 drop into both eyes 2 times daily 10 mL vial   Refills:  0       fish oil-omega-3 fatty acids 1000 MG capsule        Dose:  1 g   Take 1 g by mouth daily   Refills:  0       IMITREX PO        Dose:  25 mg   Take 25 mg by mouth as needed.   Refills:  0       latanoprost 0.005 % ophthalmic solution   Commonly known as:  XALATAN        Dose:  1 drop   Place 1 drop into both eyes At Bedtime   Refills:  0       magnesium gluconate 500 MG tablet   Commonly known as:  MAGONATE        Dose:  500 mg   Take 500 mg by mouth daily.   Refills:  0       metFORMIN 500 MG 24 hr tablet   Commonly known as:  GLUCOPHAGE-XR        Dose:  500 mg   Take 500 mg by mouth every morning   Refills:  0       multivitamin, therapeutic Tabs tablet        Dose:  1 tablet   Take 1 tablet by mouth daily.   Refills:  0       sertraline 50 MG tablet   Commonly known as:   ZOLOFT        Dose:  25 mg   Take 25 mg by mouth every evening as needed (Take a half tablet qpm prn)   Refills:  0       timolol 0.5 % ophthalmic solution   Commonly known as:  TIMOPTIC        Dose:  1 drop   Place 1 drop into both eyes every morning   Refills:  0            Where to get your medicines      These medications were sent to [facility] - [facility] - [address]  [address] [place] [address]     Phone:  [phone]      furosemide 40 MG tablet     lisinopril 5 MG tablet     metoprolol succinate 100 MG 24 hr tablet     potassium chloride SA 10 MEQ CR tablet

## 2018-10-16 NOTE — PLAN OF CARE
"Problem: Patient Care Overview  Goal: Plan of Care/Patient Progress Review  Outcome: Adequate for Discharge Date Met: 10/16/18  Pt has no complaints.  Able to participate in conversation.  Signed  waiver for daughter in law to provide further interpretation.  Pt discharged at 1400 with home PT, RN.  Potassium was replaced per protocol.  Pt and family present for discharge instructions.  Verbalized understanding of appropriate follow up with PCP and CORE clinic.  Pt was instructed to follow low salt diet and to perform daily weights.  BP 95/56 (BP Location: Left arm)  Pulse 69  Temp 97  F (36.1  C) (Oral)  Resp 20  Ht 1.651 m (5' 5\")  Wt 65.5 kg (144 lb 6 oz)  SpO2 94%  BMI 24.03 kg/m2       "

## 2018-10-16 NOTE — CONSULTS
CLINICAL NUTRITION SERVICES BRIEF NOTE  Late nutrition admission risk screen entered 10/15@ 1800 - Unintentional loss of 10 lbs or more in the past 2 mos.     Please refer to RD assessment and education note dated 10/15.     RD will continue to follow per protocol.     Barb Medina RD, LD  3rd floor/ICU: [phone]  All other floors: [phone]  Weekend/holiday: [phone]  Office: [phone]

## 2018-10-16 NOTE — PLAN OF CARE
Problem: Patient Care Overview  Goal: Plan of Care/Patient Progress Review  Outcome: No Change  Heart Failure Care Pathway  GOALS TO BE MET BEFORE DISCHARGE:    1. Decrease congestion and/or edema with diuretic therapy to achieve near      optimal volume status.            Dyspnea improved:  Yes            Edema improved:     Yes        Net I/O and Weights since admission:          09/16 0700 - 10/16 0659  In: 2080 [P.O.:2080]  Out: 5250 [Urine:5250]  Net: -3170            Vitals:    10/14/18 0234 10/15/18 0539   Weight: 71.7 kg (158 lb) 65.5 kg (144 lb 6.4 oz)       2.  O2 sats > 92% on RA or at prior home O2 therapy level.          Current oxygenation status:       SpO2: 97 %         O2 Device: None (Room air),            Able to wean O2 this shift to keep sats > 92%:  NA       Does patient use Home O2? No    3.  Tolerates ambulation and mobility near baseline: Yes        How many times did the patient ambulate with nursing staff this shift? 0    VS stable. Diminished LS. +1 edema to BLE. No complaints of pain. Tele is a fib CVR with occasionally V pacing. Slept well throughout the night.     Please review the Heart Failure Care Pathway for additional HF goal outcomes.    Dannielle Painting RN  10/16/2018

## 2018-10-16 NOTE — PLAN OF CARE
Problem: Cardiac: Heart Failure (Adult)  Goal: Signs and Symptoms of Listed Potential Problems Will be Absent, Minimized or Managed (Cardiac: Heart Failure)  Signs and symptoms of listed potential problems will be absent, minimized or managed by discharge/transition of care (reference Cardiac: Heart Failure (Adult) CPG).   Heart Failure Care Pathway  GOALS TO BE MET BEFORE DISCHARGE:    1. Decrease congestion and/or edema with diuretic therapy to achieve near      optimal volume status.            Dyspnea improved:  Yes            Edema improved:     Yes        Net I/O and Weights since admission:          09/15 2300 - 10/15 2259  In: 1960 [P.O.:1960]  Out: 4800 [Urine:4800]  Net: -2840            Vitals:    10/14/18 0234 10/15/18 0539   Weight: 71.7 kg (158 lb) 65.5 kg (144 lb 6.4 oz)       2.  O2 sats > 92% on RA or at prior home O2 therapy level.          Current oxygenation status:       SpO2: 100 %         O2 Device: None (Room air),            Able to wean O2 this shift to keep sats > 92%:  NA, patient not on Oxygen       Does patient use Home O2? No    3.  Tolerates ambulation and mobility near baseline: Yes        How many times did the patient ambulate with nursing staff this shift? 1      A&Ox4, up with SBA, denies pain,doesnt want , calls his son to interpret, Tele Afib CVR, HR 's, ,131, will continue with POC    Please review the Heart Failure Care Pathway for additional HF goal outcomes.    MINGO VIDAL RN  10/15/2018

## 2018-10-16 NOTE — PROGRESS NOTES
D: Pt will discharge back to [facility] today with HC.    I:  Sw spoke with pt and her son Chris Mitchell ([phone]) who said that Chris will transport the pt back to her apartment.  He asked about someone coming to the pt's apartment to do dishes, housekeeping, and laundry.  Sw informed him that because the pt will be doing an EW application, the county will see the pt and do an assessment to see what her needs are.  HC was discussed and [facility] was the choice that was made.  A referral was sent for PT and RN.    A/P:  Sw will continue to follow pt until discharge and be available as needed.

## 2018-10-16 NOTE — DISCHARGE SUMMARY
[facility]    Discharge Summary  Hospitalist    Date of Admission:  10/13/2018  Date of Discharge:  10/16/2018  Discharging Provider: Hesham Brantley MD  Date of Service (when I saw the patient): 10/16/18    Discharge Diagnoses   Acute systolic heart failure exacerbation, ejection fraction 40%  Atrial fibrillation with rapid ventricular response  Hypokalemia due to diuretic  Weakness and debilitation  Acute kidney injury, suspect cardiorenal  Hypertension  Diabetes mellitus2    History of Present Illness  &Hospital Course   Patient is an 85-year-old woman with a past medical history significant for atrial fibrillation with pacemaker placement, hypertension, hyperlipidemia, diabetes mellitus, mild coronary disease, recent stroke status post TPA 2 weeks ago who now presents with increasing lower extremity edema, orthopnea and dyspnea on exertion.  Upon evaluation patient was found to be in heart failure with interstitial congestion and pleural effusions present on chest x-ray.  Patient's creatinine was also elevated to 1.4 which was new for her and this was likely felt due to her heart for exacerbation and cardiorenal syndrome.  She ruled out with serial troponins and was diuresed with loop diuretics and has lost a significant amount of weight, 6 kg.  She did have some mild hypokalemia due to the diuresis and has been placed on a supplement.  She was seen in consultation by cardiology and it's felt that this is likely related to her atrial fibrillation with rapid ventricular response.  Ejection fraction was 40-45% with moderate global hypokinesia of the left ventricle.  There was 2-3+ tricuspid regurgitation and 1-2+ aortic regurgitation.  Lisinopril 5 mg at added to her regimen and her Toprol was increased to 100 mg b.i.d.  This resulted in better rate control and she is symptomatically improved almost she does have some palpitations.  AN node ablation may be a consideration in the future if she  continues to have trouble.  Her creatinine improved to 1.19 at the metformin will be restarted at time of discharge.  Recommend follow-up basic metabolic panel on the 19th when she follows up with the core clinic.        Hesham Brantley MD    Significant Results and Procedures   See above and below      Pending Results   These results will be followed up by Alton Willoughby   Unresulted Labs Ordered in the Past 30 Days of this Admission     No orders found from 8/14/2018 to 10/14/2018.          Code Status   Full Code       Primary Care Physician   Alton Willoughby    Physical Exam   Temp: 97  F (36.1  C) Temp src: Oral BP: 95/56   Heart Rate: 93 Resp: 20 SpO2: 94 % O2 Device: None (Room air)    Vitals:    10/14/18 0234 10/15/18 0539 10/16/18 0520   Weight: 71.7 kg (158 lb) 65.5 kg (144 lb 6.4 oz) 65.5 kg (144 lb 6 oz)     Vital Signs with Ranges  Temp:  [97  F (36.1  C)-97.8  F (36.6  C)] 97  F (36.1  C)  Heart Rate:  [] 93  Resp:  [20] 20  BP: ()/(56-75) 95/56  SpO2:  [94 %-100 %] 94 %  I/O last 3 completed shifts:  In: 1080 [P.O.:1080]  Out: 1275 [Urine:1275]    Constitutional: Awake, alert, cooperative, no apparent distress,    Respiratory: Clear to auscultation bilaterally, diminished breath sounds at the bases    Cardiovascular:  irregular irregular rhythm with tachycardia, normal S1 and S2, and no murmur noted   Abdomen: Normal bowel sounds, soft, non-distended, non-tender, no hepatosplenomegaly    Skin: No rashes, no cyanosis, dry to touch   Neuro: Alert and oriented x3,    Extremities:  1+ lower extremity edema bilaterally , improved    Other(s):         Discharge Disposition   Discharged to home  Condition at discharge: Stable    Consultations This Hospital Stay   [facility] EVALUATION IP CONSULT  CARDIAC REHAB IP CONSULT  CARE COORDINATOR IP CONSULT  NUTRITION SERVICES ADULT IP CONSULT  CARDIOLOGY IP CONSULT  PHARMACY DISCHARGE EDUCATION BY PHARMACIST    Time Spent on this Encounter   Hesham CORNELIUS  KISHOR Brantley, personally saw the patient today and spent greater than 30 minutes discharging this patient.    Discharge Orders     Medication Therapy Management Referral     Home care nursing referral     Home Care PT Referral for Hospital Discharge     Reason for your hospital stay   Acute heart failure and atrial fibrillation with rapid ventricular response     Follow-up and recommended labs and tests    Follow up with primary care provider, Alton Willoughby, within 7 days for hospital follow- up.  The following labs/tests are recommended: BMP.  Has follow up with [facility] 10/19 this week     Activity   Your activity upon discharge: activity as tolerated     Monitor and record   Daily weight     When to contact your care team   Call if weight gain of 3 lbs in one day or 5 lbs in one week.  Increase in short of breath. Dizziness or chest pain or pressure     MD face to face encounter   Documentation of Face to Face and Certification for Home Health Services    I certify that patient: Zayda Hawkins is under my care and that I, or a nurse practitioner or physician's assistant working with me, had a face-to-face encounter that meets the physician face-to-face encounter requirements with this patient on: 10/16/2018.    This encounter with the patient was in whole, or in part, for the following medical condition, which is the primary reason for home health care: acute heart failure and weakness.    I certify that, based on my findings, the following services are medically necessary home health services: Nursing and Physical Therapy.    My clinical findings support the need for the above services because: Nurse is needed: To assess heart failure after changes in medications or other medical regimen.. and Physical Therapy Services are needed to assess and treat the following functional impairments: weakness and debilatation.    Further, I certify that my clinical findings support that this patient is homebound  (i.e. absences from home require considerable and taxing effort and are for medical reasons or [employer] services or infrequently or of short duration when for other reasons) because: above.    Based on the above findings. I certify that this patient is confined to the home and needs intermittent skilled nursing care, physical therapy and/or speech therapy.  The patient is under my care, and I have initiated the establishment of the plan of care.  This patient will be followed by a physician who will periodically review the plan of care.  Physician/Provider to provide follow up care: Alton Willoughby    Attending hospital physician (the Medicare certified [place] provider): Hesham Brantley  Physician Signature: See electronic signature associated with these discharge orders.  Date: 10/16/2018     Full Code     Diet   Follow this diet upon discharge: Orders Placed This Encounter     2 Gram Sodium Diet No Caffeine for 24 hours (once tests completed, may have caffeine)       Discharge Medications   Current Discharge Medication List      START taking these medications    Details   furosemide (LASIX) 40 MG tablet Take 1 tablet (40 mg) by mouth daily  Qty: 30 tablet, Refills: 0    Associated Diagnoses: Acute congestive heart failure, unspecified heart failure type (H)      lisinopril (PRINIVIL/ZESTRIL) 5 MG tablet Take 1 tablet (5 mg) by mouth daily  Qty: 30 tablet, Refills: 0    Associated Diagnoses: Acute congestive heart failure, unspecified heart failure type (H)      potassium chloride SA (K-DUR/KLOR-CON M) 10 MEQ CR tablet Take 1 tablet (10 mEq) by mouth daily  Qty: 15 tablet, Refills: 1    Associated Diagnoses: Hypokalemia         CONTINUE these medications which have CHANGED    Details   metoprolol succinate (TOPROL-XL) 100 MG 24 hr tablet Take 1 tablet (100 mg) by mouth 2 times daily  Qty: 60 tablet, Refills: 0    Associated Diagnoses: Chronic atrial fibrillation (H)         CONTINUE these medications which have NOT  CHANGED    Details   apixaban ANTICOAGULANT (ELIQUIS) 5 MG tablet Take 1 tablet (5 mg) by mouth 2 times daily  Qty: 180 tablet, Refills: 3    Associated Diagnoses: A-fib (H)      calcium carbonate-vitamin D 500-400 MG-UNIT TABS Take 1 tablet by mouth 2 times daily.        diltiazem 240 MG 24 hr capsule Take 1 capsule (240 mg) by mouth daily  Qty: 30 capsule, Refills: 0    Associated Diagnoses: Paroxysmal atrial fibrillation (H)      dorzolamide-timolol PF (COSOPT) 22.3-6.8 MG/ML opthalmic solution Place 1 drop into both eyes 2 times daily 10 mL vial      fish oil-omega-3 fatty acids 1000 MG capsule Take 1 g by mouth daily      latanoprost (XALATAN) 0.005 % ophthalmic solution Place 1 drop into both eyes At Bedtime      magnesium gluconate (MAGONATE) 500 MG tablet Take 500 mg by mouth daily.        metFORMIN (GLUCOPHAGE-XR) 500 MG 24 hr tablet Take 500 mg by mouth every morning       multivitamin, therapeutic (THERA-VIT) TABS Take 1 tablet by mouth daily.        sertraline (ZOLOFT) 50 MG tablet Take 25 mg by mouth every evening as needed (Take a half tablet qpm prn)      SUMAtriptan Succinate (IMITREX PO) Take 25 mg by mouth as needed.        timolol (TIMOPTIC) 0.5 % ophthalmic solution Place 1 drop into both eyes every morning            Allergies   Allergies   Allergen Reactions     Aspirin      Coumadin [Warfarin]      Spontaneous retroperitoneal bleed.     Penicillins      Data   Most Recent 3 CBC's:  Recent Labs   Lab Test  10/15/18   0546  10/13/18   2324  09/28/18   0420   WBC  6.3  7.6  6.3   HGB  13.8  12.9  12.7   MCV  91  91  88   PLT  265  286  192      Most Recent 3 BMP's:  Recent Labs   Lab Test  10/16/18   0802  10/15/18   0546  10/13/18   2324   NA  139  143  140   POTASSIUM  3.1*  3.4  3.9   CHLORIDE  103  105  108   CO2  28  31  24   BUN  26  28  29   CR  1.19*  1.31*  1.40*   ANIONGAP  8  7  8   MADDISON  8.3*  8.7  9.0   GLC  213*  148*  138*     Most Recent 2 LFT's:  Recent Labs   Lab Test  09/27/18    0558  03/10/15   1142   07/28/12   2050   AST  141*   --    --   48*   ALT  152*  <5*   < >  24   ALKPHOS  157*   --    --   97   BILITOTAL  0.5   --    --   1.7*    < > = values in this interval not displayed.     Most Recent INR's and Anticoagulation Dosing History:  Anticoagulation Dose History     Recent Dosing and Labs Latest Ref Rng & Units 7/22/2012 7/23/2012 7/25/2012 7/28/2012 9/24/2012 10/26/2016 9/26/2018    INR 0.86 - 1.14 1.14 1.05 1.10 1.04 0.99 1.04 1.05        Most Recent 3 Troponin's:  Recent Labs   Lab Test  10/13/18   2324  09/27/18   1425  09/27/18   0558   09/24/12   1013  08/05/12   0221   07/13/12   1357   TROPI  <0.015  0.017  0.022   < >   --    --    < >   --    TROPONIN   --    --    --    --   0.18*  0.01   --   0.08    < > = values in this interval not displayed.     Most Recent Cholesterol Panel:  Recent Labs   Lab Test  09/27/18   0558   CHOL  125   LDL  51   HDL  65   TRIG  47     Most Recent 6 Bacteria Isolates From Any Culture (See EPIC Reports for Culture Details):No lab results found.  Most Recent TSH, T4 and A1c Labs:  Recent Labs   Lab Test  09/27/18   0558  02/06/17   1101   TSH   --   1.40   A1C  6.5*   --      Results for orders placed or performed during the hospital encounter of 10/13/18   XR Chest 2 Views    Narrative    CHEST TWO VIEWS  10/13/2018 11:46 PM     HISTORY:  Shortness of breath. Leg edema.    COMPARISON: 11/9/2012.      Impression    IMPRESSION: Bilateral pleural effusions, small to moderate on the  right and small on the left, are new since the previous exam. Cardiac  enlargement has a similar appearance to the previous exam. Mild  pulmonary venous congestion is new since the previous exam. Aortic  calcification. Dual-lead cardiac device at left chest wall, with lead  tips projected over the RA and RV.    GENESIS WILLSON MD     Echocardiogram  Left ventricular systolic function is moderately reduced.  The visual ejection fraction is estimated at 40-45%.  The  ejection fraction is estimated at 41% by Cano's biplane method.  There is moderate global hypokinesia of the left ventricle.  The right ventricle is normal in structure, function and size.  There is a catheter/pacemaker lead seen in the right ventricle.  There is severe biatrial enlargement.  Pacer wire in right atrium  There is mild (1+) mitral regurgitation.  There is moderate to mod-severe (2-3+) tricuspid regurgitation.  Normal IVC (1.5-2.5cm) with <50% respiratory collapse; right atrial pressure  is estimated at 10-15mmHg.  Mild (35-45mmHg) pulmonary hypertension is present.  There is mild to moderate (1-2+) aortic regurgitation.  Mild aortic root dilatation (3.8 cm).  The ascending aorta is mildly dilated (4.0 cm).  Small bilateral pleural effusion  The rhythm was atrial fibrillation with controlled ventricular rate at rest.  Compared to the prior study dated 1/31/2017, there are changes as noted. The  rhythm has changed from NSR to atrial fibrillation. Significant global  hypokinesis of the left ventricle is now seen. Pulmonary hypertension is  present but RVSP less significant than previous. Clinical correlation  suggested.

## 2018-10-16 NOTE — PROGRESS NOTES
Transition Communication Hand-off for Care Transitions to Next Level of Care Provider    Name: Zayda Hawkins  : 1932  MRN #: [id number]  Primary Care Provider: Alton Willoughby  Primary Care MD Name: Dr. Alton Willoughby  Primary Clinic: [address]  [facility] [address]  Primary Care Clinic Name: [facility]  Reason for Hospitalization:  Orthopnea [R06.01]  Chronic anticoagulation [Z79.01]  Elevated serum creatinine [R79.89]  Chronic atrial fibrillation (H) [I48.2]  Bilateral leg edema [R60.0]  Acute congestive heart failure, unspecified heart failure type (H) [I50.9]  Admit Date/Time: 10/13/2018 11:04 PM  Discharge Date: 10/16/2018  Payor Source: Payor: [employer] / Plan: [employer] / Product Type: POS /     Readmission Assessment Measure (ZURDO) Risk Score/category: Elevated          Reason for Communication Hand-off Referral: Admission diagnoses: CHF  Fragility    Discharge Plan: Home with HC PT/RN     Discharge Needs Assessment:  Needs       Most Recent Value    Equipment Currently Used at Home none    Transportation Available car, family or friend will provide    Other Resources Other (see comment) [CHF Packet discussed with pt (thru ) & son]        Follow-up plan:  Future Appointments  Date Time Provider Department Center   10/17/2018 9:00 AM LANGUAGE [facility]   10/17/2018 1:30 PM LANGUAGE [facility]   10/18/2018 9:00 AM LANGUAGE [facility]   10/18/2018 1:30 PM LANGUAGE [facility]   10/19/2018 9:00 AM LANGUAGE [facility]   10/19/2018 1:00 PM LANGUAGE [facility]   10/19/2018 1:05 PM Mary Domínguez APRN CNP [facility]   10/29/2018 9:45 AM [facility]   10/29/2018 10:50 AM Mallory Del Angel APRN CNP [facility]   2018 3:30 PM CARBALLO TECH2 [facility]   2018 3:45 PM Julio C Rangel MD [facility]       Any outstanding tests or procedures:        Referrals     Future Labs/Procedures    Home care nursing referral     Comments:    RN skilled nursing visit. RN to assess vital signs and weight, respiratory and cardiac status and patients ability to take and record daily blood pressure, temp and weight.  RN to teach.    Your provider has ordered home care nursing services. If you have not been contacted within 2 days of your discharge please call the inpatient department phone number at [phone] .    Home Care PT Referral for Hospital Discharge     Comments:    PT to eval and treat    Your provider has ordered home care - physical therapy. If you have not been contacted within 2 days of your discharge please call the department phone number listed on the top of this document.    Medication Therapy Management Referral     Comments:    MTM referral reason            Patient has 5 PTA or Discharge Medications AND one of the following   diagnoses: DM,HF,COPD,AMI DX,PULM HTN       This service is designed to help you get the most from your medications.  A specially trained pharmacist will work closely with you and your doctors  to solve any problems related to your medications and to help you get the   best results from taking them.      The Medication Therapy Management staff will call you to schedule an appointment.            Key Recommendations:  Transition Communication Hand-off for Care Transitions to Next Level of Care Provider  Key Recommendations:  Patient has not been watching her sodium intake. She is agreeable to monitoring her intake closely from now on. She weighs herself daily at different times of day. CM recommended consistent time daily for her weights. She has a working scale.     Zoë Garcia    AVS/Discharge Summary is the source of truth; this is a helpful guide for improved communication of patient story

## 2018-10-17 ENCOUNTER — TELEPHONE (OUTPATIENT)
Dept: CARDIOLOGY | Facility: CLINIC | Age: 83
End: 2018-10-17

## 2018-10-17 ENCOUNTER — TELEPHONE (OUTPATIENT)
Dept: PHARMACY | Facility: OTHER | Age: 83
End: 2018-10-17

## 2018-10-17 NOTE — TELEPHONE ENCOUNTER
Son called back and states pt has not had any worsening SOB, edema, chest pain, ERICKSON since discharged to home. Denies any medication questions. Son verbalized understanding of daily weight monitoring as per parameters below and reviewed f/u OV's as below. TISHA Vazquez RN.

## 2018-10-17 NOTE — TELEPHONE ENCOUNTER
Patient was evaluated by cardiology while inpatient for increasing lower extremity edema, orthopnea and dyspnea on exertion. Diagnosed with CHF exacerbation secondary to A. Fib with RVR. Started on Lasix and KCL and BB dose adjusted. Attempted to call patient's son, Chris, to discuss any post hospital d/c questions they may have, review medication changes, and confirm f/u appts, but no answer. VM left to return my phone call. Will remind family to have patient take daily weights and call clinic for a weight gain of 2 lbs overnight or 5 lbs in one week. Pt is scheduled for EP NP OV on 10/19/18 with Mary Domínguez, and on 10/19/18 for Core Enrollment with Imtiaz Del Angel, scheduled on 10/29/18. TISHA Vazquez RN.

## 2018-10-19 ENCOUNTER — DOCUMENTATION ONLY (OUTPATIENT)
Dept: CARDIOLOGY | Facility: CLINIC | Age: 83
End: 2018-10-19

## 2018-10-19 ENCOUNTER — OFFICE VISIT (OUTPATIENT)
Dept: CARDIOLOGY | Facility: CLINIC | Age: 83
End: 2018-10-19
Payer: COMMERCIAL

## 2018-10-19 VITALS
DIASTOLIC BLOOD PRESSURE: 68 MMHG | WEIGHT: 148 LBS | SYSTOLIC BLOOD PRESSURE: 102 MMHG | HEIGHT: 64 IN | HEART RATE: 77 BPM | BODY MASS INDEX: 25.27 KG/M2

## 2018-10-19 DIAGNOSIS — I50.20 SYSTOLIC CONGESTIVE HEART FAILURE, UNSPECIFIED HF CHRONICITY (H): ICD-10-CM

## 2018-10-19 DIAGNOSIS — I48.19 PERSISTENT ATRIAL FIBRILLATION (H): Primary | ICD-10-CM

## 2018-10-19 PROCEDURE — 93000 ELECTROCARDIOGRAM COMPLETE: CPT | Performed by: NURSE PRACTITIONER

## 2018-10-19 PROCEDURE — 99215 OFFICE O/P EST HI 40 MIN: CPT | Performed by: NURSE PRACTITIONER

## 2018-10-19 NOTE — PATIENT INSTRUCTIONS
As always, thank you for trusting us with your health care needs!    Stop taking lisinopril (at least for right now as your blood pressure is quite low)    Schedule your AV node ablation      If you have any questions regarding your visit please contact your care team:   Cardiology  Telephone Number    Afib RNs:  Esthela Vences and Pat [phone]   Call for Electrophysiology procedure scheduling concerns [phone]   Device Clinic (Pacemakers, ICDs, Loop Recorders)   RN's:  Mandy Huff Lynda, MJ, Naima Jean During business hours:   [phone]

## 2018-10-19 NOTE — PROGRESS NOTES
[employer] (D) PPM Courtesy check  Quick check for heart rate control during persistent AF.  Patient remains in AF(Since Oct. 2). HR are > 110 BPM approx. 28% of the time.  BPM 25% of the time.    27% of the time.   Patient to be referred to EP MD for probable AVNA. CHICHO Krishna

## 2018-10-19 NOTE — MR AVS SNAPSHOT
After Visit Summary   10/19/2018    Zayda Hawkins    MRN: [id number]           Patient Information     Date Of Birth          12/24/1932        Visit Information        Provider Department      10/19/2018 1:05 PM Mary Domínguez APRN CNP; LANGUAGE BANC [facility]   [place]        Today's Diagnoses     CHF (congestive heart failure) (H)    -  1    Paroxysmal atrial fibrillation (H)        Persistent atrial fibrillation (H)          Care Instructions    As always, thank you for trusting us with your health care needs!    Stop taking lisinopril (at least for right now as your blood pressure is quite low)    Schedule your AV node ablation      If you have any questions regarding your visit please contact your care team:   Cardiology  Telephone Number    Afib RNs:  Esthela Vences and Pat [phone]   Call for Electrophysiology procedure scheduling concerns [phone]   Device Clinic (Pacemakers, ICDs, Loop Recorders)   RN's:  Mandy Huff Lynda, MJ, Naima Jean During business hours:   [phone]                 Follow-ups after your visit        Your next 10 appointments already scheduled     Oct 29, 2018  9:45 AM CDT   LAB with RU LAB   [facility] [place] ([facility])    [address]  [facility] [address] [phone]           Please do not eat 10-12 hours before your appointment if you are coming in fasting for labs on lipids, cholesterol, or glucose (sugar). This does not apply to pregnant women. Water, hot tea and black coffee (with nothing added) are okay. Do not drink other fluids, diet soda or chew gum.            Oct 29, 2018 10:50 AM CDT   CORE Enrollment with BARBARA Lay CNP   [facility] ([facility])    [address]  [facility] [address] [phone]            Dec 06, 2018  3:30 PM CST  "  Teletrace with CARBALLO TECH2   [facility] ([facility])    [address]  [facility] [id number] [phone] OPT 2            Dec 06, 2018  3:45 PM CST   [facility] EP RETURN with Julio C Palacios MD   [facility] ([facility])    [address]  [facility] [id number] [phone] OPT 2              Future tests that were ordered for you today     Open Future Orders        Priority Expected Expires Ordered    EP Ablation Procedures Routine 10/26/2018 10/19/2019 10/19/2018            Who to contact     If you have questions or need follow up information about today's clinic visit or your schedule please contact [facility] directly at [phone].  Normal or non-critical lab and imaging results will be communicated to you by MyChart, letter or phone within 4 business days after the clinic has received the results. If you do not hear from us within 7 days, please contact the clinic through MyChart or phone. If you have a critical or abnormal lab result, we will notify you by phone as soon as possible.  Submit refill requests through [employer] or call your pharmacy and they will forward the refill request to us. Please allow 3 business days for your refill to be completed.          Additional Information About Your Visit        Care EveryWhere ID     This is your Care EveryWhere ID. This could be used by other organizations to access your [facility] medical records  [id number]        Your Vitals Were     Pulse Height BMI (Body Mass Index)             77 1.626 m (5' 4.02\") 25.39 kg/m2          Blood Pressure from Last 3 Encounters:   10/19/18 102/68   10/16/18 95/56   10/02/18 119/77    Weight from Last 3 Encounters:   10/19/18 67.1 kg (148 lb)   10/16/18 65.5 kg (144 lb 6 oz)   10/02/18 71.1 kg (156 lb 11.2 oz)              We Performed the Following     EKG 12-lead " complete w/read - Clinics (performed today)          Today's Medication Changes          These changes are accurate as of 10/19/18  1:58 PM.  If you have any questions, ask your nurse or doctor.               Stop taking these medicines if you haven't already. Please contact your care team if you have questions.     lisinopril 5 MG tablet   Commonly known as:  PRINIVIL/ZESTRIL   Stopped by:  Mary Domínguez APRN CNP                    Primary Care Provider Office Phone # Fax #    Alton Willoughby [phone]       [facility] [address]  [facility] [address]        Equal Access to Services     [facility]: Hadii aad ku hadasho Soomaali, waaxda luqadaha, qaybta kaalmada adeegyada, karissa mcmahon . So [facility] [phone].    ATENCIÓN: Si habla español, tiene a hoffmann disposición servicios gratuitos de asistencia lingüística. Ll[facility] [phone].    We comply with applicable federal civil rights laws and [place] laws. We do not discriminate on the basis of race, color, national origin, age, disability, sex, sexual orientation, or gender identity.            Thank you!     Thank you for choosing [facility]  for your care. Our goal is always to provide you with excellent care. Hearing back from our patients is one way we can continue to improve our services. Please take a few minutes to complete the written survey that you may receive in the mail after your visit with us. Thank you!             Your Updated Medication List - Protect others around you: Learn how to safely use, store and throw away your medicines at www.disposemymeds.org.          This list is accurate as of 10/19/18  1:58 PM.  Always use your most recent med list.                   Brand Name Dispense Instructions for use Diagnosis    apixaban ANTICOAGULANT 5 MG tablet    ELIQUIS    180 tablet    Take 1 tablet (5 mg) by mouth 2 times daily     A-fib (H)       calcium carbonate-vitamin D 500-400 MG-UNIT Tabs per tablet      Take 1 tablet by mouth 2 times daily.        diltiazem 240 MG 24 hr capsule     30 capsule    Take 1 capsule (240 mg) by mouth daily    Paroxysmal atrial fibrillation (H)       dorzolamide-timolol PF 22.3-6.8 MG/ML opthalmic solution    COSOPT     Place 1 drop into both eyes 2 times daily 10 mL vial        fish oil-omega-3 fatty acids 1000 MG capsule      Take 1 g by mouth daily        furosemide 40 MG tablet    LASIX    30 tablet    Take 1 tablet (40 mg) by mouth daily    Acute congestive heart failure, unspecified heart failure type (H)       IMITREX PO      Take 25 mg by mouth as needed.        latanoprost 0.005 % ophthalmic solution    XALATAN     Place 1 drop into both eyes At Bedtime        magnesium gluconate 500 MG tablet    MAGONATE     Take 500 mg by mouth daily.        metFORMIN 500 MG 24 hr tablet    GLUCOPHAGE-XR     Take 500 mg by mouth every morning        metoprolol succinate 100 MG 24 hr tablet    TOPROL-XL    60 tablet    Take 1 tablet (100 mg) by mouth 2 times daily    Chronic atrial fibrillation (H)       multivitamin, therapeutic Tabs tablet      Take 1 tablet by mouth daily.        potassium chloride SA 10 MEQ CR tablet    K-DUR/KLOR-CON M    15 tablet    Take 1 tablet (10 mEq) by mouth daily    Hypokalemia       sertraline 50 MG tablet    ZOLOFT     Take 25 mg by mouth every evening as needed (Take a half tablet qpm prn)        timolol 0.5 % ophthalmic solution    TIMOPTIC     Place 1 drop into both eyes every morning

## 2018-10-19 NOTE — PROGRESS NOTES
HPI:  Zayda Hawkins is a 85 year old Farsi speaking female originally from [place] who is a patient of Dr. Brandon & Dr. Rangel who presents after being hospitalized for a CVA on 9/26-10/2 and then rehospitalized for heart failure on 10/13-15.   This is also an H&P for an AV node ablation.   Her past medical history includes chronic lower extremity venous insufficiency, paroxysmal atrial fibrillation, CAD, HTN, CVA with right visual field deficit,  and HF.    She saw Dr. Mcnulty while hospitalized on 10/2.  He discontinued her Tikosyn and started her on a rate control medication at which time dilitazem 180 mg was added.   She had not been on anticoagulation prior to her CVA due to a spontaneous retroperitoneal bleed however is now on Eliquis 5 mg twice daily.    She then was hospitalized on 10/13-10/15 for HF and her medication was changed-Dr. Lees also noted if we cannot get the rate controlled an AV node ablation would need to be considered.         Today Zayda Hawkins presents stating she is having symptoms of SOB and palpitations with walking and bending over.  Her SBP was 96 on first reading today - she denies lightheadedness and dizziness. She continues to feel fatigued. At this time she denies chest pain or pressure, headaches, dizziness, syncope, angina, dyspnea at rest or with exertion, dry cough, palpitations, orthopnea, PND, abdominal pain, abdominal edema, pedal edema, or claudication.  Denies easy bruising or bleeding, hematuria, hematochezia, and epistaxis. Denies signs/symptoms of stroke such as visual disturbance, difficulty speaking, facial drooping, confusion, problems with gait, or any new numbness or weakness.    Device check today revealed since 9/27/2018 -  50% of her HR are over 90 bpm and 25% are over 110 bpm   Presenting EKG:   Atrial fibrillation with ventricular rate of 75 bpm    ASSESSMENT AND PLAN    (I50.9) CHF (congestive heart failure) (H)    Today she is euvolemic  Having  SOB with walking but HR have been high see below  Stop lisinopril due to low BP.   May need to restart after AV node ablation.  This was started due to low EF  Continue metoprolol 100 mg twice daily.   Lasix 40 mg daily  Potassium 10 mg daily   Will need repeat ECHO after ablation    (I48.0) Paroxysmal atrial fibrillation (H)  Continue eliquis 5 mg twice daily.   After ablation will need to discuss potential Watchman as she has had a CVA and a spontaneous retroperitoneal bleed while on warfarin.  Her CHADS VASC 6 (age++, female, CVA++, HTN, HF)  Pt continues to have symptoms associated with fast heart rates and her ppm reveals 50% of her HR are over 90 bpm and 25% are over 110 bpm.  She is currently taking diltiazem 240 mg daily and metoprolol 100 mg twice daily.  We discussed AV node ablation; the risk of serious complication is 1% to 2%.  Potential complications include, but are not limited to bleeding, vascular injury requiring surgical repair, phrenic nerve injury, TIA and other perceived complications. 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   Pt and family did not have any questions and would like to pursue ablation.  Consent will be signed by the physician preforming the procedure.      Spoke to Dr. Mcnulty regarding  case and obtained approval for AV node ablation.      Patient expresses understanding and agreement with the plan.     I appreciate the chance to help with Zayda Hawkins Please let me know if you have any questions or concerns.    Mary Domínguez, APRN, CNP    This note was completed in part using Dragon voice recognition software. Although reviewed after completion, some word and grammatical errors may occur.    Orders Placed This Encounter   Procedures     EKG 12-lead complete w/read - Clinics (performed today)     EP Ablation Procedures     No orders of the defined types were placed in this encounter.    Medications Discontinued During This Encounter   Medication  Reason     lisinopril (PRINIVIL/ZESTRIL) 5 MG tablet          Encounter Diagnoses   Name Primary?     Persistent atrial fibrillation (H) Yes     Systolic congestive heart failure, unspecified HF chronicity (H)        CURRENT MEDICATIONS:  Current Outpatient Prescriptions   Medication Sig Dispense Refill     apixaban ANTICOAGULANT (ELIQUIS) 5 MG tablet Take 1 tablet (5 mg) by mouth 2 times daily 180 tablet 3     calcium carbonate-vitamin D 500-400 MG-UNIT TABS Take 1 tablet by mouth 2 times daily.         diltiazem 240 MG 24 hr capsule Take 1 capsule (240 mg) by mouth daily 30 capsule 0     dorzolamide-timolol PF (COSOPT) 22.3-6.8 MG/ML opthalmic solution Place 1 drop into both eyes 2 times daily 10 mL vial       fish oil-omega-3 fatty acids 1000 MG capsule Take 1 g by mouth daily       furosemide (LASIX) 40 MG tablet Take 1 tablet (40 mg) by mouth daily 30 tablet 0     latanoprost (XALATAN) 0.005 % ophthalmic solution Place 1 drop into both eyes At Bedtime       magnesium gluconate (MAGONATE) 500 MG tablet Take 500 mg by mouth daily.         metFORMIN (GLUCOPHAGE-XR) 500 MG 24 hr tablet Take 500 mg by mouth every morning        metoprolol succinate (TOPROL-XL) 100 MG 24 hr tablet Take 1 tablet (100 mg) by mouth 2 times daily 60 tablet 0     multivitamin, therapeutic (THERA-VIT) TABS Take 1 tablet by mouth daily.         potassium chloride SA (K-DUR/KLOR-CON M) 10 MEQ CR tablet Take 1 tablet (10 mEq) by mouth daily 15 tablet 1     sertraline (ZOLOFT) 50 MG tablet Take 25 mg by mouth every evening as needed (Take a half tablet qpm prn)       SUMAtriptan Succinate (IMITREX PO) Take 25 mg by mouth as needed.         timolol (TIMOPTIC) 0.5 % ophthalmic solution Place 1 drop into both eyes every morning          ALLERGIES     Allergies   Allergen Reactions     Aspirin      Coumadin [Warfarin]      Spontaneous retroperitoneal bleed.     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     Venous insufficiency        PAST SURGICAL HISTORY:  Past Surgical History:   Procedure Laterality Date     ANESTHESIA CARDIOVERSION  7/23/2012    Procedure: ANESTHESIA CARDIOVERSION;;  Surgeon: Provider, Generic Anesthesia;  Location:  ANESTHESIA - [facility] - DO NOT SCHEDULE     BLEPHAROPLASTY  2005     Cataract Extraction with IOL Bilateral 2012     IMPLANT PACEMAKER  11/2012    Dual chamber       FAMILY HISTORY:  Family History   Problem Relation Age of Onset     HEART DISEASE Mother 65     mi     HEART DISEASE Father 45     pnemonia       SOCIAL HISTORY:  Social History     Social History     Marital status:      Spouse name: N/A     Number of children: N/A     Years of education: N/A     Social History Main Topics     Smoking status: Never Smoker     Smokeless tobacco: Never Used     Alcohol use No     Drug use: No     Sexual activity: Not Asked     Other Topics Concern     Caffeine Concern Yes     no caffeine intake     Special Diet Yes     no sugar, salt or fats      Exercise Yes     treadmill, swimming daily      Seat Belt Yes     Social History Narrative       Review of Systems:  Skin:  Negative     Eyes:  Negative    ENT:  Negative    Respiratory:  Positive for shortness of breath  Cardiovascular:  Negative for;palpitations;chest pain Positive for;lightheadedness  Gastroenterology: Negative    Genitourinary:  Negative    Musculoskeletal:  Positive for arthritis;nocturnal cramping  Neurologic:  Negative    Psychiatric:  Negative    Heme/Lymph/Imm:  Negative    Endocrine:  Positive for  "diabetes    Physical Exam:  Vitals: /68 (BP Location: Left arm, Patient Position: Standing, Cuff Size: Adult Regular)  Pulse 77  Ht 1.626 m (5' 4.02\")  Wt 67.1 kg (148 lb)  BMI 25.39 kg/m2    Constitutional:  cooperative frail;thin      Skin:  warm and dry to the touch        Head:  normocephalic, no masses or lesions        Eyes:  pupils equal and round        ENT:  no pallor or cyanosis        Neck:  JVP normal        Chest:  clear to auscultation        Cardiac:   irregularly irregular rhythm     systolic murmur          Abdomen:  abdomen soft        Vascular: pulses full and equal                                      Extremities and Back:      leg weakness;  received assistance walking    Neurological:             Recent Lab Results:  LIPID RESULTS:  Lab Results   Component Value Date    CHOL 125 09/27/2018    HDL 65 09/27/2018    LDL 51 09/27/2018    TRIG 47 09/27/2018    CHOLHDLRATIO 2.2 03/10/2015       LIVER ENZYME RESULTS:  Lab Results   Component Value Date     (H) 09/27/2018     (H) 09/27/2018       CBC RESULTS:  Lab Results   Component Value Date    WBC 6.3 10/15/2018    RBC 4.78 10/15/2018    HGB 13.8 10/15/2018    HCT 43.4 10/15/2018    MCV 91 10/15/2018    MCH 28.9 10/15/2018    MCHC 31.8 10/15/2018    RDW 14.0 10/15/2018     10/15/2018       BMP RESULTS:  Lab Results   Component Value Date     10/16/2018    POTASSIUM 3.1 (L) 10/16/2018    CHLORIDE 103 10/16/2018    CO2 28 10/16/2018    ANIONGAP 8 10/16/2018     (H) 10/16/2018    BUN 26 10/16/2018    CR 1.19 (H) 10/16/2018    GFRESTIMATED 43 (L) 10/16/2018    GFRESTBLACK 52 (L) 10/16/2018    MADDISON 8.3 (L) 10/16/2018        A1C RESULTS:  Lab Results   Component Value Date    A1C 6.5 (H) 09/27/2018       INR RESULTS:  Lab Results   Component Value Date    INR 1.05 09/26/2018    INR 1.04 10/26/2016           CC  Stephanie Mcnulty MD  [address]  [place] [id number]                "

## 2018-10-19 NOTE — LETTER
10/19/2018    [place] REYES [facility] [address]  [facility] [address]    RE: Zayda Hawkins       Dear Colleague,    I had the pleasure of seeing Zayda Hawkins in the [facility].    HPI:  Zayda Hawkins is a 85 year old Farsi speaking female originally from [place] who is a patient of Dr. Brandon & Dr. Rangel who presents after being hospitalized for a CVA on 9/26-10/2 and then rehospitalized for heart failure on 10/13-15.   This is also an H&P for an AV node ablation.   Her past medical history includes chronic lower extremity venous insufficiency, paroxysmal atrial fibrillation, CAD, HTN, CVA with right visual field deficit,  and HF.    She saw Dr. Mcnulty while hospitalized on 10/2.  He discontinued her Tikosyn and started her on a rate control medication at which time dilitazem 180 mg was added.   She had not been on anticoagulation prior to her CVA due to a spontaneous retroperitoneal bleed however is now on Eliquis 5 mg twice daily.    She then was hospitalized on 10/13-10/15 for HF and her medication was changed-Dr. Lees also noted if we cannot get the rate controlled an AV node ablation would need to be considered.         Today Zayda Hawkins presents stating she is having symptoms of SOB and palpitations with walking and bending over.  Her SBP was 96 on first reading today - she denies lightheadedness and dizziness. She continues to feel fatigued. At this time she denies chest pain or pressure, headaches, dizziness, syncope, angina, dyspnea at rest or with exertion, dry cough, palpitations, orthopnea, PND, abdominal pain, abdominal edema, pedal edema, or claudication.  Denies easy bruising or bleeding, hematuria, hematochezia, and epistaxis. Denies signs/symptoms of stroke such as visual disturbance, difficulty speaking, facial drooping, confusion, problems with gait, or any new numbness or weakness.    Device check today  revealed since 9/27/2018 -  50% of her HR are over 90 bpm and 25% are over 110 bpm   Presenting EKG:   Atrial fibrillation with ventricular rate of 75 bpm    ASSESSMENT AND PLAN    (I50.9) CHF (congestive heart failure) (H)    Today she is euvolemic  Having SOB with walking but HR have been high see below  Stop lisinopril due to low BP.   May need to restart after AV node ablation.  This was started due to low EF  Continue metoprolol 100 mg twice daily.   Lasix 40 mg daily  Potassium 10 mg daily   Will need repeat ECHO after ablation    (I48.0) Paroxysmal atrial fibrillation (H)  Continue eliquis 5 mg twice daily.   After ablation will need to discuss potential Watchman as she has had a CVA and a spontaneous retroperitoneal bleed while on warfarin.  Her CHADS VASC 6 (age++, female, CVA++, HTN, HF)  Pt continues to have symptoms associated with fast heart rates and her ppm reveals 50% of her HR are over 90 bpm and 25% are over 110 bpm.  She is currently taking diltiazem 240 mg daily and metoprolol 100 mg twice daily.  We discussed AV node ablation; the risk of serious complication is 1% to 2%.  Potential complications include, but are not limited to bleeding, vascular injury requiring surgical repair, phrenic nerve injury, TIA and other perceived complications. 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   Pt and family did not have any questions and would like to pursue ablation.  Consent will be signed by the physician preforming the procedure.      Spoke to Dr. Mcnulty regarding  case and obtained approval for AV node ablation.      Patient expresses understanding and agreement with the plan.     I appreciate the chance to help with Zayda Hawkins Please let me know if you have any questions or concerns.    Mary Domínguez, APRN, CNP    This note was completed in part using Dragon voice recognition software. Although reviewed after completion, some word and grammatical errors may  occur.    Orders Placed This Encounter   Procedures     EKG 12-lead complete w/read - Clinics (performed today)     EP Ablation Procedures     No orders of the defined types were placed in this encounter.    Medications Discontinued During This Encounter   Medication Reason     lisinopril (PRINIVIL/ZESTRIL) 5 MG tablet          Encounter Diagnoses   Name Primary?     Persistent atrial fibrillation (H) Yes     Systolic congestive heart failure, unspecified HF chronicity (H)        CURRENT MEDICATIONS:  Current Outpatient Prescriptions   Medication Sig Dispense Refill     apixaban ANTICOAGULANT (ELIQUIS) 5 MG tablet Take 1 tablet (5 mg) by mouth 2 times daily 180 tablet 3     calcium carbonate-vitamin D 500-400 MG-UNIT TABS Take 1 tablet by mouth 2 times daily.         diltiazem 240 MG 24 hr capsule Take 1 capsule (240 mg) by mouth daily 30 capsule 0     dorzolamide-timolol PF (COSOPT) 22.3-6.8 MG/ML opthalmic solution Place 1 drop into both eyes 2 times daily 10 mL vial       fish oil-omega-3 fatty acids 1000 MG capsule Take 1 g by mouth daily       furosemide (LASIX) 40 MG tablet Take 1 tablet (40 mg) by mouth daily 30 tablet 0     latanoprost (XALATAN) 0.005 % ophthalmic solution Place 1 drop into both eyes At Bedtime       magnesium gluconate (MAGONATE) 500 MG tablet Take 500 mg by mouth daily.         metFORMIN (GLUCOPHAGE-XR) 500 MG 24 hr tablet Take 500 mg by mouth every morning        metoprolol succinate (TOPROL-XL) 100 MG 24 hr tablet Take 1 tablet (100 mg) by mouth 2 times daily 60 tablet 0     multivitamin, therapeutic (THERA-VIT) TABS Take 1 tablet by mouth daily.         potassium chloride SA (K-DUR/KLOR-CON M) 10 MEQ CR tablet Take 1 tablet (10 mEq) by mouth daily 15 tablet 1     sertraline (ZOLOFT) 50 MG tablet Take 25 mg by mouth every evening as needed (Take a half tablet qpm prn)       SUMAtriptan Succinate (IMITREX PO) Take 25 mg by mouth as needed.         timolol (TIMOPTIC) 0.5 % ophthalmic  solution Place 1 drop into both eyes every morning          ALLERGIES     Allergies   Allergen Reactions     Aspirin      Coumadin [Warfarin]      Spontaneous retroperitoneal bleed.     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     Venous insufficiency        PAST SURGICAL HISTORY:  Past Surgical History:   Procedure Laterality Date     ANESTHESIA CARDIOVERSION  7/23/2012    Procedure: ANESTHESIA CARDIOVERSION;;  Surgeon: Provider, Generic Anesthesia;  Location:  ANESTHESIA - [facility] - DO NOT SCHEDULE     BLEPHAROPLASTY  2005     Cataract Extraction with IOL Bilateral 2012     IMPLANT PACEMAKER  11/2012    Dual chamber       FAMILY HISTORY:  Family History   Problem Relation Age of Onset     HEART DISEASE Mother 65     mi     HEART DISEASE Father 45     pnemonia       SOCIAL HISTORY:  Social History     Social History     Marital status:      Spouse name: N/A     Number of children: N/A     Years of education: N/A     Social History Main Topics     Smoking status: Never Smoker     Smokeless tobacco: Never Used     Alcohol use No     Drug use: No     Sexual activity: Not Asked     Other Topics Concern     Caffeine Concern Yes     no caffeine intake     Special Diet Yes     no sugar, salt or fats      Exercise Yes     treadmill, swimming daily      Seat Belt Yes     Social History Narrative       Review of Systems:  Skin:  Negative     Eyes:  Negative    ENT:  Negative    Respiratory:  Positive for shortness of breath  Cardiovascular:  Negative  "for;palpitations;chest pain Positive for;lightheadedness  Gastroenterology: Negative    Genitourinary:  Negative    Musculoskeletal:  Positive for arthritis;nocturnal cramping  Neurologic:  Negative    Psychiatric:  Negative    Heme/Lymph/Imm:  Negative    Endocrine:  Positive for diabetes    Physical Exam:  Vitals: /68 (BP Location: Left arm, Patient Position: Standing, Cuff Size: Adult Regular)  Pulse 77  Ht 1.626 m (5' 4.02\")  Wt 67.1 kg (148 lb)  BMI 25.39 kg/m2    Constitutional:  cooperative frail;thin      Skin:  warm and dry to the touch        Head:  normocephalic, no masses or lesions        Eyes:  pupils equal and round        ENT:  no pallor or cyanosis        Neck:  JVP normal        Chest:  clear to auscultation        Cardiac:   irregularly irregular rhythm     systolic murmur          Abdomen:  abdomen soft        Vascular: pulses full and equal                                      Extremities and Back:      leg weakness;  received assistance walking    Neurological:             Recent Lab Results:  LIPID RESULTS:  Lab Results   Component Value Date    CHOL 125 09/27/2018    HDL 65 09/27/2018    LDL 51 09/27/2018    TRIG 47 09/27/2018    CHOLHDLRATIO 2.2 03/10/2015       LIVER ENZYME RESULTS:  Lab Results   Component Value Date     (H) 09/27/2018     (H) 09/27/2018       CBC RESULTS:  Lab Results   Component Value Date    WBC 6.3 10/15/2018    RBC 4.78 10/15/2018    HGB 13.8 10/15/2018    HCT 43.4 10/15/2018    MCV 91 10/15/2018    MCH 28.9 10/15/2018    MCHC 31.8 10/15/2018    RDW 14.0 10/15/2018     10/15/2018       BMP RESULTS:  Lab Results   Component Value Date     10/16/2018    POTASSIUM 3.1 (L) 10/16/2018    CHLORIDE 103 10/16/2018    CO2 28 10/16/2018    ANIONGAP 8 10/16/2018     (H) 10/16/2018    BUN 26 10/16/2018    CR 1.19 (H) 10/16/2018    GFRESTIMATED 43 (L) 10/16/2018    GFRESTBLACK 52 (L) 10/16/2018    MADDISON 8.3 (L) 10/16/2018        A1C " RESULTS:  Lab Results   Component Value Date    A1C 6.5 (H) 09/27/2018       INR RESULTS:  Lab Results   Component Value Date    INR 1.05 09/26/2018    INR 1.04 10/26/2016         Thank you for allowing me to participate in the care of your patient.    Sincerely,     BARBARA Mckeon [facility]

## 2018-10-19 NOTE — LETTER
10/19/2018    [place] REYES [facility] [address]  [facility] [address]    RE: Zayda Hawkins       Dear Colleague,    I had the pleasure of seeing Zayda Hawkins in the [facility].    HPI:  Zayda Hawkins is a 85 year old Farsi speaking female originally from [place] who is a patient of Dr. Brandon & Dr. Rangel who presents after being hospitalized for a CVA on 9/26-10/2 and then rehospitalized for heart failure on 10/13-15.   This is also an H&P for an AV node ablation.   Her past medical history includes chronic lower extremity venous insufficiency, paroxysmal atrial fibrillation, CAD, HTN, CVA with right visual field deficit,  and HF.    She saw Dr. Mcnulty while hospitalized on 10/2.  He discontinued her Tikosyn and started her on a rate control medication at which time dilitazem 180 mg was added.   She had not been on anticoagulation prior to her CVA due to a spontaneous retroperitoneal bleed however is now on Eliquis 5 mg twice daily.    She then was hospitalized on 10/13-10/15 for HF and her medication was changed-Dr. Lees also noted if we cannot get the rate controlled an AV node ablation would need to be considered.         Today Zayda Hawkins presents stating she is having symptoms of SOB and palpitations with walking and bending over.  Her SBP was 96 on first reading today - she denies lightheadedness and dizziness. She continues to feel fatigued. At this time she denies chest pain or pressure, headaches, dizziness, syncope, angina, dyspnea at rest or with exertion, dry cough, palpitations, orthopnea, PND, abdominal pain, abdominal edema, pedal edema, or claudication.  Denies easy bruising or bleeding, hematuria, hematochezia, and epistaxis. Denies signs/symptoms of stroke such as visual disturbance, difficulty speaking, facial drooping, confusion, problems with gait, or any new numbness or weakness.    Device check today  revealed since 9/27/2018 -  50% of her HR are over 90 bpm and 25% are over 110 bpm   Presenting EKG:   Atrial fibrillation with ventricular rate of 75 bpm    ASSESSMENT AND PLAN    (I50.9) CHF (congestive heart failure) (H)    Today she is euvolemic  Having SOB with walking but HR have been high see below  Stop lisinopril due to low BP.   May need to restart after AV node ablation.  This was started due to low EF  Continue metoprolol 100 mg twice daily.   Lasix 40 mg daily  Potassium 10 mg daily   Will need repeat ECHO after ablation    (I48.0) Paroxysmal atrial fibrillation (H)  Continue eliquis 5 mg twice daily.   After ablation will need to discuss potential Watchman as she has had a CVA and a spontaneous retroperitoneal bleed while on warfarin.  Her CHADS VASC 6 (age++, female, CVA++, HTN, HF)  Pt continues to have symptoms associated with fast heart rates and her ppm reveals 50% of her HR are over 90 bpm and 25% are over 110 bpm.  She is currently taking diltiazem 240 mg daily and metoprolol 100 mg twice daily.  We discussed AV node ablation; the risk of serious complication is 1% to 2%.  Potential complications include, but are not limited to bleeding, vascular injury requiring surgical repair, phrenic nerve injury, TIA and other perceived complications. 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   Pt and family did not have any questions and would like to pursue ablation.  Consent will be signed by the physician preforming the procedure.      Spoke to Dr. Mcnulty regarding  case and obtained approval for AV node ablation.      Patient expresses understanding and agreement with the plan.     I appreciate the chance to help with Zayda Hawkins Please let me know if you have any questions or concerns.    Mary Domínguez, APRN, CNP    This note was completed in part using Dragon voice recognition software. Although reviewed after completion, some word and grammatical errors may  occur.    Orders Placed This Encounter   Procedures     EKG 12-lead complete w/read - Clinics (performed today)     EP Ablation Procedures     No orders of the defined types were placed in this encounter.    Medications Discontinued During This Encounter   Medication Reason     lisinopril (PRINIVIL/ZESTRIL) 5 MG tablet          Encounter Diagnoses   Name Primary?     Persistent atrial fibrillation (H) Yes     Systolic congestive heart failure, unspecified HF chronicity (H)        CURRENT MEDICATIONS:  Current Outpatient Prescriptions   Medication Sig Dispense Refill     apixaban ANTICOAGULANT (ELIQUIS) 5 MG tablet Take 1 tablet (5 mg) by mouth 2 times daily 180 tablet 3     calcium carbonate-vitamin D 500-400 MG-UNIT TABS Take 1 tablet by mouth 2 times daily.         diltiazem 240 MG 24 hr capsule Take 1 capsule (240 mg) by mouth daily 30 capsule 0     dorzolamide-timolol PF (COSOPT) 22.3-6.8 MG/ML opthalmic solution Place 1 drop into both eyes 2 times daily 10 mL vial       fish oil-omega-3 fatty acids 1000 MG capsule Take 1 g by mouth daily       furosemide (LASIX) 40 MG tablet Take 1 tablet (40 mg) by mouth daily 30 tablet 0     latanoprost (XALATAN) 0.005 % ophthalmic solution Place 1 drop into both eyes At Bedtime       magnesium gluconate (MAGONATE) 500 MG tablet Take 500 mg by mouth daily.         metFORMIN (GLUCOPHAGE-XR) 500 MG 24 hr tablet Take 500 mg by mouth every morning        metoprolol succinate (TOPROL-XL) 100 MG 24 hr tablet Take 1 tablet (100 mg) by mouth 2 times daily 60 tablet 0     multivitamin, therapeutic (THERA-VIT) TABS Take 1 tablet by mouth daily.         potassium chloride SA (K-DUR/KLOR-CON M) 10 MEQ CR tablet Take 1 tablet (10 mEq) by mouth daily 15 tablet 1     sertraline (ZOLOFT) 50 MG tablet Take 25 mg by mouth every evening as needed (Take a half tablet qpm prn)       SUMAtriptan Succinate (IMITREX PO) Take 25 mg by mouth as needed.         timolol (TIMOPTIC) 0.5 % ophthalmic  solution Place 1 drop into both eyes every morning          ALLERGIES     Allergies   Allergen Reactions     Aspirin      Coumadin [Warfarin]      Spontaneous retroperitoneal bleed.     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     Venous insufficiency        PAST SURGICAL HISTORY:  Past Surgical History:   Procedure Laterality Date     ANESTHESIA CARDIOVERSION  7/23/2012    Procedure: ANESTHESIA CARDIOVERSION;;  Surgeon: Provider, Generic Anesthesia;  Location:  ANESTHESIA - [facility] - DO NOT SCHEDULE     BLEPHAROPLASTY  2005     Cataract Extraction with IOL Bilateral 2012     IMPLANT PACEMAKER  11/2012    Dual chamber       FAMILY HISTORY:  Family History   Problem Relation Age of Onset     HEART DISEASE Mother 65     mi     HEART DISEASE Father 45     pnemonia       SOCIAL HISTORY:  Social History     Social History     Marital status:      Spouse name: N/A     Number of children: N/A     Years of education: N/A     Social History Main Topics     Smoking status: Never Smoker     Smokeless tobacco: Never Used     Alcohol use No     Drug use: No     Sexual activity: Not Asked     Other Topics Concern     Caffeine Concern Yes     no caffeine intake     Special Diet Yes     no sugar, salt or fats      Exercise Yes     treadmill, swimming daily      Seat Belt Yes     Social History Narrative       Review of Systems:  Skin:  Negative     Eyes:  Negative    ENT:  Negative    Respiratory:  Positive for shortness of breath  Cardiovascular:  Negative  "for;palpitations;chest pain Positive for;lightheadedness  Gastroenterology: Negative    Genitourinary:  Negative    Musculoskeletal:  Positive for arthritis;nocturnal cramping  Neurologic:  Negative    Psychiatric:  Negative    Heme/Lymph/Imm:  Negative    Endocrine:  Positive for diabetes    Physical Exam:  Vitals: /68 (BP Location: Left arm, Patient Position: Standing, Cuff Size: Adult Regular)  Pulse 77  Ht 1.626 m (5' 4.02\")  Wt 67.1 kg (148 lb)  BMI 25.39 kg/m2    Constitutional:  cooperative frail;thin      Skin:  warm and dry to the touch        Head:  normocephalic, no masses or lesions        Eyes:  pupils equal and round        ENT:  no pallor or cyanosis        Neck:  JVP normal        Chest:  clear to auscultation        Cardiac:   irregularly irregular rhythm     systolic murmur          Abdomen:  abdomen soft        Vascular: pulses full and equal                                      Extremities and Back:      leg weakness;  received assistance walking    Neurological:             Recent Lab Results:  LIPID RESULTS:  Lab Results   Component Value Date    CHOL 125 09/27/2018    HDL 65 09/27/2018    LDL 51 09/27/2018    TRIG 47 09/27/2018    CHOLHDLRATIO 2.2 03/10/2015       LIVER ENZYME RESULTS:  Lab Results   Component Value Date     (H) 09/27/2018     (H) 09/27/2018       CBC RESULTS:  Lab Results   Component Value Date    WBC 6.3 10/15/2018    RBC 4.78 10/15/2018    HGB 13.8 10/15/2018    HCT 43.4 10/15/2018    MCV 91 10/15/2018    MCH 28.9 10/15/2018    MCHC 31.8 10/15/2018    RDW 14.0 10/15/2018     10/15/2018       BMP RESULTS:  Lab Results   Component Value Date     10/16/2018    POTASSIUM 3.1 (L) 10/16/2018    CHLORIDE 103 10/16/2018    CO2 28 10/16/2018    ANIONGAP 8 10/16/2018     (H) 10/16/2018    BUN 26 10/16/2018    CR 1.19 (H) 10/16/2018    GFRESTIMATED 43 (L) 10/16/2018    GFRESTBLACK 52 (L) 10/16/2018    MADDISON 8.3 (L) 10/16/2018        A1C " RESULTS:  Lab Results   Component Value Date    A1C 6.5 (H) 09/27/2018       INR RESULTS:  Lab Results   Component Value Date    INR 1.05 09/26/2018    INR 1.04 10/26/2016           CC  Stephanie Mcnulty MD  [id number]  [place] [id number]                  Thank you for allowing me to participate in the care of your patient.      Sincerely,     BARBARA Mckeon [facility]    cc:   Stephanie Mcnulty MD  [id number]  [place] [id number]

## 2018-10-24 ENCOUNTER — TELEPHONE (OUTPATIENT)
Dept: CARDIOLOGY | Facility: CLINIC | Age: 83
End: 2018-10-24

## 2018-10-24 NOTE — TELEPHONE ENCOUNTER
Ade from  [facility] called and stated that pt weight is up 6 pounds.  Pt lungs are clear and vitals are stable, bit Left leg has edema. Pt weights were given for Saturday (10/20)-145, Sunday-140, Monday-140, Tuesday-140,and today 146.  Pt son was also with pt and it is a question if the numbers are accurate and that pt may have become nervous with the 145 and started putting down 140 mg for pt weight. No extra lasix was taken.  If this is correct pt weight may only be up 1 pound since Saturday. Ade will be seeing pt again tomorrow and will see what current weight is and call this to [facility] Nurse phone number.  Pt will be seeing [facility] on Monday 10/29.  Discussed with Kristin, who states that when weight is called in tomorrow and is still up and leg swollen to increase lasix by 1/2 a tab one dose to see if this helps. Will await phone call. JNWagner

## 2018-10-25 NOTE — TELEPHONE ENCOUNTER
Received detail message on afib voicemail  from Ade from  [facility] with update on patient.  Patient today feels and looks better today.  Weight 144.  /72 O2 sats 97% lungs clear.  Augustus socks on edema 1+ which is unchanged.  Patient had one episode of SOB which was short lived.  [facility] is to provide patient with new scale.    Left message for Ade that patient to continue current medication regimen do not increase lasix.   Patient to see [facility] on 10/29.  CHICHO Davis

## 2018-10-29 ENCOUNTER — OFFICE VISIT (OUTPATIENT)
Dept: CARDIOLOGY | Facility: CLINIC | Age: 83
End: 2018-10-29
Attending: NURSE PRACTITIONER

## 2018-10-29 VITALS
OXYGEN SATURATION: 96 % | HEIGHT: 64 IN | DIASTOLIC BLOOD PRESSURE: 74 MMHG | HEART RATE: 100 BPM | WEIGHT: 149.4 LBS | BODY MASS INDEX: 25.51 KG/M2 | SYSTOLIC BLOOD PRESSURE: 120 MMHG

## 2018-10-29 DIAGNOSIS — R06.02 SOB (SHORTNESS OF BREATH): ICD-10-CM

## 2018-10-29 DIAGNOSIS — I50.22 CHRONIC SYSTOLIC CONGESTIVE HEART FAILURE (H): ICD-10-CM

## 2018-10-29 LAB
ANION GAP SERPL CALCULATED.3IONS-SCNC: 4 MMOL/L (ref 3–14)
BUN SERPL-MCNC: 27 MG/DL (ref 7–30)
CALCIUM SERPL-MCNC: 8.8 MG/DL (ref 8.5–10.1)
CHLORIDE SERPL-SCNC: 109 MMOL/L (ref 94–109)
CO2 SERPL-SCNC: 26 MMOL/L (ref 20–32)
CREAT SERPL-MCNC: 1.29 MG/DL (ref 0.52–1.04)
GFR SERPL CREATININE-BSD FRML MDRD: 39 ML/MIN/1.7M2
GLUCOSE SERPL-MCNC: 133 MG/DL (ref 70–99)
NT-PROBNP SERPL-MCNC: 2830 PG/ML (ref 0–450)
POTASSIUM SERPL-SCNC: 4.7 MMOL/L (ref 3.4–5.3)
SODIUM SERPL-SCNC: 139 MMOL/L (ref 133–144)
TSH SERPL DL<=0.005 MIU/L-ACNC: 2.06 MU/L (ref 0.4–4)

## 2018-10-29 PROCEDURE — 83880 ASSAY OF NATRIURETIC PEPTIDE: CPT | Performed by: NURSE PRACTITIONER

## 2018-10-29 PROCEDURE — 84443 ASSAY THYROID STIM HORMONE: CPT | Performed by: NURSE PRACTITIONER

## 2018-10-29 PROCEDURE — 99214 OFFICE O/P EST MOD 30 MIN: CPT | Performed by: NURSE PRACTITIONER

## 2018-10-29 PROCEDURE — 80048 BASIC METABOLIC PNL TOTAL CA: CPT | Performed by: NURSE PRACTITIONER

## 2018-10-29 PROCEDURE — 36415 COLL VENOUS BLD VENIPUNCTURE: CPT | Performed by: NURSE PRACTITIONER

## 2018-10-29 NOTE — PATIENT INSTRUCTIONS
Call the C.O.R.E. nurse for any questions or concerns:  [phone]    1.  Medication changes from today: no change    2. Follow up plan: ablation procedure.     3.  Weigh yourself daily and write it down.    4.  Call [facility] nurse if your weight is up more than 2 pounds in one day or 5 pounds in one week.    5.  Call [facility] nurse if you feel more short of breath, have more abdominal bloating, or leg swelling.    6.  Continue low sodium diet (less than 2000 mg daily). If you eat less salt, you will retain less fluid.    7.  Alcohol can weaken your heart further. You should avoid alcohol or limit its use to special times, such as a holiday or birthday.     8.  Do NOT take Aleve (naproxen) or Advil (ibuprofen) without talking to your doctor first.     9.  Lab Results:     Component      Latest Ref Rng & Units 10/29/2018   Sodium      133 - 144 mmol/L 139   Potassium      3.4 - 5.3 mmol/L 4.7   Chloride      94 - 109 mmol/L 109   Carbon Dioxide      20 - 32 mmol/L 26   Anion Gap      3 - 14 mmol/L 4   Glucose      70 - 99 mg/dL 133 (H)   Urea Nitrogen      7 - 30 mg/dL 27   Creatinine      0.52 - 1.04 mg/dL 1.29 (H)   GFR Estimate      >60 mL/min/1.7m2 39 (L)   GFR Estimate If Black      >60 mL/min/1.7m2 47 (L)   Calcium      8.5 - 10.1 mg/dL 8.8   TSH      0.40 - 4.00 mU/L 2.06   N-Terminal Pro Bnp      0 - 450 pg/mL 2830 (H)     [facility]: Cardiomyopathy, Optimization, Rehabilitation, Education  The [facility] is a heart failure specialty clinic within the [facility] where you will work with your cardiologist, nurse practitioners, physician assistants and registered nurses who specialize in heart failure care. They are dedicated to helping patients with heart failure to carefully adjust medications, receive education, and learn who and when to call if symptoms develop. They specialize in helping you better understand your condition, slow the progression of your disease, improve the  length and quality of your life, help you detect future heart problems before they become life threatening, and avoid hospitalizations.

## 2018-10-29 NOTE — PROGRESS NOTES
HPI and Plan:   See dictation 115    Orders Placed This Encounter   Procedures     Echocardiogram       No orders of the defined types were placed in this encounter.      There are no discontinued medications.      Encounter Diagnosis   Name Primary?     Chronic systolic congestive heart failure (H)        CURRENT MEDICATIONS:  Current Outpatient Prescriptions   Medication Sig Dispense Refill     apixaban ANTICOAGULANT (ELIQUIS) 5 MG tablet Take 1 tablet (5 mg) by mouth 2 times daily 180 tablet 3     diltiazem 240 MG 24 hr capsule Take 1 capsule (240 mg) by mouth daily 30 capsule 0     dorzolamide-timolol PF (COSOPT) 22.3-6.8 MG/ML opthalmic solution Place 1 drop into both eyes 2 times daily 10 mL vial       fish oil-omega-3 fatty acids 1000 MG capsule Take 1 g by mouth daily       furosemide (LASIX) 40 MG tablet Take 1 tablet (40 mg) by mouth daily 30 tablet 0     latanoprost (XALATAN) 0.005 % ophthalmic solution Place 1 drop into both eyes At Bedtime       magnesium gluconate (MAGONATE) 500 MG tablet Take 500 mg by mouth daily.         metFORMIN (GLUCOPHAGE-XR) 500 MG 24 hr tablet Take 500 mg by mouth every morning        metoprolol succinate (TOPROL-XL) 100 MG 24 hr tablet Take 1 tablet (100 mg) by mouth 2 times daily 60 tablet 0     multivitamin, therapeutic (THERA-VIT) TABS Take 1 tablet by mouth daily.         potassium chloride SA (K-DUR/KLOR-CON M) 10 MEQ CR tablet Take 1 tablet (10 mEq) by mouth daily 15 tablet 1     sertraline (ZOLOFT) 50 MG tablet Take 25 mg by mouth every evening as needed (Take a half tablet qpm prn)       timolol (TIMOPTIC) 0.5 % ophthalmic solution Place 1 drop into both eyes every morning        calcium carbonate-vitamin D 500-400 MG-UNIT TABS Take 1 tablet by mouth 2 times daily.         SUMAtriptan Succinate (IMITREX PO) Take 25 mg by mouth as needed.           ALLERGIES     Allergies   Allergen Reactions     Aspirin      Coumadin [Warfarin]      Spontaneous retroperitoneal bleed.      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     Venous insufficiency        PAST SURGICAL HISTORY:  Past Surgical History:   Procedure Laterality Date     ANESTHESIA CARDIOVERSION  7/23/2012    Procedure: ANESTHESIA CARDIOVERSION;;  Surgeon: Provider, Generic Anesthesia;  Location:  ANESTHESIA - [facility] - DO NOT SCHEDULE     BLEPHAROPLASTY  2005     Cataract Extraction with IOL Bilateral 2012     IMPLANT PACEMAKER  11/2012    Dual chamber       FAMILY HISTORY:  Family History   Problem Relation Age of Onset     HEART DISEASE Mother 65     mi     HEART DISEASE Father 45     pnemonia       SOCIAL HISTORY:  Social History     Social History     Marital status:      Spouse name: N/A     Number of children: N/A     Years of education: N/A     Social History Main Topics     Smoking status: Never Smoker     Smokeless tobacco: Never Used     Alcohol use No     Drug use: No     Sexual activity: Not Asked     Other Topics Concern     Caffeine Concern Yes     no caffeine intake     Special Diet Yes     no sugar, salt or fats      Exercise Yes     treadmill, swimming daily      Seat Belt Yes     Social History Narrative       Review of Systems:  Skin:  Negative       Eyes:  Negative      ENT:  Negative      Respiratory:  Positive for shortness of breath;dyspnea on exertion minimal exertion    Cardiovascular:    Positive for;lightheadedness;palpitations;edema;exercise intolerance;fatigue edema improved since most recent admit, using compressions q.d.   Gastroenterology:  "Positive for poor appetite    Genitourinary:  Negative      Musculoskeletal:  Positive for arthritis;nocturnal cramping    Neurologic:  Negative      Psychiatric:  Negative      Heme/Lymph/Imm:  Negative      Endocrine:  Positive for diabetes      Physical Exam:  Vitals: /74 (BP Location: Right arm, Patient Position: Chair, Cuff Size: Adult Regular)  Pulse 100  Ht 1.626 m (5' 4\")  Wt 67.8 kg (149 lb 6.4 oz)  SpO2 96%  BMI 25.64 kg/m2    Constitutional:  cooperative frail;thin      Skin:  warm and dry to the touch          Head:  normocephalic, no masses or lesions        Eyes:  pupils equal and round        Lymph:No Cervical lymphadenopathy present     ENT:  no pallor or cyanosis        Neck:  JVP normal        Respiratory:    basal rales       Cardiac:   irregularly irregular rhythm     systolic murmur        pulses full and equal                                        GI:  abdomen soft        Extremities and Muscular Skeletal:  no edema;no deformities, clubbing, cyanosis, erythema observed              Neurological:  no gross motor deficits        Psych:  Alert and Oriented x 3        CC  BARBARA Lay CNP  [address]  [address]  [place] [id number]              "

## 2018-10-29 NOTE — LETTER
10/29/2018      Alton REYES [facility] [address]  [facility] [address]      RE: Zayda Hawkins       Dear Colleague,    I had the pleasure of seeing Zayda Hawkins in the [facility].    Service Date: 10/29/2018      HISTORY OF PRESENT ILLNESS:  Zayda Hawkins is an 85-year-old Farsi-speaking female originally from [place] who is accompanied by her son.  She follows with Dr. Brandon and Dr. Rangel.  She initially presented after being hospitalized for a CVA on 09/26 to 10/02 and then rehospitalized for heart failure on 10/13 to 10/15.  She was recently seen by Mary Domínguez for an H&P for an AV node ablation.      Past medical history includes chronic lower extremity venous insufficiency, paroxysmal atrial fibrillation, CAD, hypertension, CVA with right visual field deficit and heart failure.  She recently was seen by Dr. Mcnulty while hospitalized on 10/02/2018.  He discontinued her Tikosyn and started her on rate control medications at which time diltiazem 180 mg was added.  She has not been on anticoagulation prior to her CVA due to a spontaneous retroperitoneal bleed, however, is now on Eliquis 5 mg twice a day.  She was then hospitalized on 10/13 to 10/15 for heart failure.  Her medications were changed.  Dr. Lees was consulted and stated that she would need AV node ablation.      On 10/19, she followed up with Mary Domínguez stating she had more symptoms of shortness of breath and palpitations with walking and with bending over.  Lasix and potassium were added.  She was scheduled for an AV node ablation.      [facility].  She does not know why she is here.  She does weigh herself at home and it was 145 pounds.  She does not remember what it was last week.  She does state that her shortness of breath has improved with the addition of Lasix.  She denies increased shortness of breath, orthopnea, PND,  syncope or near-syncope.      She does cook her own food.  She does not cook processed food.  Mary also discontinued her lisinopril due to hypotension.  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      DIAGNOSTICS:  Basic metabolic panel:  Sodium 139, potassium 4.7, BUN 27, creatinine 1.29, GFR 39.      Last echocardiogram was 10/14/2018 showing LVEF decline to 40%-45%.  Moderate global hypokinesia of the left ventricle.  The right ventricle is normal in structure, function and size.  Pacemaker lead seen in the right ventricle.  She has severe biatrial enlargement.  Mild 1+ mitral regurgitation.  Moderate to moderately severe (2-3+) tricuspid regurgitation.  Normal IVC.  Mild (35-45 mmHg) pulmonary hypertension.  Mild to moderate 1 to 2+ aortic regurgitation.  Mild aortic root dilatation (3.8 cm).  The ascending aorta is mildly dilated at 4.0 cm.  Small bilateral pleural effusions.      Lexiscan stress test 07/02/2014.      ASSESSMENT AND PLAN:   1.  Congestive heart failure.  Recently developed signs and symptoms of congestive heart failure in the setting of atrial fibrillation with suboptimal rate control.  Since starting the Lasix and potassium, she states her shortness of breath has improved.  Lisinopril was stopped due to low blood pressures.  She is scheduled for AV node ablation in a couple of weeks.  I encouraged her to continue weighing herself on a daily basis and call if she develops increased shortness of breath or if her weight goes up more than 2 pounds in a day.  She is currently on metoprolol 100 mg twice a day, furosemide 40 mg daily, and   potassium 10 mEq daily.  She will need an echocardiogram after the ablation.  I have ordered it to be done prior to her return visit with Dr. Rangel on 12/06.      I have reviewed her basic metabolic panel with her and her son.  Hopefully, she can get off these medications after the AV node ablation.      2.  Paroxysmal atrial  fibrillation.  Continue with Eliquis 5 mg twice a day.  After the ablation, we will need to discuss potential Watchman as she has had a CVA and a spontaneous retroperitoneal bleed while on warfarin.  CHADS-VASc score 7.      She will follow up with Dr. Rangel in December after her AV node ablation on .         BARBARA DAWSON, CNP             D: 10/29/2018   T: 10/29/2018   MT: MAGDA      Name:     JOVANNA DE LEÓN   MRN:      [id number]        Account:      [id number]   :      1932           Service Date: 10/29/2018      Document: [id number]         Outpatient Encounter Prescriptions as of 10/29/2018   Medication Sig Dispense Refill     apixaban ANTICOAGULANT (ELIQUIS) 5 MG tablet Take 1 tablet (5 mg) by mouth 2 times daily 180 tablet 3     diltiazem 240 MG 24 hr capsule Take 1 capsule (240 mg) by mouth daily 30 capsule 0     dorzolamide-timolol PF (COSOPT) 22.3-6.8 MG/ML opthalmic solution Place 1 drop into both eyes 2 times daily 10 mL vial       fish oil-omega-3 fatty acids 1000 MG capsule Take 1 g by mouth daily       furosemide (LASIX) 40 MG tablet Take 1 tablet (40 mg) by mouth daily 30 tablet 0     latanoprost (XALATAN) 0.005 % ophthalmic solution Place 1 drop into both eyes At Bedtime       magnesium gluconate (MAGONATE) 500 MG tablet Take 500 mg by mouth daily.         metFORMIN (GLUCOPHAGE-XR) 500 MG 24 hr tablet Take 500 mg by mouth every morning        metoprolol succinate (TOPROL-XL) 100 MG 24 hr tablet Take 1 tablet (100 mg) by mouth 2 times daily 60 tablet 0     multivitamin, therapeutic (THERA-VIT) TABS Take 1 tablet by mouth daily.         potassium chloride SA (K-DUR/KLOR-CON M) 10 MEQ CR tablet Take 1 tablet (10 mEq) by mouth daily 15 tablet 1     sertraline (ZOLOFT) 50 MG tablet Take 25 mg by mouth every evening as needed (Take a half tablet qpm prn)       timolol (TIMOPTIC) 0.5 % ophthalmic solution Place 1 drop into both eyes every morning        calcium  carbonate-vitamin D 500-400 MG-UNIT TABS Take 1 tablet by mouth 2 times daily.         SUMAtriptan Succinate (IMITREX PO) Take 25 mg by mouth as needed.         No facility-administered encounter medications on file as of 10/29/2018.        Again, thank you for allowing me to participate in the care of your patient.      Sincerely,    BARBARA Whittington [facility]

## 2018-10-29 NOTE — PROGRESS NOTES
Service Date: 10/29/2018      HISTORY OF PRESENT ILLNESS:  Zayda Hawkins is an 85-year-old Farsi-speaking female originally from [place] who is accompanied by her son.  She follows with Dr. Brandon and Dr. Rangel.  She initially presented after being hospitalized for a CVA on 09/26 to 10/02 and then rehospitalized for heart failure on 10/13 to 10/15.  She was recently seen by Mary Domínguez for an H&P for an AV node ablation.      Past medical history includes chronic lower extremity venous insufficiency, paroxysmal atrial fibrillation, CAD, hypertension, CVA with right visual field deficit and heart failure.  She recently was seen by Dr. Mcnulty while hospitalized on 10/02/2018.  He discontinued her Tikosyn and started her on rate control medications at which time diltiazem 180 mg was added.  She has not been on anticoagulation prior to her CVA due to a spontaneous retroperitoneal bleed, however, is now on Eliquis 5 mg twice a day.  She was then hospitalized on 10/13 to 10/15 for heart failure.  Her medications were changed.  Dr. Lees was consulted and stated that she would need AV node ablation.      On 10/19, she followed up with Mary Domínguez stating she had more symptoms of shortness of breath and palpitations with walking and with bending over.  Lasix and potassium were added.  She was scheduled for an AV node ablation.      She enrolled in the [facility] today.  She does not know why she is here.  She does weigh herself at home and it was 145 pounds.  She does not remember what it was last week.  She does state that her shortness of breath has improved with the addition of Lasix.  She denies increased shortness of breath, orthopnea, PND, syncope or near-syncope.      She does cook her own food.  She does not cook processed food.  Mary also discontinued her lisinopril due to hypotension.  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      DIAGNOSTICS:  Basic metabolic panel:   Sodium 139, potassium 4.7, BUN 27, creatinine 1.29, GFR 39.      Last echocardiogram was 10/14/2018 showing LVEF decline to 40%-45%.  Moderate global hypokinesia of the left ventricle.  The right ventricle is normal in structure, function and size.  Pacemaker lead seen in the right ventricle.  She has severe biatrial enlargement.  Mild 1+ mitral regurgitation.  Moderate to moderately severe (2-3+) tricuspid regurgitation.  Normal IVC.  Mild (35-45 mmHg) pulmonary hypertension.  Mild to moderate 1 to 2+ aortic regurgitation.  Mild aortic root dilatation (3.8 cm).  The ascending aorta is mildly dilated at 4.0 cm.  Small bilateral pleural effusions.      Lexiscan stress test 07/02/2014.      ASSESSMENT AND PLAN:   1.  Congestive heart failure.  Recently developed signs and symptoms of congestive heart failure in the setting of atrial fibrillation with suboptimal rate control.  Since starting the Lasix and potassium, she states her shortness of breath has improved.  Lisinopril was stopped due to low blood pressures.  She is scheduled for AV node ablation in a couple of weeks.  I encouraged her to continue weighing herself on a daily basis and call if she develops increased shortness of breath or if her weight goes up more than 2 pounds in a day.  She is currently on metoprolol 100 mg twice a day, furosemide 40 mg daily, and   potassium 10 mEq daily.  She will need an echocardiogram after the ablation.  I have ordered it to be done prior to her return visit with Dr. Rangel on 12/06.      I have reviewed her basic metabolic panel with her and her son.  Hopefully, she can get off these medications after the AV node ablation.      2.  Paroxysmal atrial fibrillation.  Continue with Eliquis 5 mg twice a day.  After the ablation, we will need to discuss potential Watchman as she has had a CVA and a spontaneous retroperitoneal bleed while on warfarin.  CHADS-VASc score 7.      3. She will follow up with Dr. Rangel in December after  her AV node ablation on .         VERENA LOVE, BARBARA, CNP             D: 10/29/2018   T: 10/29/2018   MT: MAGDA      Name:     JOVANNA DE LEÓN   MRN:      [id number]        Account:      [id number]   :      1932           Service Date: 10/29/2018      Document: [id number]

## 2018-10-29 NOTE — LETTER
10/29/2018    Al[place] REYES [facility] [address]  [facility] [address]    RE: Zayda Hawkins       Dear Colleague,    I had the pleasure of seeing Zayda Hawkins in the [facility].    HPI and Plan:   See dictation 115    Orders Placed This Encounter   Procedures     Echocardiogram       No orders of the defined types were placed in this encounter.      There are no discontinued medications.      Encounter Diagnosis   Name Primary?     Chronic systolic congestive heart failure (H)        CURRENT MEDICATIONS:  Current Outpatient Prescriptions   Medication Sig Dispense Refill     apixaban ANTICOAGULANT (ELIQUIS) 5 MG tablet Take 1 tablet (5 mg) by mouth 2 times daily 180 tablet 3     diltiazem 240 MG 24 hr capsule Take 1 capsule (240 mg) by mouth daily 30 capsule 0     dorzolamide-timolol PF (COSOPT) 22.3-6.8 MG/ML opthalmic solution Place 1 drop into both eyes 2 times daily 10 mL vial       fish oil-omega-3 fatty acids 1000 MG capsule Take 1 g by mouth daily       furosemide (LASIX) 40 MG tablet Take 1 tablet (40 mg) by mouth daily 30 tablet 0     latanoprost (XALATAN) 0.005 % ophthalmic solution Place 1 drop into both eyes At Bedtime       magnesium gluconate (MAGONATE) 500 MG tablet Take 500 mg by mouth daily.         metFORMIN (GLUCOPHAGE-XR) 500 MG 24 hr tablet Take 500 mg by mouth every morning        metoprolol succinate (TOPROL-XL) 100 MG 24 hr tablet Take 1 tablet (100 mg) by mouth 2 times daily 60 tablet 0     multivitamin, therapeutic (THERA-VIT) TABS Take 1 tablet by mouth daily.         potassium chloride SA (K-DUR/KLOR-CON M) 10 MEQ CR tablet Take 1 tablet (10 mEq) by mouth daily 15 tablet 1     sertraline (ZOLOFT) 50 MG tablet Take 25 mg by mouth every evening as needed (Take a half tablet qpm prn)       timolol (TIMOPTIC) 0.5 % ophthalmic solution Place 1 drop into both eyes every morning        calcium carbonate-vitamin D 500-400  MG-UNIT TABS Take 1 tablet by mouth 2 times daily.         SUMAtriptan Succinate (IMITREX PO) Take 25 mg by mouth as needed.           ALLERGIES     Allergies   Allergen Reactions     Aspirin      Coumadin [Warfarin]      Spontaneous retroperitoneal bleed.     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     Venous insufficiency        PAST SURGICAL HISTORY:  Past Surgical History:   Procedure Laterality Date     ANESTHESIA CARDIOVERSION  7/23/2012    Procedure: ANESTHESIA CARDIOVERSION;;  Surgeon: Provider, Generic Anesthesia;  Location:  ANESTHESIA -  - DO NOT SCHEDULE     BLEPHAROPLASTY  2005     Cataract Extraction with IOL Bilateral 2012     IMPLANT PACEMAKER  11/2012    Dual chamber       FAMILY HISTORY:  Family History   Problem Relation Age of Onset     HEART DISEASE Mother 65     mi     HEART DISEASE Father 45     pnemonia       SOCIAL HISTORY:  Social History     Social History     Marital status:      Spouse name: N/A     Number of children: N/A     Years of education: N/A     Social History Main Topics     Smoking status: Never Smoker     Smokeless tobacco: Never Used     Alcohol use No     Drug use: No     Sexual activity: Not Asked     Other Topics Concern     Caffeine Concern Yes     no caffeine intake     Special Diet Yes     no sugar, salt or fats      Exercise Yes     treadmill, swimming daily      Seat Belt Yes     Social History Narrative       Review of Systems:  Skin:  Negative       Eyes:  Negative      ENT:  Negative      Respiratory:   "Positive for shortness of breath;dyspnea on exertion minimal exertion    Cardiovascular:    Positive for;lightheadedness;palpitations;edema;exercise intolerance;fatigue edema improved since most recent admit, using compressions q.d.   Gastroenterology: Positive for poor appetite    Genitourinary:  Negative      Musculoskeletal:  Positive for arthritis;nocturnal cramping    Neurologic:  Negative      Psychiatric:  Negative      Heme/Lymph/Imm:  Negative      Endocrine:  Positive for diabetes      Physical Exam:  Vitals: /74 (BP Location: Right arm, Patient Position: Chair, Cuff Size: Adult Regular)  Pulse 100  Ht 1.626 m (5' 4\")  Wt 67.8 kg (149 lb 6.4 oz)  SpO2 96%  BMI 25.64 kg/m2    Constitutional:  cooperative frail;thin      Skin:  warm and dry to the touch          Head:  normocephalic, no masses or lesions        Eyes:  pupils equal and round        Lymph:No Cervical lymphadenopathy present     ENT:  no pallor or cyanosis        Neck:  JVP normal        Respiratory:    basal rales       Cardiac:   irregularly irregular rhythm     systolic murmur        pulses full and equal                                        GI:  abdomen soft        Extremities and Muscular Skeletal:  no edema;no deformities, clubbing, cyanosis, erythema observed              Neurological:  no gross motor deficits        Psych:  Alert and Oriented x 3        CC  BARBARA Lay CNP  [address]  [address]  [place] [address]                Thank you for allowing me to participate in the care of your patient.      Sincerely,     BARBARA Whittington CNP     [facility]    cc:   BARBARA Lay CNP  [address]  [address]  [place] [address]        "

## 2018-11-05 ENCOUNTER — TELEPHONE (OUTPATIENT)
Dept: CARDIOLOGY | Facility: CLINIC | Age: 83
End: 2018-11-05

## 2018-11-05 NOTE — TELEPHONE ENCOUNTER
Spoke to pt son who it was discussed that they are not wanting to f/u with [facility] as it was felt that the home care nurse was doing everything that the core nurse discussed.  Pt will be having an AVN on 11/14 and the then will f/u with device going forward.  Pt son also stated that when he spoke to his Mother again she told him that the only time she had GERD was when she would eat and then lay down right after.  He will be seeing her tomorrow.  No further problems. Ricarda

## 2018-11-05 NOTE — TELEPHONE ENCOUNTER
Pt son called and stated that pt has been experiencing GERD and stomach upset and questioning the Eliquis or Diltiazem.  Pt was on Pradaxa in the past and recently switched to Eliquis.  LM for pt son to call back to discuss.  Would like to see if pt is taking medication with food. Ricarda

## 2018-11-09 ENCOUNTER — TELEPHONE (OUTPATIENT)
Dept: CARDIOLOGY | Facility: CLINIC | Age: 83
End: 2018-11-09

## 2018-11-09 RX ORDER — LIDOCAINE 40 MG/G
CREAM TOPICAL
Status: CANCELLED | OUTPATIENT
Start: 2018-11-09

## 2018-11-09 RX ORDER — SODIUM CHLORIDE 450 MG/100ML
INJECTION, SOLUTION INTRAVENOUS CONTINUOUS
Status: CANCELLED | OUTPATIENT
Start: 2018-11-09

## 2018-11-09 NOTE — PROGRESS NOTES
Pt scheduled for AVNA next week on Wednesday. She already has PPM. Called and spoke with patient's son Skyla and sent through all instructions:     Anticoagulation: Eliquis, ChadsVasc of 6, stroke in September 2018, do not stop Eliquis.    Oral diabetes meds: metformin  Insulin: NA  Diuretic: lasix  Contrast allergy: NA  Pt informed to be NPO at midnight    Pt's son will drive patient home and stay with patient for 24 hours after procedure, and son aware pt cannot drive for 24 hours after procedure.   Pt's son aware of arrival time and location, and he verbalized understanding of instructions.

## 2018-11-12 ENCOUNTER — OFFICE VISIT (OUTPATIENT)
Dept: CARDIOLOGY | Facility: CLINIC | Age: 83
End: 2018-11-12

## 2018-11-12 VITALS
HEART RATE: 88 BPM | WEIGHT: 156 LBS | OXYGEN SATURATION: 96 % | SYSTOLIC BLOOD PRESSURE: 118 MMHG | DIASTOLIC BLOOD PRESSURE: 70 MMHG | HEIGHT: 64 IN | BODY MASS INDEX: 26.63 KG/M2

## 2018-11-12 DIAGNOSIS — I50.32 CHRONIC DIASTOLIC HEART FAILURE (H): ICD-10-CM

## 2018-11-12 DIAGNOSIS — E87.6 HYPOKALEMIA: ICD-10-CM

## 2018-11-12 DIAGNOSIS — I50.21 ACUTE SYSTOLIC HEART FAILURE (H): Primary | ICD-10-CM

## 2018-11-12 DIAGNOSIS — I50.9 ACUTE CONGESTIVE HEART FAILURE, UNSPECIFIED HEART FAILURE TYPE (H): ICD-10-CM

## 2018-11-12 DIAGNOSIS — I48.0 PAF (PAROXYSMAL ATRIAL FIBRILLATION) (H): ICD-10-CM

## 2018-11-12 LAB
ANION GAP SERPL CALCULATED.3IONS-SCNC: 10.7 MMOL/L (ref 6–17)
BUN SERPL-MCNC: 27 MG/DL (ref 7–30)
CALCIUM SERPL-MCNC: 9.6 MG/DL (ref 8.5–10.5)
CHLORIDE SERPL-SCNC: 102 MMOL/L (ref 98–107)
CO2 SERPL-SCNC: 31 MMOL/L (ref 23–29)
CREAT SERPL-MCNC: 1.4 MG/DL (ref 0.7–1.3)
GFR SERPL CREATININE-BSD FRML MDRD: 36 ML/MIN/1.7M2
GLUCOSE SERPL-MCNC: 148 MG/DL (ref 70–105)
NT-PROBNP SERPL-MCNC: 2901 PG/ML (ref 0–450)
POTASSIUM SERPL-SCNC: 3.7 MMOL/L (ref 3.5–5.1)
SODIUM SERPL-SCNC: 140 MMOL/L (ref 136–145)

## 2018-11-12 PROCEDURE — 36415 COLL VENOUS BLD VENIPUNCTURE: CPT | Performed by: PHYSICIAN ASSISTANT

## 2018-11-12 PROCEDURE — 83880 ASSAY OF NATRIURETIC PEPTIDE: CPT | Performed by: PHYSICIAN ASSISTANT

## 2018-11-12 PROCEDURE — 80048 BASIC METABOLIC PNL TOTAL CA: CPT | Performed by: PHYSICIAN ASSISTANT

## 2018-11-12 PROCEDURE — 99214 OFFICE O/P EST MOD 30 MIN: CPT | Performed by: PHYSICIAN ASSISTANT

## 2018-11-12 RX ORDER — POTASSIUM CHLORIDE 750 MG/1
20 TABLET, EXTENDED RELEASE ORAL DAILY
Qty: 15 TABLET | Refills: 1
Start: 2018-11-12 | End: 2018-11-26

## 2018-11-12 RX ORDER — FUROSEMIDE 40 MG
40 TABLET ORAL 2 TIMES DAILY
Qty: 60 TABLET | Refills: 3 | Status: ON HOLD | OUTPATIENT
Start: 2018-11-12 | End: 2018-11-14

## 2018-11-12 NOTE — PROGRESS NOTES
Cardiology Progress Note    Date of Service: 11/12/2018      Reason for visit: Seen for increased leg edema; systolic heart failure.     Primary cardiologist: Dr. Kingsley Brandon and Dr. Julio C Rangel ()      HPI:  Ms. Asha Hawkins is a pleasant 85 year old female from [place] with a PMHx including DM, hypertension, and hyperlipidemia, who is a patient of Dr. Brandon and Dr. Rangel of EP. She has had chronic lower extremity venous insufficiency which improved with consistent use of compression socks. She also has a history of tachybrady syndrome for which she had been on Tikosyn for atrial tachyarrhythmias and ultimately received a pacemaker in 2012. Historically, she was not on anticoagulation because of a history of retroperitoneal bleed while on warfarin, despite an elevated CHADS-VASc score of at least 4 (age, gender, hypertension). A Watchman device was offered in 2017 though ultimately it appears this was not pursued. When she was seen by Dr. Brandon in March 2018 she was doing well, and told she could return on a PRN basis.     She was then hospitalized in Sept of this year after suffering an acute left posterior circulation ischemic stroke and received TPA. She was noted to be in afib with RVR. It was recommended she then begin anticoagulation and was initially placed on Pradaxa (though this was since changed to Eliquis). In addition, her Toprol XL was increased and she was placed on cardizem. Her Tikosyn was discontinued. Overall, rate control was recommended. She was then hospitalized again in mid October with increased leg edema and felt to be in acute heart failure. She had pleural effusions on chest xray. Echo at that time showed reduced EF at 40-45% with mild global hypokinesia. The RV was normal size and function but she did have elevated RV pressures and severe bi-atrial enlargement. She had 1+MR, 2-3+ TR, and 1-2+ AR. Troponins were negative. Lisinopril was added to her regimen and Toprol dose was increased  which resulted in improved rate control of her afib. Ultimately it was felt the etiology of her heart failure was related to tachyarrhythmias.     Ms. Asha Hawkins was then seen by Mary Domínguez NP for H&P as an AVN ablation was recommended. She was having continued shortness of breath and palpitations. Lasix and potassium were added and her lisinopril was stopped due to concern for hypotension. She followed up in [facility] on 10/29/18 with Imtiaz Del Angel NP at which time she was feeling better after being placed on diuretics. It was recommended she weigh herself daily but no changes were made. [place] [facility] [place] However, I am seeing her today as a work-in at the request of the family due to concerns for worsening swelling.     Our visit today is performed mostly by communication with her son, La, who accompanies her today long with an . Reportedly, he noted her legs appeared more swollen 3-4 days ago. Her son's wife is an internist and directed her to increase her lasix to 40mg BID over the weekend, along with doubling her potassium supplement, which she did. Her son thinks maybe this improved her leg edema somewhat, but she is still not weighing herself reliably. On our scale today, she is up 7 lbs from her visit 2 weeks ago. The patient denies significant dyspnea, orthopnea, or dizziness/presyncope. It is hard to get a good feel for her diet as she still cooks for herself and it does not appear that she restricts sodium in any particular fashion. She is scheduled for her ablation later this week, and her family is concerned regarding the fluid buildup.    Of note, the last ischemic evaluation I can identify includes a CT coronary angiogram in 2012 showing a mild lesion in the LAD, as well as 1st diagonal, and mild plaque in the proximal and  mid RCA. She had a lexiscan in 2014 which showed normal myocardial perfusion.       ASSESSMENT/PLAN:    1. Acute  systolic and chronic diastolic heart failure.   --Most recent echo Oct 2018 with EF 40-45% and moderate global hypokinesia of the LV. She has mild valvular dysfunction as noted (1+MR, 2-3+ TR, and 1-2+ AR) in the setting of volume overload. Prior echo Jan 2017 showed normal EF 55-60%, with noted advanced diastolic dysfunction and elevated RVSP.    --At this point, this is presumed tachymediated with recent hospitalization for afib with RVR. She is planned to undergo AVN ablation later this week.   --In the meantime, will keep her lasix at 40mg BID along with increased dose of KDur. I checked a BMP after her visit which showed a slight bump in her SCr up to 1.40 but her BUN is still 27. She has significant pitting edema on exam. Repeat labs will be performed at the time of her ablation.   --I've asked her to weigh daily and keep a log. She will continue compression socks daily, and we will likely have to have a more in depth discussion next visit about sodium restriction. I'd like her to return to [facility] in 2 weeks for further evaluation. She also has a repeat echo scheduled for December after her ablation.    --The son inquired today about whether she may have ischemic disease. As above, she had a Lexiscan in 2014 with normal perfusion and she is free of angina. There are no specific regional WMAs mentioned on echo. I told him we could revisit this in the future if no improvement after her afib is controlled.     2. Paroxysmal afib.   --Recent hospitalization for acute CVA, possibly cardioembolic. She is now on Eliquis which we will continue. Monitor closely for bleeding due to her hx of RP bleed on warfarin. We may need to revisit the idea of a Watchman device.   --Rate controlled today on diltiazem, metoprolol. She has an AVN ablation planned later this week, and will continue to f/u with EP.     Follow up plan: In [facility] in 2 weeks for close follow up of her heart failure symptoms. With EP as directed after  ablation.      Orders this Visit:  Orders Placed This Encounter   Procedures     Basic metabolic panel     Basic metabolic panel     N terminal pro BNP outpatient     Follow-Up with [facility] - MAGALY visit     Orders Placed This Encounter   Medications     furosemide (LASIX) 40 MG tablet     Sig: Take 1 tablet (40 mg) by mouth 2 times daily     Dispense:  60 tablet     Refill:  3     Do not fill until patient calls     potassium chloride SA (K-DUR/KLOR-CON M) 10 MEQ CR tablet     Sig: Take 2 tablets (20 mEq) by mouth daily     Dispense:  15 tablet     Refill:  1     Medications Discontinued During This Encounter   Medication Reason     furosemide (LASIX) 40 MG tablet Reorder     potassium chloride SA (K-DUR/KLOR-CON M) 10 MEQ CR tablet Reorder           CURRENT MEDICATIONS:  Current Outpatient Prescriptions   Medication Sig Dispense Refill     apixaban ANTICOAGULANT (ELIQUIS) 5 MG tablet Take 1 tablet (5 mg) by mouth 2 times daily 180 tablet 3     calcium carbonate-vitamin D 500-400 MG-UNIT TABS Take 1 tablet by mouth 2 times daily.         diltiazem 240 MG 24 hr capsule Take 1 capsule (240 mg) by mouth daily 30 capsule 0     dorzolamide-timolol PF (COSOPT) 22.3-6.8 MG/ML opthalmic solution Place 1 drop into both eyes 2 times daily 10 mL vial       fish oil-omega-3 fatty acids 1000 MG capsule Take 1 g by mouth daily       furosemide (LASIX) 40 MG tablet Take 1 tablet (40 mg) by mouth 2 times daily 60 tablet 3     latanoprost (XALATAN) 0.005 % ophthalmic solution Place 1 drop into both eyes At Bedtime       magnesium gluconate (MAGONATE) 500 MG tablet Take 500 mg by mouth daily.         metFORMIN (GLUCOPHAGE-XR) 500 MG 24 hr tablet Take 500 mg by mouth every morning        metoprolol succinate (TOPROL-XL) 100 MG 24 hr tablet Take 1 tablet (100 mg) by mouth 2 times daily 60 tablet 0     multivitamin, therapeutic (THERA-VIT) TABS Take 1 tablet by mouth daily.         potassium chloride SA (K-DUR/KLOR-CON M) 10 MEQ CR  tablet Take 2 tablets (20 mEq) by mouth daily 15 tablet 1     sertraline (ZOLOFT) 50 MG tablet Take 25 mg by mouth every evening as needed (Take a half tablet qpm prn)       SUMAtriptan Succinate (IMITREX PO) Take 25 mg by mouth as needed.         timolol (TIMOPTIC) 0.5 % ophthalmic solution Place 1 drop into both eyes every morning        [DISCONTINUED] furosemide (LASIX) 40 MG tablet Take 1 tablet (40 mg) by mouth daily 30 tablet 0       ALLERGIES     Allergies   Allergen Reactions     Aspirin      Coumadin [Warfarin]      Spontaneous retroperitoneal bleed.     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     Venous insufficiency        PAST SURGICAL HISTORY:  Past Surgical History:   Procedure Laterality Date     ANESTHESIA CARDIOVERSION  7/23/2012    Procedure: ANESTHESIA CARDIOVERSION;;  Surgeon: Provider, Generic Anesthesia;  Location:  ANESTHESIA - [facility] - DO NOT SCHEDULE     BLEPHAROPLASTY  2005     Cataract Extraction with IOL Bilateral 2012     IMPLANT PACEMAKER  11/2012    Dual chamber       FAMILY HISTORY:  Family History   Problem Relation Age of Onset     HEART DISEASE Mother 65     mi     HEART DISEASE Father 45     pnemonia       SOCIAL HISTORY:  Social History     Social History     Marital status:      Spouse name: N/A     Number of children: N/A     Years of education: N/A     Social History Main Topics     Smoking status: Never Smoker     Smokeless tobacco: Never Used     Alcohol use No     Drug use: No     Sexual activity: Not on file     Other  "Topics Concern     Caffeine Concern Yes     no caffeine intake     Special Diet Yes     no sugar, salt or fats      Exercise Yes     treadmill, swimming daily      Seat Belt Yes     Social History Narrative       Review of Systems:  Cardiovascular: negative for chest pain, palpitations, orthopnea, pos LE edema  Constitutional: negative for chills, sweats, fevers   Resp: Negative for dyspnea at rest, dyspnea on exertion, cough, known chronic lung disease  HEENT: Negative for new visual changes, frequent headaches  Gastrointestinal: negative for abdominal pain, diarrhea, nausea, vomiting  Hematologic/lymphatic: pos for current systemic anticoagulation, hx CVA  Musculoskeletal: negative for new back pain, joint pain  Neurological: negative for focal weakness, LOC, seizures, syncope.presyncope.      Physical Exam:  Vitals: /70  Pulse 88  Ht 1.626 m (5' 4\")  Wt 70.8 kg (156 lb)  SpO2 96%  BMI 26.78 kg/m2   Wt Readings from Last 4 Encounters:   11/12/18 70.8 kg (156 lb)   10/29/18 67.8 kg (149 lb 6.4 oz)   10/19/18 67.1 kg (148 lb)   10/16/18 65.5 kg (144 lb 6 oz)       GEN:  In general, this is a well nourished female in no acute distress on room air.  Patient accompanied by her son and .  HEENT:  Pupils equal, round. Sclerae nonicteric.   NECK: Supple, no masses appreciated. Trachea midline. +JVD.  C/V:  Irregular rhythm. 1/6 RAINA. No rub or gallop. No S3 or RV heave.   RESP: Respirations are unlabored. No use of accessory muscles. Clear to auscultation bilaterally without wheezing, rales, or rhonchi.  GI: Abdomen soft, nontender, not significantly distended.  EXTREM: 2+ bilateral pitting LE edema to knees. No cyanosis or clubbing.  NEURO: Alert and oriented, cooperative. Gait not formally assessed. No obvious focal deficits.   PSYCH: Normal affect.  SKIN: Warm and dry. No rashes or petechiae appreciated.       Recent Lab Results:  LIPID RESULTS:  Lab Results   Component Value Date    CHOL 125 " 09/27/2018    HDL 65 09/27/2018    LDL 51 09/27/2018    TRIG 47 09/27/2018     CBC RESULTS:  Lab Results   Component Value Date    WBC 6.3 10/15/2018    RBC 4.78 10/15/2018    HGB 13.8 10/15/2018    HCT 43.4 10/15/2018    MCV 91 10/15/2018    MCH 28.9 10/15/2018    MCHC 31.8 10/15/2018    RDW 14.0 10/15/2018     10/15/2018       BMP RESULTS:  Lab Results   Component Value Date     11/12/2018    POTASSIUM 3.7 11/12/2018    CHLORIDE 102 11/12/2018    CO2 31 (H) 11/12/2018    ANIONGAP 10.7 11/12/2018     (H) 11/12/2018    BUN 27 11/12/2018    CR 1.40 (H) 11/12/2018    GFRESTIMATED 36 (L) 11/12/2018    GFRESTBLACK 43 (L) 11/12/2018    MADDISON 9.6 11/12/2018      Results for JOVANNA DE LEÓN (MRN [id number]) as of 11/12/2018 16:10   Ref. Range 10/29/2018 09:13 11/12/2018 11:03   N-Terminal Pro Bnp Latest Ref Range: 0 - 450 pg/mL 2830 (H) 2901 (H)           New/Pertinent imaging results since last visit:  10/14/18  Echo  Interpretation Summary     Left ventricular systolic function is moderately reduced.  The visual ejection fraction is estimated at 40-45%.  The ejection fraction is estimated at 41% by Cano's biplane method.  There is moderate global hypokinesia of the left ventricle.  The right ventricle is normal in structure, function and size.  There is a catheter/pacemaker lead seen in the right ventricle.  There is severe biatrial enlargement.  Pacer wire in right atrium  There is mild (1+) mitral regurgitation.  There is moderate to mod-severe (2-3+) tricuspid regurgitation.  Normal IVC (1.5-2.5cm) with <50% respiratory collapse; right atrial pressure  is estimated at 10-15mmHg.  Mild (35-45mmHg) pulmonary hypertension is present.  There is mild to moderate (1-2+) aortic regurgitation.  Mild aortic root dilatation (3.8 cm).  The ascending aorta is mildly dilated (4.0 cm).  Small bilateral pleural effusion  The rhythm was atrial fibrillation with controlled ventricular rate at  rest.  Compared to the prior study dated 1/31/2017, there are changes as noted. The  rhythm has changed from NSR to atrial fibrillation. Significant global  hypokinesis of the left ventricle is now seen. Pulmonary hypertension is  present but RVSP less significant than previous. Clinical correlation  suggested.        Christel Villatoro PA-C  [facility]  Pager [phone]

## 2018-11-12 NOTE — PATIENT INSTRUCTIONS
Visit Summary:    Today we discussed:   You may stay on the lasix 40mg twice daily, and the increased dose of KDur 20meq daily.  We will check your kidney function today on your way out.     Follow up:   With the EP team after your ablation as planned.      Please call my nurse Lissy at [phone] with any questions or concerns.

## 2018-11-12 NOTE — LETTER
11/12/2018    [facility] [address]  [facility] [address]    RE: Zayda Hawkins       Dear Colleague,    I had the pleasure of seeing Z[facility]da [facility]wkins [facility].      Cardiology Progress Note    Date of Service: 11/12/2018      Reason for visit: Seen for increased leg edema; systolic heart failure.     Primary cardiologist: Dr. Kingsley Brandon and Dr. Julio C Rangel ()      HPI:  Ms. Asha Hawkins is a pleasant 85 year old female from [place] with a PMHx including DM, hypertension, and hyperlipidemia, who is a patient of Dr. Brandon and Dr. Rangel of EP. She has had chronic lower extremity venous insufficiency which improved with consistent use of compression socks. She also has a history of tachybrady syndrome for which she had been on Tikosyn for atrial tachyarrhythmias and ultimately received a pacemaker in 2012. Historically, she was not on anticoagulation because of a history of retroperitoneal bleed while on warfarin, despite an elevated CHADS-VASc score of at least 4 (age, gender, hypertension). A Watchman device was offered in 2017 though ultimately it appears this was not pursued. When she was seen by Dr. Brandon in March 2018 she was doing well, and told she could return on a PRN basis.     She was then hospitalized in Sept of this year after suffering an acute left posterior circulation ischemic stroke and received TPA. She was noted to be in afib with RVR. It was recommended she then begin anticoagulation and was initially placed on Pradaxa (though this was since changed to Eliquis). In addition, her Toprol XL was increased and she was placed on cardizem. Her Tikosyn was discontinued. Overall, rate control was recommended. She was then hospitalized again in mid October with increased leg edema and felt to be in acute heart failure. She had pleural effusions on chest xray. Echo at that time showed reduced EF at 40-45% with mild  global hypokinesia. The RV was normal size and function but she did have elevated RV pressures and severe bi-atrial enlargement. She had 1+MR, 2-3+ TR, and 1-2+ AR. Troponins were negative. Lisinopril was added to her regimen and Toprol dose was increased which resulted in improved rate control of her afib. Ultimately it was felt the etiology of her heart failure was related to tachyarrhythmias.     Ms. Asha Hawkins was then seen by Mary Domínguez NP for H&P as an AVN ablation was recommended. She was having continued shortness of breath and palpitations. Lasix and potassium were added and her lisinopril was stopped due to concern for hypotension. She followed up in the [facility] on 10/29/18 with Imtiaz Del Angel NP at which time she was feeling better after being placed on diuretics. It was recommended she weigh herself daily but no changes were made. She declined to follow up in [facility] at that point. However, I am seeing her today as a work-in at the request of the family due to concerns for worsening swelling.     Our visit today is performed mostly by communication with her son, La, who accompanies her today long with an . Reportedly, he noted her legs appeared more swollen 3-4 days ago. Her son's wife is an internist and directed her to increase her lasix to 40mg BID over the weekend, along with doubling her potassium supplement, which she did. Her son thinks maybe this improved her leg edema somewhat, but she is still not weighing herself reliably. On our scale today, she is up 7 lbs from her visit 2 weeks ago. The patient denies significant dyspnea, orthopnea, or dizziness/presyncope. It is hard to get a good feel for her diet as she still cooks for herself and it does not appear that she restricts sodium in any particular fashion. She is scheduled for her ablation later this week, and her family is concerned regarding the fluid buildup.    Of note, the last ischemic evaluation I can  identify includes a CT coronary angiogram in 2012 showing a mild lesion in the LAD, as well as 1st diagonal, and mild plaque in the proximal and  mid RCA. She had a lexiscan in 2014 which showed normal myocardial perfusion.       ASSESSMENT/PLAN:    1. Acute systolic and chronic diastolic heart failure.   --Most recent echo Oct 2018 with EF 40-45% and moderate global hypokinesia of the LV. She has mild valvular dysfunction as noted (1+MR, 2-3+ TR, and 1-2+ AR) in the setting of volume overload. Prior echo Jan 2017 showed normal EF 55-60%, with noted advanced diastolic dysfunction and elevated RVSP.    --At this point, this is presumed tachymediated with recent hospitalization for afib with RVR. She is planned to undergo AVN ablation later this week.   --In the meantime, will keep her lasix at 40mg BID along with increased dose of KDur. I checked a BMP after her visit which showed a slight bump in her SCr up to 1.40 but her BUN is still 27. She has significant pitting edema on exam. Repeat labs will be performed at the time of her ablation.   --I've asked her to weigh daily and keep a log. She will continue compression socks daily, and we will likely have to have a more in depth discussion next visit about sodium restriction. I'd like her to return to [facility] in 2 weeks for further evaluation. She also has a repeat echo scheduled for December after her ablation.    --The son inquired today about whether she may have ischemic disease. As above, she had a Lexiscan in 2014 with normal perfusion and she is free of angina. There are no specific regional WMAs mentioned on echo. I told him we could revisit this in the future if no improvement after her afib is controlled.     2. Paroxysmal afib.   --Recent hospitalization for acute CVA, possibly cardioembolic. She is now on Eliquis which we will continue. Monitor closely for bleeding due to her hx of RP bleed on warfarin. We may need to revisit the idea of a Watchman  device.   --Rate controlled today on diltiazem, metoprolol. She has an AVN ablation planned later this week, and will continue to f/u with EP.     Follow up plan: In [facility] clinic in 2 weeks for close follow up of her heart failure symptoms. With EP as directed after ablation.      Orders this Visit:  Orders Placed This Encounter   Procedures     Basic metabolic panel     Basic metabolic panel     N terminal pro BNP outpatient     Follow-Up with [facility] - MAGALY visit     Orders Placed This Encounter   Medications     furosemide (LASIX) 40 MG tablet     Sig: Take 1 tablet (40 mg) by mouth 2 times daily     Dispense:  60 tablet     Refill:  3     Do not fill until patient calls     potassium chloride SA (K-DUR/KLOR-CON M) 10 MEQ CR tablet     Sig: Take 2 tablets (20 mEq) by mouth daily     Dispense:  15 tablet     Refill:  1     Medications Discontinued During This Encounter   Medication Reason     furosemide (LASIX) 40 MG tablet Reorder     potassium chloride SA (K-DUR/KLOR-CON M) 10 MEQ CR tablet Reorder           CURRENT MEDICATIONS:  Current Outpatient Prescriptions   Medication Sig Dispense Refill     apixaban ANTICOAGULANT (ELIQUIS) 5 MG tablet Take 1 tablet (5 mg) by mouth 2 times daily 180 tablet 3     calcium carbonate-vitamin D 500-400 MG-UNIT TABS Take 1 tablet by mouth 2 times daily.         diltiazem 240 MG 24 hr capsule Take 1 capsule (240 mg) by mouth daily 30 capsule 0     dorzolamide-timolol PF (COSOPT) 22.3-6.8 MG/ML opthalmic solution Place 1 drop into both eyes 2 times daily 10 mL vial       fish oil-omega-3 fatty acids 1000 MG capsule Take 1 g by mouth daily       furosemide (LASIX) 40 MG tablet Take 1 tablet (40 mg) by mouth 2 times daily 60 tablet 3     latanoprost (XALATAN) 0.005 % ophthalmic solution Place 1 drop into both eyes At Bedtime       magnesium gluconate (MAGONATE) 500 MG tablet Take 500 mg by mouth daily.         metFORMIN (GLUCOPHAGE-XR) 500 MG 24 hr tablet Take 500 mg by mouth  every morning        metoprolol succinate (TOPROL-XL) 100 MG 24 hr tablet Take 1 tablet (100 mg) by mouth 2 times daily 60 tablet 0     multivitamin, therapeutic (THERA-VIT) TABS Take 1 tablet by mouth daily.         potassium chloride SA (K-DUR/KLOR-CON M) 10 MEQ CR tablet Take 2 tablets (20 mEq) by mouth daily 15 tablet 1     sertraline (ZOLOFT) 50 MG tablet Take 25 mg by mouth every evening as needed (Take a half tablet qpm prn)       SUMAtriptan Succinate (IMITREX PO) Take 25 mg by mouth as needed.         timolol (TIMOPTIC) 0.5 % ophthalmic solution Place 1 drop into both eyes every morning        [DISCONTINUED] furosemide (LASIX) 40 MG tablet Take 1 tablet (40 mg) by mouth daily 30 tablet 0       ALLERGIES     Allergies   Allergen Reactions     Aspirin      Coumadin [Warfarin]      Spontaneous retroperitoneal bleed.     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     Venous insufficiency        PAST SURGICAL HISTORY:  Past Surgical History:   Procedure Laterality Date     ANESTHESIA CARDIOVERSION  7/23/2012    Procedure: ANESTHESIA CARDIOVERSION;;  Surgeon: Provider, Generic Anesthesia;  Location:  ANESTHESIA - RH - DO NOT SCHEDULE     BLEPHAROPLASTY  2005     Cataract Extraction with IOL Bilateral 2012     IMPLANT PACEMAKER  11/2012    Dual chamber       FAMILY HISTORY:  Family History   Problem Relation Age of Onset     HEART DISEASE Mother 65     mi     HEART DISEASE Father 45     pnemonia       SOCIAL HISTORY:  Social History     Social History     Marital  "status:      Spouse name: N/A     Number of children: N/A     Years of education: N/A     Social History Main Topics     Smoking status: Never Smoker     Smokeless tobacco: Never Used     Alcohol use No     Drug use: No     Sexual activity: Not on file     Other Topics Concern     Caffeine Concern Yes     no caffeine intake     Special Diet Yes     no sugar, salt or fats      Exercise Yes     treadmill, swimming daily      Seat Belt Yes     Social History Narrative       Review of Systems:  Cardiovascular: negative for chest pain, palpitations, orthopnea, pos LE edema  Constitutional: negative for chills, sweats, fevers   Resp: Negative for dyspnea at rest, dyspnea on exertion, cough, known chronic lung disease  HEENT: Negative for new visual changes, frequent headaches  Gastrointestinal: negative for abdominal pain, diarrhea, nausea, vomiting  Hematologic/lymphatic: pos for current systemic anticoagulation, hx CVA  Musculoskeletal: negative for new back pain, joint pain  Neurological: negative for focal weakness, LOC, seizures, syncope.presyncope.      Physical Exam:  Vitals: /70  Pulse 88  Ht 1.626 m (5' 4\")  Wt 70.8 kg (156 lb)  SpO2 96%  BMI 26.78 kg/m2   Wt Readings from Last 4 Encounters:   11/12/18 70.8 kg (156 lb)   10/29/18 67.8 kg (149 lb 6.4 oz)   10/19/18 67.1 kg (148 lb)   10/16/18 65.5 kg (144 lb 6 oz)       GEN:  In general, this is a well nourished female in no acute distress on room air.  Patient accompanied by her son and .  HEENT:  Pupils equal, round. Sclerae nonicteric.   NECK: Supple, no masses appreciated. Trachea midline. +JVD.  C/V:  Irregular rhythm. 1/6 RAINA. No rub or gallop. No S3 or RV heave.   RESP: Respirations are unlabored. No use of accessory muscles. Clear to auscultation bilaterally without wheezing, rales, or rhonchi.  GI: Abdomen soft, nontender, not significantly distended.  EXTREM: 2+ bilateral pitting LE edema to knees. No cyanosis or " clubbing.  NEURO: Alert and oriented, cooperative. Gait not formally assessed. No obvious focal deficits.   PSYCH: Normal affect.  SKIN: Warm and dry. No rashes or petechiae appreciated.       Recent Lab Results:  LIPID RESULTS:  Lab Results   Component Value Date    CHOL 125 09/27/2018    HDL 65 09/27/2018    LDL 51 09/27/2018    TRIG 47 09/27/2018     CBC RESULTS:  Lab Results   Component Value Date    WBC 6.3 10/15/2018    RBC 4.78 10/15/2018    HGB 13.8 10/15/2018    HCT 43.4 10/15/2018    MCV 91 10/15/2018    MCH 28.9 10/15/2018    MCHC 31.8 10/15/2018    RDW 14.0 10/15/2018     10/15/2018       BMP RESULTS:  Lab Results   Component Value Date     11/12/2018    POTASSIUM 3.7 11/12/2018    CHLORIDE 102 11/12/2018    CO2 31 (H) 11/12/2018    ANIONGAP 10.7 11/12/2018     (H) 11/12/2018    BUN 27 11/12/2018    CR 1.40 (H) 11/12/2018    GFRESTIMATED 36 (L) 11/12/2018    GFRESTBLACK 43 (L) 11/12/2018    MADDISON 9.6 11/12/2018      Results for JOVANNA DE LEÓN (MRN [id number]) as of 11/12/2018 16:10   Ref. Range 10/29/2018 09:13 11/12/2018 11:03   N-Terminal Pro Bnp Latest Ref Range: 0 - 450 pg/mL 2830 (H) 2901 (H)           New/Pertinent imaging results since last visit:  10/14/18  Echo  Interpretation Summary     Left ventricular systolic function is moderately reduced.  The visual ejection fraction is estimated at 40-45%.  The ejection fraction is estimated at 41% by Cano's biplane method.  There is moderate global hypokinesia of the left ventricle.  The right ventricle is normal in structure, function and size.  There is a catheter/pacemaker lead seen in the right ventricle.  There is severe biatrial enlargement.  Pacer wire in right atrium  There is mild (1+) mitral regurgitation.  There is moderate to mod-severe (2-3+) tricuspid regurgitation.  Normal IVC (1.5-2.5cm) with <50% respiratory collapse; right atrial pressure  is estimated at 10-15mmHg.  Mild (35-45mmHg) pulmonary  hypertension is present.  There is mild to moderate (1-2+) aortic regurgitation.  Mild aortic root dilatation (3.8 cm).  The ascending aorta is mildly dilated (4.0 cm).  Small bilateral pleural effusion  The rhythm was atrial fibrillation with controlled ventricular rate at rest.  Compared to the prior study dated 1/31/2017, there are changes as noted. The  rhythm has changed from NSR to atrial fibrillation. Significant global  hypokinesis of the left ventricle is now seen. Pulmonary hypertension is  present but RVSP less significant than previous. Clinical correlation  suggested.        Christel Villatoro PA-C  [facility]  Pager [phone]      Thank you for allowing me to participate in the care of your patient.      Sincerely,     DONATO Zamorano     [facility]

## 2018-11-12 NOTE — MR AVS SNAPSHOT
After Visit Summary   11/12/2018    Zayda Hawkins    MRN: [id number]           Patient Information     Date Of Birth          12/24/1932        Visit Information        Provider Department      11/12/2018 9:55 AM Christel Villatoro PA; LANGUAGE [facility]   [place]        Today's Diagnoses     Acute congestive heart failure        Hypokalemia          Care Instructions    Visit Summary:    Today we discussed:   You may stay on the lasix 40mg twice daily, and the increased dose of KDur 20meq daily.  We will check your kidney function today on your way out.     Follow up:   With the EP team after your ablation as planned.      Please call my nurse Lissy at [phone] with any questions or concerns.                 Follow-ups after your visit        Additional Services     Follow-Up with [facility] - MAGALY visit                 Your next 10 appointments already scheduled     Nov 14, 2018  8:15 AM CST   Ep 90 Minute with [id number]   [facility] ([facility])    [address]  [place] [id number] [phone]            Dec 04, 2018  9:45 AM CST   Ech Complete with [id number] ([facility])    [address]  [facility] [address] [phone]           1.  Please bring or wear a comfortable two-piece outfit. 2.  You may eat, drink and take your normal medicines. 3.  For any questions that cannot be answered, please contact the ordering physician 4.  Please do not wear perfumes or scented lotions on the day of your exam. ***Please check-in at the [place] Registration Office located in [address] in the [facility] building. When you are finished registering, please go to [address] and have a seat. The technician will call your name for the test.            Dec 06, 2018  3:30 PM CST   [place] with CARBALLO TECH[address] [place] [address]  "[facility] ([facility])    [address]  [place] [address] [phone] OPT 2            Dec 06, 2018  3:45 PM CST   [facility] EP RETURN with Julio C Palacios MD   [facility] ([facility])    [address]  [place] [address]   [phone] OPT 2              Future tests that were ordered for you today     Open Future Orders        Priority Expected Expires Ordered    Follow-Up with [facility] - MAGALY visit Routine 11/26/2018 11/12/2019 11/12/2018    Basic metabolic panel Routine 11/26/2018 11/12/2019 11/12/2018    N terminal pro BNP outpatient Routine 11/26/2018 11/12/2019 11/12/2018    Basic metabolic panel Routine 11/12/2018 11/12/2019 11/12/2018            Who to contact     If you have questions or need follow up information about today's clinic visit or your schedule please contact [facility] directly at [phone].  Normal or non-critical lab and imaging results will be communicated to you by MyChart, letter or phone within 4 business days after the clinic has received the results. If you do not hear from us within 7 days, please contact the clinic through MyChart or phone. If you have a critical or abnormal lab result, we will notify you by phone as soon as possible.  Submit refill requests through [employer] or call your pharmacy and they will forward the refill request to us. Please allow 3 business days for your refill to be completed.          Additional Information About Your Visit        Care EveryWhere ID     This is your Care EveryWhere ID. This could be used by other organizations to access your [facility] medical records  [id number]        Your Vitals Were     Pulse Height Pulse Oximetry BMI (Body Mass Index)          88 1.626 m (5' 4\") 96% 26.78 kg/m2         Blood Pressure from Last 3 Encounters:   11/12/18 118/70   10/29/18 120/74   10/19/18 " 102/68    Weight from Last 3 Encounters:   11/12/18 70.8 kg (156 lb)   10/29/18 67.8 kg (149 lb 6.4 oz)   10/19/18 67.1 kg (148 lb)                 Today's Medication Changes          These changes are accurate as of 11/12/18 10:47 AM.  If you have any questions, ask your nurse or doctor.               These medicines have changed or have updated prescriptions.        Dose/Directions    furosemide 40 MG tablet   Commonly known as:  LASIX   This may have changed:  when to take this   Used for:  Acute congestive heart failure, unspecified heart failure type (H)        Dose:  40 mg   Take 1 tablet (40 mg) by mouth 2 times daily   Quantity:  60 tablet   Refills:  3       potassium chloride SA 10 MEQ CR tablet   Commonly known as:  K-DUR/KLOR-CON M   This may have changed:  how much to take   Used for:  Hypokalemia        Dose:  20 mEq   Take 2 tablets (20 mEq) by mouth daily   Quantity:  15 tablet   Refills:  1            Where to get your medicines      These medications were sent to [facility] [address]  [address], [facility] [address]     Phone:  [phone]     furosemide 40 MG tablet         Some of these will need a paper prescription and others can be bought over the counter.  Ask your nurse if you have questions.     You don't need a prescription for these medications     potassium chloride SA 10 MEQ CR tablet                Primary Care Provider Office Phone # Fax #    Altonankit Willoughby [phone]       [facility] [address]  [facility] [address]        Equal Access to Services     [facility] AH: Hadii aad ku hadasho Soomaali, waaxda luqadaha, qaybta kaalmada adeegyada, karissa yousif. So [facility] [phone].    ATENCIÓN: Si habla español, tiene a hoffmann disposición servicios gratuitos de asistencia lingüística. Llame al [phone].    We comply with applicable federal civil rights laws and [place] laws.  We do not discriminate on the basis of race, color, national origin, age, disability, sex, sexual orientation, or gender identity.            Thank you!     Thank you for choosing [facility]  for your care. Our goal is always to provide you with excellent care. Hearing back from our patients is one way we can continue to improve our services. Please take a few minutes to complete the written survey that you may receive in the mail after your visit with us. Thank you!             Your Updated Medication List - Protect others around you: Learn how to safely use, store and throw away your medicines at www.disposemymeds.org.          This list is accurate as of 11/12/18 10:47 AM.  Always use your most recent med list.                   Brand Name Dispense Instructions for use Diagnosis    apixaban ANTICOAGULANT 5 MG tablet    ELIQUIS    180 tablet    Take 1 tablet (5 mg) by mouth 2 times daily    A-fib (H)       calcium carbonate-vitamin D 500-400 MG-UNIT Tabs per tablet      Take 1 tablet by mouth 2 times daily.        diltiazem 240 MG 24 hr capsule     30 capsule    Take 1 capsule (240 mg) by mouth daily    Paroxysmal atrial fibrillation (H)       dorzolamide-timolol PF 22.3-6.8 MG/ML opthalmic solution    COSOPT     Place 1 drop into both eyes 2 times daily 10 mL vial        fish oil-omega-3 fatty acids 1000 MG capsule      Take 1 g by mouth daily        furosemide 40 MG tablet    LASIX    60 tablet    Take 1 tablet (40 mg) by mouth 2 times daily    Acute congestive heart failure, unspecified heart failure type (H)       IMITREX PO      Take 25 mg by mouth as needed.        latanoprost 0.005 % ophthalmic solution    XALATAN     Place 1 drop into both eyes At Bedtime        magnesium gluconate 500 MG tablet    MAGONATE     Take 500 mg by mouth daily.        metFORMIN 500 MG 24 hr tablet    GLUCOPHAGE-XR     Take 500 mg by mouth every morning        metoprolol succinate 100 MG 24  hr tablet    TOPROL-XL    60 tablet    Take 1 tablet (100 mg) by mouth 2 times daily    Chronic atrial fibrillation (H)       multivitamin, therapeutic Tabs tablet      Take 1 tablet by mouth daily.        potassium chloride SA 10 MEQ CR tablet    K-DUR/KLOR-CON M    15 tablet    Take 2 tablets (20 mEq) by mouth daily    Hypokalemia       sertraline 50 MG tablet    ZOLOFT     Take 25 mg by mouth every evening as needed (Take a half tablet qpm prn)        timolol 0.5 % ophthalmic solution    TIMOPTIC     Place 1 drop into both eyes every morning

## 2018-11-14 ENCOUNTER — APPOINTMENT (OUTPATIENT)
Dept: CARDIOLOGY | Facility: CLINIC | Age: 83
End: 2018-11-14
Attending: NURSE PRACTITIONER
Payer: COMMERCIAL

## 2018-11-14 ENCOUNTER — HOSPITAL ENCOUNTER (OUTPATIENT)
Facility: CLINIC | Age: 83
Setting detail: OBSERVATION
Discharge: HOME | End: 2018-11-15
Attending: INTERNAL MEDICINE | Admitting: INTERNAL MEDICINE
Payer: COMMERCIAL

## 2018-11-14 DIAGNOSIS — I48.19 PERSISTENT ATRIAL FIBRILLATION (H): ICD-10-CM

## 2018-11-14 DIAGNOSIS — I48.20 CHRONIC ATRIAL FIBRILLATION (H): ICD-10-CM

## 2018-11-14 DIAGNOSIS — I50.40 COMBINED SYSTOLIC AND DIASTOLIC CONGESTIVE HEART FAILURE, UNSPECIFIED HF CHRONICITY (H): Primary | ICD-10-CM

## 2018-11-14 LAB
ANION GAP SERPL CALCULATED.3IONS-SCNC: 6 MMOL/L (ref 3–14)
BUN SERPL-MCNC: 21 MG/DL (ref 7–30)
CALCIUM SERPL-MCNC: 8.7 MG/DL (ref 8.5–10.1)
CHLORIDE SERPL-SCNC: 105 MMOL/L (ref 94–109)
CO2 SERPL-SCNC: 29 MMOL/L (ref 20–32)
CREAT SERPL-MCNC: 1.24 MG/DL (ref 0.52–1.04)
ERYTHROCYTE [DISTWIDTH] IN BLOOD BY AUTOMATED COUNT: 13.8 % (ref 10–15)
GFR SERPL CREATININE-BSD FRML MDRD: 41 ML/MIN/1.7M2
GLUCOSE BLDC GLUCOMTR-MCNC: 135 MG/DL (ref 70–99)
GLUCOSE SERPL-MCNC: 161 MG/DL (ref 70–99)
HCT VFR BLD AUTO: 43.4 % (ref 35–47)
HGB BLD-MCNC: 14 G/DL (ref 11.7–15.7)
MCH RBC QN AUTO: 28.6 PG (ref 26.5–33)
MCHC RBC AUTO-ENTMCNC: 32.3 G/DL (ref 31.5–36.5)
MCV RBC AUTO: 89 FL (ref 78–100)
PLATELET # BLD AUTO: 184 10E9/L (ref 150–450)
POTASSIUM SERPL-SCNC: 3.3 MMOL/L (ref 3.4–5.3)
RBC # BLD AUTO: 4.9 10E12/L (ref 3.8–5.2)
SODIUM SERPL-SCNC: 140 MMOL/L (ref 133–144)
WBC # BLD AUTO: 5.6 10E9/L (ref 4–11)

## 2018-11-14 PROCEDURE — 27210995 ZZH RX 272: Performed by: INTERNAL MEDICINE

## 2018-11-14 PROCEDURE — 27211268 ZZ H BALLOON (NON-PTA) CR8

## 2018-11-14 PROCEDURE — 99152 MOD SED SAME PHYS/QHP 5/>YRS: CPT | Performed by: INTERNAL MEDICINE

## 2018-11-14 PROCEDURE — 93005 ELECTROCARDIOGRAM TRACING: CPT

## 2018-11-14 PROCEDURE — 93650 ICAR CATH ABLTJ AV NODE FUNC: CPT | Performed by: INTERNAL MEDICINE

## 2018-11-14 PROCEDURE — G0378 HOSPITAL OBSERVATION PER HR: HCPCS

## 2018-11-14 PROCEDURE — 85027 COMPLETE CBC AUTOMATED: CPT | Performed by: INTERNAL MEDICINE

## 2018-11-14 PROCEDURE — C1769 GUIDE WIRE: HCPCS

## 2018-11-14 PROCEDURE — 27210782 ZZH KIT EP TOTES DISP CR7

## 2018-11-14 PROCEDURE — 25000132 ZZH RX MED GY IP 250 OP 250 PS 637: Performed by: INTERNAL MEDICINE

## 2018-11-14 PROCEDURE — 33225 L VENTRIC PACING LEAD ADD-ON: CPT | Performed by: INTERNAL MEDICINE

## 2018-11-14 PROCEDURE — 80048 BASIC METABOLIC PNL TOTAL CA: CPT | Performed by: INTERNAL MEDICINE

## 2018-11-14 PROCEDURE — 93650 ICAR CATH ABLTJ AV NODE FUNC: CPT

## 2018-11-14 PROCEDURE — 36415 COLL VENOUS BLD VENIPUNCTURE: CPT | Performed by: INTERNAL MEDICINE

## 2018-11-14 PROCEDURE — 40000235 ZZH STATISTIC TELEMETRY

## 2018-11-14 PROCEDURE — 99153 MOD SED SAME PHYS/QHP EA: CPT

## 2018-11-14 PROCEDURE — 93010 ELECTROCARDIOGRAM REPORT: CPT | Performed by: INTERNAL MEDICINE

## 2018-11-14 PROCEDURE — C1900 LEAD, CORONARY VENOUS: HCPCS

## 2018-11-14 PROCEDURE — C2621 PMKR, OTHER THAN SING/DUAL: HCPCS

## 2018-11-14 PROCEDURE — 27210784 ZZH KIT PACEMAKER CR8

## 2018-11-14 PROCEDURE — 36415 COLL VENOUS BLD VENIPUNCTURE: CPT

## 2018-11-14 PROCEDURE — 40000852 ZZH STATISTIC HEART CATH LAB OR EP LAB

## 2018-11-14 PROCEDURE — 25000128 H RX IP 250 OP 636: Performed by: INTERNAL MEDICINE

## 2018-11-14 PROCEDURE — 33229 REMV&REPLC PM GEN MULT LEADS: CPT | Performed by: INTERNAL MEDICINE

## 2018-11-14 PROCEDURE — 27210795 ZZH PAD DEFIB QUICK CR4

## 2018-11-14 PROCEDURE — C1730 CATH, EP, 19 OR FEW ELECT: HCPCS

## 2018-11-14 PROCEDURE — 33229 REMV&REPLC PM GEN MULT LEADS: CPT

## 2018-11-14 PROCEDURE — 99152 MOD SED SAME PHYS/QHP 5/>YRS: CPT

## 2018-11-14 PROCEDURE — 25000125 ZZHC RX 250: Performed by: INTERNAL MEDICINE

## 2018-11-14 PROCEDURE — 33225 L VENTRIC PACING LEAD ADD-ON: CPT

## 2018-11-14 PROCEDURE — 00000146 ZZHCL STATISTIC GLUCOSE BY METER IP

## 2018-11-14 PROCEDURE — C2630 CATH, EP, COOL-TIP: HCPCS

## 2018-11-14 PROCEDURE — 40000065 ZZH STATISTIC EKG NON-CHARGEABLE

## 2018-11-14 RX ORDER — IBUTILIDE FUMARATE 1 MG/10ML
1 INJECTION, SOLUTION INTRAVENOUS
Status: DISCONTINUED | OUTPATIENT
Start: 2018-11-14 | End: 2018-11-14 | Stop reason: HOSPADM

## 2018-11-14 RX ORDER — CLINDAMYCIN PHOSPHATE 900 MG/50ML
900 INJECTION, SOLUTION INTRAVENOUS ONCE
Status: COMPLETED | OUTPATIENT
Start: 2018-11-14 | End: 2018-11-14

## 2018-11-14 RX ORDER — POTASSIUM CHLORIDE 1500 MG/1
20 TABLET, EXTENDED RELEASE ORAL DAILY
Status: DISCONTINUED | OUTPATIENT
Start: 2018-11-14 | End: 2018-11-15 | Stop reason: HOSPADM

## 2018-11-14 RX ORDER — LIDOCAINE 40 MG/G
CREAM TOPICAL
Status: DISCONTINUED | OUTPATIENT
Start: 2018-11-14 | End: 2018-11-14 | Stop reason: HOSPADM

## 2018-11-14 RX ORDER — DIPHENHYDRAMINE HYDROCHLORIDE 50 MG/ML
25-50 INJECTION INTRAMUSCULAR; INTRAVENOUS
Status: DISCONTINUED | OUTPATIENT
Start: 2018-11-14 | End: 2018-11-14 | Stop reason: HOSPADM

## 2018-11-14 RX ORDER — METFORMIN HCL 500 MG
500 TABLET, EXTENDED RELEASE 24 HR ORAL EVERY MORNING
Status: DISCONTINUED | OUTPATIENT
Start: 2018-11-15 | End: 2018-11-15 | Stop reason: HOSPADM

## 2018-11-14 RX ORDER — ATROPINE SULFATE 0.1 MG/ML
.5-1 INJECTION INTRAVENOUS
Status: DISCONTINUED | OUTPATIENT
Start: 2018-11-14 | End: 2018-11-14 | Stop reason: HOSPADM

## 2018-11-14 RX ORDER — BUPIVACAINE HYDROCHLORIDE 2.5 MG/ML
10-30 INJECTION, SOLUTION EPIDURAL; INFILTRATION; INTRACAUDAL
Status: DISCONTINUED | OUTPATIENT
Start: 2018-11-14 | End: 2018-11-14 | Stop reason: HOSPADM

## 2018-11-14 RX ORDER — BUPIVACAINE HYDROCHLORIDE 2.5 MG/ML
10-30 INJECTION, SOLUTION EPIDURAL; INFILTRATION; INTRACAUDAL
Status: COMPLETED | OUTPATIENT
Start: 2018-11-14 | End: 2018-11-14

## 2018-11-14 RX ORDER — METOPROLOL SUCCINATE 100 MG/1
100 TABLET, EXTENDED RELEASE ORAL DAILY
Status: DISCONTINUED | OUTPATIENT
Start: 2018-11-14 | End: 2018-11-15 | Stop reason: HOSPADM

## 2018-11-14 RX ORDER — PROTAMINE SULFATE 10 MG/ML
5-40 INJECTION, SOLUTION INTRAVENOUS EVERY 10 MIN PRN
Status: DISCONTINUED | OUTPATIENT
Start: 2018-11-14 | End: 2018-11-14 | Stop reason: HOSPADM

## 2018-11-14 RX ORDER — MORPHINE SULFATE 2 MG/ML
1-2 INJECTION, SOLUTION INTRAMUSCULAR; INTRAVENOUS EVERY 5 MIN PRN
Status: DISCONTINUED | OUTPATIENT
Start: 2018-11-14 | End: 2018-11-14 | Stop reason: HOSPADM

## 2018-11-14 RX ORDER — LATANOPROST 50 UG/ML
1 SOLUTION/ DROPS OPHTHALMIC AT BEDTIME
Status: DISCONTINUED | OUTPATIENT
Start: 2018-11-14 | End: 2018-11-15 | Stop reason: HOSPADM

## 2018-11-14 RX ORDER — SODIUM CHLORIDE 450 MG/100ML
INJECTION, SOLUTION INTRAVENOUS CONTINUOUS
Status: DISCONTINUED | OUTPATIENT
Start: 2018-11-14 | End: 2018-11-14 | Stop reason: HOSPADM

## 2018-11-14 RX ORDER — SERTRALINE HYDROCHLORIDE 25 MG/1
25 TABLET, FILM COATED ORAL
Status: DISCONTINUED | OUTPATIENT
Start: 2018-11-14 | End: 2018-11-15 | Stop reason: HOSPADM

## 2018-11-14 RX ORDER — LIDOCAINE HYDROCHLORIDE AND EPINEPHRINE 10; 10 MG/ML; UG/ML
10-30 INJECTION, SOLUTION INFILTRATION; PERINEURAL
Status: DISCONTINUED | OUTPATIENT
Start: 2018-11-14 | End: 2018-11-14 | Stop reason: HOSPADM

## 2018-11-14 RX ORDER — PROTAMINE SULFATE 10 MG/ML
1-5 INJECTION, SOLUTION INTRAVENOUS
Status: DISCONTINUED | OUTPATIENT
Start: 2018-11-14 | End: 2018-11-14 | Stop reason: HOSPADM

## 2018-11-14 RX ORDER — FENTANYL CITRATE 50 UG/ML
25-50 INJECTION, SOLUTION INTRAMUSCULAR; INTRAVENOUS
Status: DISCONTINUED | OUTPATIENT
Start: 2018-11-14 | End: 2018-11-14 | Stop reason: HOSPADM

## 2018-11-14 RX ORDER — OXYCODONE AND ACETAMINOPHEN 5; 325 MG/1; MG/1
1 TABLET ORAL EVERY 4 HOURS PRN
Status: DISCONTINUED | OUTPATIENT
Start: 2018-11-14 | End: 2018-11-15 | Stop reason: HOSPADM

## 2018-11-14 RX ORDER — FUROSEMIDE 40 MG
20 TABLET ORAL DAILY
Qty: 60 TABLET | Refills: 3 | COMMUNITY
Start: 2018-11-14 | End: 2018-12-26

## 2018-11-14 RX ORDER — KETOROLAC TROMETHAMINE 30 MG/ML
15 INJECTION, SOLUTION INTRAMUSCULAR; INTRAVENOUS
Status: DISCONTINUED | OUTPATIENT
Start: 2018-11-14 | End: 2018-11-14 | Stop reason: HOSPADM

## 2018-11-14 RX ORDER — ACETAMINOPHEN 325 MG/1
650 TABLET ORAL EVERY 4 HOURS PRN
Status: DISCONTINUED | OUTPATIENT
Start: 2018-11-14 | End: 2018-11-15 | Stop reason: HOSPADM

## 2018-11-14 RX ORDER — DOBUTAMINE HYDROCHLORIDE 200 MG/100ML
5-40 INJECTION INTRAVENOUS CONTINUOUS PRN
Status: DISCONTINUED | OUTPATIENT
Start: 2018-11-14 | End: 2018-11-14 | Stop reason: HOSPADM

## 2018-11-14 RX ORDER — LORAZEPAM 2 MG/ML
.5-2 INJECTION INTRAMUSCULAR EVERY 10 MIN PRN
Status: DISCONTINUED | OUTPATIENT
Start: 2018-11-14 | End: 2018-11-14 | Stop reason: HOSPADM

## 2018-11-14 RX ORDER — NALOXONE HYDROCHLORIDE 0.4 MG/ML
.1-.4 INJECTION, SOLUTION INTRAMUSCULAR; INTRAVENOUS; SUBCUTANEOUS
Status: DISCONTINUED | OUTPATIENT
Start: 2018-11-14 | End: 2018-11-15 | Stop reason: HOSPADM

## 2018-11-14 RX ORDER — FUROSEMIDE 10 MG/ML
20-100 INJECTION INTRAMUSCULAR; INTRAVENOUS
Status: DISCONTINUED | OUTPATIENT
Start: 2018-11-14 | End: 2018-11-14 | Stop reason: HOSPADM

## 2018-11-14 RX ORDER — LIDOCAINE HYDROCHLORIDE 10 MG/ML
10-30 INJECTION, SOLUTION EPIDURAL; INFILTRATION; INTRACAUDAL; PERINEURAL
Status: COMPLETED | OUTPATIENT
Start: 2018-11-14 | End: 2018-11-14

## 2018-11-14 RX ORDER — LIDOCAINE 40 MG/G
CREAM TOPICAL
Status: DISCONTINUED | OUTPATIENT
Start: 2018-11-14 | End: 2018-11-15 | Stop reason: HOSPADM

## 2018-11-14 RX ORDER — DILTIAZEM HYDROCHLORIDE 240 MG/1
240 CAPSULE, EXTENDED RELEASE ORAL
Status: DISCONTINUED | OUTPATIENT
Start: 2018-11-14 | End: 2018-11-15 | Stop reason: HOSPADM

## 2018-11-14 RX ORDER — ONDANSETRON 2 MG/ML
4 INJECTION INTRAMUSCULAR; INTRAVENOUS EVERY 4 HOURS PRN
Status: DISCONTINUED | OUTPATIENT
Start: 2018-11-14 | End: 2018-11-14 | Stop reason: HOSPADM

## 2018-11-14 RX ORDER — METOPROLOL SUCCINATE 100 MG/1
50 TABLET, EXTENDED RELEASE ORAL DAILY
Qty: 60 TABLET | Refills: 0 | COMMUNITY
Start: 2018-11-14 | End: 2018-12-26

## 2018-11-14 RX ORDER — NALOXONE HYDROCHLORIDE 0.4 MG/ML
0.4 INJECTION, SOLUTION INTRAMUSCULAR; INTRAVENOUS; SUBCUTANEOUS EVERY 5 MIN PRN
Status: DISCONTINUED | OUTPATIENT
Start: 2018-11-14 | End: 2018-11-14 | Stop reason: HOSPADM

## 2018-11-14 RX ORDER — FUROSEMIDE 40 MG
40 TABLET ORAL DAILY
Status: DISCONTINUED | OUTPATIENT
Start: 2018-11-14 | End: 2018-11-15 | Stop reason: HOSPADM

## 2018-11-14 RX ORDER — HEPARIN SODIUM 1000 [USP'U]/ML
1000-10000 INJECTION, SOLUTION INTRAVENOUS; SUBCUTANEOUS EVERY 5 MIN PRN
Status: DISCONTINUED | OUTPATIENT
Start: 2018-11-14 | End: 2018-11-14 | Stop reason: HOSPADM

## 2018-11-14 RX ORDER — FLUMAZENIL 0.1 MG/ML
0.2 INJECTION, SOLUTION INTRAVENOUS
Status: DISCONTINUED | OUTPATIENT
Start: 2018-11-14 | End: 2018-11-14 | Stop reason: HOSPADM

## 2018-11-14 RX ORDER — ADENOSINE 3 MG/ML
6-12 INJECTION, SOLUTION INTRAVENOUS EVERY 5 MIN PRN
Status: DISCONTINUED | OUTPATIENT
Start: 2018-11-14 | End: 2018-11-14 | Stop reason: HOSPADM

## 2018-11-14 RX ORDER — POTASSIUM CHLORIDE 1500 MG/1
20 TABLET, EXTENDED RELEASE ORAL ONCE
Status: COMPLETED | OUTPATIENT
Start: 2018-11-14 | End: 2018-11-14

## 2018-11-14 RX ORDER — TIMOLOL MALEATE 5 MG/ML
1 SOLUTION/ DROPS OPHTHALMIC
Status: DISCONTINUED | OUTPATIENT
Start: 2018-11-14 | End: 2018-11-15 | Stop reason: HOSPADM

## 2018-11-14 RX ORDER — IBUTILIDE FUMARATE 1 MG/10ML
0.01 INJECTION, SOLUTION INTRAVENOUS
Status: DISCONTINUED | OUTPATIENT
Start: 2018-11-14 | End: 2018-11-14 | Stop reason: HOSPADM

## 2018-11-14 RX ADMIN — FUROSEMIDE 40 MG: 40 TABLET ORAL at 19:47

## 2018-11-14 RX ADMIN — CLINDAMYCIN PHOSPHATE 900 MG: 18 INJECTION, SOLUTION INTRAVENOUS at 08:55

## 2018-11-14 RX ADMIN — POTASSIUM CHLORIDE 20 MEQ: 1500 TABLET, EXTENDED RELEASE ORAL at 08:07

## 2018-11-14 RX ADMIN — LIDOCAINE HYDROCHLORIDE 10 ML: 10 INJECTION, SOLUTION EPIDURAL; INFILTRATION; INTRACAUDAL; PERINEURAL at 09:03

## 2018-11-14 RX ADMIN — METOPROLOL SUCCINATE 100 MG: 100 TABLET, EXTENDED RELEASE ORAL at 19:47

## 2018-11-14 RX ADMIN — BACITRACIN 25000 UNITS: 5000 INJECTION, POWDER, FOR SOLUTION INTRAMUSCULAR at 09:31

## 2018-11-14 RX ADMIN — FENTANYL CITRATE 125 MCG: 50 INJECTION, SOLUTION INTRAMUSCULAR; INTRAVENOUS at 09:48

## 2018-11-14 RX ADMIN — SODIUM CHLORIDE: 4.5 INJECTION, SOLUTION INTRAVENOUS at 07:23

## 2018-11-14 RX ADMIN — MIDAZOLAM 2.5 MG: 1 INJECTION INTRAMUSCULAR; INTRAVENOUS at 09:49

## 2018-11-14 RX ADMIN — ACETAMINOPHEN 650 MG: 325 TABLET, FILM COATED ORAL at 20:00

## 2018-11-14 RX ADMIN — SERTRALINE HYDROCHLORIDE 25 MG: 25 TABLET ORAL at 20:00

## 2018-11-14 RX ADMIN — LIDOCAINE HYDROCHLORIDE 10 ML: 10 INJECTION, SOLUTION EPIDURAL; INFILTRATION; INTRACAUDAL; PERINEURAL at 09:45

## 2018-11-14 RX ADMIN — DILTIAZEM HYDROCHLORIDE 240 MG: 240 CAPSULE, EXTENDED RELEASE ORAL at 19:48

## 2018-11-14 RX ADMIN — BUPIVACAINE HYDROCHLORIDE 25 MG: 2.5 INJECTION, SOLUTION EPIDURAL; INFILTRATION; INTRACAUDAL at 09:04

## 2018-11-14 RX ADMIN — POTASSIUM CHLORIDE 20 MEQ: 1500 TABLET, EXTENDED RELEASE ORAL at 19:48

## 2018-11-14 RX ADMIN — APIXABAN 5 MG: 5 TABLET, FILM COATED ORAL at 20:00

## 2018-11-14 ASSESSMENT — PAIN DESCRIPTION - DESCRIPTORS: DESCRIPTORS: DULL

## 2018-11-14 NOTE — IP AVS SNAPSHOT
[facility] Observation Unit    [address] [place] [id number]    Phone:  [phone]                                       After Visit Summary   11/14/2018    Zayda Hawkins    MRN: [id number]           After Visit Summary Signature Page     I have received my discharge instructions, and my questions have been answered. I have discussed any challenges I see with this plan with the nurse or doctor.    ..........................................................................................................................................  Patient/Patient Representative Signature      ..........................................................................................................................................  Patient Representative Print Name and Relationship to Patient    ..................................................               ................................................  Date                                   Time    ..........................................................................................................................................  Reviewed by Signature/Title    ...................................................              ..............................................  Date                                               Time          22EPIC Rev 08/18

## 2018-11-14 NOTE — PROGRESS NOTES
1510 right groin soft, no bleed or hematoma. Pacer site left upper chest 2 small spots of blood marked on drsg, site above and below drsg slight bruising and is definitely swollen. Per kaur swollen since noonish. Report from kaur 1500, dr lock text page sent about swelling at pacer site. Pt pacer site is tender. Refused percocet per kaur. Pt is dozing. Son in room with her.  1630 dr lock was notified of swelling at pacer site. Pressure held to site at 1530 as instructed for 10 minutes, swelling down with this. Then pressure drsg applied over top of other drsg per dr lock. Pain at site 6. Pt refused any meds, even tylenol, say it makes her nauseated. Pt resting well.  1700 pacer book and card given to son. Paged dr lock to order all pt home meds.  1730 pt up walked, voided in restroom. Tolerated well. Post walk right groin site soft, no bleed or hematoma. Pacer site unchanged with pressure drsg on.   1750 report  Called to sierra rn in obs unit. Pt will transfer via wheel chair to obs 20. They will give her evening meds. Ordered. Per sierra insistance msg to dr lock regarding re checking pt potasium in am. Pt is on lasix and oral kcl for home meds. This was explained. See flow sheets.  1800 transfering via wheel chair to obs 20 with all belongings, including box for home monitoring. Son going with her. Dinner was ordered.

## 2018-11-14 NOTE — IP AVS SNAPSHOT
MRN:[id number]                      After Visit Summary   11/14/2018    Zayda Hawkins    MRN: [id number]           Visit Information        Department      11/14/2018  6:45 AM [facility]          Review of your medicines      CONTINUE these medicines which may have CHANGED, or have new prescriptions. If we are uncertain of the size of tablets/capsules you have at home, strength may be listed as something that might have changed.        Dose / Directions    furosemide 40 MG tablet   Commonly known as:  LASIX   This may have changed:  when to take this   Used for:  Combined systolic and diastolic congestive heart failure, unspecified HF chronicity (H)        Dose:  40 mg   Take 1 tablet (40 mg) by mouth daily   Quantity:  60 tablet   Refills:  3       metoprolol succinate 100 MG 24 hr tablet   Commonly known as:  TOPROL-XL   This may have changed:  when to take this   Used for:  Chronic atrial fibrillation (H)        Dose:  100 mg   Take 1 tablet (100 mg) by mouth daily   Quantity:  60 tablet   Refills:  0         CONTINUE these medicines which have NOT CHANGED        Dose / Directions    apixaban ANTICOAGULANT 5 MG tablet   Commonly known as:  ELIQUIS   Used for:  A-fib (H)        Dose:  5 mg   Take 1 tablet (5 mg) by mouth 2 times daily   Quantity:  180 tablet   Refills:  3       calcium carbonate-vitamin D 500-400 MG-UNIT Tabs per tablet        Dose:  1 tablet   Take 1 tablet by mouth 2 times daily.   Refills:  0       diltiazem 240 MG 24 hr capsule   Used for:  Paroxysmal atrial fibrillation (H)        Dose:  240 mg   Take 1 capsule (240 mg) by mouth daily   Quantity:  30 capsule   Refills:  0       dorzolamide-timolol PF 22.3-6.8 MG/ML opthalmic solution   Commonly known as:  COSOPT        Dose:  1 drop   Place 1 drop into both eyes 2 times daily 10 mL vial   Refills:  0       fish oil-omega-3 fatty acids 1000 MG capsule        Dose:  1 g   Take 1 g by mouth daily    Refills:  0       IMITREX PO        Dose:  25 mg   Take 25 mg by mouth as needed.   Refills:  0       latanoprost 0.005 % ophthalmic solution   Commonly known as:  XALATAN        Dose:  1 drop   Place 1 drop into both eyes At Bedtime   Refills:  0       magnesium gluconate 500 MG tablet   Commonly known as:  MAGONATE        Dose:  500 mg   Take 500 mg by mouth daily.   Refills:  0       metFORMIN 500 MG 24 hr tablet   Commonly known as:  GLUCOPHAGE-XR        Dose:  500 mg   Take 500 mg by mouth every morning   Refills:  0       multivitamin, therapeutic Tabs tablet        Dose:  1 tablet   Take 1 tablet by mouth daily.   Refills:  0       potassium chloride SA 10 MEQ CR tablet   Commonly known as:  K-DUR/KLOR-CON M   Used for:  Hypokalemia        Dose:  20 mEq   Take 2 tablets (20 mEq) by mouth daily   Quantity:  15 tablet   Refills:  1       sertraline 50 MG tablet   Commonly known as:  ZOLOFT        Dose:  25 mg   Take 25 mg by mouth every evening as needed (Take a half tablet qpm prn)   Refills:  0       timolol 0.5 % ophthalmic solution   Commonly known as:  TIMOPTIC        Dose:  1 drop   Place 1 drop into both eyes every morning   Refills:  0                Protect others around you: Learn how to safely use, store and throw away your medicines at www.disposemymeds.org.         Follow-ups after your visit        Your next 10 appointments already scheduled     Nov 28, 2018  1:50 PM CST   Core Return with Nehal Barragan PA-C   [facility] ([facility])    [address] [place], [place] [id number]  [facility] [id number] [phone] OPT 2            Dec 04, 2018  9:45 AM CST   Ech Complete with [id number] [facility] [place] ([facility])    [address] [facility] [address]  [facility] [id number] [phone]           1.  Please bring or wear a comfortable two-piece outfit. 2.  You may eat, drink and take your normal medicines.  3.  For any questions that cannot be answered, please contact the ordering physician 4.  Please do not wear perfumes or scented lotions on the day of your exam. ***Please check-in at the [facility] Registration Office located in [address] in the [facility] building. When you are finished registering, please go to [address] and have a seat. The technician will call your name for the test.            Dec 06, 2018  3:45 PM CST   [facility] EP RETURN with Julio C Palacios MD   [facility] ([facility])    [address] [place] [address] [address]  [facility] [address] [phone] OPT 2               Care Instructions        After Care Instructions     Discharge Instructions - Follow up with Device Check RN        Follow up with Device Check RN in 7-10 days.            Discharge Instructions - Keep incision dry for 72 hours       Keep incision dry for 72 hours (unless Derma Rodriguez was applied)                  Further instructions from your care team       AV Node Ablation & ICD or Pacemaker Implant Discharge Instructions    After you go home:      Have an adult stay with you until tomorrow.    You may resume your normal diet.       For 24 hours - due to the sedation you received:    Relax and take it easy.    Do NOT make any important or legal decisions.    Do NOT drive or operate machines at home or at work.    Do NOT drink alcohol.    Care of Chest Incision:      Keep the bandage on at least 3 days. You may remove the dressing on 11/16/18. Change it only if it gets loose or soaked. If you need to change it, use 4x4-inch gauze and a large clear bandage.     If there is a pressure dressing (gauze & tape) - 24 hours after your procedure you may remove ONLY the top dressing. Leave the bottom dressing on.    Leave the strips of tape on. They will fall off on their own, or we will remove them at your first check-up.    Check your wound daily for signs of infection, such as increased  redness, severe swelling or draining. Fever may also be a sign of infection. Call us if you see any of these signs.    If there are no signs of infection, you may shower after the bandage comes off in 3 days. If you take a tub bath, keep the wound dry.    No soaking the incision (swimming pool, bathtub, hot tub) for 2 weeks.    You may have mild to medium pain for 3 to 5 days. Take Acetaminophen (Tylenol) or Ibuprofen (Advil) for the pain. If the pain persists or is severe, call us.    Care of Groin Puncture Site:      For the first 24 hrs - check the puncture site every 1-2 hours while awake.    For the first 2 days, when you cough, sneeze, laugh or move your bowels, hold your hand over the puncture site and press firmly.    Remove the bandaid after 24 hours. If there is minor oozing, apply another bandaid and remove it after 12 hours.    It is normal to have a small bruise or pea size lump at the site.    Do NOT take a bath, or use a hot tub or pool for at least 3 days. Do NOT scrub the site. Do not use lotion or powder near the puncture site.    Activity    Chest:    For at least 2 weeks: Do not raise your elbow above your shoulder. You can begin to use your arm as it feels comfortable to you.    Do not use arm on implant side to lift more than 10 pounds for 2 weeks.    In 6 to 8 weeks: You may begin to golf, play tennis, swim and do similar activities.    No driving for one day & limit to necessary driving for one week. Please talk to your doctor for specific recommendations.    Groin:     For 2 days:    No stooping or squatting    Do NOT do any heavy activity such as exercise, lifting, or straining.     No housework, yard work or any activity that make you sweat    Do NOT lift more than 10 pounds    Bleeding:    Chest:    If you start bleeding from the incision site, sit down and press firmly on the site for 10 minutes.     Once bleeding stops, call [facility] as soon as you can.    Groin:     If you  start bleeding from the site in your groin, lie down flat and press firmly on the site for 10 minutes.     Once bleeding stops, lay flat for 2 hours.     Call [facility] as soon as you can.       Call 911 right away if you have heavy bleeding or bleeding that does not stop.      Medicines:      Take your medications, including blood thinners, unless your provider tells you not to.    If you have stopped any medicines, check with your provider about when to restart them.    If you have pain or shortness of breath, you may take Advil (ibuprofen) or Tylenol (acetaminophen).    Follow Up Appointments:      Follow up with Device Clinic at [facility] of patient preference in 7-10 days.    Call the clinic if:      You have increased pain or a large or growing hard lump around the site.    The site is red, swollen, hot or tender.    Blood or fluid is draining from the site.    You have chills or a fever greater than 101 F (38 C).    Your leg feels numb, cool or changes color.    Increased pain in the chest and/or groin.    Increased shortness of breath    Chest pain not relieved by Tylenol or Advil    New pain in the back or belly that you cannot control with Tylenol.    Recurrent irregular or fast heart rate lasting over 2 hours.    Any questions or concerns.    Heart rhythms:    You may have some irregular heartbeats. These feel very strong. They may make you feel that the fast heart rhythm is going to start again.  Give it time. The irregular beats should occur less often.      Telling others about your device:      Before you leave the hospital, you will receive a temporary ID card. A permanent card will be mailed to you about 6 to 8 weeks later. Always carry the ID card with you. It has important details about your device.    You may also get a Medical Alert bracelet or tag that says you have a pacemaker or a defibrillator (ICD). Go to [email or web address].     Always tell doctors, dentists and other care  "providers that you have a device implanted in you.    Let us know before you plan any surgeries. Your care team must take special steps to keep you safe during certain procedures. These steps will depend on the type of device you have. Your provider will need to see your ID card. They may need to call us for instructions.    Device Safety:      Please refer to device  s booklet for further information.    [facility]:    [phone] UMP (7 days a week)         Additional Information About Your Visit        Care EveryWhere ID     This is your Care EveryWhere ID. This could be used by other organizations to access your [facility] medical records  [id number]        Your Vitals Were     Blood Pressure Pulse Temperature Respirations Height Weight    122/77 100 98.5  F (36.9  C) (Oral) 16 1.626 m (5' 4\") 68.8 kg (151 lb 11.2 oz)    Pulse Oximetry BMI (Body Mass Index)                99% 26.04 kg/m2           Primary Care Provider Office Phone # Fax #    Alton Willoughby [phone]      Equal Access to Services     [facility] : Hadii jermaine ku hadasho Soclaudia, waaxda luqadaha, qaybta kaalmada adejoanieyamagalys, karissa mcmahon . So [facility] [phone].    ATENCIÓN: Si habla español, tiene a hoffmann disposición servicios gratuitos de asistencia lingüística. Llame al [phone].    We comply with applicable federal civil rights laws and [place] laws. We do not discriminate on the basis of race, color, national origin, age, disability, sex, sexual orientation, or gender identity.            Thank you!     Thank you for choosing [facility] for your care. Our goal is always to provide you with excellent care. Hearing back from our patients is one way we can continue to improve our services. Please take a few minutes to complete the written survey that you may receive in the mail after you visit with us. Thank you!             Medication List: This is " a list of all your medications and when to take them. Check marks below indicate your daily home schedule. Keep this list as a reference.      Medications           Morning Afternoon Evening Bedtime As Needed    apixaban ANTICOAGULANT 5 MG tablet   Commonly known as:  ELIQUIS   Take 1 tablet (5 mg) by mouth 2 times daily                                calcium carbonate-vitamin D 500-400 MG-UNIT Tabs per tablet   Take 1 tablet by mouth 2 times daily.                                diltiazem 240 MG 24 hr capsule   Take 1 capsule (240 mg) by mouth daily                                dorzolamide-timolol PF 22.3-6.8 MG/ML opthalmic solution   Commonly known as:  COSOPT   Place 1 drop into both eyes 2 times daily 10 mL vial                                fish oil-omega-3 fatty acids 1000 MG capsule   Take 1 g by mouth daily                                furosemide 40 MG tablet   Commonly known as:  LASIX   Take 1 tablet (40 mg) by mouth daily                                IMITREX PO   Take 25 mg by mouth as needed.                                latanoprost 0.005 % ophthalmic solution   Commonly known as:  XALATAN   Place 1 drop into both eyes At Bedtime                                magnesium gluconate 500 MG tablet   Commonly known as:  MAGONATE   Take 500 mg by mouth daily.                                metFORMIN 500 MG 24 hr tablet   Commonly known as:  GLUCOPHAGE-XR   Take 500 mg by mouth every morning                                metoprolol succinate 100 MG 24 hr tablet   Commonly known as:  TOPROL-XL   Take 1 tablet (100 mg) by mouth daily                                multivitamin, therapeutic Tabs tablet   Take 1 tablet by mouth daily.                                potassium chloride SA 10 MEQ CR tablet   Commonly known as:  K-DUR/KLOR-CON M   Take 2 tablets (20 mEq) by mouth daily   Last time this was given:  20 mEq on 11/14/2018  8:07 AM                                sertraline 50 MG tablet   Commonly  known as:  ZOLOFT   Take 25 mg by mouth every evening as needed (Take a half tablet qpm prn)                                timolol 0.5 % ophthalmic solution   Commonly known as:  TIMOPTIC   Place 1 drop into both eyes every morning

## 2018-11-14 NOTE — PROCEDURES
PROCEDURES PERFORMED:   1. Upgrading of a pacemaker to a cardiac resynchronization therapy with pacing (CRT-P)  2. Atrioventricular kim ablation  3. Conscious sedation.   4. Cardiac fluoroscopy  5. Left subclavian venogram    INDICATION: Dilated cardiomyopathy with LVEF 45% and medical refractory AFIB    HISTORY OF PRESENT ILLNESS: This is a 85 year old year-old patient with a history of dilated  cardiomyopathy with LVEF 45% likely from tachycardia induced due to AF with RVR refractory to medical therapy and pacemaker. She is referred for CRT-P upgrade and AVN ablation.    METHOD: I determined this patient to be an appropriate candidate for the planned sedation and procedure and have reassessed the patient immediately prior to sedation and procedure.  Left subclavian venogram was performed and showed patency. The patient was prepped and draped in the usual manner. The procedure was performed under conscious sedation for 64 minutes. 2.5 mg of Versed and 125 mcg of Fentanyl were used. Heart rate, BP, respiration, oxygen saturation, and patient responses were monitored throughout the procedure with the RN assistance. Intravenous antibiotic was given. 1% lidocaine was infiltrated into incision located over the left axillary vein area. An incision was then made with a #15 bladeThis vein was access using the Seldinger's technique.      A Coronary sinus (CS) sheath advanced over the guidewire into the right atrium.  An EP catheter was then introduced into the CS sheath and cannulated into the CS lumen. The EP catheter was exchanged for a Gilbert-Evangelina catheter.  Occlusive venograms were performed with diluted contrast in Maltese position.  There was an upper posterolateral branch which was selected for placement of the left ventricular lead. The lead along with an angioplasty wire were then advanced into this branch. Appropriate sensing and threshold were obtained. There was no diaphragmatic stimulation with high output pacing.  The CS sheath was then cut away under fluoroscopic guidance. Using the splitter the CS sheath was split away. The leads were then secured to the pectoralis muscle fascia using O-Ethibond sutures.    A pocket was then fashioned and was irrigated with bacitracin solution. The leads were then attached to the device and placed in the pocket.  The pocket was then closed with 2-0 and 4-0 Vicryl sutures. Steri-Strips and an OpSite dressing were then placed over the incision.    The right groin was then prepped and draped the usual fashion. 1% Lidocaine was infiltrated into the area. An 8-Mosotho sheath was placed in the right femoral vein via the Seldinger's technique. A large curve 5 mm ablation catheter was then advanced into the heart. Ablation was then performed using an EPT generator. Both catheter and sheath were then removed. Hemostasis was achieved by direct manual pressure. The patient was then transferred back to the room in stable condition.         ABLATION SUMMARY: 2 applications of radiofrequency ablation were targeted at the AV node at 50 [place] and 60 degrees Celsius for 10-60 seconds. Complete heart block was achieved without a junctional escape above 30 beats per minute.    COMPLICATIONS: None.    Fluoroscopy (minutes): 6.4    CONCLUSION:  1. Uneventful ablation of AV node and upgrade of ppm to a CRT-P    Julio C Rangel MD

## 2018-11-14 NOTE — DISCHARGE INSTRUCTIONS
AV Node Ablation & ICD or Pacemaker Implant Discharge Instructions    After you go home:      Have an adult stay with you until tomorrow.    You may resume your normal diet.       For 24 hours - due to the sedation you received:    Relax and take it easy.    Do NOT make any important or legal decisions.    Do NOT drive or operate machines at home or at work.    Do NOT drink alcohol.    Care of Chest Incision:      Keep the bandage on at least 3 days. You may remove the dressing on 11/16/18. Change it only if it gets loose or soaked. If you need to change it, use 4x4-inch gauze and a large clear bandage.     If there is a pressure dressing (gauze & tape) - 24 hours after your procedure you may remove ONLY the top dressing. Leave the bottom dressing on.    Leave the strips of tape on. They will fall off on their own, or we will remove them at your first check-up.    Check your wound daily for signs of infection, such as increased redness, severe swelling or draining. Fever may also be a sign of infection. Call us if you see any of these signs.    If there are no signs of infection, you may shower after the bandage comes off in 3 days. If you take a tub bath, keep the wound dry.    No soaking the incision (swimming pool, bathtub, hot tub) for 2 weeks.    You may have mild to medium pain for 3 to 5 days. Take Acetaminophen (Tylenol) or Ibuprofen (Advil) for the pain. If the pain persists or is severe, call us.    Care of Groin Puncture Site:      For the first 24 hrs - check the puncture site every 1-2 hours while awake.    For the first 2 days, when you cough, sneeze, laugh or move your bowels, hold your hand over the puncture site and press firmly.    Remove the bandaid after 24 hours. If there is minor oozing, apply another bandaid and remove it after 12 hours.    It is normal to have a small bruise or pea size lump at the site.    Do NOT take a bath, or use a hot tub or pool for at least 3 days. Do NOT scrub the  site. Do not use lotion or powder near the puncture site.    Activity    Chest:    For at least 2 weeks: Do not raise your elbow above your shoulder. You can begin to use your arm as it feels comfortable to you.    Do not use arm on implant side to lift more than 10 pounds for 2 weeks.    In 6 to 8 weeks: You may begin to golf, play tennis, swim and do similar activities.    No driving for one day & limit to necessary driving for one week. Please talk to your doctor for specific recommendations.    Groin:     For 2 days:    No stooping or squatting    Do NOT do any heavy activity such as exercise, lifting, or straining.     No housework, yard work or any activity that make you sweat    Do NOT lift more than 10 pounds    Bleeding:    Chest:    If you start bleeding from the incision site, sit down and press firmly on the site for 10 minutes.     Once bleeding stops, call [facility] as soon as you can.    Groin:     If you start bleeding from the site in your groin, lie down flat and press firmly on the site for 10 minutes.     Once bleeding stops, lay flat for 2 hours.     Call [facility] as soon as you can.       Call 911 right away if you have heavy bleeding or bleeding that does not stop.      Medicines:      Decrease furosemide back to 40 mg daily.  Continue potassium chloride 20 mEq (2 of your 10 mEq tabs)    Decrease metoprolol from 100 mg twice a day to 100 mg daily    If you have pain or shortness of breath, you may take Advil (ibuprofen) or Tylenol (acetaminophen).    Follow Up Appointments:      Follow up with Device Clinic at [facility] of patient preference in 7-10 days.    See Nehal Barragan, CORE/Heart Failure PA on 11/28/2018 @ 1:50    I HAVE CANCELLED ECHO that was to be done 12/4 in [place] ... It will be too soon to check for improvement in your pumping function    See Dr. Rangel 12/6/2018 @ 3:45    See Nena Chang, shannon 6 week device check and get echocardiogram on 12/31 starting at  10:30.    Call the clinic if:      You have increased pain or a large or growing hard lump around the site.    The site is red, swollen, hot or tender.    Blood or fluid is draining from the site.    You have chills or a fever greater than 101 F (38 C).    Your leg feels numb, cool or changes color.    Increased pain in the chest and/or groin.    Increased shortness of breath    Chest pain not relieved by Tylenol or Advil    New pain in the back or belly that you cannot control with Tylenol.    Recurrent irregular or fast heart rate lasting over 2 hours.    Any questions or concerns.    Heart rhythms:    You may have some irregular heartbeats. These feel very strong. They may make you feel that the fast heart rhythm is going to start again.  Give it time. The irregular beats should occur less often.      Telling others about your device:      Before you leave the hospital, you will receive a temporary ID card. A permanent card will be mailed to you about 6 to 8 weeks later. Always carry the ID card with you. It has important details about your device.    You may also get a Medical Alert bracelet or tag that says you have a pacemaker or a defibrillator (ICD). Go to [email or web address].     Always tell doctors, dentists and other care providers that you have a device implanted in you.    Let us know before you plan any surgeries. Your care team must take special steps to keep you safe during certain procedures. These steps will depend on the type of device you have. Your provider will need to see your ID card. They may need to call us for instructions.    Device Safety:      Please refer to device  s booklet for further information.    [facility] at [place]:    [phone] [facility] (7 days a week)

## 2018-11-14 NOTE — IP AVS SNAPSHOT
MRN:[id number]                      After Visit Summary   11/14/2018    Zayda Hawkins    MRN: [id number]           Thank you!     Thank you for choosing [facility] for your care. Our goal is always to provide you with excellent care. Hearing back from our patients is one way we can continue to improve our services. Please take a few minutes to complete the written survey that you may receive in the mail after you visit with us. Thank you!        Patient Information     Date Of Birth          12/24/1932        About your hospital stay     You were admitted on:  November 14, 2018 You last received care in the:  [facility]    You were discharged on:  November 15, 2018       Who to Call     For medical emergencies, please call 911.  For non-urgent questions about your medical care, please call your primary care provider or clinic, [phone]          Attending Provider     Provider Specialty    Stephanie Mcnulty MD Clinical Cardiac Electrophysiology    Rangel, Julio C Diego MD Cardiology       Primary Care Provider Office Phone # Fax #    [place] [facility] [place] [phone]      After Care Instructions     Discharge Instructions - Follow up with Device Check RN        Follow up with Device Check RN in 7-10 days.            Discharge Instructions - Keep incision dry for 72 hours       Keep incision dry for 72 hours (unless Derma Rodriguez was applied)                  Your next 10 appointments already scheduled     Nov 28, 2018  1:50 PM CST   Core Return with Nehal Barragan PA-C   [facility] ([facility])    [address] [place], [place] [id number]  [facility] [address] [phone] OPT 2            Nov 28, 2018  2:30 PM CST   Pacemaker Check with KAIT SALMERON   [facility] ([facility])    [address] [place], [place] [address]  [facility] [address] [phone] OPT 2            Dec 06, 2018   3:45 PM CST   [facility] EP RETURN with Julio C Palacios MD   [facility])    [address]  [facility] [address] [phone] OPT 2            Dec 31, 2018  9:00 AM CST   Pacemaker Check with KAIT SALMERON   [facility])    [address]  [facility] [address] [phone] OPT 2            Dec 31, 2018 10:00 AM CST   Ech Complete with [id number]   [facility] CV Echocardiography ([facility] at [facility])    [address]  [facility] [address] [phone]           1.  Please bring or wear a comfortable two-piece outfit. 2.  You may eat, drink and take your normal medicines. 3.  For any questions that cannot be answered, please contact the ordering physician 4.  Please do not wear perfumes or scented lotions on the day of your exam.            Dec 31, 2018  1:50 PM CST   Return Visit with Vikki Chang PA-C   [facility] ([facility])    [address]  [facility] [address] [phone] OPT 2              Additional Services     Follow-Up with Cardiac Advanced Practice Provider           Follow-Up with Device Clinic                 Future tests that were ordered for you     Echocardiogram       Administration of IV contrast will be tailored to this examination per the appropriate written protocol listed in the Echocardiography department Protocol Book, or by the supervising Cardiologist. This may result in an order change.    Use of contrast is at the discretion of the supervising Cardiologist.                  Further instructions from your care team       AV Node Ablation & ICD or Pacemaker Implant Discharge Instructions    After you go home:      Have an adult stay with you until tomorrow.    You may resume your normal diet.       For 24 hours -  due to the sedation you received:    Relax and take it easy.    Do NOT make any important or legal decisions.    Do NOT drive or operate machines at home or at work.    Do NOT drink alcohol.    Care of Chest Incision:      Keep the bandage on at least 3 days. You may remove the dressing on 11/16/18. Change it only if it gets loose or soaked. If you need to change it, use 4x4-inch gauze and a large clear bandage.     If there is a pressure dressing (gauze & tape) - 24 hours after your procedure you may remove ONLY the top dressing. Leave the bottom dressing on.    Leave the strips of tape on. They will fall off on their own, or we will remove them at your first check-up.    Check your wound daily for signs of infection, such as increased redness, severe swelling or draining. Fever may also be a sign of infection. Call us if you see any of these signs.    If there are no signs of infection, you may shower after the bandage comes off in 3 days. If you take a tub bath, keep the wound dry.    No soaking the incision (swimming pool, bathtub, hot tub) for 2 weeks.    You may have mild to medium pain for 3 to 5 days. Take Acetaminophen (Tylenol) or Ibuprofen (Advil) for the pain. If the pain persists or is severe, call us.    Care of Groin Puncture Site:      For the first 24 hrs - check the puncture site every 1-2 hours while awake.    For the first 2 days, when you cough, sneeze, laugh or move your bowels, hold your hand over the puncture site and press firmly.    Remove the bandaid after 24 hours. If there is minor oozing, apply another bandaid and remove it after 12 hours.    It is normal to have a small bruise or pea size lump at the site.    Do NOT take a bath, or use a hot tub or pool for at least 3 days. Do NOT scrub the site. Do not use lotion or powder near the puncture site.    Activity    Chest:    For at least 2 weeks: Do not raise your elbow above your shoulder. You can begin to use your arm as it feels  comfortable to you.    Do not use arm on implant side to lift more than 10 pounds for 2 weeks.    In 6 to 8 weeks: You may begin to golf, play tennis, swim and do similar activities.    No driving for one day & limit to necessary driving for one week. Please talk to your doctor for specific recommendations.    Groin:     For 2 days:    No stooping or squatting    Do NOT do any heavy activity such as exercise, lifting, or straining.     No housework, yard work or any activity that make you sweat    Do NOT lift more than 10 pounds    Bleeding:    Chest:    If you start bleeding from the incision site, sit down and press firmly on the site for 10 minutes.     Once bleeding stops, call [facility] as soon as you can.    Groin:     If you start bleeding from the site in your groin, lie down flat and press firmly on the site for 10 minutes.     Once bleeding stops, lay flat for 2 hours.     Call [facility] as soon as you can.       Call 911 right away if you have heavy bleeding or bleeding that does not stop.      Medicines:      Decrease furosemide back to 40 mg daily.  Continue potassium chloride 20 mEq (2 of your 10 mEq tabs)    Decrease metoprolol from 100 mg twice a day to 100 mg daily    If you have pain or shortness of breath, you may take Advil (ibuprofen) or Tylenol (acetaminophen).    Follow Up Appointments:      Follow up with Device Clinic at [facility] of patient preference in 7-10 days.    See Nehal Barragan, CORE/Heart Failure PA on 11/28/2018 @ 1:50    I HAVE CANCELLED ECHO that was to be done 12/4 in [place] ... It will be too soon to check for improvement in your pumping function    See Dr. Rangel 12/6/2018 @ 3:45    See Nena Chang, get 6 week device check and get echocardiogram on 12/31 starting at 10:30.    Call the clinic if:      You have increased pain or a large or growing hard lump around the site.    The site is red, swollen, hot or tender.    Blood or fluid is draining from the  site.    You have chills or a fever greater than 101 F (38 C).    Your leg feels numb, cool or changes color.    Increased pain in the chest and/or groin.    Increased shortness of breath    Chest pain not relieved by Tylenol or Advil    New pain in the back or belly that you cannot control with Tylenol.    Recurrent irregular or fast heart rate lasting over 2 hours.    Any questions or concerns.    Heart rhythms:    You may have some irregular heartbeats. These feel very strong. They may make you feel that the fast heart rhythm is going to start again.  Give it time. The irregular beats should occur less often.      Telling others about your device:      Before you leave the hospital, you will receive a temporary ID card. A permanent card will be mailed to you about 6 to 8 weeks later. Always carry the ID card with you. It has important details about your device.    You may also get a Medical Alert bracelet or tag that says you have a pacemaker or a defibrillator (ICD). Go to [email or web address].     Always tell doctors, dentists and other care providers that you have a device implanted in you.    Let us know before you plan any surgeries. Your care team must take special steps to keep you safe during certain procedures. These steps will depend on the type of device you have. Your provider will need to see your ID card. They may need to call us for instructions.    Device Safety:      Please refer to device  s booklet for further information.    [facility] [place]:    [phone] [facility] (7 days a week)        Pending Results     No orders found for last 3 day(s).            Statement of Approval     Ordered          11/15/18 0950  I have reviewed and agree with all the recommendations and orders detailed in this document.  EFFECTIVE NOW     Approved and electronically signed by:  Julio C Rangel MD             Admission Information     Date & Time Provider Department Dept.  "Phone    11/14/2018 Julio C Rangel MD [facility] [phone]      Your Vitals Were     Blood Pressure Pulse Temperature Respirations Height Weight    123/66 (BP Location: Right arm) 100 98.7  F (37.1  C) (Oral) 16 1.626 m (5' 4\") 66.7 kg (147 lb 1.6 oz)    Pulse Oximetry BMI (Body Mass Index)                93% 25.25 kg/m2          Care EveryWhere ID     This is your Care EveryWhere ID. This could be used by other organizations to access your [facility] medical records  [id number]        Equal Access to Services     TANIA MARTEL : Hadlicha Seth, destiny car, ambrosio ortiz, karissa mcmahon . So [facility] [phone].    ATENCIÓN: Si habla español, tiene a hoffmann disposición servicios gratuitos de asistencia lingüística. [facility] [phone].    We comply with applicable federal civil rights laws and [place] laws. We do not discriminate on the basis of race, color, national origin, age, disability, sex, sexual orientation, or gender identity.               Review of your medicines      START taking        Dose / Directions    acetaminophen 325 MG tablet   Commonly known as:  TYLENOL   Used for:  Persistent atrial fibrillation (H)        Dose:  650 mg   Take 2 tablets (650 mg) by mouth every 4 hours as needed for mild pain or fever   Refills:  0         CONTINUE these medicines which may have CHANGED, or have new prescriptions. If we are uncertain of the size of tablets/capsules you have at home, strength may be listed as something that might have changed.        Dose / Directions    furosemide 40 MG tablet   Commonly known as:  LASIX   This may have changed:  when to take this   Used for:  Combined systolic and diastolic congestive heart failure, unspecified HF chronicity (H)        Dose:  40 mg   Take 1 tablet (40 mg) by mouth daily   Quantity:  60 tablet   Refills:  3       metoprolol succinate 100 MG 24 hr tablet   Commonly known as:  TOPROL-XL "   This may have changed:  when to take this   Used for:  Chronic atrial fibrillation (H)        Dose:  100 mg   Take 1 tablet (100 mg) by mouth daily   Quantity:  60 tablet   Refills:  0         CONTINUE these medicines which have NOT CHANGED        Dose / Directions    apixaban ANTICOAGULANT 5 MG tablet   Commonly known as:  ELIQUIS   Used for:  A-fib (H)        Dose:  5 mg   Take 1 tablet (5 mg) by mouth 2 times daily   Quantity:  180 tablet   Refills:  3       calcium carbonate-vitamin D 500-400 MG-UNIT Tabs per tablet        Dose:  1 tablet   Take 1 tablet by mouth 2 times daily.   Refills:  0       diltiazem 240 MG 24 hr capsule   Used for:  Paroxysmal atrial fibrillation (H)        Dose:  240 mg   Take 1 capsule (240 mg) by mouth daily   Quantity:  30 capsule   Refills:  0       dorzolamide-timolol PF 22.3-6.8 MG/ML opthalmic solution   Commonly known as:  COSOPT        Dose:  1 drop   Place 1 drop into both eyes 2 times daily 10 mL vial   Refills:  0       fish oil-omega-3 fatty acids 1000 MG capsule        Dose:  1 g   Take 1 g by mouth daily   Refills:  0       IMITREX PO        Dose:  25 mg   Take 25 mg by mouth as needed.   Refills:  0       latanoprost 0.005 % ophthalmic solution   Commonly known as:  XALATAN        Dose:  1 drop   Place 1 drop into both eyes At Bedtime   Refills:  0       magnesium gluconate 500 MG tablet   Commonly known as:  MAGONATE        Dose:  500 mg   Take 500 mg by mouth daily.   Refills:  0       metFORMIN 500 MG 24 hr tablet   Commonly known as:  GLUCOPHAGE-XR        Dose:  500 mg   Take 500 mg by mouth every morning   Refills:  0       multivitamin, therapeutic Tabs tablet        Dose:  1 tablet   Take 1 tablet by mouth daily.   Refills:  0       potassium chloride SA 10 MEQ CR tablet   Commonly known as:  K-DUR/KLOR-CON M   Used for:  Hypokalemia        Dose:  20 mEq   Take 2 tablets (20 mEq) by mouth daily   Quantity:  15 tablet   Refills:  1       sertraline 50 MG tablet    Commonly known as:  ZOLOFT        Dose:  25 mg   Take 25 mg by mouth every evening as needed (Take a half tablet qpm prn)   Refills:  0       timolol 0.5 % ophthalmic solution   Commonly known as:  TIMOPTIC        Dose:  1 drop   Place 1 drop into both eyes every morning   Refills:  0            Where to get your medicines      Some of these will need a paper prescription and others can be bought over the counter. Ask your nurse if you have questions.     You don't need a prescription for these medications     acetaminophen 325 MG tablet                Protect others around you: Learn how to safely use, store and throw away your medicines at www.disposemymeds.org.             Medication List: This is a list of all your medications and when to take them. Check marks below indicate your daily home schedule. Keep this list as a reference.      Medications           Morning Afternoon Evening Bedtime As Needed    acetaminophen 325 MG tablet   Commonly known as:  TYLENOL   Take 2 tablets (650 mg) by mouth every 4 hours as needed for mild pain or fever   Last time this was given:  650 mg on 11/14/2018  8:00 PM   Next Dose Due:  As needed                                    apixaban ANTICOAGULANT 5 MG tablet   Commonly known as:  ELIQUIS   Take 1 tablet (5 mg) by mouth 2 times daily   Last time this was given:  5 mg on 11/15/2018  8:28 AM   Next Dose Due:  11/15/18 evening                                       calcium carbonate-vitamin D 500-400 MG-UNIT Tabs per tablet   Take 1 tablet by mouth 2 times daily.   Next Dose Due:  11/15/18 evening                                       diltiazem 240 MG 24 hr capsule   Take 1 capsule (240 mg) by mouth daily   Last time this was given:  240 mg on 11/14/2018  7:48 PM   Next Dose Due:  11/15/18 evening                                    dorzolamide-timolol PF 22.3-6.8 MG/ML opthalmic solution   Commonly known as:  COSOPT   Place 1 drop into both eyes 2 times daily 10 mL vial   Last  time this was given:  1 drop on 11/15/2018  9:00 AM   Next Dose Due:  11/15/18 evening                                       fish oil-omega-3 fatty acids 1000 MG capsule   Take 1 g by mouth daily   Next Dose Due:  11/16/18 morning                                    furosemide 40 MG tablet   Commonly known as:  LASIX   Take 1 tablet (40 mg) by mouth daily   Last time this was given:  40 mg on 11/15/2018  8:29 AM   Next Dose Due:  11/16/18 morning                                    IMITREX PO   Take 25 mg by mouth as needed.   Next Dose Due:  As needed                                    latanoprost 0.005 % ophthalmic solution   Commonly known as:  XALATAN   Place 1 drop into both eyes At Bedtime   Next Dose Due:  11/15/18 bedtime                                    magnesium gluconate 500 MG tablet   Commonly known as:  MAGONATE   Take 500 mg by mouth daily.   Next Dose Due:  11/16/18 morning                                    metFORMIN 500 MG 24 hr tablet   Commonly known as:  GLUCOPHAGE-XR   Take 500 mg by mouth every morning   Last time this was given:  500 mg on 11/15/2018  8:28 AM   Next Dose Due:  11/16/18 morning                                    metoprolol succinate 100 MG 24 hr tablet   Commonly known as:  TOPROL-XL   Take 1 tablet (100 mg) by mouth daily   Last time this was given:  100 mg on 11/15/2018  8:28 AM   Next Dose Due:  11/16/18 morning                                    multivitamin, therapeutic Tabs tablet   Take 1 tablet by mouth daily.   Next Dose Due:  11/16/18 morning                                    potassium chloride SA 10 MEQ CR tablet   Commonly known as:  K-DUR/KLOR-CON M   Take 2 tablets (20 mEq) by mouth daily   Last time this was given:  20 mEq on 11/15/2018  8:28 AM   Next Dose Due:  11/16/18 morning                                    sertraline 50 MG tablet   Commonly known as:  ZOLOFT   Take 25 mg by mouth every evening as needed (Take a half tablet qpm prn)   Last time this was  given:  25 mg on 11/14/2018  8:00 PM   Next Dose Due:  As needed                                    timolol 0.5 % ophthalmic solution   Commonly known as:  TIMOPTIC   Place 1 drop into both eyes every morning   Next Dose Due:  11/16/18 morning

## 2018-11-14 NOTE — PROGRESS NOTES
Discharge teaching & instructions given to both pt & son with interpretor present w/ verbal understanding received. All questions & concerns addressed. Pt will be spending the night in observation. Right groin free of bleeding or hematoma. Left subclavian does have some slight swelling with bruising noted earlier this afternoon. No further changes noted for shift change. Report to Tiffany CAROLINA for shift change.

## 2018-11-14 NOTE — PROGRESS NOTES
Uneventful CRT-p upgrade and AVN ablation. Given chronic stable RV lead even though she's in CHB ok to be discharged to day. Reduce Toprol from 100 mg bid to daily and Lasix 40 mg bid to daily. Home after 4 hours of bedrest.

## 2018-11-14 NOTE — PROGRESS NOTES
1020: Pt returned from EP Lab. A/O. Bandaid CDI to right groin puncture sites and Left subclavian pacer site. No oozing or hematoma noted. Areas soft & flat. Pt instructed on activity restrictions while on bedrest. Verbal understanding received from pt.  Drsg CDI to left chest. No oozing or hematoma noted. Area soft & flat.  Pt instructed on activity restrictions with left arm. Verbal understanding received. Pt denies pain. Pt's family at bedside. Detailed update given.  ICD/PPM booklet & ID card given to pt's son. Pt taking diet & flds well. No complaints.

## 2018-11-15 ENCOUNTER — APPOINTMENT (OUTPATIENT)
Dept: GENERAL RADIOLOGY | Facility: CLINIC | Age: 83
End: 2018-11-15
Attending: INTERNAL MEDICINE
Payer: COMMERCIAL

## 2018-11-15 VITALS
HEIGHT: 64 IN | SYSTOLIC BLOOD PRESSURE: 123 MMHG | RESPIRATION RATE: 16 BRPM | OXYGEN SATURATION: 93 % | TEMPERATURE: 98.7 F | DIASTOLIC BLOOD PRESSURE: 66 MMHG | HEART RATE: 100 BPM | BODY MASS INDEX: 25.11 KG/M2 | WEIGHT: 147.1 LBS

## 2018-11-15 LAB
ANION GAP SERPL CALCULATED.3IONS-SCNC: 7 MMOL/L (ref 3–14)
BUN SERPL-MCNC: 16 MG/DL (ref 7–30)
CALCIUM SERPL-MCNC: 8.3 MG/DL (ref 8.5–10.1)
CHLORIDE SERPL-SCNC: 108 MMOL/L (ref 94–109)
CO2 SERPL-SCNC: 30 MMOL/L (ref 20–32)
CREAT SERPL-MCNC: 1.02 MG/DL (ref 0.52–1.04)
GFR SERPL CREATININE-BSD FRML MDRD: 51 ML/MIN/1.7M2
GLUCOSE BLDC GLUCOMTR-MCNC: 133 MG/DL (ref 70–99)
GLUCOSE SERPL-MCNC: 135 MG/DL (ref 70–99)
INTERPRETATION ECG - MUSE: NORMAL
POTASSIUM SERPL-SCNC: 3.5 MMOL/L (ref 3.4–5.3)
SODIUM SERPL-SCNC: 145 MMOL/L (ref 133–144)

## 2018-11-15 PROCEDURE — 80048 BASIC METABOLIC PNL TOTAL CA: CPT | Performed by: INTERNAL MEDICINE

## 2018-11-15 PROCEDURE — G0378 HOSPITAL OBSERVATION PER HR: HCPCS

## 2018-11-15 PROCEDURE — 25000132 ZZH RX MED GY IP 250 OP 250 PS 637: Performed by: INTERNAL MEDICINE

## 2018-11-15 PROCEDURE — 40000065 ZZH STATISTIC EKG NON-CHARGEABLE

## 2018-11-15 PROCEDURE — 36415 COLL VENOUS BLD VENIPUNCTURE: CPT | Performed by: INTERNAL MEDICINE

## 2018-11-15 PROCEDURE — 40000986 XR CHEST 2 VW

## 2018-11-15 PROCEDURE — 00000146 ZZHCL STATISTIC GLUCOSE BY METER IP

## 2018-11-15 PROCEDURE — 93005 ELECTROCARDIOGRAM TRACING: CPT

## 2018-11-15 RX ORDER — ACETAMINOPHEN 325 MG/1
650 TABLET ORAL EVERY 4 HOURS PRN
COMMUNITY
Start: 2018-11-15

## 2018-11-15 RX ADMIN — METFORMIN HYDROCHLORIDE 500 MG: 500 TABLET, EXTENDED RELEASE ORAL at 08:28

## 2018-11-15 RX ADMIN — POTASSIUM CHLORIDE 20 MEQ: 1500 TABLET, EXTENDED RELEASE ORAL at 08:28

## 2018-11-15 RX ADMIN — APIXABAN 5 MG: 5 TABLET, FILM COATED ORAL at 08:28

## 2018-11-15 RX ADMIN — DORZOLAMIDE HYDROCHLORIDE AND TIMOLOL MALEATE 1 DROP: 20; 5 SOLUTION/ DROPS OPHTHALMIC at 09:00

## 2018-11-15 RX ADMIN — METOPROLOL SUCCINATE 100 MG: 100 TABLET, EXTENDED RELEASE ORAL at 08:28

## 2018-11-15 RX ADMIN — FUROSEMIDE 40 MG: 40 TABLET ORAL at 08:29

## 2018-11-15 NOTE — PLAN OF CARE
Problem: Patient Care Overview  Goal: Plan of Care/Patient Progress Review  Outcome: Adequate for Discharge Date Met: 11/15/18  Pt a&o. Up with SBA. C/o mild pain at pacemaker site. Declining intervention. Dressing with small amount of dried drainage, marked. jail diet, good appetite. discharge paperwork completed, pt and son verbalized understanding. Son interpreted while we went over the discharge paperwork. Pt declined  for today. Son refused to have waiver signed because he says that she will not be allowed an  next stay. Pt discharge via wheel chair home with son. Belongings sent with pt.

## 2018-11-15 NOTE — PROGRESS NOTES
MD Notification    Notified Person: MD    Notified Person Name: Dr Garrett     Notification Date/Time: 11/14/18 at 1843    Notification Interaction: Phone     Purpose of Notification: Potassium 3.3. Not replaced per protocol, but pt is taking potassium tabs PTA. Should we recheck potassium in a.m?     Orders Received: BMP in a.m

## 2018-11-15 NOTE — PROGRESS NOTES
Device Discharge  Dressing:  Clean, dry, and intact  Chest XR reviewed:  Yes  Pneumo present?: Pendinge  Lead Measurements per Device Rep:  WNL    Education Provided:  Avoid raising right arm above the level of the shoulder for 3 weeks.  Do not shower until outer occlusive dressing has been removed.  Remove outer dressing in 3 - 4 days.  Leave steri-strips in place, these will be removed at the 1 week check.   Call Device Clinic with increased swelling, drainage, or fever > 101 degrees.   Follow-up with the Device Clinic as scheduled. 11/28/18 @ 2:30 PM

## 2018-11-15 NOTE — PLAN OF CARE
Problem: Patient Care Overview  Goal: Plan of Care/Patient Progress Review  A&Ox4,VSS on RA, denies any pain nor SOB. L subclavian pressure dressing and right groin sites are CDI. No bleeding, no hematoma. Pedal pulses ++, CMS intact. Tele is 100% V paced. Up with A1. Tolerating mod carb diet.Diabetic on metformin, no insulin. BMP check this morning and Plan for CXR and device check.

## 2018-11-15 NOTE — PROGRESS NOTES
"[facility]    EP Progress Note    Date of Service (when I saw the patient): 11/15/2018     Assessment & Plan   Zayda Hawkins is a 85 year old female who was admitted on 11/14/2018. She has a history of dilated cardiomyopathy (EF 40-45%), thought due to tachycardia from atrial fibrillation/RVR. Previously, she was on Tikosyn for atrial fibrillation, but continued to have issues. 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 Hospitalized 9/26-10/2 for CVA. Not previously anticoagulated secondary to retroperitoneal bleed, but since CVA has been anticoagulated with Eliquis. Subsequently hospitalized for heart failure with rapid heart rate and now she is status post AV node ablation on 11/15 with biventricular upgrade.     1. Persistent Atrial Fibrillation   *now status post AV node ablation and biventricular St. Ion pacemake upgrade with Dr. Rangel 11/14  *CXR pending   *device interrogation (Eulalio) showed 97% biventricular pacing with occasional PVCs  *device teaching pending   *patient is feeling \"better\" and has no groin site discomfort. Mild discomfort at left pacemaker site with pressure dressing in place, which is relieved with tylenol.     PLAN:     *await CXR and device interrogation     *follow up will be made by device nurses @ 7 days. I have made 6 week follow-up to coincide with echo and appt with me    *continue Eliquis for high CHADSVASc      2. Non-ischemic cardiomyopathy   *EF 40-45% based on echocardiogram from 10/2018  *negative stress test in 2014  *PTA medications included lasix 40 mg BID, metoprolol succinate 100 mg BID, KCl 20 mEq daily  *lisinopril discontinued 10/19 due to hypotension  *on exam, no evidence of fluid overload and weights are down      PLAN:     *per Dr. Rangel, decrease metoprolol succinate to 100 mg daily and decrease lasix to 40 mg daily.     *will continue KCl 20 mEq daily as potassium was low at only 3.5    *has " appointment to see CORE 11/28 (Nehal)    *will hold off on restarting lisinopril as BP is in the 90s to 110s, but could be restarted as outpatient     *EP MAGALY will see her at 6 week device check with repeat echocardiogram to see if EF has improved post AV node ablation- appointments made    Carladevorah Corok PA-C    Interval History   Feeling better, mild pacer site discomfort relieved with tylenol.     Physical Exam   Temp: 97.1  F (36.2  C) Temp src: Axillary BP: 122/64   Heart Rate: 77 Resp: 16 SpO2: 93 % O2 Device: None (Room air) Oxygen Delivery: 2 LPM  Vitals:    11/14/18 0700 11/14/18 1816 11/15/18 0625   Weight: 68.8 kg (151 lb 11.2 oz) 69.6 kg (153 lb 8 oz) 66.7 kg (147 lb 1.6 oz)     Vital Signs with Ranges  Temp:  [97.1  F (36.2  C)-97.7  F (36.5  C)] 97.1  F (36.2  C)  Heart Rate:  [72-77] 77  Resp:  [16-18] 16  BP: (121-150)/(61-91) 122/64  SpO2:  [92 %-99 %] 93 %  I/O last 3 completed shifts:  In: 240 [P.O.:240]  Out: -     Telemetry: V paced at 80     Constitutional: awake, alert, cooperative, no apparent distress, and appears stated age  Respiratory: crackle left lower base that cleared with cough   Cardiovascular: regular, pressure dressing in place and bandage is clean and dry  Musculoskeletal: right groin is soft, non-tender without bruit     Medications       apixaban ANTICOAGULANT  5 mg Oral BID     diltiazem  240 mg Oral Daily with supper     dorzolamide-timolol PF  1 drop Both Eyes BID     furosemide  40 mg Oral Daily     latanoprost  1 drop Both Eyes At Bedtime     metFORMIN  500 mg Oral QAM     metoprolol succinate  100 mg Oral Daily     potassium chloride SA  20 mEq Oral Daily     sodium chloride (PF)  3 mL Intracatheter Q8H       Data   I personally reviewed the EKG tracing showing v paced at 80 with underlying atrial fibrillation.  Results for orders placed or performed during the hospital encounter of 11/14/18 (from the past 24 hour(s))   EKG 12-lead, tracing only   Result Value Ref  Range    Interpretation ECG Click View Image link to view waveform and result    EP PPM upgrade to bivent    Narrative    Julio C Rangel MD     11/14/2018 10:40 AM  PROCEDURES PERFORMED:   1. Upgrading of a pacemaker to a cardiac resynchronization   therapy with pacing (CRT-P)  2. Atrioventricular kim ablation  3. Conscious sedation.   4. Cardiac fluoroscopy  5. Left subclavian venogram    INDICATION: Dilated cardiomyopathy with LVEF 45% and medical   refractory AFIB    HISTORY OF PRESENT ILLNESS: This is a 85 year old year-old   patient with a history of dilated  cardiomyopathy with LVEF 45%   likely from tachycardia induced due to AF with RVR refractory to   medical therapy and pacemaker. She is referred for CRT-P upgrade   and AVN ablation.    METHOD: I determined this patient to be an appropriate candidate   for the planned sedation and procedure and have reassessed the   patient immediately prior to sedation and procedure.  Left   subclavian venogram was performed and showed patency. The patient   was prepped and draped in the usual manner. The procedure was   performed under conscious sedation for 64 minutes. 2.5 mg of   Versed and 125 mcg of Fentanyl were used. Heart rate, BP,   respiration, oxygen saturation, and patient responses were   monitored throughout the procedure with the RN assistance.   Intravenous antibiotic was given. 1% lidocaine was infiltrated   into incision located over the left axillary vein area. An   incision was then made with a #15 bladeThis vein was access using   the Seldinger's technique.      A Coronary sinus (CS) sheath advanced over the guidewire into the   right atrium.  An EP catheter was then introduced into the CS   sheath and cannulated into the CS lumen. The EP catheter was   exchanged for a Nass[place]-Evangelina catheter.  Occlusive venograms were   performed with diluted contrast in JOSEFINA position.  There was an   upper posterolateral branch which was selected for placement of   the  left ventricular lead. The lead along with an angioplasty   wire were then advanced into this branch. Appropriate sensing and   threshold were obtained. There was no diaphragmatic stimulation   with high output pacing. The CS sheath was then cut away under   fluoroscopic guidance. Using the splitter the CS sheath was split   away. The leads were then secured to the pectoralis muscle fascia   using O-Ethibond sutures.    A pocket was then fashioned and was irrigated with bacitracin   solution. The leads were then attached to the device and placed   in the pocket.  The pocket was then closed with 2-0 and 4-0   Vicryl sutures. Steri-Strips and an OpSite dressing were then   placed over the incision.    The right groin was then prepped and draped the usual fashion. 1%   Lidocaine was infiltrated into the area. An 8-Romanian sheath was   placed in the right femoral vein via the Seldinger's technique. A   large curve 5 mm ablation catheter was then advanced into the   heart. Ablation was then performed using an EPT generator. Both   catheter and sheath were then removed. Hemostasis was achieved by   direct manual pressure. The patient was then transferred back to   the room in stable condition.         ABLATION SUMMARY: 2 applications of radiofrequency ablation were   targeted at the AV node at 50 [place] and 60 degrees Celsius for   10-60 seconds. Complete heart block was achieved without a   junctional escape above 30 beats per minute.    COMPLICATIONS: None.    Fluoroscopy (minutes): 6.4    CONCLUSION:  1. Uneventful ablation of AV node and upgrade of ppm to a CRT-P    Julio C Rangel MD           Glucose by meter   Result Value Ref Range    Glucose 135 (H) 70 - 99 mg/dL   Glucose by meter   Result Value Ref Range    Glucose 133 (H) 70 - 99 mg/dL   Basic metabolic panel   Result Value Ref Range    Sodium 145 (H) 133 - 144 mmol/L    Potassium 3.5 3.4 - 5.3 mmol/L    Chloride 108 94 - 109 mmol/L    Carbon Dioxide 30 20 - 32  mmol/L    Anion Gap 7 3 - 14 mmol/L    Glucose 135 (H) 70 - 99 mg/dL    Urea Nitrogen 16 7 - 30 mg/dL    Creatinine 1.02 0.52 - 1.04 mg/dL    GFR Estimate 51 (L) >60 mL/min/1.7m2    GFR Estimate If Black 62 >60 mL/min/1.7m2    Calcium 8.3 (L) 8.5 - 10.1 mg/dL

## 2018-11-15 NOTE — PLAN OF CARE
Problem: Patient Care Overview  Goal: Plan of Care/Patient Progress Review  Outcome: Improving  Pt is A+O x 4. VSS on RA. Left subclavian pressure dressing and right groin site are CDI. No bleeding, no hematoma. Pedal pulses 1+. Tele is 100% V paced. Up with A1. Tolerated mod carb diet. . Potassium 3.3, see MAR. BMP in a.m. Plan for CXR and device check tomorrow.

## 2018-11-24 LAB — INTERPRETATION ECG - MUSE: NORMAL

## 2018-11-26 ENCOUNTER — OFFICE VISIT (OUTPATIENT)
Dept: CARDIOLOGY | Facility: CLINIC | Age: 83
End: 2018-11-26
Payer: COMMERCIAL

## 2018-11-26 ENCOUNTER — TELEPHONE (OUTPATIENT)
Dept: CARDIOLOGY | Facility: CLINIC | Age: 83
End: 2018-11-26

## 2018-11-26 ENCOUNTER — ALLIED HEALTH/NURSE VISIT (OUTPATIENT)
Dept: CARDIOLOGY | Facility: CLINIC | Age: 83
End: 2018-11-26
Payer: COMMERCIAL

## 2018-11-26 VITALS
DIASTOLIC BLOOD PRESSURE: 74 MMHG | HEIGHT: 64 IN | WEIGHT: 148 LBS | HEART RATE: 77 BPM | SYSTOLIC BLOOD PRESSURE: 124 MMHG | BODY MASS INDEX: 25.27 KG/M2

## 2018-11-26 DIAGNOSIS — I48.0 PAROXYSMAL ATRIAL FIBRILLATION (H): Primary | ICD-10-CM

## 2018-11-26 DIAGNOSIS — I10 HTN (HYPERTENSION): Primary | ICD-10-CM

## 2018-11-26 DIAGNOSIS — E87.6 HYPOKALEMIA: ICD-10-CM

## 2018-11-26 DIAGNOSIS — Z98.890 S/P AV NODAL ABLATION: ICD-10-CM

## 2018-11-26 DIAGNOSIS — I48.0 PAROXYSMAL ATRIAL FIBRILLATION (H): ICD-10-CM

## 2018-11-26 DIAGNOSIS — I10 HTN (HYPERTENSION): ICD-10-CM

## 2018-11-26 DIAGNOSIS — Z95.0 CARDIAC PACEMAKER IN SITU: ICD-10-CM

## 2018-11-26 LAB
ANION GAP SERPL CALCULATED.3IONS-SCNC: 10.5 MMOL/L (ref 6–17)
BUN SERPL-MCNC: 17 MG/DL (ref 7–30)
CALCIUM SERPL-MCNC: 9.8 MG/DL (ref 8.5–10.5)
CHLORIDE SERPL-SCNC: 103 MMOL/L (ref 98–107)
CO2 SERPL-SCNC: 32 MMOL/L (ref 23–29)
CREAT SERPL-MCNC: 1.3 MG/DL (ref 0.7–1.3)
GFR SERPL CREATININE-BSD FRML MDRD: 39 ML/MIN/1.7M2
GLUCOSE SERPL-MCNC: 162 MG/DL (ref 70–105)
HGB BLD-MCNC: 12.8 G/DL (ref 11.7–15.7)
POTASSIUM SERPL-SCNC: 3.5 MMOL/L (ref 3.5–5.1)
SODIUM SERPL-SCNC: 142 MMOL/L (ref 136–145)

## 2018-11-26 PROCEDURE — 36415 COLL VENOUS BLD VENIPUNCTURE: CPT | Performed by: NURSE PRACTITIONER

## 2018-11-26 PROCEDURE — 80048 BASIC METABOLIC PNL TOTAL CA: CPT | Performed by: NURSE PRACTITIONER

## 2018-11-26 PROCEDURE — 99215 OFFICE O/P EST HI 40 MIN: CPT | Mod: 25 | Performed by: NURSE PRACTITIONER

## 2018-11-26 PROCEDURE — 93281 PM DEVICE PROGR EVAL MULTI: CPT | Performed by: INTERNAL MEDICINE

## 2018-11-26 PROCEDURE — 85018 HEMOGLOBIN: CPT | Performed by: NURSE PRACTITIONER

## 2018-11-26 RX ORDER — POTASSIUM CHLORIDE 750 MG/1
10 TABLET, EXTENDED RELEASE ORAL DAILY
Qty: 15 TABLET | Refills: 1
Start: 2018-11-26 | End: 2018-12-31

## 2018-11-26 NOTE — PATIENT INSTRUCTIONS
As always, thank you for trusting us with your health care needs!    Please have hemoglobin drawn today    If you have changes and increase weakness, dizziness, your device gets more swollen please call the clinic.      I have asked the  to have you seen Dr. Rangel towards the end of December      If you have any questions regarding your visit please contact your care team:   Cardiology  Telephone Number    Afib RNs:  Esthela Vences, and Cheryl [phone]   Call for Electrophysiology procedure scheduling concerns [phone]   Device Clinic (Pacemakers, ICDs, Loop Recorders)   RN's:  Mandy Huff, URIAH Strauss, Naima Jean During business hours:   [phone]

## 2018-11-26 NOTE — PROGRESS NOTES
St. Ion Quadra Allure CRT 7-10 day Post Pacemaker Device Check  AP: 0 % BVP: 98 %  Mode: DDDR         Underlying Rhythm: AF with CHB achieved by AVNA, no intrinsic V rhythm at VVI 30  Heart Rate: adequate variability  Sensing: WNL    Pacing Threshold: WNL    Impedance: WNL  Battery Status: 6.0-6.4 yrs estimated longevity   Incision/Complications: pocket is bruised and slightly swollen, site is tender to the touch. Site was assessed by Mary PALMA and Dr. Ramesh earlier today and OV and no changes were made. Steri strips had already been removed.   Atrial Arrhythmia: 100% AF burden since implant. Pt takes Eliquis. Rates controlled.   Ventricular Arrhythmia: none  Setting Change: turned on Monitor for RV and LV CapConfirm. Will address at 6 week check if auto capture can be turned on.     Care Plan: 6 week check already scheduled. Pt was Mary PALMA for OV today.    FLY VALENTINO

## 2018-11-26 NOTE — MR AVS SNAPSHOT
After Visit Summary   11/26/2018    Zayda Hawkins    MRN: [id number]           Patient Information     Date Of Birth          12/24/1932        Visit Information        Provider Department      11/26/2018 2:00 PM KAIT ARELLANO2 [facility]        Today's Diagnoses     HTN (hypertension)    -  1    Cardiac pacemaker in situ        S/P AV kim ablation           Follow-ups after your visit        Your next 10 appointments already scheduled     Dec 31, 2018  9:00 AM CST   Pacemaker Check with CARBALLO EILEENN   [facility] ([facility])    [address]  [place] [address] [phone] OPT 2            Dec 31, 2018 10:00 AM CST   Ech Complete with [id number]   [facility] ([facility])    [address]  [address]  [place] [address] [phone]           1.  Please bring or wear a comfortable two-piece outfit. 2.  You may eat, drink and take your normal medicines. 3.  For any questions that cannot be answered, please contact the ordering physician 4.  Please do not wear perfumes or scented lotions on the day of your exam.            Dec 31, 2018  1:50 PM CST   Return Visit with Vikki Chang PA-C   [facility] ([facility])    [address] [place] [id number] [phone] OPT 2              Future tests that were ordered for you today     Open Future Orders        Priority Expected Expires Ordered    Follow-Up with Electrophysiologist Routine 12/19/2018 11/26/2019 11/26/2018            Who to contact     If you have questions or need follow up information about today's clinic visit or your schedule please contact [facility] directly at [phone].  Normal or non-critical lab and imaging results will be  communicated to you by MyChart, letter or phone within 4 business days after the clinic has received the results. If you do not hear from us within 7 days, please contact the clinic through MyChart or phone. If you have a critical or abnormal lab result, we will notify you by phone as soon as possible.  Submit refill requests through [employer] or call your pharmacy and they will forward the refill request to us. Please allow 3 business days for your refill to be completed.          Additional Information About Your Visit        Care EveryWhere ID     This is your Care EveryWhere ID. This could be used by other organizations to access your [place] medical records  [id number]         Blood Pressure from Last 3 Encounters:   11/26/18 124/74   11/15/18 123/66   11/12/18 118/70    Weight from Last 3 Encounters:   11/26/18 67.1 kg (148 lb)   11/15/18 66.7 kg (147 lb 1.6 oz)   11/12/18 70.8 kg (156 lb)              We Performed the Following     PM DEVICE PROGRAMMING EVAL, MULTI LEAD PACER ([id number])        Primary Care Provider Office Phone # Fax #    Altondiamante Willoughby [phone]       [place] [address] [address] [address]        Equal Access to Services     JOSE MARTEL : Hadii aad ku hadasho Soomaali, waaxda luqadaha, qaybta kaalmada adeegyada, waxay idiin haydanikan quan yousif. So [facility] [phone].    ATENCIÓN: Si habla español, tiene a hoffmann disposición servicios gratuitos de asistencia lingüística. ame al [phone].    We comply with applicable federal civil rights laws and [place] laws. We do not discriminate on the basis of race, color, national origin, age, disability, sex, sexual orientation, or gender identity.            Thank you!     Thank you for choosing [facility]  for your care. Our goal is always to provide you with excellent care. Hearing back from our patients is one way we can continue to improve our services.  Please take a few minutes to complete the written survey that you may receive in the mail after your visit with us. Thank you!             Your Updated Medication List - Protect others around you: Learn how to safely use, store and throw away your medicines at www.disposemymeds.org.          This list is accurate as of 11/26/18  3:41 PM.  Always use your most recent med list.                   Brand Name Dispense Instructions for use Diagnosis    acetaminophen 325 MG tablet    TYLENOL     Take 2 tablets (650 mg) by mouth every 4 hours as needed for mild pain or fever    Persistent atrial fibrillation (H)       apixaban ANTICOAGULANT 5 MG tablet    ELIQUIS    180 tablet    Take 1 tablet (5 mg) by mouth 2 times daily    A-fib (H)       calcium carbonate-vitamin D 500-400 MG-UNIT Tabs per tablet      Take 1 tablet by mouth 2 times daily.        diltiazem 240 MG 24 hr capsule     30 capsule    Take 1 capsule (240 mg) by mouth daily    Paroxysmal atrial fibrillation (H)       dorzolamide-timolol PF 22.3-6.8 MG/ML opthalmic solution    COSOPT     Place 1 drop into both eyes 2 times daily 10 mL vial        fish oil-omega-3 fatty acids 1000 MG capsule      Take 1 g by mouth daily        furosemide 40 MG tablet    LASIX    60 tablet    Take 1 tablet (40 mg) by mouth daily    Combined systolic and diastolic congestive heart failure, unspecified HF chronicity (H)       IMITREX PO      Take 25 mg by mouth as needed.        latanoprost 0.005 % ophthalmic solution    XALATAN     Place 1 drop into both eyes At Bedtime        magnesium gluconate 500 MG tablet    MAGONATE     Take 500 mg by mouth daily.        metFORMIN 500 MG 24 hr tablet    GLUCOPHAGE-XR     Take 500 mg by mouth every morning        metoprolol succinate 100 MG 24 hr tablet    TOPROL-XL    60 tablet    Take 1 tablet (100 mg) by mouth daily    Chronic atrial fibrillation (H)       multivitamin, therapeutic Tabs tablet      Take 1 tablet by mouth daily.         potassium chloride SA 10 MEQ CR tablet    K-DUR/KLOR-CON M    15 tablet    Take 2 tablets (20 mEq) by mouth daily    Hypokalemia       sertraline 50 MG tablet    ZOLOFT     Take 25 mg by mouth every evening as needed (Take a half tablet qpm prn)        timolol maleate 0.5 % ophthalmic solution    TIMOPTIC     Place 1 drop into both eyes every morning

## 2018-11-26 NOTE — TELEPHONE ENCOUNTER
Pt son called and stated that they had held pt dose of Eliquis last night and this morning d/t a bruise from below her left breast, left lateral torso, to waist.  Pt did not fall or bump this area that is bruised. The dose was questioned, but Eliquis 5 mg is correct d/t pt weight and Kidney function.  Will discuss with Dr Rangel or Dr Mcnulty as to going forward. JNelsonRN

## 2018-11-26 NOTE — TELEPHONE ENCOUNTER
Discussed with Mary and Rica device nurse.  Pt had recent AVN ablation and CRT upgrade on 11/15, thus there could have possibly been a hematoma that has now been draining down pt torso.  Pt will come in and see Mary today at 1350 so that this can be visualized and it can be determined if Eliquis should be held longer. Ricarda

## 2018-11-26 NOTE — LETTER
11/26/2018    [place] RE[facility] [address]  [facility] [address]    RE: Zayda Hawkins       Dear Colleague,    I had the pleasure of seeing Zayda Hawkins in the [facility].    HPI:  Zayda Hawkins is a 85 year old Farsi-speaking female originally from [place] who presents today after calling hte clinic regarding new ecchymosis. Dr. Rangel and Dr. Brandon follow her for:    Atrial fibrillation:  She had not been on AC due to spontaneous retroperitoneal bleed, however, she had a CVA (9/26/2018) she was subsequently started on Eliquis 5 mg twice a day.  She was hospitalized on 10/13/2018 with HF and AFib with RVR and  her Tikosyn was discontinued and rate control was initiated with diltiazem 180 mg daily.  She underwent a AV node ablation (11/14/2018) and upgrade to BiV St Ion ppm.  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         Congestive heart failure.  ECHO (10/14/2018) revealed EF 40-45%.  She was having symptoms of HF on 10/19/18 and lasix 40 mg daily and potassium 10 mEq daily were added.  She referred to CORE clinic and seen on 10/29/2018.       Other medical history includes chronic lower extremity venous insufficiency, paroxysmal atrial fibrillation, CAD, HTN, CVA with right visual field deficit.     Today Zayda Hawkins presents today with dark ecchymosis on and under her left breast and along her 4-5th intercostal space past her midaxillary line which appeared 4-5 days ago.   Her son is present and interpreted.     Her pacemaker site is slightly edematous and healing ecchymosis (green like).   She held her Eliquis for the past 2 days.   She denies any falls or trauma to the area.  She also denies chest pain, dizziness, syncope, dyspnea at rest or with exertion, palpitations, orthopnea, PND, abdominal pain, abdominal edema, pedal edema, or claudication.      ASSESSMENT AND PLAN    Zayda Hawkins is  a 85 year old Farsi-speaking female   Atrial fibrillation:    She has underwent a AV node ablation on 11/14/2018 with upgrade to BiV St Ion ppm.  She called the clinic today with new ecchymosis under and on her left breast.   Plan:    Dr. Ramesh examined the patient with me.  He felt this was related to anticoagulation and upgrade and the bleeding which occurred during the procedure has dropped.  He recommended continuing Eliquis 5 mg twice daily.    Encouraged patient to call clinic if she has changes in how she feels regarding dizziness, lightheadedness, her ppm site increases in size    Anticoagulation for CHADS VASC 6 (age++, CVA++, female, HTN, HF), however she has had a spontaneous retroperitoneal bleed while on warfarin and now is on Eliquis.  There has been discussion regarding left atrial appendage closure (Watchman) implantation for long term anticoagution     Stop diltiazem 240 mg daily     Continue Toprol  mg daily     Her family has asked to see Dr. Rangel to further discuss Watchman and care moving forward    Congestive Heart failure.  Patient has been hospitalized for HF associated with atrial fibrillation.  She has seen CORE clinic but has elected not to return and has subsequently canceled the core follow up appointment.    Plan:    Decrease lasix 20 mg daily     Decrease potassium chloride 10 mEq daily    Continue to weigh self daily and take blood pressure     Follow up in 2 weeks with EP MAGALY in 2 weeks.      ECHO prior to seeing Dr. Rangel    Patient expresses understanding and agreement with the plan.     I appreciate the chance to help with Zayda Hawkins Please let me know if you have any questions or concerns.    Mary Domínguez, APRN, CNP    This note was completed in part using Dragon voice recognition software. Although reviewed after completion, some word and grammatical errors may occur.    Orders Placed This Encounter   Procedures     Hemoglobin     Follow-Up with  Electrophysiologist     Orders Placed This Encounter   Medications     potassium chloride SA (K-DUR/KLOR-CON M) 10 MEQ CR tablet     Sig: Take 1 tablet (10 mEq) by mouth daily     Dispense:  15 tablet     Refill:  1     Medications Discontinued During This Encounter   Medication Reason     diltiazem 240 MG 24 hr capsule      potassium chloride SA (K-DUR/KLOR-CON M) 10 MEQ CR tablet Reorder         Encounter Diagnoses   Name Primary?     Paroxysmal atrial fibrillation (H) Yes     Hypokalemia        CURRENT MEDICATIONS:  Current Outpatient Prescriptions   Medication Sig Dispense Refill     acetaminophen (TYLENOL) 325 MG tablet Take 2 tablets (650 mg) by mouth every 4 hours as needed for mild pain or fever       apixaban ANTICOAGULANT (ELIQUIS) 5 MG tablet Take 1 tablet (5 mg) by mouth 2 times daily 180 tablet 3     calcium carbonate-vitamin D 500-400 MG-UNIT TABS Take 1 tablet by mouth 2 times daily.         dorzolamide-timolol PF (COSOPT) 22.3-6.8 MG/ML opthalmic solution Place 1 drop into both eyes 2 times daily 10 mL vial       fish oil-omega-3 fatty acids 1000 MG capsule Take 1 g by mouth daily       furosemide (LASIX) 40 MG tablet Take 20 mg by mouth daily 60 tablet 3     latanoprost (XALATAN) 0.005 % ophthalmic solution Place 1 drop into both eyes At Bedtime       magnesium gluconate (MAGONATE) 500 MG tablet Take 500 mg by mouth daily.         metFORMIN (GLUCOPHAGE-XR) 500 MG 24 hr tablet Take 500 mg by mouth every morning        metoprolol succinate (TOPROL-XL) 100 MG 24 hr tablet Take 1 tablet (100 mg) by mouth daily 60 tablet 0     multivitamin, therapeutic (THERA-VIT) TABS Take 1 tablet by mouth daily.         potassium chloride SA (K-DUR/KLOR-CON M) 10 MEQ CR tablet Take 1 tablet (10 mEq) by mouth daily 15 tablet 1     sertraline (ZOLOFT) 50 MG tablet Take 25 mg by mouth every evening as needed (Take a half tablet qpm prn)       SUMAtriptan Succinate (IMITREX PO) Take 25 mg by mouth as needed.          timolol (TIMOPTIC) 0.5 % ophthalmic solution Place 1 drop into both eyes every morning          ALLERGIES     Allergies   Allergen Reactions     Aspirin      Coumadin [Warfarin]      Spontaneous retroperitoneal bleed.     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     Venous insufficiency        PAST SURGICAL HISTORY:  Past Surgical History:   Procedure Laterality Date     ANESTHESIA CARDIOVERSION  7/23/2012    Procedure: ANESTHESIA CARDIOVERSION;;  Surgeon: Provider, Generic Anesthesia;  Location:  ANESTHESIA -  - DO NOT SCHEDULE     BLEPHAROPLASTY  2005     Cataract Extraction with IOL Bilateral 2012     IMPLANT PACEMAKER  11/2012    Dual chamber       FAMILY HISTORY:  Family History   Problem Relation Age of Onset     HEART DISEASE Mother 65     mi     HEART DISEASE Father 45     pnemonia       SOCIAL HISTORY:  Social History     Social History     Marital status:      Spouse name: N/A     Number of children: N/A     Years of education: N/A     Social History Main Topics     Smoking status: Never Smoker     Smokeless tobacco: Never Used     Alcohol use No     Drug use: No     Sexual activity: Not Asked     Other Topics Concern     Caffeine Concern Yes     no caffeine intake     Special Diet Yes     no sugar, salt or fats      Exercise Yes     treadmill, swimming daily      Seat Belt Yes     Social History Narrative       Review of Systems:  Skin:  Negative     Eyes:  Negative    ENT:  Negative    Respiratory:  Negative    Cardiovascular:  Negative   "  Gastroenterology: Negative    Genitourinary:  Negative    Musculoskeletal:  Positive for arthritis  Neurologic:  Positive for numbness or tingling of feet  Psychiatric:  Negative    Heme/Lymph/Imm:  Negative    Endocrine:  Positive for diabetes    Physical Exam:  Vitals: /74  Pulse 77  Ht 1.626 m (5' 4\")  Wt 67.1 kg (148 lb)  BMI 25.4 kg/m2    Constitutional:    frail;thin      Skin:      ppm site swollen and slight ecchymosis.  see HPI for ecchymosis on left breast and chest    Head:  normocephalic        Eyes:  pupils equal and round        ENT:  no pallor or cyanosis        Neck:  JVP normal        Chest:  clear to auscultation        Cardiac: regular rhythm         systolic murmur        Abdomen:  abdomen soft        Vascular:       right radial artery;2+             left radial artery;2+                  Extremities and Back:  no deformities, clubbing, cyanosis, erythema observed        Neurological:  no gross motor deficits          Recent Lab Results:  LIPID RESULTS:  Lab Results   Component Value Date    CHOL 125 09/27/2018    HDL 65 09/27/2018    LDL 51 09/27/2018    TRIG 47 09/27/2018    CHOLHDLRATIO 2.2 03/10/2015       LIVER ENZYME RESULTS:  Lab Results   Component Value Date     (H) 09/27/2018     (H) 09/27/2018       CBC RESULTS:  Lab Results   Component Value Date    WBC 5.6 11/14/2018    RBC 4.90 11/14/2018    HGB 12.8 11/26/2018    HCT 43.4 11/14/2018    MCV 89 11/14/2018    MCH 28.6 11/14/2018    MCHC 32.3 11/14/2018    RDW 13.8 11/14/2018     11/14/2018       BMP RESULTS:  Lab Results   Component Value Date     11/26/2018    POTASSIUM 3.5 11/26/2018    CHLORIDE 103 11/26/2018    CO2 32 (H) 11/26/2018    ANIONGAP 10.5 11/26/2018     (H) 11/26/2018    BUN 17 11/26/2018    CR 1.30 11/26/2018    GFRESTIMATED 39 (L) 11/26/2018    GFRESTBLACK 47 (L) 11/26/2018    MADDISON 9.8 11/26/2018        A1C RESULTS:  Lab Results   Component Value Date    A1C 6.5 (H) " 09/27/2018       INR RESULTS:  Lab Results   Component Value Date    INR 1.05 09/26/2018    INR 1.04 10/26/2016         Thank you for allowing me to participate in the care of your patient.    Sincerely,     BARBARA Mckeon [facility]

## 2018-11-26 NOTE — MR AVS SNAPSHOT
After Visit Summary   11/26/2018    Zayda Hawkins    MRN: [id number]           Patient Information     Date Of Birth          12/24/1932        Visit Information        Provider Department      11/26/2018 1:35 PM Mary Domínguez APRN CNP; [facility]        Today's Diagnoses     Paroxysmal atrial fibrillation (H)    -  1    Hypokalemia          Care Instructions    As always, thank you for trusting us with your health care needs!    Please have hemoglobin drawn today    If you have changes and increase weakness, dizziness, your device gets more swollen please call the clinic.      I have asked the  to have you seen Dr. Rangel towards the end of December      If you have any questions regarding your visit please contact your care team:   Cardiology  Telephone Number    Afib RNs:  Esthela Vences and Pat [phone]   Call for Electrophysiology procedure scheduling concerns [phone]   Device Clinic (Pacemakers, ICDs, Loop Recorders)   RN's:  Mandy Huff Lynda, MJ, Naima Jean During business hours:   [phone]                 Follow-ups after your visit        Additional Services     Follow-Up with Cardiac Advanced Practice Provider           Follow-Up with Electrophysiologist                 Your next 10 appointments already scheduled     Dec 31, 2018  9:00 AM CST   Pacemaker Check with KAIT ARELLANON   [facility] ([facility])    [address]  [place] [address] [phone] OPT 2            Dec 31, 2018 10:00 AM CST   Ech Complete with [id number]   [facility] CV Echocardiography (Cardiovascular [facility] at [facility])    [address]  [place] [id number] [phone]           1.  Please bring or wear a comfortable two-piece outfit. 2.  You may eat, drink and take your normal medicines. 3.  For any questions  "that cannot be answered, please contact the ordering physician 4.  Please do not wear perfumes or scented lotions on the day of your exam.            Dec 31, 2018  1:50 PM CST   Return Visit with Vikki Chang PA-C   [facility]   [place] ([facility] Clinics)    [address]  [place] [id number] [phone] OPT 2              Future tests that were ordered for you today     Open Future Orders        Priority Expected Expires Ordered    Follow-Up with Cardiac Advanced Practice Provider Routine 12/10/2018 11/26/2019 11/26/2018    Follow-Up with Electrophysiologist Routine 12/19/2018 11/26/2019 11/26/2018            Who to contact     If you have questions or need follow up information about today's clinic visit or your schedule please contact [facility]   [place] directly at [phone].  Normal or non-critical lab and imaging results will be communicated to you by MyChart, letter or phone within 4 business days after the clinic has received the results. If you do not hear from us within 7 days, please contact the clinic through MyChart or phone. If you have a critical or abnormal lab result, we will notify you by phone as soon as possible.  Submit refill requests through [employer] or call your pharmacy and they will forward the refill request to us. Please allow 3 business days for your refill to be completed.          Additional Information About Your Visit        Care EveryWhere ID     This is your Care EveryWhere ID. This could be used by other organizations to access your [place] medical records  [id number]        Your Vitals Were     Pulse Height BMI (Body Mass Index)             77 1.626 m (5' 4\") 25.4 kg/m2          Blood Pressure from Last 3 Encounters:   11/26/18 124/74   11/15/18 123/66   11/12/18 118/70    Weight from Last 3 Encounters:   11/26/18 67.1 kg (148 lb)   11/15/18 66.7 kg (147 lb 1.6 oz)   11/12/18 70.8 " kg (156 lb)                 Today's Medication Changes          These changes are accurate as of 11/26/18  5:34 PM.  If you have any questions, ask your nurse or doctor.               These medicines have changed or have updated prescriptions.        Dose/Directions    potassium chloride SA 10 MEQ CR tablet   Commonly known as:  K-DUR/KLOR-CON M   This may have changed:  how much to take   Used for:  Hypokalemia   Changed by:  Mary Domínguez APRN CNP        Dose:  10 mEq   Take 1 tablet (10 mEq) by mouth daily   Quantity:  15 tablet   Refills:  1         Stop taking these medicines if you haven't already. Please contact your care team if you have questions.     diltiazem 240 MG 24 hr capsule   Stopped by:  Mary Domínguez APRN CNP                Where to get your medicines      Some of these will need a paper prescription and others can be bought over the counter.  Ask your nurse if you have questions.     You don't need a prescription for these medications     potassium chloride SA 10 MEQ CR tablet                Primary Care Provider Office Phone # Fax #    [place] [phone]       [facility] [address] [facility] [address]  [facility] [address]        Equal Access to Services     [facility] Hospital[facility] AH: Hadii aad ku hadasho Soomaali, waaxda luqadaha, qaybta kaalmada adeegyada, karissa yousif. So [facility] [phone].    ATENCIÓN: Si habla español, tiene a hoffmann disposición servicios gratuitos de asistencia lingüística. [facility] [phone].    We comply with applicable federal civil rights laws and [place] laws. We do not discriminate on the basis of race, color, national origin, age, disability, sex, sexual orientation, or gender identity.            Thank you!     Thank you for choosing [facility] HEART [facility]  for your care. Our goal is always to provide you with excellent care. Hearing back from our patients is one way we  can continue to improve our services. Please take a few minutes to complete the written survey that you may receive in the mail after your visit with us. Thank you!             Your Updated Medication List - Protect others around you: Learn how to safely use, store and throw away your medicines at www.disposemymeds.org.          This list is accurate as of 11/26/18  5:34 PM.  Always use your most recent med list.                   Brand Name Dispense Instructions for use Diagnosis    acetaminophen 325 MG tablet    TYLENOL     Take 2 tablets (650 mg) by mouth every 4 hours as needed for mild pain or fever    Persistent atrial fibrillation (H)       apixaban ANTICOAGULANT 5 MG tablet    ELIQUIS    180 tablet    Take 1 tablet (5 mg) by mouth 2 times daily    A-fib (H)       calcium carbonate-vitamin D 500-400 MG-UNIT Tabs per tablet      Take 1 tablet by mouth 2 times daily.        dorzolamide-timolol PF 22.3-6.8 MG/ML opthalmic solution    COSOPT     Place 1 drop into both eyes 2 times daily 10 mL vial        fish oil-omega-3 fatty acids 1000 MG capsule      Take 1 g by mouth daily        furosemide 40 MG tablet    LASIX    60 tablet    Take 20 mg by mouth daily    Combined systolic and diastolic congestive heart failure, unspecified HF chronicity (H)       IMITREX PO      Take 25 mg by mouth as needed.        latanoprost 0.005 % ophthalmic solution    XALATAN     Place 1 drop into both eyes At Bedtime        magnesium gluconate 500 MG tablet    MAGONATE     Take 500 mg by mouth daily.        metFORMIN 500 MG 24 hr tablet    GLUCOPHAGE-XR     Take 500 mg by mouth every morning        metoprolol succinate 100 MG 24 hr tablet    TOPROL-XL    60 tablet    Take 1 tablet (100 mg) by mouth daily    Chronic atrial fibrillation (H)       multivitamin, therapeutic Tabs tablet      Take 1 tablet by mouth daily.        potassium chloride SA 10 MEQ CR tablet    K-DUR/KLOR-CON M    15 tablet    Take 1 tablet (10 mEq) by mouth  daily    Hypokalemia       sertraline 50 MG tablet    ZOLOFT     Take 25 mg by mouth every evening as needed (Take a half tablet qpm prn)        timolol maleate 0.5 % ophthalmic solution    TIMOPTIC     Place 1 drop into both eyes every morning

## 2018-11-26 NOTE — PROGRESS NOTES
HPI:  Zayda Hawkins is a 85 year old Farsi-speaking female originally from [place] who presents today after calling hte clinic regarding new ecchymosis. Dr. Rangel and Dr. Brandon follow her for:    Atrial fibrillation:  She had not been on AC due to spontaneous retroperitoneal bleed, however, she had a CVA (9/26/2018) she was subsequently started on Eliquis 5 mg twice a day.  She was hospitalized on 10/13/2018 with HF and AFib with RVR and  her Tikosyn was discontinued and rate control was initiated with diltiazem 180 mg daily.  She underwent a AV node ablation (11/14/2018) and upgrade to BiV St Ion ppm.  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         Congestive heart failure.  ECHO (10/14/2018) revealed EF 40-45%.  She was having symptoms of HF on 10/19/18 and lasix 40 mg daily and potassium 10 mEq daily were added.  She referred to [facility] clinic and seen on 10/29/2018.       Other medical history includes chronic lower extremity venous insufficiency, paroxysmal atrial fibrillation, CAD, HTN, CVA with right visual field deficit.     Today Zayda Hawkins presents today with dark ecchymosis on and under her left breast and along her 4-5th intercostal space past her midaxillary line which appeared 4-5 days ago.   Her son is present and interpreted.     Her pacemaker site is slightly edematous and healing ecchymosis (green like).   She held her Eliquis for the past 2 days.   She denies any falls or trauma to the area.  She also denies chest pain, dizziness, syncope, dyspnea at rest or with exertion, palpitations, orthopnea, PND, abdominal pain, abdominal edema, pedal edema, or claudication.      ASSESSMENT AND PLAN    Zayda Hawkins is a 85 year old Farsi-speaking female   Atrial fibrillation:    She has underwent a AV node ablation on 11/14/2018 with upgrade to BiV St Ion ppm.  She called the clinic today with new ecchymosis under and on her left breast.   Plan:    Dr. Ramesh examined the  patient with me.  He felt this was related to anticoagulation and upgrade and the bleeding which occurred during the procedure has dropped.  He recommended continuing Eliquis 5 mg twice daily.    Encouraged patient to call clinic if she has changes in how she feels regarding dizziness, lightheadedness, her ppm site increases in size    Anticoagulation for CHADS VASC 6 (age++, CVA++, female, HTN, HF), however she has had a spontaneous retroperitoneal bleed while on warfarin and now is on Eliquis.  There has been discussion regarding left atrial appendage closure (Watchman) implantation for long term anticoagution     Stop diltiazem 240 mg daily     Continue Toprol  mg daily     Her family has asked to see Dr. Rangel to further discuss Watchman and care moving forward    Congestive Heart failure.  Patient has been hospitalized for HF associated with atrial fibrillation.  She has seen [facility] clinic but has elected not to return and has subsequently canceled the core follow up appointment.    Plan:    Decrease lasix 20 mg daily     Decrease potassium chloride 10 mEq daily    Continue to weigh self daily and take blood pressure     Follow up in 2 weeks with EP MAGALY in 2 weeks.      ECHO prior to seeing Dr. Rangel    Patient expresses understanding and agreement with the plan.     I appreciate the chance to help with Zayda Darylhailey Hawkins Please let me know if you have any questions or concerns.    Mary Domínguez APRN, CNP    This note was completed in part using Dragon voice recognition software. Although reviewed after completion, some word and grammatical errors may occur.    Orders Placed This Encounter   Procedures     Hemoglobin     Follow-Up with Electrophysiologist     Orders Placed This Encounter   Medications     potassium chloride SA (K-DUR/KLOR-CON M) 10 MEQ CR tablet     Sig: Take 1 tablet (10 mEq) by mouth daily     Dispense:  15 tablet     Refill:  1     Medications Discontinued During This Encounter    Medication Reason     diltiazem 240 MG 24 hr capsule      potassium chloride SA (K-DUR/KLOR-CON M) 10 MEQ CR tablet Reorder         Encounter Diagnoses   Name Primary?     Paroxysmal atrial fibrillation (H) Yes     Hypokalemia        CURRENT MEDICATIONS:  Current Outpatient Prescriptions   Medication Sig Dispense Refill     acetaminophen (TYLENOL) 325 MG tablet Take 2 tablets (650 mg) by mouth every 4 hours as needed for mild pain or fever       apixaban ANTICOAGULANT (ELIQUIS) 5 MG tablet Take 1 tablet (5 mg) by mouth 2 times daily 180 tablet 3     calcium carbonate-vitamin D 500-400 MG-UNIT TABS Take 1 tablet by mouth 2 times daily.         dorzolamide-timolol PF (COSOPT) 22.3-6.8 MG/ML opthalmic solution Place 1 drop into both eyes 2 times daily 10 mL vial       fish oil-omega-3 fatty acids 1000 MG capsule Take 1 g by mouth daily       furosemide (LASIX) 40 MG tablet Take 20 mg by mouth daily 60 tablet 3     latanoprost (XALATAN) 0.005 % ophthalmic solution Place 1 drop into both eyes At Bedtime       magnesium gluconate (MAGONATE) 500 MG tablet Take 500 mg by mouth daily.         metFORMIN (GLUCOPHAGE-XR) 500 MG 24 hr tablet Take 500 mg by mouth every morning        metoprolol succinate (TOPROL-XL) 100 MG 24 hr tablet Take 1 tablet (100 mg) by mouth daily 60 tablet 0     multivitamin, therapeutic (THERA-VIT) TABS Take 1 tablet by mouth daily.         potassium chloride SA (K-DUR/KLOR-CON M) 10 MEQ CR tablet Take 1 tablet (10 mEq) by mouth daily 15 tablet 1     sertraline (ZOLOFT) 50 MG tablet Take 25 mg by mouth every evening as needed (Take a half tablet qpm prn)       SUMAtriptan Succinate (IMITREX PO) Take 25 mg by mouth as needed.         timolol (TIMOPTIC) 0.5 % ophthalmic solution Place 1 drop into both eyes every morning          ALLERGIES     Allergies   Allergen Reactions     Aspirin      Coumadin [Warfarin]      Spontaneous retroperitoneal bleed.     Penicillins        PAST MEDICAL HISTORY:  Past  Medical History:   Diagnosis Date     Atrial fibrillation (H)     not on anticoagulation, hx RP bleed     CAD (coronary artery disease)     CT angio: mild lesion in the LAD, as well as 1st diagonal, and mild plaque in the proximal and  mid RCA     CVA (cerebral vascular accident) (H) 10/2018    Acute left posterior circulation ischemic stroke      Diabetes mellitus (H)      Hyperlipidemia      Hypertension      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     Venous insufficiency        PAST SURGICAL HISTORY:  Past Surgical History:   Procedure Laterality Date     ANESTHESIA CARDIOVERSION  7/23/2012    Procedure: ANESTHESIA CARDIOVERSION;;  Surgeon: Provider, Generic Anesthesia;  Location:  ANESTHESIA - [facility] - DO NOT SCHEDULE     BLEPHAROPLASTY  2005     Cataract Extraction with IOL Bilateral 2012     IMPLANT PACEMAKER  11/2012    Dual chamber       FAMILY HISTORY:  Family History   Problem Relation Age of Onset     HEART DISEASE Mother 65     mi     HEART DISEASE Father 45     pnemonia       SOCIAL HISTORY:  Social History     Social History     Marital status:      Spouse name: N/A     Number of children: N/A     Years of education: N/A     Social History Main Topics     Smoking status: Never Smoker     Smokeless tobacco: Never Used     Alcohol use No     Drug use: No     Sexual activity: Not Asked     Other Topics Concern     Caffeine Concern Yes     no caffeine intake     Special Diet Yes     no sugar, salt or fats      Exercise Yes     treadmill, swimming daily      Seat Belt Yes     Social History Narrative       Review of Systems:  Skin:  Negative     Eyes:  Negative    ENT:  Negative    Respiratory:  Negative    Cardiovascular:  Negative    Gastroenterology: Negative    Genitourinary:  Negative    Musculoskeletal:  Positive for arthritis  Neurologic:  Positive for numbness or tingling of feet  Psychiatric:  Negative    Heme/Lymph/Imm:  Negative    Endocrine:  Positive for diabetes    Physical Exam:  Vitals:  "/74  Pulse 77  Ht 1.626 m (5' 4\")  Wt 67.1 kg (148 lb)  BMI 25.4 kg/m2    Constitutional:    frail;thin      Skin:      ppm site swollen and slight ecchymosis.  see HPI for ecchymosis on left breast and chest    Head:  normocephalic        Eyes:  pupils equal and round        ENT:  no pallor or cyanosis        Neck:  JVP normal        Chest:  clear to auscultation        Cardiac: regular rhythm         systolic murmur        Abdomen:  abdomen soft        Vascular:       right radial artery;2+             left radial artery;2+                  Extremities and Back:  no deformities, clubbing, cyanosis, erythema observed        Neurological:  no gross motor deficits          Recent Lab Results:  LIPID RESULTS:  Lab Results   Component Value Date    CHOL 125 09/27/2018    HDL 65 09/27/2018    LDL 51 09/27/2018    TRIG 47 09/27/2018    CHOLHDLRATIO 2.2 03/10/2015       LIVER ENZYME RESULTS:  Lab Results   Component Value Date     (H) 09/27/2018     (H) 09/27/2018       CBC RESULTS:  Lab Results   Component Value Date    WBC 5.6 11/14/2018    RBC 4.90 11/14/2018    HGB 12.8 11/26/2018    HCT 43.4 11/14/2018    MCV 89 11/14/2018    MCH 28.6 11/14/2018    MCHC 32.3 11/14/2018    RDW 13.8 11/14/2018     11/14/2018       BMP RESULTS:  Lab Results   Component Value Date     11/26/2018    POTASSIUM 3.5 11/26/2018    CHLORIDE 103 11/26/2018    CO2 32 (H) 11/26/2018    ANIONGAP 10.5 11/26/2018     (H) 11/26/2018    BUN 17 11/26/2018    CR 1.30 11/26/2018    GFRESTIMATED 39 (L) 11/26/2018    GFRESTBLACK 47 (L) 11/26/2018    MADDISON 9.8 11/26/2018        A1C RESULTS:  Lab Results   Component Value Date    A1C 6.5 (H) 09/27/2018       INR RESULTS:  Lab Results   Component Value Date    INR 1.05 09/26/2018    INR 1.04 10/26/2016           CC  No referring provider defined for this encounter.                "

## 2018-11-26 NOTE — LETTER
11/26/2018    [place] REYES [facility] [address]  [facility] [address]    RE: Zayda Hawkins       Dear Colleague,    I had the pleasure of seeing Zayda Hawkins in the [facility].    HPI:  Zayda Hawkins is a 85 year old Farsi-speaking female originally from [place] who presents today after calling hte clinic regarding new ecchymosis. Dr. Rangel and Dr. Brandon follow her for:    Atrial fibrillation:  She had not been on AC due to spontaneous retroperitoneal bleed, however, she had a CVA (9/26/2018) she was subsequently started on Eliquis 5 mg twice a day.  She was hospitalized on 10/13/2018 with HF and AFib with RVR and  her Tikosyn was discontinued and rate control was initiated with diltiazem 180 mg daily.  She underwent a AV node ablation (11/14/2018) and upgrade to BiV St Ion ppm.  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         Congestive heart failure.  ECHO (10/14/2018) revealed EF 40-45%.  She was having symptoms of HF on 10/19/18 and lasix 40 mg daily and potassium 10 mEq daily were added.  She referred to [facility] clinic and seen on 10/29/2018.       Other medical history includes chronic lower extremity venous insufficiency, paroxysmal atrial fibrillation, CAD, HTN, CVA with right visual field deficit.     Today Zayda Hawkins presents today with dark ecchymosis on and under her left breast and along her 4-5th intercostal space past her midaxillary line which appeared 4-5 days ago.   Her son is present and interpreted.     Her pacemaker site is slightly edematous and healing ecchymosis (green like).   She held her Eliquis for the past 2 days.   She denies any falls or trauma to the area.  She also denies chest pain, dizziness, syncope, dyspnea at rest or with exertion, palpitations, orthopnea, PND, abdominal pain, abdominal edema, pedal edema, or claudication.      ASSESSMENT AND PLAN    Zayda Hawkins is  a 85 year old Farsi-speaking female   Atrial fibrillation:    She has underwent a AV node ablation on 11/14/2018 with upgrade to BiV St Ion ppm.  She called the clinic today with new ecchymosis under and on her left breast.   Plan:    Dr. Ramesh examined the patient with me.  He felt this was related to anticoagulation and upgrade and the bleeding which occurred during the procedure has dropped.  He recommended continuing Eliquis 5 mg twice daily.    Encouraged patient to call clinic if she has changes in how she feels regarding dizziness, lightheadedness, her ppm site increases in size    Anticoagulation for CHADS VASC 6 (age++, CVA++, female, HTN, HF), however she has had a spontaneous retroperitoneal bleed while on warfarin and now is on Eliquis.  There has been discussion regarding left atrial appendage closure (Watchman) implantation for long term anticoagution     Stop diltiazem 240 mg daily     Continue Toprol  mg daily     Her family has asked to see Dr. Rangel to further discuss Watchman and care moving forward    Congestive Heart failure.  Patient has been hospitalized for HF associated with atrial fibrillation.  She has seen [facility] clinic but has elected not to return and has subsequently canceled the core follow up appointment.    Plan:    Decrease lasix 20 mg daily     Decrease potassium chloride 10 mEq daily    Continue to weigh self daily and take blood pressure     Follow up in 2 weeks with EP MAGALY in 2 weeks.      ECHO prior to seeing Dr. Rangel    Patient expresses understanding and agreement with the plan.     I appreciate the chance to help with Zayda Hawkins Please let me know if you have any questions or concerns.    Mary Domínguez, APRN, CNP    This note was completed in part using Dragon voice recognition software. Although reviewed after completion, some word and grammatical errors may occur.    Orders Placed This Encounter   Procedures     Hemoglobin     Follow-Up with  Electrophysiologist     Orders Placed This Encounter   Medications     potassium chloride SA (K-DUR/KLOR-CON M) 10 MEQ CR tablet     Sig: Take 1 tablet (10 mEq) by mouth daily     Dispense:  15 tablet     Refill:  1     Medications Discontinued During This Encounter   Medication Reason     diltiazem 240 MG 24 hr capsule      potassium chloride SA (K-DUR/KLOR-CON M) 10 MEQ CR tablet Reorder         Encounter Diagnoses   Name Primary?     Paroxysmal atrial fibrillation (H) Yes     Hypokalemia        CURRENT MEDICATIONS:  Current Outpatient Prescriptions   Medication Sig Dispense Refill     acetaminophen (TYLENOL) 325 MG tablet Take 2 tablets (650 mg) by mouth every 4 hours as needed for mild pain or fever       apixaban ANTICOAGULANT (ELIQUIS) 5 MG tablet Take 1 tablet (5 mg) by mouth 2 times daily 180 tablet 3     calcium carbonate-vitamin D 500-400 MG-UNIT TABS Take 1 tablet by mouth 2 times daily.         dorzolamide-timolol PF (COSOPT) 22.3-6.8 MG/ML opthalmic solution Place 1 drop into both eyes 2 times daily 10 mL vial       fish oil-omega-3 fatty acids 1000 MG capsule Take 1 g by mouth daily       furosemide (LASIX) 40 MG tablet Take 20 mg by mouth daily 60 tablet 3     latanoprost (XALATAN) 0.005 % ophthalmic solution Place 1 drop into both eyes At Bedtime       magnesium gluconate (MAGONATE) 500 MG tablet Take 500 mg by mouth daily.         metFORMIN (GLUCOPHAGE-XR) 500 MG 24 hr tablet Take 500 mg by mouth every morning        metoprolol succinate (TOPROL-XL) 100 MG 24 hr tablet Take 1 tablet (100 mg) by mouth daily 60 tablet 0     multivitamin, therapeutic (THERA-VIT) TABS Take 1 tablet by mouth daily.         potassium chloride SA (K-DUR/KLOR-CON M) 10 MEQ CR tablet Take 1 tablet (10 mEq) by mouth daily 15 tablet 1     sertraline (ZOLOFT) 50 MG tablet Take 25 mg by mouth every evening as needed (Take a half tablet qpm prn)       SUMAtriptan Succinate (IMITREX PO) Take 25 mg by mouth as needed.          timolol (TIMOPTIC) 0.5 % ophthalmic solution Place 1 drop into both eyes every morning          ALLERGIES     Allergies   Allergen Reactions     Aspirin      Coumadin [Warfarin]      Spontaneous retroperitoneal bleed.     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     Venous insufficiency        PAST SURGICAL HISTORY:  Past Surgical History:   Procedure Laterality Date     ANESTHESIA CARDIOVERSION  7/23/2012    Procedure: ANESTHESIA CARDIOVERSION;;  Surgeon: Provider, Generic Anesthesia;  Location:  ANESTHESIA -  - DO NOT SCHEDULE     BLEPHAROPLASTY  2005     Cataract Extraction with IOL Bilateral 2012     IMPLANT PACEMAKER  11/2012    Dual chamber       FAMILY HISTORY:  Family History   Problem Relation Age of Onset     HEART DISEASE Mother 65     mi     HEART DISEASE Father 45     pnemonia       SOCIAL HISTORY:  Social History     Social History     Marital status:      Spouse name: N/A     Number of children: N/A     Years of education: N/A     Social History Main Topics     Smoking status: Never Smoker     Smokeless tobacco: Never Used     Alcohol use No     Drug use: No     Sexual activity: Not Asked     Other Topics Concern     Caffeine Concern Yes     no caffeine intake     Special Diet Yes     no sugar, salt or fats      Exercise Yes     treadmill, swimming daily      Seat Belt Yes     Social History Narrative       Review of Systems:  Skin:  Negative     Eyes:  Negative    ENT:  Negative    Respiratory:  Negative    Cardiovascular:  Negative   "  Gastroenterology: Negative    Genitourinary:  Negative    Musculoskeletal:  Positive for arthritis  Neurologic:  Positive for numbness or tingling of feet  Psychiatric:  Negative    Heme/Lymph/Imm:  Negative    Endocrine:  Positive for diabetes    Physical Exam:  Vitals: /74  Pulse 77  Ht 1.626 m (5' 4\")  Wt 67.1 kg (148 lb)  BMI 25.4 kg/m2    Constitutional:    frail;thin      Skin:      ppm site swollen and slight ecchymosis.  see HPI for ecchymosis on left breast and chest    Head:  normocephalic        Eyes:  pupils equal and round        ENT:  no pallor or cyanosis        Neck:  JVP normal        Chest:  clear to auscultation        Cardiac: regular rhythm         systolic murmur        Abdomen:  abdomen soft        Vascular:       right radial artery;2+             left radial artery;2+                  Extremities and Back:  no deformities, clubbing, cyanosis, erythema observed        Neurological:  no gross motor deficits          Recent Lab Results:  LIPID RESULTS:  Lab Results   Component Value Date    CHOL 125 09/27/2018    HDL 65 09/27/2018    LDL 51 09/27/2018    TRIG 47 09/27/2018    CHOLHDLRATIO 2.2 03/10/2015       LIVER ENZYME RESULTS:  Lab Results   Component Value Date     (H) 09/27/2018     (H) 09/27/2018       CBC RESULTS:  Lab Results   Component Value Date    WBC 5.6 11/14/2018    RBC 4.90 11/14/2018    HGB 12.8 11/26/2018    HCT 43.4 11/14/2018    MCV 89 11/14/2018    MCH 28.6 11/14/2018    MCHC 32.3 11/14/2018    RDW 13.8 11/14/2018     11/14/2018       BMP RESULTS:  Lab Results   Component Value Date     11/26/2018    POTASSIUM 3.5 11/26/2018    CHLORIDE 103 11/26/2018    CO2 32 (H) 11/26/2018    ANIONGAP 10.5 11/26/2018     (H) 11/26/2018    BUN 17 11/26/2018    CR 1.30 11/26/2018    GFRESTIMATED 39 (L) 11/26/2018    GFRESTBLACK 47 (L) 11/26/2018    MADDISON 9.8 11/26/2018        A1C RESULTS:  Lab Results   Component Value Date    A1C 6.5 (H) " 09/27/2018       INR RESULTS:  Lab Results   Component Value Date    INR 1.05 09/26/2018    INR 1.04 10/26/2016           CC  No referring provider defined for this encounter.                  Thank you for allowing me to participate in the care of your patient.      Sincerely,     BARBARA Mckeon [facility]    cc:   No referring provider defined for this encounter.

## 2018-12-03 NOTE — DISCHARGE SUMMARY
Admit Date:     2018   Discharge Date:     11/15/2018      DISCHARGE DIAGNOSES:     1.  Permanent atrial fibrillation.     2.  Cardiomyopathy, tachycardia-induced.      PROCEDURE PERFORMED:   1.  AV kim ablation.   2.  Upgrading of a pacemaker to a CRT with pacing.      DISCHARGE MEDICATIONS:   1.  Eliquis 2.5 mg b.i.d.   2.  Lasix 20 mg daily.   3.  Metformin 500 mg q. day.   4.  Metoprolol 100 mg daily.   5.  Zoloft 25 mg as needed.   6.  Magnesium.   7.  Fish oil.   8.  Calcium daily.      HOSPITAL COURSE:  The patient is an 85-year-old female with a history of persistent atrial fibrillation at one point was on rhythm control strategy with Dofetilide but failed to maintain sinus rhythm.  The patient does have a pacemaker implanted for tachybrady syndrome.  She has been on rate control strategy but the rate continued to be quite fast, causing LV function to be reduced therefore she was referred for a CRT upgrade along with AV node ablation which was performed uneventfully.      The patient was observed overnight.  The next day chest x-ray done.  There is no evidence of pneumothorax.  The lead position was quite adequate along with a normal pacemaker check.  Followup appointment and instructions were provided to the patient.         MARTA VELA MD             D: 2018   T: 2018   MT: BOBBY      Name:     JOVANNA DE LEÓN   MRN:      [id number]        Account:        [id number]   :      1932           Admit Date:     2018                                  Discharge Date: 11/15/2018      Document: [id number]

## 2018-12-26 ENCOUNTER — HOSPITAL ENCOUNTER (OUTPATIENT)
Dept: CARDIOLOGY | Facility: CLINIC | Age: 83
Discharge: HOME | End: 2018-12-26
Attending: PHYSICIAN ASSISTANT | Admitting: PHYSICIAN ASSISTANT
Payer: COMMERCIAL

## 2018-12-26 ENCOUNTER — OFFICE VISIT (OUTPATIENT)
Dept: CARDIOLOGY | Facility: CLINIC | Age: 83
End: 2018-12-26
Attending: NURSE PRACTITIONER
Payer: COMMERCIAL

## 2018-12-26 ENCOUNTER — ANCILLARY PROCEDURE (OUTPATIENT)
Dept: CARDIOLOGY | Facility: CLINIC | Age: 83
End: 2018-12-26
Attending: INTERNAL MEDICINE

## 2018-12-26 VITALS
BODY MASS INDEX: 25.27 KG/M2 | HEART RATE: 76 BPM | HEIGHT: 64 IN | SYSTOLIC BLOOD PRESSURE: 134 MMHG | DIASTOLIC BLOOD PRESSURE: 76 MMHG | WEIGHT: 148 LBS

## 2018-12-26 DIAGNOSIS — I10 ESSENTIAL HYPERTENSION: ICD-10-CM

## 2018-12-26 DIAGNOSIS — I48.0 PAROXYSMAL ATRIAL FIBRILLATION (H): ICD-10-CM

## 2018-12-26 DIAGNOSIS — Z95.0 PACEMAKER: ICD-10-CM

## 2018-12-26 DIAGNOSIS — I48.20 CHRONIC ATRIAL FIBRILLATION (H): ICD-10-CM

## 2018-12-26 DIAGNOSIS — I50.40 COMBINED SYSTOLIC AND DIASTOLIC CONGESTIVE HEART FAILURE, UNSPECIFIED HF CHRONICITY (H): ICD-10-CM

## 2018-12-26 DIAGNOSIS — I48.19 PERSISTENT ATRIAL FIBRILLATION (H): ICD-10-CM

## 2018-12-26 DIAGNOSIS — I50.32 CHRONIC DIASTOLIC HEART FAILURE (H): Primary | ICD-10-CM

## 2018-12-26 LAB
ANION GAP SERPL CALCULATED.3IONS-SCNC: 11.3 MMOL/L (ref 6–17)
BUN SERPL-MCNC: 19 MG/DL (ref 7–30)
CALCIUM SERPL-MCNC: 9.3 MG/DL (ref 8.5–10.5)
CHLORIDE SERPL-SCNC: 104 MMOL/L (ref 98–107)
CO2 SERPL-SCNC: 28 MMOL/L (ref 23–29)
CREAT SERPL-MCNC: 1.11 MG/DL (ref 0.7–1.3)
GFR SERPL CREATININE-BSD FRML MDRD: 47 ML/MIN/{1.73_M2}
GLUCOSE SERPL-MCNC: 142 MG/DL (ref 70–105)
POTASSIUM SERPL-SCNC: 3.3 MMOL/L (ref 3.5–5.1)
SODIUM SERPL-SCNC: 140 MMOL/L (ref 136–145)

## 2018-12-26 PROCEDURE — 36415 COLL VENOUS BLD VENIPUNCTURE: CPT | Performed by: INTERNAL MEDICINE

## 2018-12-26 PROCEDURE — 93306 TTE W/DOPPLER COMPLETE: CPT

## 2018-12-26 PROCEDURE — 93281 PM DEVICE PROGR EVAL MULTI: CPT | Performed by: INTERNAL MEDICINE

## 2018-12-26 PROCEDURE — 93306 TTE W/DOPPLER COMPLETE: CPT | Mod: 26 | Performed by: INTERNAL MEDICINE

## 2018-12-26 PROCEDURE — 99214 OFFICE O/P EST MOD 30 MIN: CPT | Performed by: INTERNAL MEDICINE

## 2018-12-26 PROCEDURE — 80048 BASIC METABOLIC PNL TOTAL CA: CPT | Performed by: INTERNAL MEDICINE

## 2018-12-26 RX ORDER — FUROSEMIDE 20 MG
20 TABLET ORAL DAILY
Qty: 90 TABLET | Refills: 3 | Status: SHIPPED | OUTPATIENT
Start: 2018-12-26 | End: 2018-12-31

## 2018-12-26 RX ORDER — LISINOPRIL 20 MG/1
20 TABLET ORAL DAILY
Qty: 90 TABLET | Refills: 3 | Status: SHIPPED | OUTPATIENT
Start: 2018-12-26 | End: 2019-12-26

## 2018-12-26 ASSESSMENT — MIFFLIN-ST. JEOR: SCORE: 1096.32

## 2018-12-26 NOTE — PROGRESS NOTES
HPI and Plan:   See dictation  [id number]  Orders Placed This Encounter   Procedures     Basic metabolic panel     Basic metabolic panel     Follow-Up with Cardiac Advanced Practice Provider       Orders Placed This Encounter   Medications     lisinopril (PRINIVIL/ZESTRIL) 20 MG tablet     Sig: Take 1 tablet (20 mg) by mouth daily     Dispense:  90 tablet     Refill:  3       Medications Discontinued During This Encounter   Medication Reason     furosemide (LASIX) 40 MG tablet      metoprolol succinate (TOPROL-XL) 100 MG 24 hr tablet          Encounter Diagnoses   Name Primary?     Chronic atrial fibrillation (H)      Chronic diastolic heart failure (H) Yes     Essential hypertension        CURRENT MEDICATIONS:  Current Outpatient Medications   Medication Sig Dispense Refill     acetaminophen (TYLENOL) 325 MG tablet Take 2 tablets (650 mg) by mouth every 4 hours as needed for mild pain or fever       apixaban ANTICOAGULANT (ELIQUIS) 5 MG tablet Take 1 tablet (5 mg) by mouth 2 times daily 180 tablet 3     calcium carbonate-vitamin D 500-400 MG-UNIT TABS Take 1 tablet by mouth 2 times daily.         dorzolamide-timolol PF (COSOPT) 22.3-6.8 MG/ML opthalmic solution Place 1 drop into both eyes 2 times daily 10 mL vial       fish oil-omega-3 fatty acids 1000 MG capsule Take 1 g by mouth daily       latanoprost (XALATAN) 0.005 % ophthalmic solution Place 1 drop into both eyes At Bedtime       lisinopril (PRINIVIL/ZESTRIL) 20 MG tablet Take 1 tablet (20 mg) by mouth daily 90 tablet 3     magnesium gluconate (MAGONATE) 500 MG tablet Take 500 mg by mouth daily.         metFORMIN (GLUCOPHAGE-XR) 500 MG 24 hr tablet Take 500 mg by mouth every morning        multivitamin, therapeutic (THERA-VIT) TABS Take 1 tablet by mouth daily.         potassium chloride SA (K-DUR/KLOR-CON M) 10 MEQ CR tablet Take 1 tablet (10 mEq) by mouth daily 15 tablet 1     sertraline (ZOLOFT) 50 MG tablet Take 25 mg by mouth every evening as needed (Take a  half tablet qpm prn)       SUMAtriptan Succinate (IMITREX PO) Take 25 mg by mouth as needed.         timolol (TIMOPTIC) 0.5 % ophthalmic solution Place 1 drop into both eyes every morning          ALLERGIES     Allergies   Allergen Reactions     Aspirin      Coumadin [Warfarin]      Spontaneous retroperitoneal bleed.     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     Venous insufficiency        PAST SURGICAL HISTORY:  Past Surgical History:   Procedure Laterality Date     ANESTHESIA CARDIOVERSION  7/23/2012    Procedure: ANESTHESIA CARDIOVERSION;;  Surgeon: Provider, Generic Anesthesia;  Location:  ANESTHESIA - [facility] - DO NOT SCHEDULE     BLEPHAROPLASTY  2005     Cataract Extraction with IOL Bilateral 2012     IMPLANT PACEMAKER  11/2012    Dual chamber       FAMILY HISTORY:  Family History   Problem Relation Age of Onset     Heart Disease Mother 65        mi     Heart Disease Father 45        pnemonia     Heart Disease Brother      Heart Disease Sister      Heart Disease Brother        SOCIAL HISTORY:  Social History     Socioeconomic History     Marital status:      Spouse name: None     Number of children: None     Years of education: None     Highest education level: None   Social Needs     Financial resource strain: None     Food insecurity - worry: None     Food insecurity - inability: None     Transportation needs - medical: None     Transportation needs - non-medical: None   Occupational History     None   Tobacco Use     Smoking status: Never Smoker      "Smokeless tobacco: Never Used   Substance and Sexual Activity     Alcohol use: No     Drug use: No     Sexual activity: None   Other Topics Concern     Parent/sibling w/ CABG, MI or angioplasty before 65F 55M? Not Asked      Service Not Asked     Blood Transfusions Not Asked     Caffeine Concern Yes     Comment: no caffeine intake     Occupational Exposure Not Asked     Hobby Hazards Not Asked     Sleep Concern Not Asked     Stress Concern Not Asked     Weight Concern Not Asked     Special Diet Yes     Comment: no sugar, salt or fats      Back Care Not Asked     Exercise Yes     Comment: treadmill, swimming daily      Bike Helmet Not Asked     Seat Belt Yes     Self-Exams Not Asked   Social History Narrative     None       Review of Systems:  Skin:  Negative       Eyes:  Negative      ENT:  Negative      Respiratory:  Positive for dyspnea on exertion;shortness of breath     Cardiovascular:    palpitations;Positive for    Gastroenterology: Negative      Genitourinary:  Negative      Musculoskeletal:  Positive for arthritis shoulder pains and pain at site of pacemaker  Neurologic:  Positive for numbness or tingling of feet on occ  Psychiatric:  Negative      Heme/Lymph/Imm:  Negative      Endocrine:  Positive for diabetes      Physical Exam:  Vitals: /76   Pulse 76   Ht 1.626 m (5' 4\")   Wt 67.1 kg (148 lb)   BMI 25.40 kg/m      Constitutional:  cooperative, alert and oriented, well developed, well nourished, in no acute distress        Skin:  warm and dry to the touch, no apparent skin lesions or masses noted   pacemaker incision in the left infraclavicular area was well-healed      Head:  normocephalic, no masses or lesions        Eyes:  pupils equal and round, conjunctivae and lids unremarkable, sclera white, no xanthalasma, EOMS intact, no nystagmus        Lymph:      ENT:           Neck:  carotid pulses are full and equal bilaterally, JVP normal, no carotid bruit        Respiratory:  normal " breath sounds, clear to auscultation, normal A-P diameter, normal symmetry, normal respiratory excursion, no use of accessory muscles         Cardiac: regular rhythm, normal S1/S2, no S3 or S4, apical impulse not displaced, no murmurs, gallops or rubs                                                         GI:  abdomen soft, non-tender, BS normoactive, no mass, no HSM, no bruits        Extremities and Muscular Skeletal:                 Neurological:  no gross motor deficits        Psych:           CC  Mary Domínguez, APRN CNP  [address]  [place] [address]

## 2018-12-26 NOTE — LETTER
12/26/2018    Alton REYES [facility] [address]  [facility] [address]    RE: Zayda Hawkins       Dear Colleague,    I had the pleasure of seeing Zayda Hawkins in the [facility].    HPI and Plan:   See dictation  [id number]  Orders Placed This Encounter   Procedures     Basic metabolic panel     Basic metabolic panel     Follow-Up with Cardiac Advanced Practice Provider       Orders Placed This Encounter   Medications     lisinopril (PRINIVIL/ZESTRIL) 20 MG tablet     Sig: Take 1 tablet (20 mg) by mouth daily     Dispense:  90 tablet     Refill:  3       Medications Discontinued During This Encounter   Medication Reason     furosemide (LASIX) 40 MG tablet      metoprolol succinate (TOPROL-XL) 100 MG 24 hr tablet          Encounter Diagnoses   Name Primary?     Chronic atrial fibrillation (H)      Chronic diastolic heart failure (H) Yes     Essential hypertension        CURRENT MEDICATIONS:  Current Outpatient Medications   Medication Sig Dispense Refill     acetaminophen (TYLENOL) 325 MG tablet Take 2 tablets (650 mg) by mouth every 4 hours as needed for mild pain or fever       apixaban ANTICOAGULANT (ELIQUIS) 5 MG tablet Take 1 tablet (5 mg) by mouth 2 times daily 180 tablet 3     calcium carbonate-vitamin D 500-400 MG-UNIT TABS Take 1 tablet by mouth 2 times daily.         dorzolamide-timolol PF (COSOPT) 22.3-6.8 MG/ML opthalmic solution Place 1 drop into both eyes 2 times daily 10 mL vial       fish oil-omega-3 fatty acids 1000 MG capsule Take 1 g by mouth daily       latanoprost (XALATAN) 0.005 % ophthalmic solution Place 1 drop into both eyes At Bedtime       lisinopril (PRINIVIL/ZESTRIL) 20 MG tablet Take 1 tablet (20 mg) by mouth daily 90 tablet 3     magnesium gluconate (MAGONATE) 500 MG tablet Take 500 mg by mouth daily.         metFORMIN (GLUCOPHAGE-XR) 500 MG 24 hr tablet Take 500 mg by mouth every morning        multivitamin,  therapeutic (THERA-VIT) TABS Take 1 tablet by mouth daily.         potassium chloride SA (K-DUR/KLOR-CON M) 10 MEQ CR tablet Take 1 tablet (10 mEq) by mouth daily 15 tablet 1     sertraline (ZOLOFT) 50 MG tablet Take 25 mg by mouth every evening as needed (Take a half tablet qpm prn)       SUMAtriptan Succinate (IMITREX PO) Take 25 mg by mouth as needed.         timolol (TIMOPTIC) 0.5 % ophthalmic solution Place 1 drop into both eyes every morning          ALLERGIES     Allergies   Allergen Reactions     Aspirin      Coumadin [Warfarin]      Spontaneous retroperitoneal bleed.     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     Venous insufficiency        PAST SURGICAL HISTORY:  Past Surgical History:   Procedure Laterality Date     ANESTHESIA CARDIOVERSION  7/23/2012    Procedure: ANESTHESIA CARDIOVERSION;;  Surgeon: Provider, Generic Anesthesia;  Location:  ANESTHESIA - [facility] - DO NOT SCHEDULE     BLEPHAROPLASTY  2005     Cataract Extraction with IOL Bilateral 2012     IMPLANT PACEMAKER  11/2012    Dual chamber       FAMILY HISTORY:  Family History   Problem Relation Age of Onset     Heart Disease Mother 65        mi     Heart Disease Father 45        pnemonia     Heart Disease Brother      Heart Disease Sister      Heart Disease Brother        SOCIAL HISTORY:  Social History     Socioeconomic History     Marital status:      Spouse name: None     Number of children: None     Years of education: None     Highest education level: None   Social Needs     Financial  "resource strain: None     Food insecurity - worry: None     Food insecurity - inability: None     Transportation needs - medical: None     Transportation needs - non-medical: None   Occupational History     None   Tobacco Use     Smoking status: Never Smoker     Smokeless tobacco: Never Used   Substance and Sexual Activity     Alcohol use: No     Drug use: No     Sexual activity: None   Other Topics Concern     Parent/sibling w/ CABG, MI or angioplasty before 65F 55M? Not Asked      Service Not Asked     Blood Transfusions Not Asked     Caffeine Concern Yes     Comment: no caffeine intake     Occupational Exposure Not Asked     Hobby Hazards Not Asked     Sleep Concern Not Asked     Stress Concern Not Asked     Weight Concern Not Asked     Special Diet Yes     Comment: no sugar, salt or fats      Back Care Not Asked     Exercise Yes     Comment: treadmill, swimming daily      Bike Helmet Not Asked     Seat Belt Yes     Self-Exams Not Asked   Social History Narrative     None       Review of Systems:  Skin:  Negative       Eyes:  Negative      ENT:  Negative      Respiratory:  Positive for dyspnea on exertion;shortness of breath     Cardiovascular:    palpitations;Positive for    Gastroenterology: Negative      Genitourinary:  Negative      Musculoskeletal:  Positive for arthritis shoulder pains and pain at site of pacemaker  Neurologic:  Positive for numbness or tingling of feet on occ  Psychiatric:  Negative      Heme/Lymph/Imm:  Negative      Endocrine:  Positive for diabetes      Physical Exam:  Vitals: /76   Pulse 76   Ht 1.626 m (5' 4\")   Wt 67.1 kg (148 lb)   BMI 25.40 kg/m       Constitutional:  cooperative, alert and oriented, well developed, well nourished, in no acute distress        Skin:  warm and dry to the touch, no apparent skin lesions or masses noted   pacemaker incision in the left infraclavicular area was well-healed      Head:  normocephalic, no masses or lesions        Eyes:  " pupils equal and round, conjunctivae and lids unremarkable, sclera white, no xanthalasma, EOMS intact, no nystagmus        Lymph:      ENT:           Neck:  carotid pulses are full and equal bilaterally, JVP normal, no carotid bruit        Respiratory:  normal breath sounds, clear to auscultation, normal A-P diameter, normal symmetry, normal respiratory excursion, no use of accessory muscles         Cardiac: regular rhythm, normal S1/S2, no S3 or S4, apical impulse not displaced, no murmurs, gallops or rubs                                                         GI:  abdomen soft, non-tender, BS normoactive, no mass, no HSM, no bruits        Extremities and Muscular Skeletal:                 Neurological:  no gross motor deficits        Psych:           CC  Mary Domínguez, APRN CNP  [address]  [place] [address]                Thank you for allowing me to participate in the care of your patient.      Sincerely,     Julio C Palacios MD     [facility]    cc:   Mary Domínguez, APRN CNP  [address]  [place] [address]

## 2018-12-26 NOTE — LETTER
12/26/2018      Alton REYES [facility] [address]  [facility] [address]      RE: Zayda Hawkins       Dear Colleague,    I had the pleasure of seeing Zayda Britobrookeirene Shruthi in the [facility].    Service Date: 12/26/2018      HISTORY OF PRESENT ILLNESS:  Thank you for allowing me to participate in the care of this very delightful patient.  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  At one point she was on rhythm control strategy with Tikosyn, but it was ineffective.  Additionally, she does have a history of retroperitoneal bleed when she was on warfarin a while back.  I had the pleasure of meeting the patient for the first time about a year ago for consideration of left atrial appendage occluder, Watchman device.  The patient declined at that point in time.  Unfortunately, she came back this past November with a CVA, but fortunately did not suffer any neurological deficit from it.  Given that her retroperitoneal bleed was quite a long time ago, I had her on Pradaxa because there is a reversal agent.  Recently she was switched to Eliquis, as it also has a reversal agent as well.      Eventually, the patient developed chronic atrial fibrillation associated with mild LV dysfunction with EF of 40%-45%.  Her rate is difficult to control despite being on the optimal AV kim block agent.  In light of that, I recommended AV node ablation and upgrading her device to a CRT with pacing.      Since then, she has been doing a little bit better, feeling less short of breath.  She is on a high dose of Toprol.  Echocardiogram was done today and demonstrated ejection fraction has improved slightly with EF of 45%-50%.      The fatigue certainly could be from the high dose of beta blocker.  I will  have her taper it off by having her take it 50 mg in the morning and then start her on an ACE inhibitor of lisinopril 20 mg a day.  I will have the patient come back for repeat electrolytes.  The electrolytes will be done today as well to ensure that she continues to have normal renal function.  The patient is scheduled to go to visit [facility] in a month or so, where she is going to spend the whole month there.  I see no reason why she cannot go back.  I gave her more refills of her Eliquis.  We will contact the patient regarding result of the electrolyte panel and also the echocardiogram.      cc:   Alton Willoughby MD    [address], [address]    [place] [address]         MARTA VELA MD             D: 2018   T: 2018   MT: ELIZABET      Name:     JOVANNA DE LEÓN   MRN:      [id number]        Account:      [id number]   :      1932           Service Date: 2018      Document: [id number]           Outpatient Encounter Medications as of 2018   Medication Sig Dispense Refill     acetaminophen (TYLENOL) 325 MG tablet Take 2 tablets (650 mg) by mouth every 4 hours as needed for mild pain or fever       apixaban ANTICOAGULANT (ELIQUIS) 5 MG tablet Take 1 tablet (5 mg) by mouth 2 times daily 180 tablet 3     calcium carbonate-vitamin D 500-400 MG-UNIT TABS Take 1 tablet by mouth 2 times daily.         dorzolamide-timolol PF (COSOPT) 22.3-6.8 MG/ML opthalmic solution Place 1 drop into both eyes 2 times daily 10 mL vial       fish oil-omega-3 fatty acids 1000 MG capsule Take 1 g by mouth daily       latanoprost (XALATAN) 0.005 % ophthalmic solution Place 1 drop into both eyes At Bedtime       lisinopril (PRINIVIL/ZESTRIL) 20 MG tablet Take 1 tablet (20 mg) by mouth daily 90 tablet 3     magnesium gluconate (MAGONATE) 500 MG tablet Take 500 mg by mouth daily.         metFORMIN (GLUCOPHAGE-XR) 500 MG 24 hr tablet Take 500 mg by mouth every morning         multivitamin, therapeutic (THERA-VIT) TABS Take 1 tablet by mouth daily.         sertraline (ZOLOFT) 50 MG tablet Take 25 mg by mouth every evening as needed (Take a half tablet qpm prn)       SUMAtriptan Succinate (IMITREX PO) Take 25 mg by mouth as needed.         timolol (TIMOPTIC) 0.5 % ophthalmic solution Place 1 drop into both eyes every morning        [DISCONTINUED] furosemide (LASIX) 20 MG tablet Take 1 tablet (20 mg) by mouth daily 90 tablet 3     [DISCONTINUED] potassium chloride SA (K-DUR/KLOR-CON M) 10 MEQ CR tablet Take 1 tablet (10 mEq) by mouth daily 15 tablet 1     [DISCONTINUED] furosemide (LASIX) 40 MG tablet Take 20 mg by mouth daily 60 tablet 3     [DISCONTINUED] metoprolol succinate (TOPROL-XL) 100 MG 24 hr tablet Take 50 mg by mouth daily 60 tablet 0     No facility-administered encounter medications on file as of 12/26/2018.        Again, thank you for allowing me to participate in the care of your patient.      Sincerely,    Julio C Paalcios MD     [facility]

## 2018-12-27 ENCOUNTER — TELEPHONE (OUTPATIENT)
Dept: CARDIOLOGY | Facility: CLINIC | Age: 83
End: 2018-12-27

## 2018-12-27 NOTE — PROGRESS NOTES
Service Date: 12/26/2018      HISTORY OF PRESENT ILLNESS:  Thank you for allowing me to participate in the care of this very delightful patient.  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  At one point she was on rhythm control strategy with Tikosyn, but it was ineffective.  Additionally, she does have a history of retroperitoneal bleed when she was on warfarin a while back.  I had the pleasure of meeting the patient for the first time about a year ago for consideration of left atrial appendage occluder, Watchman device.  The patient declined at that point in time.  Unfortunately, she came back this past November with a CVA, but fortunately did not suffer any neurological deficit from it.  Given that her retroperitoneal bleed was quite a long time ago, I had her on Pradaxa because there is a reversal agent.  Recently she was switched to Eliquis, as it also has a reversal agent as well.      Eventually, the patient developed chronic atrial fibrillation associated with mild LV dysfunction with EF of 40%-45%.  Her rate is difficult to control despite being on the optimal AV kim block agent.  In light of that, I recommended AV node ablation and upgrading her device to a CRT with pacing.      Since then, she has been doing a little bit better, feeling less short of breath.  She is on a high dose of Toprol.  Echocardiogram was done today and demonstrated ejection fraction has improved slightly with EF of 45%-50%.      The fatigue certainly could be from the high dose of beta blocker.  I will have her taper it off by having her take it 50 mg in the morning and then start her on an ACE inhibitor of lisinopril 20 mg a day.  I will have the patient come back for repeat electrolytes.  The electrolytes will be done today as well to ensure that she  continues to have normal renal function.  The patient is scheduled to go to visit [facility] in a month or so, where she is going to spend the whole month there.  I see no reason why she cannot go back.  I gave her more refills of her Eliquis.  We will contact the patient regarding result of the electrolyte panel and also the echocardiogram.      cc:   Alton Willoughby MD    [address], [place] [address]         MARTA VELA MD             D: 2018   T: 2018   MT: ELIZABET      Name:     JOVANNA DE LEÓN   MRN:      [id number]        Account:      [id number]   :      1932           Service Date: 2018      Document: [id number]

## 2018-12-27 NOTE — TELEPHONE ENCOUNTER
Called son with Dr Rangel's recommendations of 1  Extra dose of KCL and to start Lisinopril 20 mgs daily. Reviews orders for lab next week. Son states understanding. Will  RX.        Julio C Rangel MD  [facility]             Please inform pt that creat is normal but k is a bit low at 3.3 (normal 3.5). Have her take an extra dose of kcl one time only. Ok to start lisinopril 20 mg daily. I escripted already. LVEF is slightly improved to 45-50% as well. We 'll inform lytes result after done. Thanks, qp

## 2018-12-31 ENCOUNTER — TELEPHONE (OUTPATIENT)
Dept: CARDIOLOGY | Facility: CLINIC | Age: 83
End: 2018-12-31

## 2018-12-31 DIAGNOSIS — E87.6 HYPOKALEMIA: ICD-10-CM

## 2018-12-31 DIAGNOSIS — I50.40 COMBINED SYSTOLIC AND DIASTOLIC CONGESTIVE HEART FAILURE, UNSPECIFIED HF CHRONICITY (H): ICD-10-CM

## 2018-12-31 RX ORDER — POTASSIUM CHLORIDE 750 MG/1
10 TABLET, EXTENDED RELEASE ORAL 2 TIMES DAILY
Qty: 15 TABLET | Refills: 1
Start: 2018-12-31 | End: 2019-01-04

## 2018-12-31 RX ORDER — FUROSEMIDE 20 MG
40 TABLET ORAL DAILY
Qty: 90 TABLET | Refills: 3
Start: 2018-12-31 | End: 2019-02-08

## 2018-12-31 NOTE — TELEPHONE ENCOUNTER
Spoke to Pt son and recommended that pt follow back up with Christel in [facility].  Pt son agreed. Spoke then to Christel who agreed to a f/u and a few days were given. Spoke again to pt son La and pt will have lab work completed on 1/2 as planned and then f/u with Christel on 1/7 at 1350 in [place]. Labs will be reviewed by Dr Rangel or Kristin as Christel is out of the office that day. Pt will continue with the Lasix 40 mg daily and KCL 10 meq bid until either labs show that dose needs to be changed or until pt sees Christel. JNelsonRSHERITA

## 2018-12-31 NOTE — TELEPHONE ENCOUNTER
Pt son La called and stated that pt has increased LE edema and wife who is an internist had pt increase the lasix to 40 mg daily and double the potassium. Calling for a plan.  LM for pt son to call back. Ricarda

## 2019-01-02 DIAGNOSIS — E87.6 HYPOKALEMIA: ICD-10-CM

## 2019-01-02 LAB
ANION GAP SERPL CALCULATED.3IONS-SCNC: 8 MMOL/L (ref 3–14)
BUN SERPL-MCNC: 17 MG/DL (ref 7–30)
CALCIUM SERPL-MCNC: 8.4 MG/DL (ref 8.5–10.1)
CHLORIDE SERPL-SCNC: 106 MMOL/L (ref 94–109)
CO2 SERPL-SCNC: 25 MMOL/L (ref 20–32)
CREAT SERPL-MCNC: 0.99 MG/DL (ref 0.52–1.04)
GFR SERPL CREATININE-BSD FRML MDRD: 52 ML/MIN/{1.73_M2}
GLUCOSE SERPL-MCNC: 135 MG/DL (ref 70–99)
POTASSIUM SERPL-SCNC: 3.2 MMOL/L (ref 3.4–5.3)
SODIUM SERPL-SCNC: 139 MMOL/L (ref 133–144)

## 2019-01-02 PROCEDURE — 80048 BASIC METABOLIC PNL TOTAL CA: CPT | Performed by: INTERNAL MEDICINE

## 2019-01-02 PROCEDURE — 36415 COLL VENOUS BLD VENIPUNCTURE: CPT | Performed by: INTERNAL MEDICINE

## 2019-01-04 RX ORDER — POTASSIUM CHLORIDE 750 MG/1
20 TABLET, EXTENDED RELEASE ORAL 2 TIMES DAILY
Qty: 60 TABLET | Refills: 1 | Status: SHIPPED | OUTPATIENT
Start: 2019-01-04 | End: 2019-02-06

## 2019-01-04 NOTE — TELEPHONE ENCOUNTER
Her K is still low, but renal function remains fine.   Can you have her increase her KDur to 20meq and repeat a BMP and an NT-proBNP the day of her visit with me next week?     Sindi Kearney       Spoke to pt son La and discussed pt BMP completed on 12/26.  Explained that K was low at 3.3 and Christel asked that K be increased to 20 meq bid and to have BMP and BNP completed prior to OV on Monday (1235). Pt states understanding and asked for refill to be sent in.   Pt son did state that pt also broke her ankle since we spoke, slipping on the ice. Escript sent and lab appointment placed on schedule. Ricarda

## 2019-01-07 ENCOUNTER — OFFICE VISIT (OUTPATIENT)
Dept: CARDIOLOGY | Facility: CLINIC | Age: 84
End: 2019-01-07
Payer: COMMERCIAL

## 2019-01-07 VITALS
HEIGHT: 64 IN | HEART RATE: 69 BPM | WEIGHT: 142.4 LBS | BODY MASS INDEX: 24.31 KG/M2 | OXYGEN SATURATION: 97 % | DIASTOLIC BLOOD PRESSURE: 84 MMHG | SYSTOLIC BLOOD PRESSURE: 156 MMHG

## 2019-01-07 DIAGNOSIS — I50.22 CHRONIC SYSTOLIC HEART FAILURE (H): Primary | ICD-10-CM

## 2019-01-07 DIAGNOSIS — I50.40 COMBINED SYSTOLIC AND DIASTOLIC CONGESTIVE HEART FAILURE, UNSPECIFIED HF CHRONICITY (H): ICD-10-CM

## 2019-01-07 LAB
ANION GAP SERPL CALCULATED.3IONS-SCNC: 12.6 MMOL/L (ref 6–17)
BUN SERPL-MCNC: 15 MG/DL (ref 7–30)
CALCIUM SERPL-MCNC: 10.3 MG/DL (ref 8.5–10.5)
CHLORIDE SERPL-SCNC: 102 MMOL/L (ref 98–107)
CO2 SERPL-SCNC: 27 MMOL/L (ref 23–29)
CREAT SERPL-MCNC: 1.08 MG/DL (ref 0.7–1.3)
GFR SERPL CREATININE-BSD FRML MDRD: 48 ML/MIN/{1.73_M2}
GLUCOSE SERPL-MCNC: 138 MG/DL (ref 70–105)
NT-PROBNP SERPL-MCNC: 5232 PG/ML (ref 0–450)
POTASSIUM SERPL-SCNC: 3.6 MMOL/L (ref 3.5–5.1)
SODIUM SERPL-SCNC: 138 MMOL/L (ref 136–145)

## 2019-01-07 PROCEDURE — 99214 OFFICE O/P EST MOD 30 MIN: CPT | Mod: 24 | Performed by: PHYSICIAN ASSISTANT

## 2019-01-07 PROCEDURE — 80048 BASIC METABOLIC PNL TOTAL CA: CPT | Performed by: INTERNAL MEDICINE

## 2019-01-07 PROCEDURE — 36415 COLL VENOUS BLD VENIPUNCTURE: CPT | Performed by: INTERNAL MEDICINE

## 2019-01-07 PROCEDURE — 83880 ASSAY OF NATRIURETIC PEPTIDE: CPT | Performed by: INTERNAL MEDICINE

## 2019-01-07 ASSESSMENT — MIFFLIN-ST. JEOR: SCORE: 1070.92

## 2019-01-07 NOTE — LETTER
1/7/2019    [facility] [address]  [facility] [address]    RE: Zayda Hawkins       Dear Colleague,    I had the pleasure of seeing Zayda Hawkins in the [facility].      Cardiology Progress Note    Date of Service: 01/07/2019      Reason for visit:  [facility] clinic follow up, chronic systolic heart failure. Seen for leg edema concerns.     Primary cardiologist: Dr. Kingsley Brandon and Dr. Julio C Rangel ()      HPI:  Ms. Asha Hawkins is a pleasant 86 year old female from [place] with a PMHx including DM, hypertension, and hyperlipidemia, who is a patient of Dr. Brandon and Dr. Rangel of EP. She has had chronic lower extremity venous insufficiency which had initially improved with consistent use of compression socks. She also has a history of tachybrady syndrome for which she had been on Tikosyn for atrial tachyarrhythmias and received a pacemaker in 2012. Historically, she was not on anticoagulation because of a history of retroperitoneal bleed while on warfarin, despite an elevated CHADS-VASc score of at least 4 (age, gender, hypertension). A Watchman device was offered in 2017 though she declined. When she was seen by Dr. Brandon in March 2018 she was doing well, and told she could return on a PRN basis.     She was then hospitalized in Sept 2018 after suffering an acute left posterior circulation ischemic stroke and received TPA. She was noted to be in afib with RVR. It was recommended she then begin anticoagulation and was initially placed on Pradaxa (though this was since changed to Eliquis). In addition, her Toprol XL was increased and she was placed on cardizem. Her Tikosyn was discontinued. Overall, rate control was recommended. She was then hospitalized again in mid October 2018 with increased leg edema and felt to be in acute heart failure. She had pleural effusions on chest xray. Echo at that time showed reduced EF at 40-45% with mild  global hypokinesia. The RV was normal size and function but she did have elevated RV pressures and severe bi-atrial enlargement. She had 1+MR, 2-3+ TR, and 1-2+ AR. Ultimately it was felt the etiology of her heart failure was related to tachyarrhythmias. She required increase in diuretics, and subsequently underwent AVN ablation in Mid November.     She saw Dr. Rangel in follow up the end of December and was improved in regard to dyspnea. Echo showed mild improvement in her EF, at 45-50%. Due to complaints of fatigue, she was tapered off her beta blocker, and lisinopril was resumed at 20mg daily. I'm seeing her back today in follow up, as her son called the clinic due to some increase in leg edema once again. Her son's wife is an internist, and increased her diuretics last week to 40mg daily. We performed a BMP in follow up of this change, and her K was somewhat low. We increased her KDur, and repeat labs today show improvement. Her renal function is holding and K is now back within normal limits. Unfortunately, she fell and broke her ankle last week; she has a walking boot on which makes interpretation of her weight difficult. She has some ongoing leg edema, but it is reportedly better after the increase in lasix last week. She denies significant ERICKSON, and reportedly, her fatigue is no better after being off beta blockers. Of note, our visit was conducted today with her son as an .  BP today is higher than her baseline, but she reports that she is uncomfortable due to her foot.       ASSESSMENT/PLAN:    1. Acute systolic and chronic diastolic heart failure.   --Most recent echo Dec 2018 after AVN ablation showed EF mildly improved at 45-50%. She has continued with elevated filling pressures.She continues with moderate TR an 1-2+ MR which was not significantly changed.   --She once again had some issues with volume retention; for now, will continue with the higher dose of lasix at 40mg daily and current K  supplementation. We could consider transition to torsemide, but son feels she is doing relatively well for now and would like to continue without change. Weight is difficult to interpret with her walking boot. I've asked them to call with any worsening edema. Continue compression socks.   --Continue lisinopril 20mg daily. BP is a bit high today, but reportedly states she is uncomfortable with the recent ankle injury. Historically this hasn't been a problem. It is noted her fatigue did not improve with stopping beta blockers (son tells me he believes she may be mentally feeling down which is contributing). If BP remains elevated at follow up visit, will consider retrial low dose Toprol vs addition of spironolactone which may also help her swelling.    2. Atrial fibrillation.   --Initially difficult to rate control. Now s/p AVN ablation in Nov 2018.    --Recently tapered off beta blockers due to fatigue, though no change when stopped. Can consider retrial in the future if needed.   --Had a hospitalization for CVA Sept 2018 while off AC. Now on Eliquis which she appears to be tolerating.     Follow up plan: In [facility] clinic with myself in 1 month with labs.       Orders this Visit:  Orders Placed This Encounter   Procedures     Basic metabolic panel     Follow-Up with [facility] - MAGALY visit     No orders of the defined types were placed in this encounter.    There are no discontinued medications.        CURRENT MEDICATIONS:  Current Outpatient Medications   Medication Sig Dispense Refill     acetaminophen (TYLENOL) 325 MG tablet Take 2 tablets (650 mg) by mouth every 4 hours as needed for mild pain or fever       apixaban ANTICOAGULANT (ELIQUIS) 5 MG tablet Take 1 tablet (5 mg) by mouth 2 times daily 180 tablet 3     calcium carbonate-vitamin D 500-400 MG-UNIT TABS Take 1 tablet by mouth 2 times daily.         dorzolamide-timolol PF (COSOPT) 22.3-6.8 MG/ML opthalmic solution Place 1 drop into both eyes 2 times daily 10  mL vial       fish oil-omega-3 fatty acids 1000 MG capsule Take 1 g by mouth daily       furosemide (LASIX) 20 MG tablet Take 2 tablets (40 mg) by mouth daily 90 tablet 3     latanoprost (XALATAN) 0.005 % ophthalmic solution Place 1 drop into both eyes At Bedtime       lisinopril (PRINIVIL/ZESTRIL) 20 MG tablet Take 1 tablet (20 mg) by mouth daily 90 tablet 3     magnesium gluconate (MAGONATE) 500 MG tablet Take 500 mg by mouth daily.         metFORMIN (GLUCOPHAGE-XR) 500 MG 24 hr tablet Take 500 mg by mouth every morning        multivitamin, therapeutic (THERA-VIT) TABS Take 1 tablet by mouth daily.         potassium chloride ER (K-DUR/KLOR-CON M) 10 MEQ CR tablet Take 2 tablets (20 mEq) by mouth 2 times daily 60 tablet 1     sertraline (ZOLOFT) 50 MG tablet Take 25 mg by mouth every evening as needed (Take a half tablet qpm prn)       SUMAtriptan Succinate (IMITREX PO) Take 25 mg by mouth as needed.         timolol (TIMOPTIC) 0.5 % ophthalmic solution Place 1 drop into both eyes every morning          ALLERGIES     Allergies   Allergen Reactions     Aspirin      Coumadin [Warfarin]      Spontaneous retroperitoneal bleed.     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     Venous insufficiency        PAST SURGICAL HISTORY:  Past Surgical History:   Procedure Laterality Date     ANESTHESIA CARDIOVERSION  7/23/2012    Procedure: ANESTHESIA CARDIOVERSION;;  Surgeon: Provider, Generic Anesthesia;  Location:  ANESTHESIA -  - DO NOT SCHEDULE     BLEPHAROPLASTY  2005      Cataract Extraction with IOL Bilateral 2012     H ABLATION AV NODE  11/14/2018     IMPLANT PACEMAKER  11/2012    Dual chamber     REPLACE PACEMAKER GENERATOR  11/14/2018    upgrade to CRT       FAMILY HISTORY:  Family History   Problem Relation Age of Onset     Heart Disease Mother 65        mi     Heart Disease Father 45        pnemonia     Heart Disease Brother      Heart Disease Sister      Heart Disease Brother        SOCIAL HISTORY:  Social History     Socioeconomic History     Marital status:      Spouse name: None     Number of children: None     Years of education: None     Highest education level: None   Social Needs     Financial resource strain: None     Food insecurity - worry: None     Food insecurity - inability: None     Transportation needs - medical: None     Transportation needs - non-medical: None   Occupational History     None   Tobacco Use     Smoking status: Never Smoker     Smokeless tobacco: Never Used   Substance and Sexual Activity     Alcohol use: No     Drug use: No     Sexual activity: None   Other Topics Concern     Parent/sibling w/ CABG, MI or angioplasty before 65F 55M? Not Asked      Service Not Asked     Blood Transfusions Not Asked     Caffeine Concern Yes     Comment: no caffeine intake     Occupational Exposure Not Asked     Hobby Hazards Not Asked     Sleep Concern Not Asked     Stress Concern Not Asked     Weight Concern Not Asked     Special Diet Yes     Comment: no sugar, salt or fats      Back Care Not Asked     Exercise Yes     Comment: treadmill, swimming daily      Bike Helmet Not Asked     Seat Belt Yes     Self-Exams Not Asked   Social History Narrative     None       Review of Systems:  Cardiovascular: negative for chest pain, palpitations, orthopnea, pos LE edema  Constitutional: negative for chills, sweats, fevers. Pos fatigue.  Resp: Negative for dyspnea at rest, dyspnea on exertion, cough, known chronic lung disease  HEENT: Negative for new visual  "changes, frequent headaches  Gastrointestinal: negative for abdominal pain, diarrhea, nausea, vomiting  Hematologic/lymphatic: pos for current systemic anticoagulation, hx CVA  Musculoskeletal: negative for new back pain, pos ankle pain.  Neurological: negative for focal weakness, LOC, seizures, syncope.presyncope.      Physical Exam:  Vitals: /84   Pulse 69   Ht 1.626 m (5' 4\")   Wt 64.6 kg (142 lb 6.4 oz)   SpO2 97%   BMI 24.44 kg/m      Wt Readings from Last 4 Encounters:   01/07/19 64.6 kg (142 lb 6.4 oz)   12/26/18 67.1 kg (148 lb)   11/26/18 67.1 kg (148 lb)   11/15/18 66.7 kg (147 lb 1.6 oz)       GEN:  In general, this is a well nourished female in no acute distress on room air.  Patient accompanied by her son today.  HEENT:  Pupils equal, round. Sclerae nonicteric.   NECK: Supple, no masses appreciated. Trachea midline. +JVD.  C/V:  Regular rhythm, 1/6 RAINA. No rub or gallop.   RESP: Respirations are unlabored. No use of accessory muscles. Clear to auscultation bilaterally without wheezing, rales, or rhonchi.  GI: Abdomen soft, nontender, not significantly distended.  EXTREM: Trace to 1+ left edema. Compression socks in place. Right leg has boot. No cyanosis or clubbing.  NEURO: Alert and oriented, cooperative. Gait not formally assessed. No obvious focal deficits.   PSYCH: Normal affect.   SKIN: Warm and dry. No rashes or petechiae appreciated.       Recent Lab Results:    BMP RESULTS:  Lab Results   Component Value Date     01/07/2019    POTASSIUM 3.6 01/07/2019    CHLORIDE 102 01/07/2019    CO2 27 01/07/2019    ANIONGAP 12.6 01/07/2019     (H) 01/07/2019    BUN 15 01/07/2019    CR 1.08 01/07/2019    GFRESTIMATED 48 (L) 01/07/2019    GFRESTBLACK 58 (L) 01/07/2019    MADDISON 10.3 01/07/2019      Results for JOVANNA DE LEÓN (MRN [id number]) as of 1/7/2019 17:28   Ref. Range 10/29/2018 09:13 11/12/2018 11:03 1/7/2019 12:39   N-Terminal Pro Bnp Latest Ref Range: 0 - 450 pg/mL 2,830 " (H) 2,901 (H) 5,232 (H)         New/Pertinent imaging results since last visit:  12/26/18     Interpretation Summary     Left ventricular systolic function is mildly reduced.  The visual ejection fraction is estimated at 45-50%.  Basal and mid inferolateral and lateral hypokinesis.  Diastolic Doppler findings (E/E' ratio and/or other parameters) suggest left  ventricular filling pressures are increased.  There is moderate (2+) tricuspid regurgitation.  Dilated IVC (>2.5cm) with no respiratory collapse; right atrial pressure is  estimated at >20mmHg.  Right ventricular systolic pressure is elevated, consistent with moderate  pulmonary hypertension.  There is mild to moderate (1-2+) mitral regurgitation.  There is mild (1+) aortic regurgitation.     Compared to the previous study, the LV function appears to be slightly better.  Valvular disease is similar. CVP is much higher now, which makes RVSP higher.      Christel Villatoro PA-C  [facility]  Pager [phone]      Thank you for allowing me to participate in the care of your patient.    Sincerely,     DONATO Zamorano     [facility]

## 2019-01-07 NOTE — LETTER
1/7/2019    [facility] [address]  [facility] [address]    RE: Zayda Hawkins       Dear Colleague,    I had the pleasure of seeing Zayda Hawkins in the [facility].      Cardiology Progress Note    Date of Service: 01/07/2019      Reason for visit:  [facility] clinic follow up, chronic systolic heart failure. Seen for leg edema concerns.     Primary cardiologist: Dr. Kingsley Brandon and Dr. Julio C Rangel ()      HPI:  Ms. Asha Hawkins is a pleasant 86 year old female from [place] with a PMHx including DM, hypertension, and hyperlipidemia, who is a patient of Dr. Brandon and Dr. Rangel of EP. She has had chronic lower extremity venous insufficiency which had initially improved with consistent use of compression socks. She also has a history of tachybrady syndrome for which she had been on Tikosyn for atrial tachyarrhythmias and received a pacemaker in 2012. Historically, she was not on anticoagulation because of a history of retroperitoneal bleed while on warfarin, despite an elevated CHADS-VASc score of at least 4 (age, gender, hypertension). A Watchman device was offered in 2017 though she declined. When she was seen by Dr. Brandon in March 2018 she was doing well, and told she could return on a PRN basis.     She was then hospitalized in Sept 2018 after suffering an acute left posterior circulation ischemic stroke and received TPA. She was noted to be in afib with RVR. It was recommended she then begin anticoagulation and was initially placed on Pradaxa (though this was since changed to Eliquis). In addition, her Toprol XL was increased and she was placed on cardizem. Her Tikosyn was discontinued. Overall, rate control was recommended. She was then hospitalized again in mid October 2018 with increased leg edema and felt to be in acute heart failure. She had pleural effusions on chest xray. Echo at that time showed reduced EF at 40-45% with mild  global hypokinesia. The RV was normal size and function but she did have elevated RV pressures and severe bi-atrial enlargement. She had 1+MR, 2-3+ TR, and 1-2+ AR. Ultimately it was felt the etiology of her heart failure was related to tachyarrhythmias. She required increase in diuretics, and subsequently underwent AVN ablation in Mid November.     She saw Dr. Rangel in follow up the end of December and was improved in regard to dyspnea. Echo showed mild improvement in her EF, at 45-50%. Due to complaints of fatigue, she was tapered off her beta blocker, and lisinopril was resumed at 20mg daily. I'm seeing her back today in follow up, as her son called the clinic due to some increase in leg edema once again. Her son's wife is an internist, and increased her diuretics last week to 40mg daily. We performed a BMP in follow up of this change, and her K was somewhat low. We increased her KDur, and repeat labs today show improvement. Her renal function is holding and K is now back within normal limits. Unfortunately, she fell and broke her ankle last week; she has a walking boot on which makes interpretation of her weight difficult. She has some ongoing leg edema, but it is reportedly better after the increase in lasix last week. She denies significant ERICKSON, and reportedly, her fatigue is no better after being off beta blockers. Of note, our visit was conducted today with her son as an .  BP today is higher than her baseline, but she reports that she is uncomfortable due to her foot.       ASSESSMENT/PLAN:    1. Acute systolic and chronic diastolic heart failure.   --Most recent echo Dec 2018 after AVN ablation showed EF mildly improved at 45-50%. She has continued with elevated filling pressures.She continues with moderate TR an 1-2+ MR which was not significantly changed.   --She once again had some issues with volume retention; for now, will continue with the higher dose of lasix at 40mg daily and current K  supplementation. We could consider transition to torsemide, but son feels she is doing relatively well for now and would like to continue without change. Weight is difficult to interpret with her walking boot. I've asked them to call with any worsening edema. Continue compression socks.   --Continue lisinopril 20mg daily. BP is a bit high today, but reportedly states she is uncomfortable with the recent ankle injury. Historically this hasn't been a problem. It is noted her fatigue did not improve with stopping beta blockers (son tells me he believes she may be mentally feeling down which is contributing). If BP remains elevated at follow up visit, will consider retrial low dose Toprol vs addition of spironolactone which may also help her swelling.    2. Atrial fibrillation.   --Initially difficult to rate control. Now s/p AVN ablation in Nov 2018.    --Recently tapered off beta blockers due to fatigue, though no change when stopped. Can consider retrial in the future if needed.   --Had a hospitalization for CVA Sept 2018 while off AC. Now on Eliquis which she appears to be tolerating.     Follow up plan: In [facility] clinic with myself in 1 month with labs.       Orders this Visit:  Orders Placed This Encounter   Procedures     Basic metabolic panel     Follow-Up with [facility] - MAGALY visit     No orders of the defined types were placed in this encounter.    There are no discontinued medications.        CURRENT MEDICATIONS:  Current Outpatient Medications   Medication Sig Dispense Refill     acetaminophen (TYLENOL) 325 MG tablet Take 2 tablets (650 mg) by mouth every 4 hours as needed for mild pain or fever       apixaban ANTICOAGULANT (ELIQUIS) 5 MG tablet Take 1 tablet (5 mg) by mouth 2 times daily 180 tablet 3     calcium carbonate-vitamin D 500-400 MG-UNIT TABS Take 1 tablet by mouth 2 times daily.         dorzolamide-timolol PF (COSOPT) 22.3-6.8 MG/ML opthalmic solution Place 1 drop into both eyes 2 times daily 10  mL vial       fish oil-omega-3 fatty acids 1000 MG capsule Take 1 g by mouth daily       furosemide (LASIX) 20 MG tablet Take 2 tablets (40 mg) by mouth daily 90 tablet 3     latanoprost (XALATAN) 0.005 % ophthalmic solution Place 1 drop into both eyes At Bedtime       lisinopril (PRINIVIL/ZESTRIL) 20 MG tablet Take 1 tablet (20 mg) by mouth daily 90 tablet 3     magnesium gluconate (MAGONATE) 500 MG tablet Take 500 mg by mouth daily.         metFORMIN (GLUCOPHAGE-XR) 500 MG 24 hr tablet Take 500 mg by mouth every morning        multivitamin, therapeutic (THERA-VIT) TABS Take 1 tablet by mouth daily.         potassium chloride ER (K-DUR/KLOR-CON M) 10 MEQ CR tablet Take 2 tablets (20 mEq) by mouth 2 times daily 60 tablet 1     sertraline (ZOLOFT) 50 MG tablet Take 25 mg by mouth every evening as needed (Take a half tablet qpm prn)       SUMAtriptan Succinate (IMITREX PO) Take 25 mg by mouth as needed.         timolol (TIMOPTIC) 0.5 % ophthalmic solution Place 1 drop into both eyes every morning          ALLERGIES     Allergies   Allergen Reactions     Aspirin      Coumadin [Warfarin]      Spontaneous retroperitoneal bleed.     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     Venous insufficiency        PAST SURGICAL HISTORY:  Past Surgical History:   Procedure Laterality Date     ANESTHESIA CARDIOVERSION  7/23/2012    Procedure: ANESTHESIA CARDIOVERSION;;  Surgeon: Provider, Generic Anesthesia;  Location:  ANESTHESIA -  - DO NOT SCHEDULE     BLEPHAROPLASTY  2005      Cataract Extraction with IOL Bilateral 2012     H ABLATION AV NODE  11/14/2018     IMPLANT PACEMAKER  11/2012    Dual chamber     REPLACE PACEMAKER GENERATOR  11/14/2018    upgrade to CRT       FAMILY HISTORY:  Family History   Problem Relation Age of Onset     Heart Disease Mother 65        mi     Heart Disease Father 45        pnemonia     Heart Disease Brother      Heart Disease Sister      Heart Disease Brother        SOCIAL HISTORY:  Social History     Socioeconomic History     Marital status:      Spouse name: None     Number of children: None     Years of education: None     Highest education level: None   Social Needs     Financial resource strain: None     Food insecurity - worry: None     Food insecurity - inability: None     Transportation needs - medical: None     Transportation needs - non-medical: None   Occupational History     None   Tobacco Use     Smoking status: Never Smoker     Smokeless tobacco: Never Used   Substance and Sexual Activity     Alcohol use: No     Drug use: No     Sexual activity: None   Other Topics Concern     Parent/sibling w/ CABG, MI or angioplasty before 65F 55M? Not Asked      Service Not Asked     Blood Transfusions Not Asked     Caffeine Concern Yes     Comment: no caffeine intake     Occupational Exposure Not Asked     Hobby Hazards Not Asked     Sleep Concern Not Asked     Stress Concern Not Asked     Weight Concern Not Asked     Special Diet Yes     Comment: no sugar, salt or fats      Back Care Not Asked     Exercise Yes     Comment: treadmill, swimming daily      Bike Helmet Not Asked     Seat Belt Yes     Self-Exams Not Asked   Social History Narrative     None       Review of Systems:  Cardiovascular: negative for chest pain, palpitations, orthopnea, pos LE edema  Constitutional: negative for chills, sweats, fevers. Pos fatigue.  Resp: Negative for dyspnea at rest, dyspnea on exertion, cough, known chronic lung disease  HEENT: Negative for new visual  "changes, frequent headaches  Gastrointestinal: negative for abdominal pain, diarrhea, nausea, vomiting  Hematologic/lymphatic: pos for current systemic anticoagulation, hx CVA  Musculoskeletal: negative for new back pain, pos ankle pain.  Neurological: negative for focal weakness, LOC, seizures, syncope.presyncope.      Physical Exam:  Vitals: /84   Pulse 69   Ht 1.626 m (5' 4\")   Wt 64.6 kg (142 lb 6.4 oz)   SpO2 97%   BMI 24.44 kg/m      Wt Readings from Last 4 Encounters:   01/07/19 64.6 kg (142 lb 6.4 oz)   12/26/18 67.1 kg (148 lb)   11/26/18 67.1 kg (148 lb)   11/15/18 66.7 kg (147 lb 1.6 oz)       GEN:  In general, this is a well nourished female in no acute distress on room air.  Patient accompanied by her son today.  HEENT:  Pupils equal, round. Sclerae nonicteric.   NECK: Supple, no masses appreciated. Trachea midline. +JVD.  C/V:  Regular rhythm, 1/6 RAINA. No rub or gallop.   RESP: Respirations are unlabored. No use of accessory muscles. Clear to auscultation bilaterally without wheezing, rales, or rhonchi.  GI: Abdomen soft, nontender, not significantly distended.  EXTREM: Trace to 1+ left edema. Compression socks in place. Right leg has boot. No cyanosis or clubbing.  NEURO: Alert and oriented, cooperative. Gait not formally assessed. No obvious focal deficits.   PSYCH: Normal affect.   SKIN: Warm and dry. No rashes or petechiae appreciated.       Recent Lab Results:    BMP RESULTS:  Lab Results   Component Value Date     01/07/2019    POTASSIUM 3.6 01/07/2019    CHLORIDE 102 01/07/2019    CO2 27 01/07/2019    ANIONGAP 12.6 01/07/2019     (H) 01/07/2019    BUN 15 01/07/2019    CR 1.08 01/07/2019    GFRESTIMATED 48 (L) 01/07/2019    GFRESTBLACK 58 (L) 01/07/2019    MADDISON 10.3 01/07/2019      Results for JOVANNA DE LEÓN (MRN [id number]) as of 1/7/2019 17:28   Ref. Range 10/29/2018 09:13 11/12/2018 11:03 1/7/2019 12:39   N-Terminal Pro Bnp Latest Ref Range: 0 - 450 pg/mL 2,830 " (H) 2,901 (H) 5,232 (H)         New/Pertinent imaging results since last visit:  12/26/18     Interpretation Summary     Left ventricular systolic function is mildly reduced.  The visual ejection fraction is estimated at 45-50%.  Basal and mid inferolateral and lateral hypokinesis.  Diastolic Doppler findings (E/E' ratio and/or other parameters) suggest left  ventricular filling pressures are increased.  There is moderate (2+) tricuspid regurgitation.  Dilated IVC (>2.5cm) with no respiratory collapse; right atrial pressure is  estimated at >20mmHg.  Right ventricular systolic pressure is elevated, consistent with moderate  pulmonary hypertension.  There is mild to moderate (1-2+) mitral regurgitation.  There is mild (1+) aortic regurgitation.     Compared to the previous study, the LV function appears to be slightly better.  Valvular disease is similar. CVP is much higher now, which makes RVSP higher.      Christel Villatoro PA-C  [facility]  Pager [phone]      Thank you for allowing me to participate in the care of your patient.      Sincerely,     DONATO Zamorano     [facility]    cc:   No referring provider defined for this encounter.

## 2019-01-07 NOTE — PATIENT INSTRUCTIONS
Visit Summary:    Today we discussed:   No changes.   Please call if any worsening swelling again.     Medication changes:    NONE    Test results:   Results for JOVANNA DE LEÓN (MRN [id number]) as of 1/7/2019 14:19   Ref. Range 1/7/2019 12:39   Sodium Latest Ref Range: 136 - 145 mmol/L 138   Potassium Latest Ref Range: 3.5 - 5.1 mmol/L 3.6   Chloride Latest Ref Range: 98 - 107 mmol/L 102   Carbon Dioxide Latest Ref Range: 23 - 29 mmol/L 27   Urea Nitrogen Latest Ref Range: 7 - 30 mg/dL 15   Creatinine Latest Ref Range: 0.70 - 1.30 mg/dL 1.08   GFR Estimate Latest Ref Range: >60 mL/min/1.73_m2 48    GFR Estimate If Black Latest Ref Range: >60 mL/min/1.73_m2 58   Calcium Latest Ref Range: 8.5 - 10.5 mg/dL 10.3   Anion Gap Latest Ref Range: 6 - 17 mmol/L 12.6       Follow up:   With Christel in the [facility] clinic in [place] in ~1 month with repeat labs.

## 2019-01-07 NOTE — PROGRESS NOTES
Cardiology Progress Note    Date of Service: 01/07/2019      Reason for visit:  [facility] clinic follow up, chronic systolic heart failure. Seen for leg edema concerns.     Primary cardiologist: Dr. Kingsley Brandon and Dr. Julio C Rangel ()      HPI:  Ms. Asha Hawkins is a pleasant 86 year old female from [place] with a PMHx including DM, hypertension, and hyperlipidemia, who is a patient of Dr. Brandon and Dr. Rangel of EP. She has had chronic lower extremity venous insufficiency which had initially improved with consistent use of compression socks. She also has a history of tachybrady syndrome for which she had been on Tikosyn for atrial tachyarrhythmias and received a pacemaker in 2012. Historically, she was not on anticoagulation because of a history of retroperitoneal bleed while on warfarin, despite an elevated CHADS-VASc score of at least 4 (age, gender, hypertension). A Watchman device was offered in 2017 though she declined. When she was seen by Dr. Brandon in March 2018 she was doing well, and told she could return on a PRN basis.     She was then hospitalized in Sept 2018 after suffering an acute left posterior circulation ischemic stroke and received TPA. She was noted to be in afib with RVR. It was recommended she then begin anticoagulation and was initially placed on Pradaxa (though this was since changed to Eliquis). In addition, her Toprol XL was increased and she was placed on cardizem. Her Tikosyn was discontinued. Overall, rate control was recommended. She was then hospitalized again in mid October 2018 with increased leg edema and felt to be in acute heart failure. She had pleural effusions on chest xray. Echo at that time showed reduced EF at 40-45% with mild global hypokinesia. The RV was normal size and function but she did have elevated RV pressures and severe bi-atrial enlargement. She had 1+MR, 2-3+ TR, and 1-2+ AR. Ultimately it was felt the etiology of her heart failure was related to tachyarrhythmias. She  required increase in diuretics, and subsequently underwent AVN ablation in Mid November.     She saw Dr. Rangel in follow up the end of December and was improved in regard to dyspnea. Echo showed mild improvement in her EF, at 45-50%. Due to complaints of fatigue, she was tapered off her beta blocker, and lisinopril was resumed at 20mg daily. I'm seeing her back today in follow up, as her son called the clinic due to some increase in leg edema once again. Her son's wife is an internist, and increased her diuretics last week to 40mg daily. We performed a BMP in follow up of this change, and her K was somewhat low. We increased her KDur, and repeat labs today show improvement. Her renal function is holding and K is now back within normal limits. Unfortunately, she fell and broke her ankle last week; she has a walking boot on which makes interpretation of her weight difficult. She has some ongoing leg edema, but it is reportedly better after the increase in lasix last week. She denies significant ERICKSON, and reportedly, her fatigue is no better after being off beta blockers. Of note, our visit was conducted today with her son as an .  BP today is higher than her baseline, but she reports that she is uncomfortable due to her foot.       ASSESSMENT/PLAN:    1. Acute systolic and chronic diastolic heart failure.   --Most recent echo Dec 2018 after AVN ablation showed EF mildly improved at 45-50%. She has continued with elevated filling pressures.She continues with moderate TR an 1-2+ MR which was not significantly changed.   --She once again had some issues with volume retention; for now, will continue with the higher dose of lasix at 40mg daily and current K supplementation. We could consider transition to torsemide, but son feels she is doing relatively well for now and would like to continue without change. Weight is difficult to interpret with her walking boot. I've asked them to call with any worsening edema.  Continue compression socks.   --Continue lisinopril 20mg daily. BP is a bit high today, but reportedly states she is uncomfortable with the recent ankle injury. Historically this hasn't been a problem. It is noted her fatigue did not improve with stopping beta blockers (son tells me he believes she may be mentally feeling down which is contributing). If BP remains elevated at follow up visit, will consider retrial low dose Toprol vs addition of spironolactone which may also help her swelling.    2. Atrial fibrillation.   --Initially difficult to rate control. Now s/p AVN ablation in Nov 2018.    --Recently tapered off beta blockers due to fatigue, though no change when stopped. Can consider retrial in the future if needed.   --Had a hospitalization for CVA Sept 2018 while off AC. Now on Eliquis which she appears to be tolerating.     Follow up plan: In [facility] clinic with myself in 1 month with labs.       Orders this Visit:  Orders Placed This Encounter   Procedures     Basic metabolic panel     Follow-Up with [facility] - MAGALY visit     No orders of the defined types were placed in this encounter.    There are no discontinued medications.        CURRENT MEDICATIONS:  Current Outpatient Medications   Medication Sig Dispense Refill     acetaminophen (TYLENOL) 325 MG tablet Take 2 tablets (650 mg) by mouth every 4 hours as needed for mild pain or fever       apixaban ANTICOAGULANT (ELIQUIS) 5 MG tablet Take 1 tablet (5 mg) by mouth 2 times daily 180 tablet 3     calcium carbonate-vitamin D 500-400 MG-UNIT TABS Take 1 tablet by mouth 2 times daily.         dorzolamide-timolol PF (COSOPT) 22.3-6.8 MG/ML opthalmic solution Place 1 drop into both eyes 2 times daily 10 mL vial       fish oil-omega-3 fatty acids 1000 MG capsule Take 1 g by mouth daily       furosemide (LASIX) 20 MG tablet Take 2 tablets (40 mg) by mouth daily 90 tablet 3     latanoprost (XALATAN) 0.005 % ophthalmic solution Place 1 drop into both eyes At  Bedtime       lisinopril (PRINIVIL/ZESTRIL) 20 MG tablet Take 1 tablet (20 mg) by mouth daily 90 tablet 3     magnesium gluconate (MAGONATE) 500 MG tablet Take 500 mg by mouth daily.         metFORMIN (GLUCOPHAGE-XR) 500 MG 24 hr tablet Take 500 mg by mouth every morning        multivitamin, therapeutic (THERA-VIT) TABS Take 1 tablet by mouth daily.         potassium chloride ER (K-DUR/KLOR-CON M) 10 MEQ CR tablet Take 2 tablets (20 mEq) by mouth 2 times daily 60 tablet 1     sertraline (ZOLOFT) 50 MG tablet Take 25 mg by mouth every evening as needed (Take a half tablet qpm prn)       SUMAtriptan Succinate (IMITREX PO) Take 25 mg by mouth as needed.         timolol (TIMOPTIC) 0.5 % ophthalmic solution Place 1 drop into both eyes every morning          ALLERGIES     Allergies   Allergen Reactions     Aspirin      Coumadin [Warfarin]      Spontaneous retroperitoneal bleed.     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     Venous insufficiency        PAST SURGICAL HISTORY:  Past Surgical History:   Procedure Laterality Date     ANESTHESIA CARDIOVERSION  7/23/2012    Procedure: ANESTHESIA CARDIOVERSION;;  Surgeon: Provider, Generic Anesthesia;  Location:  ANESTHESIA -  - DO NOT SCHEDULE     BLEPHAROPLASTY  2005     Cataract Extraction with IOL Bilateral 2012     H ABLATION AV NODE  11/14/2018     IMPLANT PACEMAKER  11/2012    Dual chamber     REPLACE PACEMAKER GENERATOR  11/14/2018    upgrade to CRT       FAMILY HISTORY:  Family History   Problem Relation Age of  Onset     Heart Disease Mother 65        mi     Heart Disease Father 45        pnemonia     Heart Disease Brother      Heart Disease Sister      Heart Disease Brother        SOCIAL HISTORY:  Social History     Socioeconomic History     Marital status:      Spouse name: None     Number of children: None     Years of education: None     Highest education level: None   Social Needs     Financial resource strain: None     Food insecurity - worry: None     Food insecurity - inability: None     Transportation needs - medical: None     Transportation needs - non-medical: None   Occupational History     None   Tobacco Use     Smoking status: Never Smoker     Smokeless tobacco: Never Used   Substance and Sexual Activity     Alcohol use: No     Drug use: No     Sexual activity: None   Other Topics Concern     Parent/sibling w/ CABG, MI or angioplasty before 65F 55M? Not Asked      Service Not Asked     Blood Transfusions Not Asked     Caffeine Concern Yes     Comment: no caffeine intake     Occupational Exposure Not Asked     Hobby Hazards Not Asked     Sleep Concern Not Asked     Stress Concern Not Asked     Weight Concern Not Asked     Special Diet Yes     Comment: no sugar, salt or fats      Back Care Not Asked     Exercise Yes     Comment: treadmill, swimming daily      Bike Helmet Not Asked     Seat Belt Yes     Self-Exams Not Asked   Social History Narrative     None       Review of Systems:  Cardiovascular: negative for chest pain, palpitations, orthopnea, pos LE edema  Constitutional: negative for chills, sweats, fevers. Pos fatigue.  Resp: Negative for dyspnea at rest, dyspnea on exertion, cough, known chronic lung disease  HEENT: Negative for new visual changes, frequent headaches  Gastrointestinal: negative for abdominal pain, diarrhea, nausea, vomiting  Hematologic/lymphatic: pos for current systemic anticoagulation, hx CVA  Musculoskeletal: negative for new back pain, pos ankle pain.  Neurological:  "negative for focal weakness, LOC, seizures, syncope.presyncope.      Physical Exam:  Vitals: /84   Pulse 69   Ht 1.626 m (5' 4\")   Wt 64.6 kg (142 lb 6.4 oz)   SpO2 97%   BMI 24.44 kg/m     Wt Readings from Last 4 Encounters:   01/07/19 64.6 kg (142 lb 6.4 oz)   12/26/18 67.1 kg (148 lb)   11/26/18 67.1 kg (148 lb)   11/15/18 66.7 kg (147 lb 1.6 oz)       GEN:  In general, this is a well nourished female in no acute distress on room air.  Patient accompanied by her son today.  HEENT:  Pupils equal, round. Sclerae nonicteric.   NECK: Supple, no masses appreciated. Trachea midline. +JVD.  C/V:  Regular rhythm, 1/6 RAINA. No rub or gallop.   RESP: Respirations are unlabored. No use of accessory muscles. Clear to auscultation bilaterally without wheezing, rales, or rhonchi.  GI: Abdomen soft, nontender, not significantly distended.  EXTREM: Trace to 1+ left edema. Compression socks in place. Right leg has boot. No cyanosis or clubbing.  NEURO: Alert and oriented, cooperative. Gait not formally assessed. No obvious focal deficits.   PSYCH: Normal affect.   SKIN: Warm and dry. No rashes or petechiae appreciated.       Recent Lab Results:    BMP RESULTS:  Lab Results   Component Value Date     01/07/2019    POTASSIUM 3.6 01/07/2019    CHLORIDE 102 01/07/2019    CO2 27 01/07/2019    ANIONGAP 12.6 01/07/2019     (H) 01/07/2019    BUN 15 01/07/2019    CR 1.08 01/07/2019    GFRESTIMATED 48 (L) 01/07/2019    GFRESTBLACK 58 (L) 01/07/2019    MADDISON 10.3 01/07/2019      Results for JOVANNA DE LEÓN (MRN [id number]) as of 1/7/2019 17:28   Ref. Range 10/29/2018 09:13 11/12/2018 11:03 1/7/2019 12:39   N-Terminal Pro Bnp Latest Ref Range: 0 - 450 pg/mL 2,830 (H) 2,901 (H) 5,232 (H)         New/Pertinent imaging results since last visit:  12/26/18     Interpretation Summary     Left ventricular systolic function is mildly reduced.  The visual ejection fraction is estimated at 45-50%.  Basal and mid " inferolateral and lateral hypokinesis.  Diastolic Doppler findings (E/E' ratio and/or other parameters) suggest left  ventricular filling pressures are increased.  There is moderate (2+) tricuspid regurgitation.  Dilated IVC (>2.5cm) with no respiratory collapse; right atrial pressure is  estimated at >20mmHg.  Right ventricular systolic pressure is elevated, consistent with moderate  pulmonary hypertension.  There is mild to moderate (1-2+) mitral regurgitation.  There is mild (1+) aortic regurgitation.     Compared to the previous study, the LV function appears to be slightly better.  Valvular disease is similar. CVP is much higher now, which makes RVSP higher.      Christel Villatoro PA-C  [facility]  Pager [phone]

## 2019-02-06 DIAGNOSIS — E87.6 HYPOKALEMIA: ICD-10-CM

## 2019-02-06 RX ORDER — POTASSIUM CHLORIDE 750 MG/1
20 TABLET, EXTENDED RELEASE ORAL 2 TIMES DAILY
Qty: 120 TABLET | Refills: 1 | Status: SHIPPED | OUTPATIENT
Start: 2019-02-06 | End: 2019-02-08

## 2019-02-06 NOTE — TELEPHONE ENCOUNTER
Received refill request for:  Potassium Chloride  Last OV was: 1/7/2019 with PALAK Griffiths  Labs/EKG: last BMP 1/7/2019  F/U scheduled: 2/8/2019 with PALAK Griffiths  New script sent to: [facility]

## 2019-02-08 ENCOUNTER — OFFICE VISIT (OUTPATIENT)
Dept: CARDIOLOGY | Facility: CLINIC | Age: 84
End: 2019-02-08
Payer: COMMERCIAL

## 2019-02-08 VITALS
OXYGEN SATURATION: 96 % | BODY MASS INDEX: 23.9 KG/M2 | DIASTOLIC BLOOD PRESSURE: 62 MMHG | HEART RATE: 70 BPM | WEIGHT: 140 LBS | SYSTOLIC BLOOD PRESSURE: 122 MMHG | HEIGHT: 64 IN

## 2019-02-08 DIAGNOSIS — I50.22 CHRONIC SYSTOLIC HEART FAILURE (H): ICD-10-CM

## 2019-02-08 DIAGNOSIS — I50.40 COMBINED SYSTOLIC AND DIASTOLIC CONGESTIVE HEART FAILURE, UNSPECIFIED HF CHRONICITY (H): ICD-10-CM

## 2019-02-08 DIAGNOSIS — I50.22 CHRONIC SYSTOLIC HEART FAILURE (H): Primary | ICD-10-CM

## 2019-02-08 DIAGNOSIS — E87.6 HYPOKALEMIA: ICD-10-CM

## 2019-02-08 LAB
ANION GAP SERPL CALCULATED.3IONS-SCNC: 8 MMOL/L (ref 3–14)
BUN SERPL-MCNC: 29 MG/DL (ref 7–30)
CALCIUM SERPL-MCNC: 8.6 MG/DL (ref 8.5–10.1)
CHLORIDE SERPL-SCNC: 106 MMOL/L (ref 94–109)
CO2 SERPL-SCNC: 27 MMOL/L (ref 20–32)
CREAT SERPL-MCNC: 1.24 MG/DL (ref 0.52–1.04)
GFR SERPL CREATININE-BSD FRML MDRD: 39 ML/MIN/{1.73_M2}
GLUCOSE SERPL-MCNC: 170 MG/DL (ref 70–99)
POTASSIUM SERPL-SCNC: 3.5 MMOL/L (ref 3.4–5.3)
SODIUM SERPL-SCNC: 141 MMOL/L (ref 133–144)

## 2019-02-08 PROCEDURE — 80048 BASIC METABOLIC PNL TOTAL CA: CPT | Performed by: INTERNAL MEDICINE

## 2019-02-08 PROCEDURE — 99214 OFFICE O/P EST MOD 30 MIN: CPT | Performed by: PHYSICIAN ASSISTANT

## 2019-02-08 PROCEDURE — 36415 COLL VENOUS BLD VENIPUNCTURE: CPT | Performed by: INTERNAL MEDICINE

## 2019-02-08 RX ORDER — FUROSEMIDE 20 MG
20 TABLET ORAL DAILY
Qty: 90 TABLET | Refills: 3
Start: 2019-02-08 | End: 2020-02-03 | Stop reason: DRUGHIGH

## 2019-02-08 RX ORDER — POTASSIUM CHLORIDE 750 MG/1
TABLET, EXTENDED RELEASE ORAL
Qty: 270 TABLET | Refills: 3 | Status: SHIPPED | OUTPATIENT
Start: 2019-02-08

## 2019-02-08 ASSESSMENT — MIFFLIN-ST. JEOR: SCORE: 1060.04

## 2019-02-08 NOTE — LETTER
2/8/2019    [facility] [address]  [facility] [address]    RE: Zayda Hawkins       Dear Colleague,    I had the pleasure of seeing Zayda Hawkins in the [facility].      Cardiology Progress Note    Date of Service: 02/08/2019      Reason for visit:  [facility] clinic follow up, chronic systolic heart failure.     Primary cardiologist: Dr. Kingsley Brandon and Dr. Julio C Rangel ()      HPI:  Ms. Asha Hawkins is a very pleasant [place] 86 year old with a PMHx including DM, hypertension, and hyperlipidemia. She has had chronic lower extremity venous insufficiency which had initially improved with consistent use of compression socks. She also has a history of tachybrady syndrome for which she had been on Tikosyn for atrial tachyarrhythmias and received a pacemaker in 2012. In the past, she was not on anticoagulation because of a history of retroperitoneal bleed while on warfarin. A Watchman device was offered in 2017 though she declined. When she was seen by Dr. Brandon in March 2018 she was doing well, and told she could return on a PRN basis.     She was then hospitalized in Sept 2018 after suffering an acute left posterior circulation ischemic stroke and received TPA. She was noted to be in afib with RVR. She was then placed on AC initially with Pradaxa (though this was since changed to Eliquis). In addition, her Toprol XL was increased and she was placed on cardizem. Her Tikosyn was discontinued. She was then hospitalized again in mid October 2018 with increased leg edema and felt to be in acute heart failure. Echo at that time showed reduced EF at 40-45% with mild global hypokinesia. The RV was normal size and function but she did have elevated RV pressures and severe bi-atrial enlargement. She had 1+MR, 2-3+ TR, and 1-2+ AR. Ultimately it was felt the etiology of her heart failure was related to tachyarrhythmias. She required increase in  diuretics, and subsequently underwent AVN ablation in Mid November 2018.    She saw Dr. Rangel in follow up the end of December and was improved in regard to dyspnea. Echo showed mild improvement in her EF, at 45-50%. Due to complaints of fatigue, she was tapered off her beta blocker, and lisinopril was resumed at 20mg daily. I then saw her in Jan 2019 due to increase in leg edema once again. Her son's wife is an internist, and increased her diuretics to 20mg twice daily, which we continued. I increased her KDur at that time as she was mildly hypokalemic. Unfortunately, she fell and broke her ankle and was in a walking boot making her weight trends difficult. She has continued fatigue despite being off beta blockers.     Today, I am seeing her back as a routine visit. She says she's feeling well. Unfortunately she still has the boot in place as her son tells me she was noncompliant with it initially and now needs to wear it longer. Despite this, her weight on our scale is down 2 lbs. She denies ERICKSON, chest pain, palpitations, orthopnea, or dizziness. She feels she has only minimal ankle edema and is compliant with her compression socks. Labs today show a slight trend up in her BUN/SCr from previous. Overall she is feeling well, with the exception of voicing sadness that she had to cancel a trip to [place] and [place] due to her ankle issues.       ASSESSMENT/PLAN:    1. Chronic combined systolic and diastolic heart failure.   --Most recent echo Dec 2018 after AVN ablation showed EF 45-50% (had been 40-45% Oct 2018 in the setting of tachyarrhythmias). She has continued with elevated filling pressures with moderate TR an 1-2+ MR which was not significantly changed.   --She appears closer to euvolemia today with mild trend up of her BUN/Scr. Will decrease her lasix back to 20mg daily and decrease her KDur slightly as well. Weight is difficult to interpret with her walking boot, and she does not routinely weigh at home. I've  asked them to call with any worsening edema. Continue compression socks.   --Continue lisinopril 20mg daily. BP under good control today. I did not add any new medications today as she was feeling well.     2. Atrial fibrillation.   --Initially difficult to rate control. Now s/p AVN ablation in Nov 2018.    --Somewhat recently tapered off beta blockers due to fatigue, though no change when stopped. Can consider retrial in the future if needed.    --Had a hospitalization for CVA Sept 2018 while off AC. Now on Eliquis which she appears to be tolerating.     Follow up plan: In [facility] clinic with myself in 2 month with labs.       Orders this Visit:  Orders Placed This Encounter   Procedures     Basic metabolic panel     Follow-Up with [facility] - MAGALY visit     Orders Placed This Encounter   Medications     potassium chloride ER (K-DUR/KLOR-CON M) 10 MEQ CR tablet     Sig: Take 2 tablets (20meq) in the AM, and 1 tablet (10meq) in the PM.     Dispense:  270 tablet     Refill:  3     Please do not fill until patient calls     furosemide (LASIX) 20 MG tablet     Sig: Take 1 tablet (20 mg) by mouth daily     Dispense:  90 tablet     Refill:  3     Medications Discontinued During This Encounter   Medication Reason     potassium chloride ER (K-DUR/KLOR-CON M) 10 MEQ CR tablet Reorder     furosemide (LASIX) 20 MG tablet            CURRENT MEDICATIONS:  Current Outpatient Medications   Medication Sig Dispense Refill     acetaminophen (TYLENOL) 325 MG tablet Take 2 tablets (650 mg) by mouth every 4 hours as needed for mild pain or fever       apixaban ANTICOAGULANT (ELIQUIS) 5 MG tablet Take 1 tablet (5 mg) by mouth 2 times daily 180 tablet 3     calcium carbonate-vitamin D 500-400 MG-UNIT TABS Take 1 tablet by mouth 2 times daily.         dorzolamide-timolol PF (COSOPT) 22.3-6.8 MG/ML opthalmic solution Place 1 drop into both eyes 2 times daily 10 mL vial       furosemide (LASIX) 20 MG tablet Take 1 tablet (20 mg) by mouth  daily 90 tablet 3     latanoprost (XALATAN) 0.005 % ophthalmic solution Place 1 drop into both eyes At Bedtime       magnesium gluconate (MAGONATE) 500 MG tablet Take 500 mg by mouth daily.         metFORMIN (GLUCOPHAGE-XR) 500 MG 24 hr tablet Take 500 mg by mouth every morning        potassium chloride ER (K-DUR/KLOR-CON M) 10 MEQ CR tablet Take 2 tablets (20meq) in the AM, and 1 tablet (10meq) in the PM. 270 tablet 3     sertraline (ZOLOFT) 50 MG tablet Take 25 mg by mouth every evening as needed (Take a half tablet qpm prn)       SUMAtriptan Succinate (IMITREX PO) Take 25 mg by mouth as needed.         timolol (TIMOPTIC) 0.5 % ophthalmic solution Place 1 drop into both eyes every morning        fish oil-omega-3 fatty acids 1000 MG capsule Take 1 g by mouth daily       lisinopril (PRINIVIL/ZESTRIL) 20 MG tablet Take 1 tablet (20 mg) by mouth daily 90 tablet 3     multivitamin, therapeutic (THERA-VIT) TABS Take 1 tablet by mouth daily.           ALLERGIES     Allergies   Allergen Reactions     Aspirin      Coumadin [Warfarin]      Spontaneous retroperitoneal bleed.     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     Venous insufficiency        PAST SURGICAL HISTORY:  Past Surgical History:   Procedure Laterality Date     ANESTHESIA CARDIOVERSION  7/23/2012    Procedure: ANESTHESIA CARDIOVERSION;;  Surgeon: Provider, Generic Anesthesia;  Location:  ANESTHESIA - RH - DO NOT SCHEDULE     BLEPHAROPLASTY  2005     Cataract Extraction with IOL Bilateral 2012     H ABLATION  AV NODE  11/14/2018     IMPLANT PACEMAKER  11/2012    Dual chamber     REPLACE PACEMAKER GENERATOR  11/14/2018    upgrade to CRT       FAMILY HISTORY:  Family History   Problem Relation Age of Onset     Heart Disease Mother 65        mi     Heart Disease Father 45        pnemonia     Heart Disease Brother      Heart Disease Sister      Heart Disease Brother        SOCIAL HISTORY:  Social History     Socioeconomic History     Marital status:      Spouse name: None     Number of children: None     Years of education: None     Highest education level: None   Social Needs     Financial resource strain: None     Food insecurity - worry: None     Food insecurity - inability: None     Transportation needs - medical: None     Transportation needs - non-medical: None   Occupational History     None   Tobacco Use     Smoking status: Never Smoker     Smokeless tobacco: Never Used   Substance and Sexual Activity     Alcohol use: No     Drug use: No     Sexual activity: None   Other Topics Concern     Parent/sibling w/ CABG, MI or angioplasty before 65F 55M? Not Asked      Service Not Asked     Blood Transfusions Not Asked     Caffeine Concern Yes     Comment: no caffeine intake     Occupational Exposure Not Asked     Hobby Hazards Not Asked     Sleep Concern Not Asked     Stress Concern Not Asked     Weight Concern Not Asked     Special Diet Yes     Comment: no sugar, salt or fats      Back Care Not Asked     Exercise Yes     Comment: treadmill, swimming daily      Bike Helmet Not Asked     Seat Belt Yes     Self-Exams Not Asked   Social History Narrative     None       Review of Systems:  Cardiovascular: negative for chest pain, palpitations, orthopnea, neg LE edema.  Constitutional: negative for chills, sweats, fevers. Pos fatigue.  Resp: Negative for dyspnea at rest, dyspnea on exertion, cough, known chronic lung disease  HEENT: Negative for new visual changes, frequent headaches  Gastrointestinal: negative  "for abdominal pain, diarrhea, nausea, vomiting  Hematologic/lymphatic: pos for current systemic anticoagulation, hx CVA  Musculoskeletal: negative for new back pain, pos ankle pain/recent fracture.  Neurological: negative for focal weakness, LOC, seizures, syncope.presyncope.      Physical Exam:  Vitals: /62 (BP Location: Right arm, Patient Position: Sitting, Cuff Size: Adult Regular)   Pulse 70   Ht 1.626 m (5' 4\")   Wt 63.5 kg (140 lb)   SpO2 96%   BMI 24.03 kg/m      Wt Readings from Last 4 Encounters:   02/08/19 63.5 kg (140 lb)   01/07/19 64.6 kg (142 lb 6.4 oz)   12/26/18 67.1 kg (148 lb)   11/26/18 67.1 kg (148 lb)       GEN:  In general, this is a well nourished female in no acute distress on room air.  Patient accompanied by her son today who translates when needed.  HEENT:  Pupils equal, round. Sclerae nonicteric.   NECK: Supple, no masses appreciated. Trachea midline. No obvious JVD while upright.  C/V:  Regular rhythm, 1/6 RAINA heard best at LSB. No rub or gallop.   RESP: Respirations are unlabored. No use of accessory muscles. Clear to auscultation bilaterally without wheezing, rales, or rhonchi.  GI: Abdomen soft, nontender, not significantly distended.  EXTREM: Trace pitting left leg edema with compression socks in place. Right leg has boot. No cyanosis or clubbing.  NEURO: Alert and oriented, cooperative. Gait not formally assessed. No obvious focal deficits.   PSYCH: Normal affect.   SKIN: Warm and dry. No rashes or petechiae appreciated.       Recent Lab Results:    BMP RESULTS:  Lab Results   Component Value Date     02/08/2019    POTASSIUM 3.5 02/08/2019    CHLORIDE 106 02/08/2019    CO2 27 02/08/2019    ANIONGAP 8 02/08/2019     (H) 02/08/2019    BUN 29 02/08/2019    CR 1.24 (H) 02/08/2019    GFRESTIMATED 39 (L) 02/08/2019    GFRESTBLACK 46 (L) 02/08/2019    MADDISON 8.6 02/08/2019        New/Pertinent imaging results since last visit:  12/26/18     Interpretation " Summary     Left ventricular systolic function is mildly reduced.  The visual ejection fraction is estimated at 45-50%.  Basal and mid inferolateral and lateral hypokinesis.  Diastolic Doppler findings (E/E' ratio and/or other parameters) suggest left  ventricular filling pressures are increased.  There is moderate (2+) tricuspid regurgitation.  Dilated IVC (>2.5cm) with no respiratory collapse; right atrial pressure is  estimated at >20mmHg.  Right ventricular systolic pressure is elevated, consistent with moderate  pulmonary hypertension.  There is mild to moderate (1-2+) mitral regurgitation.  There is mild (1+) aortic regurgitation.     Compared to the previous study, the LV function appears to be slightly better.  Valvular disease is similar. CVP is much higher now, which makes RVSP higher.      DONATO Zamorano-C  [facility]  Pager [phone]      Thank you for allowing me to participate in the care of your patient.      Sincerely,     DONATO Zamorano     [facility]    cc:   DONATO Zamorano  [facility]  [address] [place] [id number]

## 2019-02-08 NOTE — PATIENT INSTRUCTIONS
Visit Summary:    Today we discussed:   We can DECREASE your lasix to 20mg once a day.  We will also DECREASE your potassium to 2 pills (20meq) in the morning and 1 pill (10meq) in the afternoon.     Please call with any worsening swelling or feeling like you are retaining fluid.     Test results:   Results for JOVANNA DE LEÓN (MRN [id number]) as of 2/8/2019 09:52   Ref. Range 12/26/2018 14:01 1/2/2019 10:23 1/7/2019 12:39 2/8/2019 08:29   Sodium Latest Ref Range: 133 - 144 mmol/L 140 139 138 141   Potassium Latest Ref Range: 3.4 - 5.3 mmol/L 3.3 (L) 3.2 (L) 3.6 3.5   Chloride Latest Ref Range: 94 - 109 mmol/L 104 106 102 106   Carbon Dioxide Latest Ref Range: 20 - 32 mmol/L 28 25 27 27   Urea Nitrogen Latest Ref Range: 7 - 30 mg/dL 19 17 15 29   Creatinine Latest Ref Range: 0.52 - 1.04 mg/dL 1.11 0.99 1.08 1.24 (H)   GFR Estimate Latest Ref Range: >60 mL/min/1.73_m2 47 (L) 52 (L) 48 (L) 39 (L)   GFR Estimate If Black Latest Ref Range: >60 mL/min/1.73_m2 56 (L) 60 (L) 58 (L) 46 (L)   Calcium Latest Ref Range: 8.5 - 10.1 mg/dL 9.3 8.4 (L) 10.3 8.6   Anion Gap Latest Ref Range: 3 - 14 mmol/L 11.3 8 12.6 8   N-Terminal Pro Bnp Latest Ref Range: 0 - 450 pg/mL   5,232 (H)    Glucose Latest Ref Range: 70 - 99 mg/dL 142 (H) 135 (H) 138 (H) 170 (H)       Follow up:   With Christel in 2 months with repeat labs.

## 2019-02-08 NOTE — LETTER
2/8/2019    [facility] [address]  [facility] [address]    RE: Zayda Hawkins       Dear Colleague,    I had the pleasure of seeing Zayda Hawkins in the [facility].      Cardiology Progress Note    Date of Service: 02/08/2019      Reason for visit:  [facility] clinic follow up, chronic systolic heart failure.     Primary cardiologist: Dr. Kingsley Brandon and Dr. Julio C Rangel ()      HPI:  Ms. Asha Hawkins is a very pleasant [place] 86 year old with a PMHx including DM, hypertension, and hyperlipidemia. She has had chronic lower extremity venous insufficiency which had initially improved with consistent use of compression socks. She also has a history of tachybrady syndrome for which she had been on Tikosyn for atrial tachyarrhythmias and received a pacemaker in 2012. In the past, she was not on anticoagulation because of a history of retroperitoneal bleed while on warfarin. A Watchman device was offered in 2017 though she declined. When she was seen by Dr. Brandon in March 2018 she was doing well, and told she could return on a PRN basis.     She was then hospitalized in Sept 2018 after suffering an acute left posterior circulation ischemic stroke and received TPA. She was noted to be in afib with RVR. She was then placed on AC initially with Pradaxa (though this was since changed to Eliquis). In addition, her Toprol XL was increased and she was placed on cardizem. Her Tikosyn was discontinued. She was then hospitalized again in mid October 2018 with increased leg edema and felt to be in acute heart failure. Echo at that time showed reduced EF at 40-45% with mild global hypokinesia. The RV was normal size and function but she did have elevated RV pressures and severe bi-atrial enlargement. She had 1+MR, 2-3+ TR, and 1-2+ AR. Ultimately it was felt the etiology of her heart failure was related to tachyarrhythmias. She required increase in  diuretics, and subsequently underwent AVN ablation in Mid November 2018.    She saw Dr. Rangel in follow up the end of December and was improved in regard to dyspnea. Echo showed mild improvement in her EF, at 45-50%. Due to complaints of fatigue, she was tapered off her beta blocker, and lisinopril was resumed at 20mg daily. I then saw her in Jan 2019 due to increase in leg edema once again. Her son's wife is an internist, and increased her diuretics to 20mg twice daily, which we continued. I increased her KDur at that time as she was mildly hypokalemic. Unfortunately, she fell and broke her ankle and was in a walking boot making her weight trends difficult. She has continued fatigue despite being off beta blockers.     Today, I am seeing her back as a routine visit. She says she's feeling well. Unfortunately she still has the boot in place as her son tells me she was noncompliant with it initially and now needs to wear it longer. Despite this, her weight on our scale is down 2 lbs. She denies ERICKSON, chest pain, palpitations, orthopnea, or dizziness. She feels she has only minimal ankle edema and is compliant with her compression socks. Labs today show a slight trend up in her BUN/SCr from previous. Overall she is feeling well, with the exception of voicing sadness that she had to cancel a trip to [place] and [place] due to her ankle issues.       ASSESSMENT/PLAN:    1. Chronic combined systolic and diastolic heart failure.   --Most recent echo Dec 2018 after AVN ablation showed EF 45-50% (had been 40-45% Oct 2018 in the setting of tachyarrhythmias). She has continued with elevated filling pressures with moderate TR an 1-2+ MR which was not significantly changed.   --She appears closer to euvolemia today with mild trend up of her BUN/Scr. Will decrease her lasix back to 20mg daily and decrease her KDur slightly as well. Weight is difficult to interpret with her walking boot, and she does not routinely weigh at home. I've  asked them to call with any worsening edema. Continue compression socks.   --Continue lisinopril 20mg daily. BP under good control today. I did not add any new medications today as she was feeling well.     2. Atrial fibrillation.   --Initially difficult to rate control. Now s/p AVN ablation in Nov 2018.    --Somewhat recently tapered off beta blockers due to fatigue, though no change when stopped. Can consider retrial in the future if needed.    --Had a hospitalization for CVA Sept 2018 while off AC. Now on Eliquis which she appears to be tolerating.     Follow up plan: In [facility] clinic with myself in 2 month with labs.       Orders this Visit:  Orders Placed This Encounter   Procedures     Basic metabolic panel     Follow-Up with [facility] - MAGALY visit     Orders Placed This Encounter   Medications     potassium chloride ER (K-DUR/KLOR-CON M) 10 MEQ CR tablet     Sig: Take 2 tablets (20meq) in the AM, and 1 tablet (10meq) in the PM.     Dispense:  270 tablet     Refill:  3     Please do not fill until patient calls     furosemide (LASIX) 20 MG tablet     Sig: Take 1 tablet (20 mg) by mouth daily     Dispense:  90 tablet     Refill:  3     Medications Discontinued During This Encounter   Medication Reason     potassium chloride ER (K-DUR/KLOR-CON M) 10 MEQ CR tablet Reorder     furosemide (LASIX) 20 MG tablet            CURRENT MEDICATIONS:  Current Outpatient Medications   Medication Sig Dispense Refill     acetaminophen (TYLENOL) 325 MG tablet Take 2 tablets (650 mg) by mouth every 4 hours as needed for mild pain or fever       apixaban ANTICOAGULANT (ELIQUIS) 5 MG tablet Take 1 tablet (5 mg) by mouth 2 times daily 180 tablet 3     calcium carbonate-vitamin D 500-400 MG-UNIT TABS Take 1 tablet by mouth 2 times daily.         dorzolamide-timolol PF (COSOPT) 22.3-6.8 MG/ML opthalmic solution Place 1 drop into both eyes 2 times daily 10 mL vial       furosemide (LASIX) 20 MG tablet Take 1 tablet (20 mg) by mouth  daily 90 tablet 3     latanoprost (XALATAN) 0.005 % ophthalmic solution Place 1 drop into both eyes At Bedtime       magnesium gluconate (MAGONATE) 500 MG tablet Take 500 mg by mouth daily.         metFORMIN (GLUCOPHAGE-XR) 500 MG 24 hr tablet Take 500 mg by mouth every morning        potassium chloride ER (K-DUR/KLOR-CON M) 10 MEQ CR tablet Take 2 tablets (20meq) in the AM, and 1 tablet (10meq) in the PM. 270 tablet 3     sertraline (ZOLOFT) 50 MG tablet Take 25 mg by mouth every evening as needed (Take a half tablet qpm prn)       SUMAtriptan Succinate (IMITREX PO) Take 25 mg by mouth as needed.         timolol (TIMOPTIC) 0.5 % ophthalmic solution Place 1 drop into both eyes every morning        fish oil-omega-3 fatty acids 1000 MG capsule Take 1 g by mouth daily       lisinopril (PRINIVIL/ZESTRIL) 20 MG tablet Take 1 tablet (20 mg) by mouth daily 90 tablet 3     multivitamin, therapeutic (THERA-VIT) TABS Take 1 tablet by mouth daily.           ALLERGIES     Allergies   Allergen Reactions     Aspirin      Coumadin [Warfarin]      Spontaneous retroperitoneal bleed.     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     Venous insufficiency        PAST SURGICAL HISTORY:  Past Surgical History:   Procedure Laterality Date     ANESTHESIA CARDIOVERSION  7/23/2012    Procedure: ANESTHESIA CARDIOVERSION;;  Surgeon: Provider, Generic Anesthesia;  Location:  ANESTHESIA - RH - DO NOT SCHEDULE     BLEPHAROPLASTY  2005     Cataract Extraction with IOL Bilateral 2012     H ABLATION  AV NODE  11/14/2018     IMPLANT PACEMAKER  11/2012    Dual chamber     REPLACE PACEMAKER GENERATOR  11/14/2018    upgrade to CRT       FAMILY HISTORY:  Family History   Problem Relation Age of Onset     Heart Disease Mother 65        mi     Heart Disease Father 45        pnemonia     Heart Disease Brother      Heart Disease Sister      Heart Disease Brother        SOCIAL HISTORY:  Social History     Socioeconomic History     Marital status:      Spouse name: None     Number of children: None     Years of education: None     Highest education level: None   Social Needs     Financial resource strain: None     Food insecurity - worry: None     Food insecurity - inability: None     Transportation needs - medical: None     Transportation needs - non-medical: None   Occupational History     None   Tobacco Use     Smoking status: Never Smoker     Smokeless tobacco: Never Used   Substance and Sexual Activity     Alcohol use: No     Drug use: No     Sexual activity: None   Other Topics Concern     Parent/sibling w/ CABG, MI or angioplasty before 65F 55M? Not Asked      Service Not Asked     Blood Transfusions Not Asked     Caffeine Concern Yes     Comment: no caffeine intake     Occupational Exposure Not Asked     Hobby Hazards Not Asked     Sleep Concern Not Asked     Stress Concern Not Asked     Weight Concern Not Asked     Special Diet Yes     Comment: no sugar, salt or fats      Back Care Not Asked     Exercise Yes     Comment: treadmill, swimming daily      Bike Helmet Not Asked     Seat Belt Yes     Self-Exams Not Asked   Social History Narrative     None       Review of Systems:  Cardiovascular: negative for chest pain, palpitations, orthopnea, neg LE edema.  Constitutional: negative for chills, sweats, fevers. Pos fatigue.  Resp: Negative for dyspnea at rest, dyspnea on exertion, cough, known chronic lung disease  HEENT: Negative for new visual changes, frequent headaches  Gastrointestinal: negative  "for abdominal pain, diarrhea, nausea, vomiting  Hematologic/lymphatic: pos for current systemic anticoagulation, hx CVA  Musculoskeletal: negative for new back pain, pos ankle pain/recent fracture.  Neurological: negative for focal weakness, LOC, seizures, syncope.presyncope.      Physical Exam:  Vitals: /62 (BP Location: Right arm, Patient Position: Sitting, Cuff Size: Adult Regular)   Pulse 70   Ht 1.626 m (5' 4\")   Wt 63.5 kg (140 lb)   SpO2 96%   BMI 24.03 kg/m      Wt Readings from Last 4 Encounters:   02/08/19 63.5 kg (140 lb)   01/07/19 64.6 kg (142 lb 6.4 oz)   12/26/18 67.1 kg (148 lb)   11/26/18 67.1 kg (148 lb)       GEN:  In general, this is a well nourished female in no acute distress on room air.  Patient accompanied by her son today who translates when needed.  HEENT:  Pupils equal, round. Sclerae nonicteric.   NECK: Supple, no masses appreciated. Trachea midline. No obvious JVD while upright.  C/V:  Regular rhythm, 1/6 RAINA heard best at LSB. No rub or gallop.   RESP: Respirations are unlabored. No use of accessory muscles. Clear to auscultation bilaterally without wheezing, rales, or rhonchi.  GI: Abdomen soft, nontender, not significantly distended.  EXTREM: Trace pitting left leg edema with compression socks in place. Right leg has boot. No cyanosis or clubbing.  NEURO: Alert and oriented, cooperative. Gait not formally assessed. No obvious focal deficits.   PSYCH: Normal affect.   SKIN: Warm and dry. No rashes or petechiae appreciated.       Recent Lab Results:    BMP RESULTS:  Lab Results   Component Value Date     02/08/2019    POTASSIUM 3.5 02/08/2019    CHLORIDE 106 02/08/2019    CO2 27 02/08/2019    ANIONGAP 8 02/08/2019     (H) 02/08/2019    BUN 29 02/08/2019    CR 1.24 (H) 02/08/2019    GFRESTIMATED 39 (L) 02/08/2019    GFRESTBLACK 46 (L) 02/08/2019    MADDISON 8.6 02/08/2019        New/Pertinent imaging results since last visit:  12/26/18     Interpretation " Summary     Left ventricular systolic function is mildly reduced.  The visual ejection fraction is estimated at 45-50%.  Basal and mid inferolateral and lateral hypokinesis.  Diastolic Doppler findings (E/E' ratio and/or other parameters) suggest left  ventricular filling pressures are increased.  There is moderate (2+) tricuspid regurgitation.  Dilated IVC (>2.5cm) with no respiratory collapse; right atrial pressure is  estimated at >20mmHg.  Right ventricular systolic pressure is elevated, consistent with moderate  pulmonary hypertension.  There is mild to moderate (1-2+) mitral regurgitation.  There is mild (1+) aortic regurgitation.     Compared to the previous study, the LV function appears to be slightly better.  Valvular disease is similar. CVP is much higher now, which makes RVSP higher.      DONATO Zamorano-KAREN  [facility]  Pager [phone]      Thank you for allowing me to participate in the care of your patient.    Sincerely,     DONATO Zamorano     [facility]

## 2019-02-08 NOTE — PROGRESS NOTES
Cardiology Progress Note    Date of Service: 02/08/2019      Reason for visit:  [facility] clinic follow up, chronic systolic heart failure.     Primary cardiologist: Dr. Kingsley Brandon and Dr. Julio C Rangel ()      HPI:  Ms. Asha Hawkins is a very pleasant Citizen of [place] 86 year old with a PMHx including DM, hypertension, and hyperlipidemia. She has had chronic lower extremity venous insufficiency which had initially improved with consistent use of compression socks. She also has a history of tachybrady syndrome for which she had been on Tikosyn for atrial tachyarrhythmias and received a pacemaker in 2012. In the past, she was not on anticoagulation because of a history of retroperitoneal bleed while on warfarin. A Watchman device was offered in 2017 though she declined. When she was seen by Dr. Brandon in March 2018 she was doing well, and told she could return on a PRN basis.     She was then hospitalized in Sept 2018 after suffering an acute left posterior circulation ischemic stroke and received TPA. She was noted to be in afib with RVR. She was then placed on AC initially with Pradaxa (though this was since changed to Eliquis). In addition, her Toprol XL was increased and she was placed on cardizem. Her Tikosyn was discontinued. She was then hospitalized again in mid October 2018 with increased leg edema and felt to be in acute heart failure. Echo at that time showed reduced EF at 40-45% with mild global hypokinesia. The RV was normal size and function but she did have elevated RV pressures and severe bi-atrial enlargement. She had 1+MR, 2-3+ TR, and 1-2+ AR. Ultimately it was felt the etiology of her heart failure was related to tachyarrhythmias. She required increase in diuretics, and subsequently underwent AVN ablation in Mid November 2018.    She saw Dr. Rangel in follow up the end of December and was improved in regard to dyspnea. Echo showed mild improvement in her EF, at 45-50%. Due to complaints of fatigue, she was  tapered off her beta blocker, and lisinopril was resumed at 20mg daily. I then saw her in Jan 2019 due to increase in leg edema once again. Her son's wife is an internist, and increased her diuretics to 20mg twice daily, which we continued. I increased her KDur at that time as she was mildly hypokalemic. Unfortunately, she fell and broke her ankle and was in a walking boot making her weight trends difficult. She has continued fatigue despite being off beta blockers.     Today, I am seeing her back as a routine visit. She says she's feeling well. Unfortunately she still has the boot in place as her son tells me she was noncompliant with it initially and now needs to wear it longer. Despite this, her weight on our scale is down 2 lbs. She denies ERICKSON, chest pain, palpitations, orthopnea, or dizziness. She feels she has only minimal ankle edema and is compliant with her compression socks. Labs today show a slight trend up in her BUN/SCr from previous. Overall she is feeling well, with the exception of voicing sadness that she had to cancel a trip to [place] and [place] due to her ankle issues.       ASSESSMENT/PLAN:    1. Chronic combined systolic and diastolic heart failure.   --Most recent echo Dec 2018 after AVN ablation showed EF 45-50% (had been 40-45% Oct 2018 in the setting of tachyarrhythmias). She has continued with elevated filling pressures with moderate TR an 1-2+ MR which was not significantly changed.   --She appears closer to euvolemia today with mild trend up of her BUN/Scr. Will decrease her lasix back to 20mg daily and decrease her KDur slightly as well. Weight is difficult to interpret with her walking boot, and she does not routinely weigh at home. I've asked them to call with any worsening edema. Continue compression socks.   --Continue lisinopril 20mg daily. BP under good control today. I did not add any new medications today as she was feeling well.     2. Atrial fibrillation.   --Initially difficult  to rate control. Now s/p AVN ablation in Nov 2018.    --Somewhat recently tapered off beta blockers due to fatigue, though no change when stopped. Can consider retrial in the future if needed.    --Had a hospitalization for CVA Sept 2018 while off AC. Now on Eliquis which she appears to be tolerating.     Follow up plan: In [facility] clinic with myself in 2 month with labs.       Orders this Visit:  Orders Placed This Encounter   Procedures     Basic metabolic panel     Follow-Up with [facility] - MAGALY visit     Orders Placed This Encounter   Medications     potassium chloride ER (K-DUR/KLOR-CON M) 10 MEQ CR tablet     Sig: Take 2 tablets (20meq) in the AM, and 1 tablet (10meq) in the PM.     Dispense:  270 tablet     Refill:  3     Please do not fill until patient calls     furosemide (LASIX) 20 MG tablet     Sig: Take 1 tablet (20 mg) by mouth daily     Dispense:  90 tablet     Refill:  3     Medications Discontinued During This Encounter   Medication Reason     potassium chloride ER (K-DUR/KLOR-CON M) 10 MEQ CR tablet Reorder     furosemide (LASIX) 20 MG tablet            CURRENT MEDICATIONS:  Current Outpatient Medications   Medication Sig Dispense Refill     acetaminophen (TYLENOL) 325 MG tablet Take 2 tablets (650 mg) by mouth every 4 hours as needed for mild pain or fever       apixaban ANTICOAGULANT (ELIQUIS) 5 MG tablet Take 1 tablet (5 mg) by mouth 2 times daily 180 tablet 3     calcium carbonate-vitamin D 500-400 MG-UNIT TABS Take 1 tablet by mouth 2 times daily.         dorzolamide-timolol PF (COSOPT) 22.3-6.8 MG/ML opthalmic solution Place 1 drop into both eyes 2 times daily 10 mL vial       furosemide (LASIX) 20 MG tablet Take 1 tablet (20 mg) by mouth daily 90 tablet 3     latanoprost (XALATAN) 0.005 % ophthalmic solution Place 1 drop into both eyes At Bedtime       magnesium gluconate (MAGONATE) 500 MG tablet Take 500 mg by mouth daily.         metFORMIN (GLUCOPHAGE-XR) 500 MG 24 hr tablet Take 500 mg by  mouth every morning        potassium chloride ER (K-DUR/KLOR-CON M) 10 MEQ CR tablet Take 2 tablets (20meq) in the AM, and 1 tablet (10meq) in the PM. 270 tablet 3     sertraline (ZOLOFT) 50 MG tablet Take 25 mg by mouth every evening as needed (Take a half tablet qpm prn)       SUMAtriptan Succinate (IMITREX PO) Take 25 mg by mouth as needed.         timolol (TIMOPTIC) 0.5 % ophthalmic solution Place 1 drop into both eyes every morning        fish oil-omega-3 fatty acids 1000 MG capsule Take 1 g by mouth daily       lisinopril (PRINIVIL/ZESTRIL) 20 MG tablet Take 1 tablet (20 mg) by mouth daily 90 tablet 3     multivitamin, therapeutic (THERA-VIT) TABS Take 1 tablet by mouth daily.           ALLERGIES     Allergies   Allergen Reactions     Aspirin      Coumadin [Warfarin]      Spontaneous retroperitoneal bleed.     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     Venous insufficiency        PAST SURGICAL HISTORY:  Past Surgical History:   Procedure Laterality Date     ANESTHESIA CARDIOVERSION  7/23/2012    Procedure: ANESTHESIA CARDIOVERSION;;  Surgeon: Provider, Generic Anesthesia;  Location:  ANESTHESIA -  - DO NOT SCHEDULE     BLEPHAROPLASTY  2005     Cataract Extraction with IOL Bilateral 2012     H ABLATION AV NODE  11/14/2018     IMPLANT PACEMAKER  11/2012    Dual chamber     REPLACE PACEMAKER GENERATOR  11/14/2018    upgrade to CRT       FAMILY HISTORY:  Family History   Problem Relation Age of Onset     Heart Disease Mother 65        mi     Heart Disease  Father 45        pnemonia     Heart Disease Brother      Heart Disease Sister      Heart Disease Brother        SOCIAL HISTORY:  Social History     Socioeconomic History     Marital status:      Spouse name: None     Number of children: None     Years of education: None     Highest education level: None   Social Needs     Financial resource strain: None     Food insecurity - worry: None     Food insecurity - inability: None     Transportation needs - medical: None     Transportation needs - non-medical: None   Occupational History     None   Tobacco Use     Smoking status: Never Smoker     Smokeless tobacco: Never Used   Substance and Sexual Activity     Alcohol use: No     Drug use: No     Sexual activity: None   Other Topics Concern     Parent/sibling w/ CABG, MI or angioplasty before 65F 55M? Not Asked      Service Not Asked     Blood Transfusions Not Asked     Caffeine Concern Yes     Comment: no caffeine intake     Occupational Exposure Not Asked     Hobby Hazards Not Asked     Sleep Concern Not Asked     Stress Concern Not Asked     Weight Concern Not Asked     Special Diet Yes     Comment: no sugar, salt or fats      Back Care Not Asked     Exercise Yes     Comment: treadmill, swimming daily      Bike Helmet Not Asked     Seat Belt Yes     Self-Exams Not Asked   Social History Narrative     None       Review of Systems:  Cardiovascular: negative for chest pain, palpitations, orthopnea, neg LE edema.  Constitutional: negative for chills, sweats, fevers. Pos fatigue.  Resp: Negative for dyspnea at rest, dyspnea on exertion, cough, known chronic lung disease  HEENT: Negative for new visual changes, frequent headaches  Gastrointestinal: negative for abdominal pain, diarrhea, nausea, vomiting  Hematologic/lymphatic: pos for current systemic anticoagulation, hx CVA  Musculoskeletal: negative for new back pain, pos ankle pain/recent fracture.  Neurological: negative for focal weakness, LOC, seizures,  "syncope.presyncope.      Physical Exam:  Vitals: /62 (BP Location: Right arm, Patient Position: Sitting, Cuff Size: Adult Regular)   Pulse 70   Ht 1.626 m (5' 4\")   Wt 63.5 kg (140 lb)   SpO2 96%   BMI 24.03 kg/m     Wt Readings from Last 4 Encounters:   02/08/19 63.5 kg (140 lb)   01/07/19 64.6 kg (142 lb 6.4 oz)   12/26/18 67.1 kg (148 lb)   11/26/18 67.1 kg (148 lb)       GEN:  In general, this is a well nourished female in no acute distress on room air.  Patient accompanied by her son today who translates when needed.  HEENT:  Pupils equal, round. Sclerae nonicteric.   NECK: Supple, no masses appreciated. Trachea midline. No obvious JVD while upright.  C/V:  Regular rhythm, 1/6 RAINA heard best at LSB. No rub or gallop.   RESP: Respirations are unlabored. No use of accessory muscles. Clear to auscultation bilaterally without wheezing, rales, or rhonchi.  GI: Abdomen soft, nontender, not significantly distended.  EXTREM: Trace pitting left leg edema with compression socks in place. Right leg has boot. No cyanosis or clubbing.  NEURO: Alert and oriented, cooperative. Gait not formally assessed. No obvious focal deficits.   PSYCH: Normal affect.   SKIN: Warm and dry. No rashes or petechiae appreciated.       Recent Lab Results:    BMP RESULTS:  Lab Results   Component Value Date     02/08/2019    POTASSIUM 3.5 02/08/2019    CHLORIDE 106 02/08/2019    CO2 27 02/08/2019    ANIONGAP 8 02/08/2019     (H) 02/08/2019    BUN 29 02/08/2019    CR 1.24 (H) 02/08/2019    GFRESTIMATED 39 (L) 02/08/2019    GFRESTBLACK 46 (L) 02/08/2019    MADDISON 8.6 02/08/2019        New/Pertinent imaging results since last visit:  12/26/18     Interpretation Summary     Left ventricular systolic function is mildly reduced.  The visual ejection fraction is estimated at 45-50%.  Basal and mid inferolateral and lateral hypokinesis.  Diastolic Doppler findings (E/E' ratio and/or other parameters) suggest left  ventricular " filling pressures are increased.  There is moderate (2+) tricuspid regurgitation.  Dilated IVC (>2.5cm) with no respiratory collapse; right atrial pressure is  estimated at >20mmHg.  Right ventricular systolic pressure is elevated, consistent with moderate  pulmonary hypertension.  There is mild to moderate (1-2+) mitral regurgitation.  There is mild (1+) aortic regurgitation.     Compared to the previous study, the LV function appears to be slightly better.  Valvular disease is similar. CVP is much higher now, which makes RVSP higher.      Christel Villatoro PA-C  [facility]  Pager [phone]

## 2019-04-15 ENCOUNTER — ANCILLARY PROCEDURE (OUTPATIENT)
Dept: CARDIOLOGY | Facility: CLINIC | Age: 84
End: 2019-04-15
Attending: INTERNAL MEDICINE
Payer: COMMERCIAL

## 2019-04-15 ENCOUNTER — TELEPHONE (OUTPATIENT)
Dept: CARDIOLOGY | Facility: CLINIC | Age: 84
End: 2019-04-15

## 2019-04-15 DIAGNOSIS — Z95.0 PACEMAKER: ICD-10-CM

## 2019-04-15 PROCEDURE — 93296 REM INTERROG EVL PM/IDS: CPT | Performed by: INTERNAL MEDICINE

## 2019-04-15 PROCEDURE — 93294 REM INTERROG EVL PM/LDLS PM: CPT | Performed by: INTERNAL MEDICINE

## 2019-04-15 NOTE — TELEPHONE ENCOUNTER
Dr. Rangel,    FY: Your patient had 3 episodes of NSVT on today's remote transmission. EGMs show AFib with slightly irregular VS events lasting 2-4 seconds, rates 135-185bpm. EF 45-50% (2018). Patient had AVNA. No associated symptoms.                       St Ion Quadra Allure CRT-P Remote PPM Device Check  AP: <1%   BIVP: >99%  Mode: DDDR        Presenting Rhythm: AFib with BIVP  Heart Rate: Adequate rates per histogram  Sensing: stable      Pacing Threshold: stable     Impedance: stable  Battery Status: 7.8-8.4 years  Atrial Arrhythmia: Patient in AFib 100% of the time. Ventricular rates controlled. Taking Eliquis.  Ventricular Arrhythmia: 3 ventricular high rates. EGMs show AFib with slightly irregular VS events for probable NSVT due to pt being AVNA, episodes lasting 2-4 seconds, rates 135-185bpm. EF 45-50% (2018). No associated symptoms. Will notify Dr. Rangel of findings.     Care Plan: F/u PPM Merlin q 3 months. Gave patient's son results over the phone. [email or web address]

## 2019-04-24 ENCOUNTER — TELEPHONE (OUTPATIENT)
Dept: CARDIOLOGY | Facility: CLINIC | Age: 84
End: 2019-04-24

## 2019-04-24 NOTE — TELEPHONE ENCOUNTER
Requested patient bring medication bottles for all current prescriptions to office visit with PALAK Griffiths on 4/25/19.  Mary Ann to contact patient.     Warren Rivera LPN  [facility]  [phone]

## 2019-04-25 ENCOUNTER — OFFICE VISIT (OUTPATIENT)
Dept: CARDIOLOGY | Facility: CLINIC | Age: 84
End: 2019-04-25
Attending: PHYSICIAN ASSISTANT
Payer: COMMERCIAL

## 2019-04-25 VITALS
SYSTOLIC BLOOD PRESSURE: 160 MMHG | DIASTOLIC BLOOD PRESSURE: 90 MMHG | HEIGHT: 64 IN | BODY MASS INDEX: 23.73 KG/M2 | OXYGEN SATURATION: 97 % | HEART RATE: 82 BPM | WEIGHT: 139 LBS

## 2019-04-25 DIAGNOSIS — E87.6 HYPOKALEMIA: ICD-10-CM

## 2019-04-25 DIAGNOSIS — I50.22 CHRONIC SYSTOLIC HEART FAILURE (H): ICD-10-CM

## 2019-04-25 DIAGNOSIS — I50.40 COMBINED SYSTOLIC AND DIASTOLIC CONGESTIVE HEART FAILURE, UNSPECIFIED HF CHRONICITY (H): ICD-10-CM

## 2019-04-25 LAB
ANION GAP SERPL CALCULATED.3IONS-SCNC: 2 MMOL/L (ref 3–14)
BUN SERPL-MCNC: 18 MG/DL (ref 7–30)
CALCIUM SERPL-MCNC: 8.6 MG/DL (ref 8.5–10.1)
CHLORIDE SERPL-SCNC: 106 MMOL/L (ref 94–109)
CO2 SERPL-SCNC: 30 MMOL/L (ref 20–32)
CREAT SERPL-MCNC: 1.15 MG/DL (ref 0.52–1.04)
GFR SERPL CREATININE-BSD FRML MDRD: 43 ML/MIN/{1.73_M2}
GLUCOSE SERPL-MCNC: 137 MG/DL (ref 70–99)
MDC_IDC_LEAD_IMPLANT_DT: NORMAL
MDC_IDC_LEAD_LOCATION: NORMAL
MDC_IDC_LEAD_LOCATION_DETAIL_1: NORMAL
MDC_IDC_LEAD_MFG: NORMAL
MDC_IDC_LEAD_MODEL: NORMAL
MDC_IDC_LEAD_POLARITY_TYPE: NORMAL
MDC_IDC_LEAD_SERIAL: NORMAL
MDC_IDC_MSMT_BATTERY_DTM: NORMAL
MDC_IDC_MSMT_BATTERY_REMAINING_LONGEVITY: 97 MO
MDC_IDC_MSMT_BATTERY_REMAINING_PERCENTAGE: 95.5 %
MDC_IDC_MSMT_BATTERY_RRT_TRIGGER: NORMAL
MDC_IDC_MSMT_BATTERY_STATUS: NORMAL
MDC_IDC_MSMT_BATTERY_VOLTAGE: 2.99 V
MDC_IDC_MSMT_LEADCHNL_LV_IMPEDANCE_VALUE: 960 OHM
MDC_IDC_MSMT_LEADCHNL_LV_LEAD_CHANNEL_STATUS: NORMAL
MDC_IDC_MSMT_LEADCHNL_LV_PACING_THRESHOLD_AMPLITUDE: 1.12 V
MDC_IDC_MSMT_LEADCHNL_LV_PACING_THRESHOLD_PULSEWIDTH: 0.5 MS
MDC_IDC_MSMT_LEADCHNL_RA_IMPEDANCE_VALUE: 390 OHM
MDC_IDC_MSMT_LEADCHNL_RA_LEAD_CHANNEL_STATUS: NORMAL
MDC_IDC_MSMT_LEADCHNL_RA_SENSING_INTR_AMPL: 0.7 MV
MDC_IDC_MSMT_LEADCHNL_RV_IMPEDANCE_VALUE: 530 OHM
MDC_IDC_MSMT_LEADCHNL_RV_LEAD_CHANNEL_STATUS: NORMAL
MDC_IDC_MSMT_LEADCHNL_RV_PACING_THRESHOLD_AMPLITUDE: 0.5 V
MDC_IDC_MSMT_LEADCHNL_RV_PACING_THRESHOLD_PULSEWIDTH: 0.5 MS
MDC_IDC_MSMT_LEADCHNL_RV_SENSING_INTR_AMPL: 12 MV
MDC_IDC_PG_IMPLANT_DTM: NORMAL
MDC_IDC_PG_MFG: NORMAL
MDC_IDC_PG_MODEL: NORMAL
MDC_IDC_PG_SERIAL: NORMAL
MDC_IDC_PG_TYPE: NORMAL
MDC_IDC_SESS_CLINIC_NAME: NORMAL
MDC_IDC_SESS_DTM: NORMAL
MDC_IDC_SESS_REPROGRAMMED: NO
MDC_IDC_SESS_TYPE: NORMAL
MDC_IDC_SET_BRADY_AT_MODE_SWITCH_MODE: NORMAL
MDC_IDC_SET_BRADY_AT_MODE_SWITCH_RATE: 160 {BEATS}/MIN
MDC_IDC_SET_BRADY_LOWRATE: 60 {BEATS}/MIN
MDC_IDC_SET_BRADY_MAX_SENSOR_RATE: 120 {BEATS}/MIN
MDC_IDC_SET_BRADY_MAX_TRACKING_RATE: 120 {BEATS}/MIN
MDC_IDC_SET_BRADY_MODE: NORMAL
MDC_IDC_SET_BRADY_PAV_DELAY_HIGH: 100 MS
MDC_IDC_SET_BRADY_PAV_DELAY_LOW: 200 MS
MDC_IDC_SET_BRADY_SAV_DELAY_HIGH: 100 MS
MDC_IDC_SET_BRADY_SAV_DELAY_LOW: 170 MS
MDC_IDC_SET_CRT_LVRV_DELAY: 0 MS
MDC_IDC_SET_CRT_PACED_CHAMBERS: NORMAL
MDC_IDC_SET_LEADCHNL_LV_PACING_AMPLITUDE: 2.12
MDC_IDC_SET_LEADCHNL_LV_PACING_ANODE_ELECTRODE_1: NORMAL
MDC_IDC_SET_LEADCHNL_LV_PACING_ANODE_LOCATION_1: NORMAL
MDC_IDC_SET_LEADCHNL_LV_PACING_CAPTURE_MODE: NORMAL
MDC_IDC_SET_LEADCHNL_LV_PACING_CATHODE_ELECTRODE_1: NORMAL
MDC_IDC_SET_LEADCHNL_LV_PACING_CATHODE_LOCATION_1: NORMAL
MDC_IDC_SET_LEADCHNL_LV_PACING_POLARITY: NORMAL
MDC_IDC_SET_LEADCHNL_LV_PACING_PULSEWIDTH: 0.5 MS
MDC_IDC_SET_LEADCHNL_RA_PACING_AMPLITUDE: 2.5 V
MDC_IDC_SET_LEADCHNL_RA_PACING_ANODE_ELECTRODE_1: NORMAL
MDC_IDC_SET_LEADCHNL_RA_PACING_ANODE_LOCATION_1: NORMAL
MDC_IDC_SET_LEADCHNL_RA_PACING_CAPTURE_MODE: NORMAL
MDC_IDC_SET_LEADCHNL_RA_PACING_CATHODE_ELECTRODE_1: NORMAL
MDC_IDC_SET_LEADCHNL_RA_PACING_CATHODE_LOCATION_1: NORMAL
MDC_IDC_SET_LEADCHNL_RA_PACING_POLARITY: NORMAL
MDC_IDC_SET_LEADCHNL_RA_PACING_PULSEWIDTH: 0.5 MS
MDC_IDC_SET_LEADCHNL_RA_SENSING_ADAPTATION_MODE: NORMAL
MDC_IDC_SET_LEADCHNL_RA_SENSING_ANODE_ELECTRODE_1: NORMAL
MDC_IDC_SET_LEADCHNL_RA_SENSING_ANODE_LOCATION_1: NORMAL
MDC_IDC_SET_LEADCHNL_RA_SENSING_CATHODE_ELECTRODE_1: NORMAL
MDC_IDC_SET_LEADCHNL_RA_SENSING_CATHODE_LOCATION_1: NORMAL
MDC_IDC_SET_LEADCHNL_RA_SENSING_POLARITY: NORMAL
MDC_IDC_SET_LEADCHNL_RA_SENSING_SENSITIVITY: 0.3 MV
MDC_IDC_SET_LEADCHNL_RV_PACING_AMPLITUDE: 2 V
MDC_IDC_SET_LEADCHNL_RV_PACING_ANODE_ELECTRODE_1: NORMAL
MDC_IDC_SET_LEADCHNL_RV_PACING_ANODE_LOCATION_1: NORMAL
MDC_IDC_SET_LEADCHNL_RV_PACING_CAPTURE_MODE: NORMAL
MDC_IDC_SET_LEADCHNL_RV_PACING_CATHODE_ELECTRODE_1: NORMAL
MDC_IDC_SET_LEADCHNL_RV_PACING_CATHODE_LOCATION_1: NORMAL
MDC_IDC_SET_LEADCHNL_RV_PACING_POLARITY: NORMAL
MDC_IDC_SET_LEADCHNL_RV_PACING_PULSEWIDTH: 0.5 MS
MDC_IDC_SET_LEADCHNL_RV_SENSING_ADAPTATION_MODE: NORMAL
MDC_IDC_SET_LEADCHNL_RV_SENSING_ANODE_ELECTRODE_1: NORMAL
MDC_IDC_SET_LEADCHNL_RV_SENSING_ANODE_LOCATION_1: NORMAL
MDC_IDC_SET_LEADCHNL_RV_SENSING_CATHODE_ELECTRODE_1: NORMAL
MDC_IDC_SET_LEADCHNL_RV_SENSING_CATHODE_LOCATION_1: NORMAL
MDC_IDC_SET_LEADCHNL_RV_SENSING_POLARITY: NORMAL
MDC_IDC_SET_LEADCHNL_RV_SENSING_SENSITIVITY: 0.5 MV
MDC_IDC_STAT_AT_BURDEN_PERCENT: 99 %
MDC_IDC_STAT_AT_DTM_END: NORMAL
MDC_IDC_STAT_AT_DTM_START: NORMAL
MDC_IDC_STAT_AT_MODE_SW_COUNT: 1
MDC_IDC_STAT_AT_MODE_SW_COUNT_PER_DAY: 0
MDC_IDC_STAT_AT_MODE_SW_MAX_DURATION: NORMAL S
MDC_IDC_STAT_AT_MODE_SW_PERCENT_TIME: 100 %
MDC_IDC_STAT_BRADY_AP_VP_PERCENT: 0 %
MDC_IDC_STAT_BRADY_AP_VS_PERCENT: 0 %
MDC_IDC_STAT_BRADY_AS_VP_PERCENT: 0 %
MDC_IDC_STAT_BRADY_AS_VS_PERCENT: 0 %
MDC_IDC_STAT_BRADY_DTM_END: NORMAL
MDC_IDC_STAT_BRADY_DTM_START: NORMAL
MDC_IDC_STAT_BRADY_RA_PERCENT_PACED: 1 %
MDC_IDC_STAT_CRT_DTM_END: NORMAL
MDC_IDC_STAT_CRT_DTM_START: NORMAL
MDC_IDC_STAT_CRT_PERCENT_PACED: 99 %
MDC_IDC_STAT_HEART_RATE_ATRIAL_MAX: 330 {BEATS}/MIN
MDC_IDC_STAT_HEART_RATE_ATRIAL_MEAN: 294 {BEATS}/MIN
MDC_IDC_STAT_HEART_RATE_ATRIAL_MIN: 40 {BEATS}/MIN
MDC_IDC_STAT_HEART_RATE_DTM_END: NORMAL
MDC_IDC_STAT_HEART_RATE_DTM_START: NORMAL
POTASSIUM SERPL-SCNC: 4.1 MMOL/L (ref 3.4–5.3)
SODIUM SERPL-SCNC: 138 MMOL/L (ref 133–144)

## 2019-04-25 PROCEDURE — 80048 BASIC METABOLIC PNL TOTAL CA: CPT | Performed by: INTERNAL MEDICINE

## 2019-04-25 PROCEDURE — 99214 OFFICE O/P EST MOD 30 MIN: CPT | Performed by: PHYSICIAN ASSISTANT

## 2019-04-25 PROCEDURE — 36415 COLL VENOUS BLD VENIPUNCTURE: CPT | Performed by: INTERNAL MEDICINE

## 2019-04-25 ASSESSMENT — MIFFLIN-ST. JEOR: SCORE: 1055.5

## 2019-04-25 NOTE — PROGRESS NOTES
Cardiology Progress Note    Date of Service: 04/25/2019      Reason for visit:  [facility] follow up, chronic systolic heart failure.     Primary cardiologist: Dr. Kingsley Brandon and Dr. Julio C Rangel ()      HPI:  Ms. Asha Hawkins is a very pleasant [place] 86 year old with a PMHx including DM, hypertension, and hyperlipidemia. She has had chronic lower extremity venous insufficiency which had initially improved with consistent use of compression socks. She also has a history of tachybrady syndrome for which she had been on Tikosyn for atrial tachyarrhythmias and received a pacemaker in 2012. In the past, she was not on anticoagulation because of a history of retroperitoneal bleed while on warfarin. A Watchman device was offered in 2017 though she declined. When she was seen by Dr. Brandon in March 2018 she was doing well, and told she could return on a PRN basis.     She was then hospitalized in Sept 2018 after suffering an acute left posterior circulation ischemic stroke and received TPA. She was noted to be in afib with RVR. She was placed on AC initially with Pradaxa (though this was since changed to Eliquis). In addition, her Toprol XL was increased and she was placed on cardizem. Her Tikosyn was discontinued. She was then hospitalized again in mid October 2018 with increased leg edema and felt to be in acute heart failure. Echo at that time showed reduced EF at 40-45% with mild global hypokinesia. The RV was normal size and function but she did have elevated RV pressures and severe bi-atrial enlargement. Ultimately it was felt the etiology of her heart failure was related to tachyarrhythmias. She required increase in diuretics, and subsequently underwent AVN ablation in Mid November 2018.    Follow up echo in Dec 2018 showed mild improvement in her EF, at 45-50%. Due to complaints of fatigue, she was tapered off her beta blocker. A few weeks later she had recurrence of leg edema. Her son's wife is an internist,  "and increased her diuretics, which we continued. This was complicated by the fact that she fell and broke her ankle shortly after, rendering her weights difficult to interpret. Over the next few months she continued to do well, and at our last visit in Feb 2019 we opted to decrease her diuretics back to 20mg once daily as she had a slight decline in her renal function. She's back today for our planned routine follow up.    Over the last few months she states she is about the same. Her main complaint remains fatigue, which hasn't changed since tapering off her beta blockers a few months back. She denies any new ERICKSON. Her BP is up today, but she has not yet taken her morning medications. Her weight appears stable, and is at home as well She denies any new exertional chest discomfort or orthopnea. Device check done last week showed 99% BIVP, with adequate rates via histogram. She had 3 NSVT events over the last 3 months, lasting 2-4 seconds which was reviewed by Dr. Rangel. On occasion she tells me she feels her \"heart beat faster\" when she bends over, but this is short lived and not terribly bothersome to her. Upon further investigation, the short NSVT episodes were at night during sleeping hours. She has not had any syncope/presyncope. Her son wonders today whether her legs are more swollen, but Ms. Barry does not believe that is the case. On first glance, her ankles do appear somewhat swollen, but there is no pitting edema on closer exam. She does have some skin changes on her right leg after her recent injury and prolonged wearing of the boot. Labs today are acceptable, with a mildly elevated SCr, though improved from prior.       ASSESSMENT/PLAN:    1. Chronic combined systolic and diastolic heart failure.   --Most recent echo Dec 2018 after AVN ablation showed EF 45-50% (had been 40-45% Oct 2018 in the setting of tachyarrhythmias)with elevated filling pressures, moderate TR and 1-2+ MR which was not significantly " changed.   --She does not appear significantly volume overloaded on exam; there is no pitting edema. Will continue her lasix at 20mg daily. I've asked her to continue with compression socks, try to weigh daily at home, and call with upward trend.    --Continue lisinopril 20mg daily. BP up today but has not yet taken this or her diuretics. I did not alter her regimen today.  She is not on beta blockers as below.     2. Atrial fibrillation.   --Initially difficult to rate control. Now s/p AVN ablation in Nov 2018.    --She was tapered off her beta blockers due to fatigue, but noted no difference. Given the few short runs of NSVT I did offer a re-trial of low dose metoprolol, but she and her son decline, mostly due to concern of polypharmacy. We will continue to monitor for any new symptoms, and routine device checks.     --Had a hospitalization for CVA Sept 2018 while off AC. Now on Eliquis which she appears to be tolerating.       Follow up plan:  With Mary Domínguez NP in June 2019 for EP as already scheduled.  Request also placed for annual f/u with Dr. Brandon in Sept (as she plans to travel out of the country in early Oct).     Orders this Visit:  Orders Placed This Encounter   Procedures     Basic metabolic panel     N terminal pro BNP outpatient     Follow-Up with [facility] - [facility]AL[facility] visit     Follow-Up with Cardiologist     No orders of the defined types were placed in this encounter.    There are no discontinued medications.        CURRENT MEDICATIONS:  Current Outpatient Medications   Medication Sig Dispense Refill     apixaban ANTICOAGULANT (ELIQUIS) 5 MG tablet Take 1 tablet (5 mg) by mouth 2 times daily 180 tablet 3     calcium carbonate-vitamin D 500-400 MG-UNIT TABS Take 1 tablet by mouth 2 times daily.         dorzolamide-timolol PF (COSOPT) 22.3-6.8 MG/ML opthalmic solution Place 1 drop into both eyes 2 times daily 10 mL vial       furosemide (LASIX) 20 MG tablet Take 1 tablet (20 mg) by mouth daily 90  tablet 3     latanoprost (XALATAN) 0.005 % ophthalmic solution Place 1 drop into both eyes At Bedtime       lisinopril (PRINIVIL/ZESTRIL) 20 MG tablet Take 1 tablet (20 mg) by mouth daily 90 tablet 3     magnesium gluconate (MAGONATE) 500 MG tablet Take 500 mg by mouth daily.         metFORMIN (GLUCOPHAGE-XR) 500 MG 24 hr tablet Take 500 mg by mouth every morning        potassium chloride ER (K-DUR/KLOR-CON M) 10 MEQ CR tablet Take 2 tablets (20meq) in the AM, and 1 tablet (10meq) in the PM. 270 tablet 3     sertraline (ZOLOFT) 50 MG tablet Take 25 mg by mouth every evening as needed (Take a half tablet qpm prn)       timolol (TIMOPTIC) 0.5 % ophthalmic solution Place 1 drop into both eyes every morning        acetaminophen (TYLENOL) 325 MG tablet Take 2 tablets (650 mg) by mouth every 4 hours as needed for mild pain or fever (Patient not taking: Reported on 4/25/2019)       fish oil-omega-3 fatty acids 1000 MG capsule Take 1 g by mouth daily       multivitamin, therapeutic (THERA-VIT) TABS Take 1 tablet by mouth daily.         SUMAtriptan Succinate (IMITREX PO) Take 25 mg by mouth as needed.           ALLERGIES     Allergies   Allergen Reactions     Aspirin      Coumadin [Warfarin]      Spontaneous retroperitoneal bleed.     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     Venous insufficiency        PAST SURGICAL HISTORY:  Past Surgical History:   Procedure Laterality Date     ANESTHESIA CARDIOVERSION  7/23/2012    Procedure: ANESTHESIA CARDIOVERSION;;  Surgeon:  Provider, Generic Anesthesia;  Location:  ANESTHESIA - [facility] - DO NOT SCHEDULE     BLEPHAROPLASTY  2005     Cataract Extraction with IOL Bilateral 2012     H ABLATION AV NODE  11/14/2018     IMPLANT PACEMAKER  11/2012    Dual chamber     REPLACE PACEMAKER GENERATOR  11/14/2018    upgrade to CRT       FAMILY HISTORY:  Family History   Problem Relation Age of Onset     Heart Disease Mother 65        mi     Heart Disease Father 45        pnemonia     Heart Disease Brother      Heart Disease Sister      Heart Disease Brother        SOCIAL HISTORY:  Social History     Socioeconomic History     Marital status:      Spouse name: None     Number of children: None     Years of education: None     Highest education level: None   Occupational History     None   Social Needs     Financial resource strain: None     Food insecurity:     Worry: None     Inability: None     Transportation needs:     Medical: None     Non-medical: None   Tobacco Use     Smoking status: Never Smoker     Smokeless tobacco: Never Used   Substance and Sexual Activity     Alcohol use: No     Drug use: No     Sexual activity: None   Lifestyle     Physical activity:     Days per week: None     Minutes per session: None     Stress: None   Relationships     Social connections:     Talks on phone: None     Gets together: None     Attends Gnosticism service: None     Active member of club or organization: None     Attends meetings of clubs or organizations: None     Relationship status: None     Intimate partner violence:     Fear of current or ex partner: None     Emotionally abused: None     Physically abused: None     Forced sexual activity: None   Other Topics Concern     Parent/sibling w/ CABG, MI or angioplasty before 65F 55M? Not Asked      Service Not Asked     Blood Transfusions Not Asked     Caffeine Concern Yes     Comment: no caffeine intake     Occupational Exposure Not Asked     Hobby Hazards Not Asked     Sleep Concern Not Asked      "Stress Concern Not Asked     Weight Concern Not Asked     Special Diet Yes     Comment: no sugar, salt or fats      Back Care Not Asked     Exercise Yes     Comment: treadmill, swimming daily      Bike Helmet Not Asked     Seat Belt Yes     Self-Exams Not Asked   Social History Narrative     None       Review of Systems:  Cardiovascular: negative for chest pain, palpitations, orthopnea, neg LE edema.  Constitutional: negative for chills, sweats, fevers. Pos fatigue.  Resp: Negative for dyspnea at rest, dyspnea on exertion, cough, known chronic lung disease  HEENT: Negative for new visual changes, frequent headaches  Gastrointestinal: negative for abdominal pain, diarrhea, nausea, vomiting  Hematologic/lymphatic: pos for current systemic anticoagulation, hx CVA  Musculoskeletal: negative for new back pain, pos chronic ankle pain.  Neurological: negative for focal weakness, LOC, seizures, syncope.presyncope.      Physical Exam:  Vitals: /90 (BP Location: Right arm, Patient Position: Chair, Cuff Size: Adult Small)   Pulse 82   Ht 1.626 m (5' 4\")   Wt 63 kg (139 lb)   SpO2 97%   BMI 23.86 kg/m     Wt Readings from Last 4 Encounters:   04/25/19 63 kg (139 lb)   02/08/19 63.5 kg (140 lb)   01/07/19 64.6 kg (142 lb 6.4 oz)   12/26/18 67.1 kg (148 lb)       GEN:  In general, this is a well nourished female in no acute distress on room air.  Patient accompanied by her son today who translates when needed.  HEENT:  Pupils equal, round. Sclerae nonicteric.   NECK: Supple, no masses appreciated. Trachea midline. No obvious JVD while upright.  C/V:  Regular rhythm, 1/6 RAINA heard best at LSB. No rub or gallop.   RESP: Respirations are unlabored. No use of accessory muscles. Clear to auscultation bilaterally without wheezing, rales, or rhonchi.  GI: Abdomen soft, nontender, not significantly distended.  EXTREM: Trace ankle edema bilaterally, nonpitting, with compression socks in place. No cyanosis or clubbing.  NEURO: " Alert and oriented, cooperative. Gait not formally assessed. No obvious focal deficits.   PSYCH: Normal affect.   SKIN: Warm and dry. No rashes or petechiae appreciated.       Recent Lab Results:    BMP RESULTS:  Lab Results   Component Value Date     04/25/2019    POTASSIUM 4.1 04/25/2019    CHLORIDE 106 04/25/2019    CO2 30 04/25/2019    ANIONGAP 2 (L) 04/25/2019     (H) 04/25/2019    BUN 18 04/25/2019    CR 1.15 (H) 04/25/2019    GFRESTIMATED 43 (L) 04/25/2019    GFRESTBLACK 50 (L) 04/25/2019    MADDISON 8.6 04/25/2019        New/Pertinent imaging results since last visit:  12/26/18     Interpretation Summary     Left ventricular systolic function is mildly reduced.  The visual ejection fraction is estimated at 45-50%.  Basal and mid inferolateral and lateral hypokinesis.  Diastolic Doppler findings (E/E' ratio and/or other parameters) suggest left  ventricular filling pressures are increased.  There is moderate (2+) tricuspid regurgitation.  Dilated IVC (>2.5cm) with no respiratory collapse; right atrial pressure is  estimated at >20mmHg.  Right ventricular systolic pressure is elevated, consistent with moderate  pulmonary hypertension.  There is mild to moderate (1-2+) mitral regurgitation.  There is mild (1+) aortic regurgitation.     Compared to the previous study, the LV function appears to be slightly better.  Valvular disease is similar. CVP is much higher now, which makes RVSP higher.      Christel Villatoro PA-C  [facility]  Pager [phone]

## 2019-04-25 NOTE — LETTER
4/25/2019    [facility] [address]  [facility] [address]    RE: Zayda Hawkins       Dear Colleague,    I had the pleasure of seeing Zayda Hawkins in the [facility].      Cardiology Progress Note    Date of Service: 04/25/2019      Reason for visit:  [facility] clinic follow up, chronic systolic heart failure.     Primary cardiologist: Dr. Kingsley Brandon and Dr. Julio C Rangel ()      HPI:  Ms. Asha Hawkins is a very pleasant [place] 86 year old with a PMHx including DM, hypertension, and hyperlipidemia. She has had chronic lower extremity venous insufficiency which had initially improved with consistent use of compression socks. She also has a history of tachybrady syndrome for which she had been on Tikosyn for atrial tachyarrhythmias and received a pacemaker in 2012. In the past, she was not on anticoagulation because of a history of retroperitoneal bleed while on warfarin. A Watchman device was offered in 2017 though she declined. When she was seen by Dr. Brandon in March 2018 she was doing well, and told she could return on a PRN basis.     She was then hospitalized in Sept 2018 after suffering an acute left posterior circulation ischemic stroke and received TPA. She was noted to be in afib with RVR. She was placed on AC initially with Pradaxa (though this was since changed to Eliquis). In addition, her Toprol XL was increased and she was placed on cardizem. Her Tikosyn was discontinued. She was then hospitalized again in mid October 2018 with increased leg edema and felt to be in acute heart failure. Echo at that time showed reduced EF at 40-45% with mild global hypokinesia. The RV was normal size and function but she did have elevated RV pressures and severe bi-atrial enlargement. Ultimately it was felt the etiology of her heart failure was related to tachyarrhythmias. She required increase in diuretics, and subsequently underwent AVN  "ablation in Mid November 2018.    Follow up echo in Dec 2018 showed mild improvement in her EF, at 45-50%. Due to complaints of fatigue, she was tapered off her beta blocker. A few weeks later she had recurrence of leg edema. Her son's wife is an internist, and increased her diuretics, which we continued. This was complicated by the fact that she fell and broke her ankle shortly after, rendering her weights difficult to interpret. Over the next few months she continued to do well, and at our last visit in Feb 2019 we opted to decrease her diuretics back to 20mg once daily as she had a slight decline in her renal function. She's back today for our planned routine follow up.    Over the last few months she states she is about the same. Her main complaint remains fatigue, which hasn't changed since tapering off her beta blockers a few months back. She denies any new ERICKSON. Her BP is up today, but she has not yet taken her morning medications. Her weight appears stable, and is at home as well She denies any new exertional chest discomfort or orthopnea. Device check done last week showed 99% BIVP, with adequate rates via histogram. She had 3 NSVT events over the last 3 months, lasting 2-4 seconds which was reviewed by Dr. Rangel. On occasion she tells me she feels her \"heart beat faster\" when she bends over, but this is short lived and not terribly bothersome to her. Upon further investigation, the short NSVT episodes were at night during sleeping hours. She has not had any syncope/presyncope. Her son wonders today whether her legs are more swollen, but Ms. Barry does not believe that is the case. On first glance, her ankles do appear somewhat swollen, but there is no pitting edema on closer exam. She does have some skin changes on her right leg after her recent injury and prolonged wearing of the boot. Labs today are acceptable, with a mildly elevated SCr, though improved from prior.       ASSESSMENT/PLAN:    1. 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   --Most recent echo Dec 2018 after AVN ablation showed EF 45-50% (had been 40-45% Oct 2018 in the setting of tachyarrhythmias)with elevated filling pressures, moderate TR and 1-2+ MR which was not significantly changed.   --She does not appear significantly volume overloaded on exam; there is no pitting edema. Will continue her lasix at 20mg daily. I've asked her to continue with compression socks, try to weigh daily at home, and call with upward trend.    --Continue lisinopril 20mg daily. BP up today but has not yet taken this or her diuretics. I did not alter her regimen today.  She is not on beta blockers as below.     2. Atrial fibrillation.   --Initially difficult to rate control. Now s/p AVN ablation in Nov 2018.    --She was tapered off her beta blockers due to fatigue, but noted no difference. Given the few short runs of NSVT I did offer a re-trial of low dose metoprolol, but she and her son decline, mostly due to concern of polypharmacy. We will continue to monitor for any new symptoms, and routine device checks.     --Had a hospitalization for CVA Sept 2018 while off AC. Now on Eliquis which she appears to be tolerating.       Follow up plan:  With Mary Domínguez NP in June 2019 for EP as already scheduled.  Request also placed for annual f/u with Dr. Brandon in Sept (as she plans to travel out of the country in early Oct).     Orders this Visit:  Orders Placed This Encounter   Procedures     Basic metabolic panel     N terminal pro BNP outpatient     Follow-Up with [facility] - MAGALY visit     Follow-Up with Cardiologist     No orders of the defined types were placed in this encounter.    There are no discontinued medications.        CURRENT MEDICATIONS:  Current Outpatient Medications   Medication Sig Dispense Refill     apixaban ANTICOAGULANT (ELIQUIS) 5 MG tablet Take 1 tablet (5 mg) by mouth 2 times daily 180 tablet 3     calcium carbonate-vitamin D 500-400  MG-UNIT TABS Take 1 tablet by mouth 2 times daily.         dorzolamide-timolol PF (COSOPT) 22.3-6.8 MG/ML opthalmic solution Place 1 drop into both eyes 2 times daily 10 mL vial       furosemide (LASIX) 20 MG tablet Take 1 tablet (20 mg) by mouth daily 90 tablet 3     latanoprost (XALATAN) 0.005 % ophthalmic solution Place 1 drop into both eyes At Bedtime       lisinopril (PRINIVIL/ZESTRIL) 20 MG tablet Take 1 tablet (20 mg) by mouth daily 90 tablet 3     magnesium gluconate (MAGONATE) 500 MG tablet Take 500 mg by mouth daily.         metFORMIN (GLUCOPHAGE-XR) 500 MG 24 hr tablet Take 500 mg by mouth every morning        potassium chloride ER (K-DUR/KLOR-CON M) 10 MEQ CR tablet Take 2 tablets (20meq) in the AM, and 1 tablet (10meq) in the PM. 270 tablet 3     sertraline (ZOLOFT) 50 MG tablet Take 25 mg by mouth every evening as needed (Take a half tablet qpm prn)       timolol (TIMOPTIC) 0.5 % ophthalmic solution Place 1 drop into both eyes every morning        acetaminophen (TYLENOL) 325 MG tablet Take 2 tablets (650 mg) by mouth every 4 hours as needed for mild pain or fever (Patient not taking: Reported on 4/25/2019)       fish oil-omega-3 fatty acids 1000 MG capsule Take 1 g by mouth daily       multivitamin, therapeutic (THERA-VIT) TABS Take 1 tablet by mouth daily.         SUMAtriptan Succinate (IMITREX PO) Take 25 mg by mouth as needed.           ALLERGIES     Allergies   Allergen Reactions     Aspirin      Coumadin [Warfarin]      Spontaneous retroperitoneal bleed.     Penicillins        PAST MEDICAL HISTORY:  Past Medical History:   Diagnosis Date     Atrial fibrillation (H)     not on anticoagulation, hx RP bleed     CAD (coronary artery disease)     CT angio: mild lesion in the LAD, as well as 1st diagonal, and mild plaque in the proximal and  mid RCA     CVA (cerebral vascular accident) (H) 10/2018    Acute left posterior circulation ischemic stroke      Diabetes mellitus (H)      Hyperlipidemia       Hypertension      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     Venous insufficiency        PAST SURGICAL HISTORY:  Past Surgical History:   Procedure Laterality Date     ANESTHESIA CARDIOVERSION  7/23/2012    Procedure: ANESTHESIA CARDIOVERSION;;  Surgeon: Provider, Generic Anesthesia;  Location:  ANESTHESIA - RH - DO NOT SCHEDULE     BLEPHAROPLASTY  2005     Cataract Extraction with IOL Bilateral 2012     H ABLATION AV NODE  11/14/2018     IMPLANT PACEMAKER  11/2012    Dual chamber     REPLACE PACEMAKER GENERATOR  11/14/2018    upgrade to CRT       FAMILY HISTORY:  Family History   Problem Relation Age of Onset     Heart Disease Mother 65        mi     Heart Disease Father 45        pnemonia     Heart Disease Brother      Heart Disease Sister      Heart Disease Brother        SOCIAL HISTORY:  Social History     Socioeconomic History     Marital status:      Spouse name: None     Number of children: None     Years of education: None     Highest education level: None   Occupational History     None   Social Needs     Financial resource strain: None     Food insecurity:     Worry: None     Inability: None     Transportation needs:     Medical: None     Non-medical: None   Tobacco Use     Smoking status: Never Smoker     Smokeless tobacco: Never Used   Substance and Sexual Activity     Alcohol use: No     Drug use: No     Sexual activity: None   Lifestyle     Physical activity:     Days per week: None     Minutes per session: None     Stress: None   Relationships     Social connections:     Talks on phone: None     Gets together: None     Attends Gnosticist service: None     Active member of club or organization: None     Attends meetings of clubs or organizations: None     Relationship status: None     Intimate partner violence:     Fear of current or ex partner: None     Emotionally abused: None     Physically abused: None     Forced sexual activity: None   Other Topics Concern     Parent/sibling w/ CABG, MI or  "angioplasty before 65F 55M? Not Asked      Service Not Asked     Blood Transfusions Not Asked     Caffeine Concern Yes     Comment: no caffeine intake     Occupational Exposure Not Asked     Hobby Hazards Not Asked     Sleep Concern Not Asked     Stress Concern Not Asked     Weight Concern Not Asked     Special Diet Yes     Comment: no sugar, salt or fats      Back Care Not Asked     Exercise Yes     Comment: treadmill, swimming daily      Bike Helmet Not Asked     Seat Belt Yes     Self-Exams Not Asked   Social History Narrative     None       Review of Systems:  Cardiovascular: negative for chest pain, palpitations, orthopnea, neg LE edema.  Constitutional: negative for chills, sweats, fevers. Pos fatigue.  Resp: Negative for dyspnea at rest, dyspnea on exertion, cough, known chronic lung disease  HEENT: Negative for new visual changes, frequent headaches  Gastrointestinal: negative for abdominal pain, diarrhea, nausea, vomiting  Hematologic/lymphatic: pos for current systemic anticoagulation, hx CVA  Musculoskeletal: negative for new back pain, pos chronic ankle pain.  Neurological: negative for focal weakness, LOC, seizures, syncope.presyncope.      Physical Exam:  Vitals: /90 (BP Location: Right arm, Patient Position: Chair, Cuff Size: Adult Small)   Pulse 82   Ht 1.626 m (5' 4\")   Wt 63 kg (139 lb)   SpO2 97%   BMI 23.86 kg/m      Wt Readings from Last 4 Encounters:   04/25/19 63 kg (139 lb)   02/08/19 63.5 kg (140 lb)   01/07/19 64.6 kg (142 lb 6.4 oz)   12/26/18 67.1 kg (148 lb)       GEN:  In general, this is a well nourished female in no acute distress on room air.  Patient accompanied by her son today who translates when needed.  HEENT:  Pupils equal, round. Sclerae nonicteric.   NECK: Supple, no masses appreciated. Trachea midline. No obvious JVD while upright.  C/V:  Regular rhythm, 1/6 RAINA heard best at LSB. No rub or gallop.   RESP: Respirations are unlabored. No use of accessory " muscles. Clear to auscultation bilaterally without wheezing, rales, or rhonchi.  GI: Abdomen soft, nontender, not significantly distended.  EXTREM: Trace ankle edema bilaterally, nonpitting, with compression socks in place. No cyanosis or clubbing.  NEURO: Alert and oriented, cooperative. Gait not formally assessed. No obvious focal deficits.   PSYCH: Normal affect.   SKIN: Warm and dry. No rashes or petechiae appreciated.       Recent Lab Results:    BMP RESULTS:  Lab Results   Component Value Date     04/25/2019    POTASSIUM 4.1 04/25/2019    CHLORIDE 106 04/25/2019    CO2 30 04/25/2019    ANIONGAP 2 (L) 04/25/2019     (H) 04/25/2019    BUN 18 04/25/2019    CR 1.15 (H) 04/25/2019    GFRESTIMATED 43 (L) 04/25/2019    GFRESTBLACK 50 (L) 04/25/2019    MADDISON 8.6 04/25/2019        New/Pertinent imaging results since last visit:  12/26/18     Interpretation Summary     Left ventricular systolic function is mildly reduced.  The visual ejection fraction is estimated at 45-50%.  Basal and mid inferolateral and lateral hypokinesis.  Diastolic Doppler findings (E/E' ratio and/or other parameters) suggest left  ventricular filling pressures are increased.  There is moderate (2+) tricuspid regurgitation.  Dilated IVC (>2.5cm) with no respiratory collapse; right atrial pressure is  estimated at >20mmHg.  Right ventricular systolic pressure is elevated, consistent with moderate  pulmonary hypertension.  There is mild to moderate (1-2+) mitral regurgitation.  There is mild (1+) aortic regurgitation.     Compared to the previous study, the LV function appears to be slightly better.  Valvular disease is similar. CVP is much higher now, which makes RVSP higher.      Christel Villatoro PA-C  [facility]  Pager [phone]    Thank you for allowing me to participate in the care of your patient.    Sincerely,     DONAOT Zamorano     [facility]

## 2019-04-25 NOTE — PATIENT INSTRUCTIONS
Call C.O.R.E. nurse for any questions or concerns Mon-Fri 8am-4pm: [phone] For concerns after hours: [phone]    Medication changes:   None today  Please be sure to call if you note any new dizziness, more swelling, or more palpitiations.     Plan from today:  Return in June to see Mary with EP, then in September to see Christel in [facility] [place].     Lab results:  Results for CANDIDO RODRIGUEZJOVANNA (MRN [id number]) as of 4/25/2019 08:37   Ref. Range 4/25/2019 07:23   Sodium Latest Ref Range: 133 - 144 mmol/L 138   Potassium Latest Ref Range: 3.4 - 5.3 mmol/L 4.1   Chloride Latest Ref Range: 94 - 109 mmol/L 106   Carbon Dioxide Latest Ref Range: 20 - 32 mmol/L 30   Urea Nitrogen Latest Ref Range: 7 - 30 mg/dL 18   Creatinine Latest Ref Range: 0.52 - 1.04 mg/dL 1.15 (H)   GFR Estimate Latest Ref Range: >60 mL/min/1.73_m2 43 (L)   GFR Estimate If Black Latest Ref Range: >60 mL/min/1.73_m2 50 (L)   Calcium Latest Ref Range: 8.5 - 10.1 mg/dL 8.6   Anion Gap Latest Ref Range: 3 - 14 mmol/L 2 (L)

## 2019-04-29 ENCOUNTER — TELEPHONE (OUTPATIENT)
Dept: CARDIOLOGY | Facility: CLINIC | Age: 84
End: 2019-04-29

## 2019-04-29 NOTE — TELEPHONE ENCOUNTER
Called and spoke to son to let him know the times his mother had tachycardia/NSVT.  Son asked if there is a change in plan. Informed son that current regien will continue. Son appreciative of the call.   Mamie Sidhu RN 1:04 PM 04/29/19

## 2019-04-29 NOTE — TELEPHONE ENCOUNTER
----- Message from DONATO Zamorano sent at 4/25/2019 11:10 AM CDT -----  Regarding: device check info for pt's son  Can you please call the pt's son and let him know that the three fast HR NSVT events I was telling him about look like they happened during sleeping hours? He wanted to know.  Thus, this doesn't change any of our plans from previous.    Thanks  Christel  2/5/19 at 2:30am  3/1/19 at 6:10am  3/8/19 at 4:55am.

## 2019-05-17 ENCOUNTER — TELEPHONE (OUTPATIENT)
Dept: CARDIOLOGY | Facility: CLINIC | Age: 84
End: 2019-05-17

## 2019-05-17 NOTE — TELEPHONE ENCOUNTER
Patient's son called asking if it would be ok for her to resume water aerobics at the [facility]. Patient's pacemaker was implanted in 11/2018. Informed patient's son that her pacemaker does not limit her from this activity and she may resume as tolerated.

## 2019-06-12 ENCOUNTER — OFFICE VISIT (OUTPATIENT)
Dept: CARDIOLOGY | Facility: CLINIC | Age: 84
End: 2019-06-12
Attending: INTERNAL MEDICINE
Payer: COMMERCIAL

## 2019-06-12 VITALS
BODY MASS INDEX: 23.66 KG/M2 | HEART RATE: 72 BPM | WEIGHT: 138.6 LBS | DIASTOLIC BLOOD PRESSURE: 72 MMHG | HEIGHT: 64 IN | SYSTOLIC BLOOD PRESSURE: 148 MMHG

## 2019-06-12 DIAGNOSIS — Z95.0 CARDIAC PACEMAKER IN SITU: ICD-10-CM

## 2019-06-12 DIAGNOSIS — I48.20 CHRONIC ATRIAL FIBRILLATION (H): Primary | ICD-10-CM

## 2019-06-12 DIAGNOSIS — R60.9 EDEMA, UNSPECIFIED TYPE: ICD-10-CM

## 2019-06-12 DIAGNOSIS — I50.20 SYSTOLIC HEART FAILURE, UNSPECIFIED HF CHRONICITY (H): ICD-10-CM

## 2019-06-12 PROCEDURE — 99214 OFFICE O/P EST MOD 30 MIN: CPT | Performed by: NURSE PRACTITIONER

## 2019-06-12 ASSESSMENT — MIFFLIN-ST. JEOR: SCORE: 1053.69

## 2019-06-12 NOTE — PROGRESS NOTES
HPI:  Zayda Hawkins is a 85 year old Farsi-speaking female originally from [place] who presents today for follow up on atrial fibrillation and ppm.  She is a patient of Dr. Brandon.  Her past medical history includes     Atrial fibrillation:  In the past, she had not been on AC due to spontaneous retroperitoneal bleed, however, she had a CVA (9/26/2018) she was subsequently started on Eliquis 5 mg twice a day and tolerating.   She was hospitalized on 10/13/2018 with HF and AFib with RVR and her Tikosyn was discontinued and rate control was initiate with diltiazem 180 mg daily.   Continued to have difficulty with rate control and ultimately underwent an AV node ablation (11/14/2018) and upgrade to BiV St Ion ppm.  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           Congestive heart failure.  ECHO (10/14/2018) revealed EF 40-45% thought to be tachy-induced and her EF improved to 45-50% (12/2018).  Due to fatigue BB was discontinued unfortunately  her fatigue did not improve.  Continues on lisinopril     NSVT.  Noted on device (4/2019) and lasting 2-4 seconds.            Other medical history includes chronic lower extremity venous insufficiency, paroxysmal atrial fibrillation, CAD, HTN, CVA with right visual field deficit.      Diagnostics:  Device check (4/2019) revealed 1% A-paced, 99% Vpaced, 8 years battery.  100% atrial fibrillation with controlled ventricular rates.  3 ventricular high rates likely NSVT lasting 2-4 seconds at 135-185 bpm    Today she presents with her son who is interpreting for her. One of her biggest complaints and her son's concerns is she has lower extremity edema left worse than the right. She does wear compression stocking with some relief.  She saw an outside group who recommended a venous ablation for her varicose veins.  Her son is hesitant about proceeding and would like to talk to Dr. Brandon prior to any procedures.  she states she has intermittent palpitations occurring  2-3 times a month lasting for seconds and no associated symptoms.  In addition, she has intermittent chest discomfort occurring when she is resting lasting also for seconds and occurring in her left mid axillary 4th-5th intercostal space and she has no associated symptoms of nausea, diaphoresis, radiating pain dizziness or lightheadedness.  She does not regularly exercise and is active doing household activities and overall she is doing well and denies chest pain or pressure, dizziness, dyspnea at rest or with exertion.   She has no heart failure symptoms and denies orthopnea, and PND and her weight is stable.   She is tolerating Eliquis and denies bleeding, hematuria, hematochezia, and epistaxis.     ASSESSMENT AND PLAN  Heart failure with reduced EF    Thought to be tachy-induced    EF 40-45% in Oct 2018 in the setting of tachyarrhythmias    ECHO (12/2018) after AVN ablation showed EF 45-50% with elevated filling pressures, moderate TR and 1-2+ MR which was not significantly changed    Follows with [facility]     Today her weight is down 10# since 12/19 but stable since 4/2019.    She has chronic LE edema otherwise on HF symptoms.      Continue her lasix at 20 mg daily and potassium chloride 30 mEq.     Continue with compression socks and weigh self daily at home, and call with upward trend.     Continue lisinopril 20 mg daily and not on BB due to fatigue       2. Atrial fibrillation.    Had tachy susan syndrome s/p ppm    Failed Tikosyn    Had difficult to rate control.     Underwent s/p AVN ablation and BiV permanent pacemaker (11/2018).     Fatigued and tapered off BB.  However no difference in energy level.      Anticoagulation for CHADS VASC 6 (age++, CVA++, female, HTN, HF).  Had a hospitalization for CVA Sept 2018 while off AC.  Had a spontaneous retroperitoneal bleed while on warfarin and now is on Eliquis.  There has been discussion regarding left atrial appendage closure (Watchman) implantation for long term  anticoagulation.   However, now tolerating Eliquis.    Normal device check    Stable leads     Follow with device clinic as scheduled    NSVT    Noted on device check lasting 2-8 seconds    BB offered in past and family declined due to polypharmacy    Having palpitations with no associated symptoms which last seconds but overall are not bothersome.  Unsure if correlated with NSVT    Lower extremity edema    Pt having chronic lower extremity edema left worse than right    Pt saw outside group and they recommended venous ablation    Pt's son wants to discuss with Dr Brandon prior to proceeding with procedure.      Patient expresses understanding and agreement with the plan.     I appreciate the chance to help with Zayda Hawkins Please let me know if you have any questions or concerns.    Mary Domínguez, APRN, CNP    This note was completed in part using Dragon voice recognition software. Although reviewed after completion, some word and grammatical errors may occur.    Orders Placed This Encounter   Procedures     Follow-Up with Cardiologist     Orders Placed This Encounter   Medications     apixaban ANTICOAGULANT (ELIQUIS) 5 MG tablet     Sig: Take 1 tablet (5 mg) by mouth 2 times daily     Dispense:  180 tablet     Refill:  3     Medications Discontinued During This Encounter   Medication Reason     apixaban ANTICOAGULANT (ELIQUIS) 5 MG tablet          Encounter Diagnoses   Name Primary?     Chronic atrial fibrillation (H)      Edema, unspecified type Yes     A-fib (H)        CURRENT MEDICATIONS:  Current Outpatient Medications   Medication Sig Dispense Refill     acetaminophen (TYLENOL) 325 MG tablet Take 2 tablets (650 mg) by mouth every 4 hours as needed for mild pain or fever       apixaban ANTICOAGULANT (ELIQUIS) 5 MG tablet Take 1 tablet (5 mg) by mouth 2 times daily 180 tablet 3     calcium carbonate-vitamin D 500-400 MG-UNIT TABS Take 1 tablet by mouth 2 times daily.         dorzolamide-timolol PF  (COSOPT) 22.3-6.8 MG/ML opthalmic solution Place 1 drop into both eyes 2 times daily 10 mL vial       furosemide (LASIX) 20 MG tablet Take 1 tablet (20 mg) by mouth daily 90 tablet 3     latanoprost (XALATAN) 0.005 % ophthalmic solution Place 1 drop into both eyes At Bedtime       lisinopril (PRINIVIL/ZESTRIL) 20 MG tablet Take 1 tablet (20 mg) by mouth daily 90 tablet 3     magnesium gluconate (MAGONATE) 500 MG tablet Take 500 mg by mouth daily.         metFORMIN (GLUCOPHAGE-XR) 500 MG 24 hr tablet Take 500 mg by mouth every morning        potassium chloride ER (K-DUR/KLOR-CON M) 10 MEQ CR tablet Take 2 tablets (20meq) in the AM, and 1 tablet (10meq) in the PM. 270 tablet 3     sertraline (ZOLOFT) 50 MG tablet Take 25 mg by mouth every evening as needed (Take a half tablet qpm prn)       timolol (TIMOPTIC) 0.5 % ophthalmic solution Place 1 drop into both eyes every morning        fish oil-omega-3 fatty acids 1000 MG capsule Take 1 g by mouth daily       multivitamin, therapeutic (THERA-VIT) TABS Take 1 tablet by mouth daily.         SUMAtriptan Succinate (IMITREX PO) Take 25 mg by mouth as needed.           ALLERGIES     Allergies   Allergen Reactions     Aspirin      Coumadin [Warfarin]      Spontaneous retroperitoneal bleed.     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     Venous insufficiency        PAST SURGICAL HISTORY:  Past Surgical History:   Procedure Laterality Date     ANESTHESIA CARDIOVERSION  7/23/2012    Procedure: ANESTHESIA CARDIOVERSION;;  Surgeon:  Provider, Generic Anesthesia;  Location:  ANESTHESIA - [facility] - DO NOT SCHEDULE     BLEPHAROPLASTY  2005     Cataract Extraction with IOL Bilateral 2012     H ABLATION AV NODE  11/14/2018     IMPLANT PACEMAKER  11/2012    Dual chamber     REPLACE PACEMAKER GENERATOR  11/14/2018    upgrade to CRT       FAMILY HISTORY:  Family History   Problem Relation Age of Onset     Heart Disease Mother 65        mi     Heart Disease Father 45        pnemonia     Heart Disease Brother      Heart Disease Sister      Heart Disease Brother        SOCIAL HISTORY:  Social History     Socioeconomic History     Marital status:      Spouse name: None     Number of children: None     Years of education: None     Highest education level: None   Occupational History     None   Social Needs     Financial resource strain: None     Food insecurity:     Worry: None     Inability: None     Transportation needs:     Medical: None     Non-medical: None   Tobacco Use     Smoking status: Never Smoker     Smokeless tobacco: Never Used   Substance and Sexual Activity     Alcohol use: No     Drug use: No     Sexual activity: None   Lifestyle     Physical activity:     Days per week: None     Minutes per session: None     Stress: None   Relationships     Social connections:     Talks on phone: None     Gets together: None     Attends Baptism service: None     Active member of club or organization: None     Attends meetings of clubs or organizations: None     Relationship status: None     Intimate partner violence:     Fear of current or ex partner: None     Emotionally abused: None     Physically abused: None     Forced sexual activity: None   Other Topics Concern     Parent/sibling w/ CABG, MI or angioplasty before 65F 55M? Not Asked      Service Not Asked     Blood Transfusions Not Asked     Caffeine Concern Yes     Comment: no caffeine intake     Occupational Exposure Not Asked     Hobby Hazards Not Asked     Sleep Concern Not Asked      "Stress Concern Not Asked     Weight Concern Not Asked     Special Diet Yes     Comment: no sugar, salt or fats      Back Care Not Asked     Exercise Yes     Comment: treadmill, swimming daily      Bike Helmet Not Asked     Seat Belt Yes     Self-Exams Not Asked   Social History Narrative     None       Review of Systems:  Skin:  Negative     Eyes:  Positive for    ENT:  Negative    Respiratory:  Negative    Cardiovascular:    Positive for;chest pain;palpitations;edema  Gastroenterology: Negative    Genitourinary:  Positive for urinary frequency  Musculoskeletal:  Positive for arthritis  Neurologic:  Negative    Psychiatric:  Positive for excessive stress;anxiety;depression  Heme/Lymph/Imm:  Negative    Endocrine:  Positive for diabetes    Physical Exam:  Vitals: /72 (BP Location: Right arm, Patient Position: Chair, Cuff Size: Adult Regular)   Pulse 72   Ht 1.626 m (5' 4\")   Wt 62.9 kg (138 lb 9.6 oz)   BMI 23.79 kg/m      Constitutional:  cooperative, alert and oriented, well developed, well nourished, in no acute distress frail;thin son is  difficult to assess patients orientation    Skin:  warm and dry to the touch, no apparent skin lesions or masses noted        Head:  normocephalic, no masses or lesions        Eyes:  pupils equal and round        ENT:  no pallor or cyanosis        Neck:  JVP normal        Chest:  normal respiratory excursion;clear to auscultation        Cardiac: regular rhythm       systolic murmur          Abdomen:  abdomen soft, non-tender, BS normoactive, no mass, no HSM, no bruits        Vascular: pulses full and equal     right radial artery;2+             left radial artery;2+                  Extremities and Back:  normal muscle strength and tone        Neurological:  no gross motor deficits          Recent Lab Results:  LIPID RESULTS:  Lab Results   Component Value Date    CHOL 125 09/27/2018    HDL 65 09/27/2018    LDL 51 09/27/2018    TRIG 47 09/27/2018    " CHOLHDLRATIO 2.2 03/10/2015       LIVER ENZYME RESULTS:  Lab Results   Component Value Date     (H) 09/27/2018     (H) 09/27/2018       CBC RESULTS:  Lab Results   Component Value Date    WBC 5.6 11/14/2018    RBC 4.90 11/14/2018    HGB 12.8 11/26/2018    HCT 43.4 11/14/2018    MCV 89 11/14/2018    MCH 28.6 11/14/2018    MCHC 32.3 11/14/2018    RDW 13.8 11/14/2018     11/14/2018       BMP RESULTS:  Lab Results   Component Value Date     04/25/2019    POTASSIUM 4.1 04/25/2019    CHLORIDE 106 04/25/2019    CO2 30 04/25/2019    ANIONGAP 2 (L) 04/25/2019     (H) 04/25/2019    BUN 18 04/25/2019    CR 1.15 (H) 04/25/2019    GFRESTIMATED 43 (L) 04/25/2019    GFRESTBLACK 50 (L) 04/25/2019    MADDISON 8.6 04/25/2019        A1C RESULTS:  Lab Results   Component Value Date    A1C 6.5 (H) 09/27/2018       INR RESULTS:  Lab Results   Component Value Date    INR 1.05 09/26/2018    INR 1.04 10/26/2016           CC  Julio C Palacios MD  [id number]  [place] [id number]

## 2019-06-12 NOTE — LETTER
6/12/2019    [place] [facility] [address]  [facility] [address]    RE: Zayda Hawkins       Dear Colleague,    I had the pleasure of seeing Zayda Hawkins in the [facility].    HPI:  Zayda Hawkins is a 85 year old Farsi-speaking female originally from [place] who presents today for follow up on atrial fibrillation and ppm.  She is a patient of Dr. Brandon.  Her past medical history includes     Atrial fibrillation:   In the past, she had not been on AC due to spontaneous retroperitoneal bleed, however, she had a CVA (9/26/2018) she was subsequently started on Eliquis 5 mg twice a day and tolerating.   She was hospitalized on 10/13/2018 with HF and AFib with RVR and her Tikosyn was discontinued and rate control was initiate with diltiazem 180 mg daily.   Continued to have difficulty with rate control and ultimately underwent an AV node ablation (11/14/2018) and upgrade to BiV St Ion ppm.  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           Congestive heart failure.  ECHO (10/14/2018) revealed EF 40-45% thought to be tachy-induced and her EF improved to 45-50% (12/2018).  Due to fatigue BB was discontinued unfortunately  her fatigue did not improve.  Continues on lisinopril     NSVT.  Noted on device (4/2019) and lasting 2-4 seconds.            Other medical history includes chronic lower extremity venous insufficiency, paroxysmal atrial fibrillation, CAD, HTN, CVA with right visual field deficit.      Diagnostics:  Device check (4/2019) revealed 1% A-paced, 99% Vpaced, 8 years battery.  100% atrial fibrillation with controlled ventricular rates.  3 ventricular high rates likely NSVT lasting 2-4 seconds at 135-185 bpm    Today she presents with her son who is interpreting for her. One of her biggest complaints and her son's concerns is she has lower extremity edema left worse than the right. She does wear compression stocking  with some relief.  She saw an outside group who recommended a venous ablation for her varicose veins.  Her son is hesitant about proceeding and would like to talk to Dr. Brandon prior to any procedures.  she states she has intermittent palpitations occurring 2-3 times a month lasting for seconds and no associated symptoms.  In addition, she has intermittent chest discomfort occurring when she is resting lasting also for seconds and occurring in her left mid axillary 4th-5th intercostal space and she has no associated symptoms of nausea, diaphoresis, radiating pain dizziness or lightheadedness.  She does not regularly exercise and is active doing household activities and overall she is doing well and denies chest pain or pressure, dizziness, dyspnea at rest or with exertion.   She has no heart failure symptoms and denies orthopnea, and PND and her weight is stable.   She is tolerating Eliquis and denies bleeding, hematuria, hematochezia, and epistaxis.     ASSESSMENT AND PLAN  Heart failure with reduced EF    Thought to be tachy-induced    EF 40-45% in Oct 2018 in the setting of tachyarrhythmias    ECHO (12/2018) after AVN ablation showed EF 45-50% with elevated filling pressures, moderate TR and 1-2+ MR which was not significantly changed    Follows with [facility]     Today her weight is down 10# since 12/19 but stable since 4/2019.    She has chronic LE edema otherwise on HF symptoms.      Continue her lasix at 20 mg daily and potassium chloride 30 mEq.     Continue with compression socks and weigh self daily at home, and call with upward trend.     Continue lisinopril 20 mg daily and not on BB due to fatigue       2. Atrial fibrillation.    Had tachy susan syndrome s/p ppm    Failed Tikosyn    Had difficult to rate control.     Underwent s/p AVN ablation and BiV permanent pacemaker (11/2018).     Fatigued and tapered off BB.  However no difference in energy level.      Anticoagulation for CHADS VASC 6 (age++, CVA++,  female, HTN, HF).  Had a hospitalization for CVA Sept 2018 while off AC.  Had a spontaneous retroperitoneal bleed while on warfarin and now is on Eliquis.  There has been discussion regarding left atrial appendage closure (Watchman) implantation for long term anticoagulation.   However, now tolerating Eliquis.    Normal device check    Stable leads     Follow with device clinic as scheduled    NSVT    Noted on device check lasting 2-8 seconds    BB offered in past and family declined due to polypharmacy    Having palpitations with no associated symptoms which last seconds but overall are not bothersome.  Unsure if correlated with NSVT    Lower extremity edema    Pt having chronic lower extremity edema left worse than right    Pt saw outside group and they recommended venous ablation    Pt's son wants to discuss with Dr Brandon prior to proceeding with procedure.      Patient expresses understanding and agreement with the plan.     I appreciate the chance to help with Zayda Hawkins Please let me know if you have any questions or concerns.    Mary Domínguez, APRN, CNP    This note was completed in part using Dragon voice recognition software. Although reviewed after completion, some word and grammatical errors may occur.    Orders Placed This Encounter   Procedures     Follow-Up with Cardiologist     Orders Placed This Encounter   Medications     apixaban ANTICOAGULANT (ELIQUIS) 5 MG tablet     Sig: Take 1 tablet (5 mg) by mouth 2 times daily     Dispense:  180 tablet     Refill:  3     Medications Discontinued During This Encounter   Medication Reason     apixaban ANTICOAGULANT (ELIQUIS) 5 MG tablet          Encounter Diagnoses   Name Primary?     Chronic atrial fibrillation (H)      Edema, unspecified type Yes     A-fib (H)        CURRENT MEDICATIONS:  Current Outpatient Medications   Medication Sig Dispense Refill     acetaminophen (TYLENOL) 325 MG tablet Take 2 tablets (650 mg) by mouth every 4 hours as  needed for mild pain or fever       apixaban ANTICOAGULANT (ELIQUIS) 5 MG tablet Take 1 tablet (5 mg) by mouth 2 times daily 180 tablet 3     calcium carbonate-vitamin D 500-400 MG-UNIT TABS Take 1 tablet by mouth 2 times daily.         dorzolamide-timolol PF (COSOPT) 22.3-6.8 MG/ML opthalmic solution Place 1 drop into both eyes 2 times daily 10 mL vial       furosemide (LASIX) 20 MG tablet Take 1 tablet (20 mg) by mouth daily 90 tablet 3     latanoprost (XALATAN) 0.005 % ophthalmic solution Place 1 drop into both eyes At Bedtime       lisinopril (PRINIVIL/ZESTRIL) 20 MG tablet Take 1 tablet (20 mg) by mouth daily 90 tablet 3     magnesium gluconate (MAGONATE) 500 MG tablet Take 500 mg by mouth daily.         metFORMIN (GLUCOPHAGE-XR) 500 MG 24 hr tablet Take 500 mg by mouth every morning        potassium chloride ER (K-DUR/KLOR-CON M) 10 MEQ CR tablet Take 2 tablets (20meq) in the AM, and 1 tablet (10meq) in the PM. 270 tablet 3     sertraline (ZOLOFT) 50 MG tablet Take 25 mg by mouth every evening as needed (Take a half tablet qpm prn)       timolol (TIMOPTIC) 0.5 % ophthalmic solution Place 1 drop into both eyes every morning        fish oil-omega-3 fatty acids 1000 MG capsule Take 1 g by mouth daily       multivitamin, therapeutic (THERA-VIT) TABS Take 1 tablet by mouth daily.         SUMAtriptan Succinate (IMITREX PO) Take 25 mg by mouth as needed.           ALLERGIES     Allergies   Allergen Reactions     Aspirin      Coumadin [Warfarin]      Spontaneous retroperitoneal bleed.     Penicillins        PAST MEDICAL HISTORY:  Past Medical History:   Diagnosis Date     Atrial fibrillation (H)     not on anticoagulation, hx RP bleed     CAD (coronary artery disease)     CT angio: mild lesion in the LAD, as well as 1st diagonal, and mild plaque in the proximal and  mid RCA     CVA (cerebral vascular accident) (H) 10/2018    Acute left posterior circulation ischemic stroke      Diabetes mellitus (H)       Hyperlipidemia      Hypertension      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     Venous insufficiency        PAST SURGICAL HISTORY:  Past Surgical History:   Procedure Laterality Date     ANESTHESIA CARDIOVERSION  7/23/2012    Procedure: ANESTHESIA CARDIOVERSION;;  Surgeon: Provider, Generic Anesthesia;  Location:  ANESTHESIA - RH - DO NOT SCHEDULE     BLEPHAROPLASTY  2005     Cataract Extraction with IOL Bilateral 2012     H ABLATION AV NODE  11/14/2018     IMPLANT PACEMAKER  11/2012    Dual chamber     REPLACE PACEMAKER GENERATOR  11/14/2018    upgrade to CRT       FAMILY HISTORY:  Family History   Problem Relation Age of Onset     Heart Disease Mother 65        mi     Heart Disease Father 45        pnemonia     Heart Disease Brother      Heart Disease Sister      Heart Disease Brother        SOCIAL HISTORY:  Social History     Socioeconomic History     Marital status:      Spouse name: None     Number of children: None     Years of education: None     Highest education level: None   Occupational History     None   Social Needs     Financial resource strain: None     Food insecurity:     Worry: None     Inability: None     Transportation needs:     Medical: None     Non-medical: None   Tobacco Use     Smoking status: Never Smoker     Smokeless tobacco: Never Used   Substance and Sexual Activity     Alcohol use: No     Drug use: No     Sexual activity: None   Lifestyle     Physical activity:     Days per week: None     Minutes per session: None     Stress: None   Relationships     Social connections:     Talks on phone: None     Gets together: None     Attends Hoahaoism service: None     Active member of club or organization: None     Attends meetings of clubs or organizations: None     Relationship status: None     Intimate partner violence:     Fear of current or ex partner: None     Emotionally abused: None     Physically abused: None     Forced sexual activity: None   Other Topics Concern      "Parent/sibling w/ CABG, MI or angioplasty before 65F 55M? Not Asked      Service Not Asked     Blood Transfusions Not Asked     Caffeine Concern Yes     Comment: no caffeine intake     Occupational Exposure Not Asked     Hobby Hazards Not Asked     Sleep Concern Not Asked     Stress Concern Not Asked     Weight Concern Not Asked     Special Diet Yes     Comment: no sugar, salt or fats      Back Care Not Asked     Exercise Yes     Comment: treadmill, swimming daily      Bike Helmet Not Asked     Seat Belt Yes     Self-Exams Not Asked   Social History Narrative     None       Review of Systems:  Skin:  Negative     Eyes:  Positive for    ENT:  Negative    Respiratory:  Negative    Cardiovascular:    Positive for;chest pain;palpitations;edema  Gastroenterology: Negative    Genitourinary:  Positive for urinary frequency  Musculoskeletal:  Positive for arthritis  Neurologic:  Negative    Psychiatric:  Positive for excessive stress;anxiety;depression  Heme/Lymph/Imm:  Negative    Endocrine:  Positive for diabetes    Physical Exam:  Vitals: /72 (BP Location: Right arm, Patient Position: Chair, Cuff Size: Adult Regular)   Pulse 72   Ht 1.626 m (5' 4\")   Wt 62.9 kg (138 lb 9.6 oz)   BMI 23.79 kg/m       Constitutional:  cooperative, alert and oriented, well developed, well nourished, in no acute distress frail;thin son is  difficult to assess patients orientation    Skin:  warm and dry to the touch, no apparent skin lesions or masses noted        Head:  normocephalic, no masses or lesions        Eyes:  pupils equal and round        ENT:  no pallor or cyanosis        Neck:  JVP normal        Chest:  normal respiratory excursion;clear to auscultation        Cardiac: regular rhythm       systolic murmur          Abdomen:  abdomen soft, non-tender, BS normoactive, no mass, no HSM, no bruits        Vascular: pulses full and equal     right radial artery;2+             left radial artery;2+             "      Extremities and Back:  normal muscle strength and tone        Neurological:  no gross motor deficits          Recent Lab Results:  LIPID RESULTS:  Lab Results   Component Value Date    CHOL 125 09/27/2018    HDL 65 09/27/2018    LDL 51 09/27/2018    TRIG 47 09/27/2018    CHOLHDLRATIO 2.2 03/10/2015       LIVER ENZYME RESULTS:  Lab Results   Component Value Date     (H) 09/27/2018     (H) 09/27/2018       CBC RESULTS:  Lab Results   Component Value Date    WBC 5.6 11/14/2018    RBC 4.90 11/14/2018    HGB 12.8 11/26/2018    HCT 43.4 11/14/2018    MCV 89 11/14/2018    MCH 28.6 11/14/2018    MCHC 32.3 11/14/2018    RDW 13.8 11/14/2018     11/14/2018       BMP RESULTS:  Lab Results   Component Value Date     04/25/2019    POTASSIUM 4.1 04/25/2019    CHLORIDE 106 04/25/2019    CO2 30 04/25/2019    ANIONGAP 2 (L) 04/25/2019     (H) 04/25/2019    BUN 18 04/25/2019    CR 1.15 (H) 04/25/2019    GFRESTIMATED 43 (L) 04/25/2019    GFRESTBLACK 50 (L) 04/25/2019    MADDISON 8.6 04/25/2019        A1C RESULTS:  Lab Results   Component Value Date    A1C 6.5 (H) 09/27/2018       INR RESULTS:  Lab Results   Component Value Date    INR 1.05 09/26/2018    INR 1.04 10/26/2016       Thank you for allowing me to participate in the care of your patient.    Sincerely,     BARBARA Mckeon [facility]

## 2019-06-12 NOTE — LETTER
6/12/2019    [place] [address] [facility] [place] [address]  [facility] [address]    RE: Zayda Hawkins       Dear Colleague,    I had the pleasure of seeing Zayda Hawkins in the [facility].    HPI:  Zayda Hawkins is a 85 year old Farsi-speaking female originally from [place] who presents today for follow up on atrial fibrillation and ppm.  She is a patient of Dr. Brandon.  Her past medical history includes     Atrial fibrillation:   In the past, she had not been on AC due to spontaneous retroperitoneal bleed, however, she had a CVA (9/26/2018) she was subsequently started on Eliquis 5 mg twice a day and tolerating.   She was hospitalized on 10/13/2018 with HF and AFib with RVR and her Tikosyn was discontinued and rate control was initiate with diltiazem 180 mg daily.   Continued to have difficulty with rate control and ultimately underwent an AV node ablation (11/14/2018) and upgrade to BiV St Ion ppm.  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           Congestive heart failure.  ECHO (10/14/2018) revealed EF 40-45% thought to be tachy-induced and her EF improved to 45-50% (12/2018).  Due to fatigue BB was discontinued unfortunately  her fatigue did not improve.  Continues on lisinopril     NSVT.  Noted on device (4/2019) and lasting 2-4 seconds.            Other medical history includes chronic lower extremity venous insufficiency, paroxysmal atrial fibrillation, CAD, HTN, CVA with right visual field deficit.      Diagnostics:  Device check (4/2019) revealed 1% A-paced, 99% Vpaced, 8 years battery.  100% atrial fibrillation with controlled ventricular rates.  3 ventricular high rates likely NSVT lasting 2-4 seconds at 135-185 bpm    Today she presents with her son who is interpreting for her. One of her biggest complaints and her son's concerns is she has lower extremity edema left worse than the right. She does wear compression stocking  with some relief.  She saw an outside group who recommended a venous ablation for her varicose veins.  Her son is hesitant about proceeding and would like to talk to Dr. Brandon prior to any procedures.  she states she has intermittent palpitations occurring 2-3 times a month lasting for seconds and no associated symptoms.  In addition, she has intermittent chest discomfort occurring when she is resting lasting also for seconds and occurring in her left mid axillary 4th-5th intercostal space and she has no associated symptoms of nausea, diaphoresis, radiating pain dizziness or lightheadedness.  She does not regularly exercise and is active doing household activities and overall she is doing well and denies chest pain or pressure, dizziness, dyspnea at rest or with exertion.   She has no heart failure symptoms and denies orthopnea, and PND and her weight is stable.   She is tolerating Eliquis and denies bleeding, hematuria, hematochezia, and epistaxis.     ASSESSMENT AND PLAN  Heart failure with reduced EF    Thought to be tachy-induced    EF 40-45% in Oct 2018 in the setting of tachyarrhythmias    ECHO (12/2018) after AVN ablation showed EF 45-50% with elevated filling pressures, moderate TR and 1-2+ MR which was not significantly changed    Follows with [facility]     Today her weight is down 10# since 12/19 but stable since 4/2019.    She has chronic LE edema otherwise on HF symptoms.      Continue her lasix at 20 mg daily and potassium chloride 30 mEq.     Continue with compression socks and weigh self daily at home, and call with upward trend.     Continue lisinopril 20 mg daily and not on BB due to fatigue       2. Atrial fibrillation.    Had tachy susan syndrome s/p ppm    Failed Tikosyn    Had difficult to rate control.     Underwent s/p AVN ablation and BiV permanent pacemaker (11/2018).     Fatigued and tapered off BB.  However no difference in energy level.      Anticoagulation for CHADS VASC 6 (age++, CVA++,  female, HTN, HF).  Had a hospitalization for CVA Sept 2018 while off AC.  Had a spontaneous retroperitoneal bleed while on warfarin and now is on Eliquis.  There has been discussion regarding left atrial appendage closure (Watchman) implantation for long term anticoagulation.   However, now tolerating Eliquis.    Normal device check    Stable leads     Follow with device clinic as scheduled    NSVT    Noted on device check lasting 2-8 seconds    BB offered in past and family declined due to polypharmacy    Having palpitations with no associated symptoms which last seconds but overall are not bothersome.  Unsure if correlated with NSVT    Lower extremity edema    Pt having chronic lower extremity edema left worse than right    Pt saw outside group and they recommended venous ablation    Pt's son wants to discuss with Dr Brandon prior to proceeding with procedure.      Patient expresses understanding and agreement with the plan.     I appreciate the chance to help with Zayda Hawkins Please let me know if you have any questions or concerns.    Mary Domínguez, APRN, CNP    This note was completed in part using Dragon voice recognition software. Although reviewed after completion, some word and grammatical errors may occur.    Orders Placed This Encounter   Procedures     Follow-Up with Cardiologist     Orders Placed This Encounter   Medications     apixaban ANTICOAGULANT (ELIQUIS) 5 MG tablet     Sig: Take 1 tablet (5 mg) by mouth 2 times daily     Dispense:  180 tablet     Refill:  3     Medications Discontinued During This Encounter   Medication Reason     apixaban ANTICOAGULANT (ELIQUIS) 5 MG tablet          Encounter Diagnoses   Name Primary?     Chronic atrial fibrillation (H)      Edema, unspecified type Yes     A-fib (H)        CURRENT MEDICATIONS:  Current Outpatient Medications   Medication Sig Dispense Refill     acetaminophen (TYLENOL) 325 MG tablet Take 2 tablets (650 mg) by mouth every 4 hours as  needed for mild pain or fever       apixaban ANTICOAGULANT (ELIQUIS) 5 MG tablet Take 1 tablet (5 mg) by mouth 2 times daily 180 tablet 3     calcium carbonate-vitamin D 500-400 MG-UNIT TABS Take 1 tablet by mouth 2 times daily.         dorzolamide-timolol PF (COSOPT) 22.3-6.8 MG/ML opthalmic solution Place 1 drop into both eyes 2 times daily 10 mL vial       furosemide (LASIX) 20 MG tablet Take 1 tablet (20 mg) by mouth daily 90 tablet 3     latanoprost (XALATAN) 0.005 % ophthalmic solution Place 1 drop into both eyes At Bedtime       lisinopril (PRINIVIL/ZESTRIL) 20 MG tablet Take 1 tablet (20 mg) by mouth daily 90 tablet 3     magnesium gluconate (MAGONATE) 500 MG tablet Take 500 mg by mouth daily.         metFORMIN (GLUCOPHAGE-XR) 500 MG 24 hr tablet Take 500 mg by mouth every morning        potassium chloride ER (K-DUR/KLOR-CON M) 10 MEQ CR tablet Take 2 tablets (20meq) in the AM, and 1 tablet (10meq) in the PM. 270 tablet 3     sertraline (ZOLOFT) 50 MG tablet Take 25 mg by mouth every evening as needed (Take a half tablet qpm prn)       timolol (TIMOPTIC) 0.5 % ophthalmic solution Place 1 drop into both eyes every morning        fish oil-omega-3 fatty acids 1000 MG capsule Take 1 g by mouth daily       multivitamin, therapeutic (THERA-VIT) TABS Take 1 tablet by mouth daily.         SUMAtriptan Succinate (IMITREX PO) Take 25 mg by mouth as needed.           ALLERGIES     Allergies   Allergen Reactions     Aspirin      Coumadin [Warfarin]      Spontaneous retroperitoneal bleed.     Penicillins        PAST MEDICAL HISTORY:  Past Medical History:   Diagnosis Date     Atrial fibrillation (H)     not on anticoagulation, hx RP bleed     CAD (coronary artery disease)     CT angio: mild lesion in the LAD, as well as 1st diagonal, and mild plaque in the proximal and  mid RCA     CVA (cerebral vascular accident) (H) 10/2018    Acute left posterior circulation ischemic stroke      Diabetes mellitus (H)       Hyperlipidemia      Hypertension      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     Venous insufficiency        PAST SURGICAL HISTORY:  Past Surgical History:   Procedure Laterality Date     ANESTHESIA CARDIOVERSION  7/23/2012    Procedure: ANESTHESIA CARDIOVERSION;;  Surgeon: Provider, Generic Anesthesia;  Location:  ANESTHESIA - [facility] - DO NOT SCHEDULE     BLEPHAROPLASTY  2005     Cataract Extraction with IOL Bilateral 2012     H ABLATION AV NODE  11/14/2018     IMPLANT PACEMAKER  11/2012    Dual chamber     REPLACE PACEMAKER GENERATOR  11/14/2018    upgrade to CRT       FAMILY HISTORY:  Family History   Problem Relation Age of Onset     Heart Disease Mother 65        mi     Heart Disease Father 45        pnemonia     Heart Disease Brother      Heart Disease Sister      Heart Disease Brother        SOCIAL HISTORY:  Social History     Socioeconomic History     Marital status:      Spouse name: None     Number of children: None     Years of education: None     Highest education level: None   Occupational History     None   Social Needs     Financial resource strain: None     Food insecurity:     Worry: None     Inability: None     Transportation needs:     Medical: None     Non-medical: None   Tobacco Use     Smoking status: Never Smoker     Smokeless tobacco: Never Used   Substance and Sexual Activity     Alcohol use: No     Drug use: No     Sexual activity: None   Lifestyle     Physical activity:     Days per week: None     Minutes per session: None     Stress: None   Relationships     Social connections:     Talks on phone: None     Gets together: None     Attends [employer] service: None     Active member of club or organization: None     Attends meetings of clubs or organizations: None     Relationship status: None     Intimate partner violence:     Fear of current or ex partner: None     Emotionally abused: None     Physically abused: None     Forced sexual activity: None   Other Topics Concern      "Parent/sibling w/ CABG, MI or angioplasty before 65F 55M? Not Asked      Service Not Asked     Blood Transfusions Not Asked     Caffeine Concern Yes     Comment: no caffeine intake     Occupational Exposure Not Asked     Hobby Hazards Not Asked     Sleep Concern Not Asked     Stress Concern Not Asked     Weight Concern Not Asked     Special Diet Yes     Comment: no sugar, salt or fats      Back Care Not Asked     Exercise Yes     Comment: treadmill, swimming daily      Bike Helmet Not Asked     Seat Belt Yes     Self-Exams Not Asked   Social History Narrative     None       Review of Systems:  Skin:  Negative     Eyes:  Positive for    ENT:  Negative    Respiratory:  Negative    Cardiovascular:    Positive for;chest pain;palpitations;edema  Gastroenterology: Negative    Genitourinary:  Positive for urinary frequency  Musculoskeletal:  Positive for arthritis  Neurologic:  Negative    Psychiatric:  Positive for excessive stress;anxiety;depression  Heme/Lymph/Imm:  Negative    Endocrine:  Positive for diabetes    Physical Exam:  Vitals: /72 (BP Location: Right arm, Patient Position: Chair, Cuff Size: Adult Regular)   Pulse 72   Ht 1.626 m (5' 4\")   Wt 62.9 kg (138 lb 9.6 oz)   BMI 23.79 kg/m       Constitutional:  cooperative, alert and oriented, well developed, well nourished, in no acute distress frail;thin son is  difficult to assess patients orientation    Skin:  warm and dry to the touch, no apparent skin lesions or masses noted        Head:  normocephalic, no masses or lesions        Eyes:  pupils equal and round        ENT:  no pallor or cyanosis        Neck:  JVP normal        Chest:  normal respiratory excursion;clear to auscultation        Cardiac: regular rhythm       systolic murmur          Abdomen:  abdomen soft, non-tender, BS normoactive, no mass, no HSM, no bruits        Vascular: pulses full and equal     right radial artery;2+             left radial artery;2+             "      Extremities and Back:  normal muscle strength and tone        Neurological:  no gross motor deficits          Recent Lab Results:  LIPID RESULTS:  Lab Results   Component Value Date    CHOL 125 09/27/2018    HDL 65 09/27/2018    LDL 51 09/27/2018    TRIG 47 09/27/2018    CHOLHDLRATIO 2.2 03/10/2015       LIVER ENZYME RESULTS:  Lab Results   Component Value Date     (H) 09/27/2018     (H) 09/27/2018       CBC RESULTS:  Lab Results   Component Value Date    WBC 5.6 11/14/2018    RBC 4.90 11/14/2018    HGB 12.8 11/26/2018    HCT 43.4 11/14/2018    MCV 89 11/14/2018    MCH 28.6 11/14/2018    MCHC 32.3 11/14/2018    RDW 13.8 11/14/2018     11/14/2018       BMP RESULTS:  Lab Results   Component Value Date     04/25/2019    POTASSIUM 4.1 04/25/2019    CHLORIDE 106 04/25/2019    CO2 30 04/25/2019    ANIONGAP 2 (L) 04/25/2019     (H) 04/25/2019    BUN 18 04/25/2019    CR 1.15 (H) 04/25/2019    GFRESTIMATED 43 (L) 04/25/2019    GFRESTBLACK 50 (L) 04/25/2019    MADDISON 8.6 04/25/2019        A1C RESULTS:  Lab Results   Component Value Date    A1C 6.5 (H) 09/27/2018       INR RESULTS:  Lab Results   Component Value Date    INR 1.05 09/26/2018    INR 1.04 10/26/2016           CC  Julio C Palacios MD  [id number]  [place] [id number]                  Thank you for allowing me to participate in the care of your patient.      Sincerely,     BARBARA Mckeon [facility]    cc:   Julio C Palacios MD  [address]  [place] [id number]

## 2019-07-02 ENCOUNTER — CARE COORDINATION (OUTPATIENT)
Dept: CARDIOLOGY | Facility: CLINIC | Age: 84
End: 2019-07-02

## 2019-07-02 DIAGNOSIS — I50.22 CHRONIC SYSTOLIC CONGESTIVE HEART FAILURE (H): Primary | ICD-10-CM

## 2019-07-02 RX ORDER — FUROSEMIDE 20 MG
20 TABLET ORAL 2 TIMES DAILY
COMMUNITY
Start: 2019-06-28 | End: 2021-01-01 | Stop reason: DRUGHIGH

## 2019-07-02 RX ORDER — POTASSIUM CHLORIDE 1500 MG/1
TABLET, EXTENDED RELEASE ORAL DAILY
COMMUNITY
Start: 2019-06-28

## 2019-07-02 NOTE — PROGRESS NOTES
"Vm left from son stating his wife changed pt's diuretics.     Called and spoke to son and his wife (ANTONIETA). ANTONIETA is an internist. Wife states that last wkend they went and saw pt and she had \"1+ pitting edema to her bilateral legs from feet to mid leg and she was complaining of pain to her legs.\" ANTONIETA states pt is normally on Lasix 20 mg daily. She had pt increase her Lasix to 20 mg BID Sat and Sun and return to 20 mg daily Mon-Fri. ANTONIETA states \"My plan worked and her leg swelling resolved so I told her to continue with 20 mg daily Monday - Friday and on Saturday and Sunday take 20 mg BID. She does eat a high salt diet and we are not going to make her change that. She's 86 and should be able to eat whatever she wants so medications will just have to be adjusted for her\".  Asked ANTONIETA if she had pt increase her KCl on Sat and Sub with the increased Lasix; she had her double her KCL dose, 60 mEq. Pt takes 30 mEq daily.   Did let ANTONIETA know Christel was notified and asked if pt could increase KCL to 40 mEq on Saturday and Sundays with increased Lasix. Asked ANTONIETA if they could bring pt in day prior to her upcoming appt with Dr Brandon as she should have a BMP checked. DIL in agreement and BMP schuedled for day prior to appt. ANTONIETA states they will have pt use extra Lasix on the wkend until she see Dr Brandon.   Did update med list to reflect changes.   Mamie Sidhu, RN 2:26 PM 07/02/19    "

## 2019-07-22 ENCOUNTER — ANCILLARY PROCEDURE (OUTPATIENT)
Dept: CARDIOLOGY | Facility: CLINIC | Age: 84
End: 2019-07-22
Attending: INTERNAL MEDICINE
Payer: COMMERCIAL

## 2019-07-22 DIAGNOSIS — Z95.0 PACEMAKER: ICD-10-CM

## 2019-07-22 PROCEDURE — 93296 REM INTERROG EVL PM/IDS: CPT | Performed by: INTERNAL MEDICINE

## 2019-07-22 PROCEDURE — 93294 REM INTERROG EVL PM/LDLS PM: CPT | Performed by: INTERNAL MEDICINE

## 2019-07-26 LAB
MDC_IDC_EPISODE_DTM: NORMAL
MDC_IDC_EPISODE_ID: NORMAL
MDC_IDC_EPISODE_TYPE: NORMAL
MDC_IDC_LEAD_IMPLANT_DT: NORMAL
MDC_IDC_LEAD_LOCATION: NORMAL
MDC_IDC_LEAD_LOCATION_DETAIL_1: NORMAL
MDC_IDC_LEAD_MFG: NORMAL
MDC_IDC_LEAD_MODEL: NORMAL
MDC_IDC_LEAD_POLARITY_TYPE: NORMAL
MDC_IDC_LEAD_SERIAL: NORMAL
MDC_IDC_MSMT_BATTERY_DTM: NORMAL
MDC_IDC_MSMT_BATTERY_REMAINING_LONGEVITY: 96 MO
MDC_IDC_MSMT_BATTERY_REMAINING_PERCENTAGE: 95.5 %
MDC_IDC_MSMT_BATTERY_RRT_TRIGGER: NORMAL
MDC_IDC_MSMT_BATTERY_STATUS: NORMAL
MDC_IDC_MSMT_BATTERY_VOLTAGE: 2.99 V
MDC_IDC_MSMT_LEADCHNL_LV_IMPEDANCE_VALUE: 980 OHM
MDC_IDC_MSMT_LEADCHNL_LV_LEAD_CHANNEL_STATUS: NORMAL
MDC_IDC_MSMT_LEADCHNL_LV_PACING_THRESHOLD_AMPLITUDE: 1.12 V
MDC_IDC_MSMT_LEADCHNL_LV_PACING_THRESHOLD_PULSEWIDTH: 0.5 MS
MDC_IDC_MSMT_LEADCHNL_RA_IMPEDANCE_VALUE: 390 OHM
MDC_IDC_MSMT_LEADCHNL_RA_LEAD_CHANNEL_STATUS: NORMAL
MDC_IDC_MSMT_LEADCHNL_RA_SENSING_INTR_AMPL: 0.7 MV
MDC_IDC_MSMT_LEADCHNL_RV_IMPEDANCE_VALUE: 530 OHM
MDC_IDC_MSMT_LEADCHNL_RV_LEAD_CHANNEL_STATUS: NORMAL
MDC_IDC_MSMT_LEADCHNL_RV_PACING_THRESHOLD_AMPLITUDE: 0.5 V
MDC_IDC_MSMT_LEADCHNL_RV_PACING_THRESHOLD_PULSEWIDTH: 0.5 MS
MDC_IDC_MSMT_LEADCHNL_RV_SENSING_INTR_AMPL: 11.4 MV
MDC_IDC_PG_IMPLANT_DTM: NORMAL
MDC_IDC_PG_MFG: NORMAL
MDC_IDC_PG_MODEL: NORMAL
MDC_IDC_PG_SERIAL: NORMAL
MDC_IDC_PG_TYPE: NORMAL
MDC_IDC_SESS_CLINIC_NAME: NORMAL
MDC_IDC_SESS_DTM: NORMAL
MDC_IDC_SESS_REPROGRAMMED: NO
MDC_IDC_SESS_TYPE: NORMAL
MDC_IDC_SET_BRADY_AT_MODE_SWITCH_MODE: NORMAL
MDC_IDC_SET_BRADY_AT_MODE_SWITCH_RATE: 160 {BEATS}/MIN
MDC_IDC_SET_BRADY_LOWRATE: 60 {BEATS}/MIN
MDC_IDC_SET_BRADY_MAX_SENSOR_RATE: 120 {BEATS}/MIN
MDC_IDC_SET_BRADY_MAX_TRACKING_RATE: 120 {BEATS}/MIN
MDC_IDC_SET_BRADY_MODE: NORMAL
MDC_IDC_SET_BRADY_PAV_DELAY_HIGH: 100 MS
MDC_IDC_SET_BRADY_PAV_DELAY_LOW: 200 MS
MDC_IDC_SET_BRADY_SAV_DELAY_HIGH: 100 MS
MDC_IDC_SET_BRADY_SAV_DELAY_LOW: 170 MS
MDC_IDC_SET_CRT_LVRV_DELAY: 0 MS
MDC_IDC_SET_CRT_PACED_CHAMBERS: NORMAL
MDC_IDC_SET_LEADCHNL_LV_PACING_AMPLITUDE: 2.12
MDC_IDC_SET_LEADCHNL_LV_PACING_ANODE_ELECTRODE_1: NORMAL
MDC_IDC_SET_LEADCHNL_LV_PACING_ANODE_LOCATION_1: NORMAL
MDC_IDC_SET_LEADCHNL_LV_PACING_CAPTURE_MODE: NORMAL
MDC_IDC_SET_LEADCHNL_LV_PACING_CATHODE_ELECTRODE_1: NORMAL
MDC_IDC_SET_LEADCHNL_LV_PACING_CATHODE_LOCATION_1: NORMAL
MDC_IDC_SET_LEADCHNL_LV_PACING_POLARITY: NORMAL
MDC_IDC_SET_LEADCHNL_LV_PACING_PULSEWIDTH: 0.5 MS
MDC_IDC_SET_LEADCHNL_RA_PACING_AMPLITUDE: 2.5 V
MDC_IDC_SET_LEADCHNL_RA_PACING_ANODE_ELECTRODE_1: NORMAL
MDC_IDC_SET_LEADCHNL_RA_PACING_ANODE_LOCATION_1: NORMAL
MDC_IDC_SET_LEADCHNL_RA_PACING_CAPTURE_MODE: NORMAL
MDC_IDC_SET_LEADCHNL_RA_PACING_CATHODE_ELECTRODE_1: NORMAL
MDC_IDC_SET_LEADCHNL_RA_PACING_CATHODE_LOCATION_1: NORMAL
MDC_IDC_SET_LEADCHNL_RA_PACING_POLARITY: NORMAL
MDC_IDC_SET_LEADCHNL_RA_PACING_PULSEWIDTH: 0.5 MS
MDC_IDC_SET_LEADCHNL_RA_SENSING_ADAPTATION_MODE: NORMAL
MDC_IDC_SET_LEADCHNL_RA_SENSING_ANODE_ELECTRODE_1: NORMAL
MDC_IDC_SET_LEADCHNL_RA_SENSING_ANODE_LOCATION_1: NORMAL
MDC_IDC_SET_LEADCHNL_RA_SENSING_CATHODE_ELECTRODE_1: NORMAL
MDC_IDC_SET_LEADCHNL_RA_SENSING_CATHODE_LOCATION_1: NORMAL
MDC_IDC_SET_LEADCHNL_RA_SENSING_POLARITY: NORMAL
MDC_IDC_SET_LEADCHNL_RA_SENSING_SENSITIVITY: 0.3 MV
MDC_IDC_SET_LEADCHNL_RV_PACING_AMPLITUDE: 2 V
MDC_IDC_SET_LEADCHNL_RV_PACING_ANODE_ELECTRODE_1: NORMAL
MDC_IDC_SET_LEADCHNL_RV_PACING_ANODE_LOCATION_1: NORMAL
MDC_IDC_SET_LEADCHNL_RV_PACING_CAPTURE_MODE: NORMAL
MDC_IDC_SET_LEADCHNL_RV_PACING_CATHODE_ELECTRODE_1: NORMAL
MDC_IDC_SET_LEADCHNL_RV_PACING_CATHODE_LOCATION_1: NORMAL
MDC_IDC_SET_LEADCHNL_RV_PACING_POLARITY: NORMAL
MDC_IDC_SET_LEADCHNL_RV_PACING_PULSEWIDTH: 0.5 MS
MDC_IDC_SET_LEADCHNL_RV_SENSING_ADAPTATION_MODE: NORMAL
MDC_IDC_SET_LEADCHNL_RV_SENSING_ANODE_ELECTRODE_1: NORMAL
MDC_IDC_SET_LEADCHNL_RV_SENSING_ANODE_LOCATION_1: NORMAL
MDC_IDC_SET_LEADCHNL_RV_SENSING_CATHODE_ELECTRODE_1: NORMAL
MDC_IDC_SET_LEADCHNL_RV_SENSING_CATHODE_LOCATION_1: NORMAL
MDC_IDC_SET_LEADCHNL_RV_SENSING_POLARITY: NORMAL
MDC_IDC_SET_LEADCHNL_RV_SENSING_SENSITIVITY: 0.5 MV
MDC_IDC_STAT_AT_BURDEN_PERCENT: 99 %
MDC_IDC_STAT_AT_DTM_END: NORMAL
MDC_IDC_STAT_AT_DTM_START: NORMAL
MDC_IDC_STAT_AT_MODE_SW_COUNT: 1
MDC_IDC_STAT_AT_MODE_SW_COUNT_PER_DAY: 0
MDC_IDC_STAT_AT_MODE_SW_MAX_DURATION: NORMAL S
MDC_IDC_STAT_AT_MODE_SW_PERCENT_TIME: 100 %
MDC_IDC_STAT_BRADY_AP_VP_PERCENT: 0 %
MDC_IDC_STAT_BRADY_AP_VS_PERCENT: 0 %
MDC_IDC_STAT_BRADY_AS_VP_PERCENT: 0 %
MDC_IDC_STAT_BRADY_AS_VS_PERCENT: 0 %
MDC_IDC_STAT_BRADY_DTM_END: NORMAL
MDC_IDC_STAT_BRADY_DTM_START: NORMAL
MDC_IDC_STAT_BRADY_RA_PERCENT_PACED: 1 %
MDC_IDC_STAT_CRT_DTM_END: NORMAL
MDC_IDC_STAT_CRT_DTM_START: NORMAL
MDC_IDC_STAT_CRT_PERCENT_PACED: 99 %
MDC_IDC_STAT_HEART_RATE_ATRIAL_MAX: 330 {BEATS}/MIN
MDC_IDC_STAT_HEART_RATE_ATRIAL_MEAN: 297 {BEATS}/MIN
MDC_IDC_STAT_HEART_RATE_ATRIAL_MIN: 40 {BEATS}/MIN
MDC_IDC_STAT_HEART_RATE_DTM_END: NORMAL
MDC_IDC_STAT_HEART_RATE_DTM_START: NORMAL

## 2019-07-29 DIAGNOSIS — I50.22 CHRONIC SYSTOLIC CONGESTIVE HEART FAILURE (H): ICD-10-CM

## 2019-07-29 LAB
ANION GAP SERPL CALCULATED.3IONS-SCNC: 7 MMOL/L (ref 3–14)
BUN SERPL-MCNC: 19 MG/DL (ref 7–30)
CALCIUM SERPL-MCNC: 8.7 MG/DL (ref 8.5–10.1)
CHLORIDE SERPL-SCNC: 108 MMOL/L (ref 94–109)
CO2 SERPL-SCNC: 25 MMOL/L (ref 20–32)
CREAT SERPL-MCNC: 0.96 MG/DL (ref 0.52–1.04)
GFR SERPL CREATININE-BSD FRML MDRD: 54 ML/MIN/{1.73_M2}
GLUCOSE SERPL-MCNC: 90 MG/DL (ref 70–99)
POTASSIUM SERPL-SCNC: 4 MMOL/L (ref 3.4–5.3)
SODIUM SERPL-SCNC: 140 MMOL/L (ref 133–144)

## 2019-07-29 PROCEDURE — 36415 COLL VENOUS BLD VENIPUNCTURE: CPT | Performed by: INTERNAL MEDICINE

## 2019-07-29 PROCEDURE — 80048 BASIC METABOLIC PNL TOTAL CA: CPT | Performed by: INTERNAL MEDICINE

## 2019-07-30 ENCOUNTER — OFFICE VISIT (OUTPATIENT)
Dept: CARDIOLOGY | Facility: CLINIC | Age: 84
End: 2019-07-30
Attending: NURSE PRACTITIONER
Payer: COMMERCIAL

## 2019-07-30 VITALS
HEIGHT: 64 IN | DIASTOLIC BLOOD PRESSURE: 70 MMHG | SYSTOLIC BLOOD PRESSURE: 134 MMHG | WEIGHT: 133.4 LBS | HEART RATE: 72 BPM | BODY MASS INDEX: 22.77 KG/M2

## 2019-07-30 DIAGNOSIS — R60.9 EDEMA, UNSPECIFIED TYPE: ICD-10-CM

## 2019-07-30 DIAGNOSIS — I87.2 VENOUS INSUFFICIENCY: Primary | ICD-10-CM

## 2019-07-30 PROCEDURE — 99214 OFFICE O/P EST MOD 30 MIN: CPT | Performed by: INTERNAL MEDICINE

## 2019-07-30 ASSESSMENT — MIFFLIN-ST. JEOR: SCORE: 1030.1

## 2019-07-30 NOTE — PROGRESS NOTES
HPI and Plan:   Service Date: 03/07/2018      REASON FOR VISIT:  Follow up for lower extremity edema and chronic venous insufficiency.      HISTORY OF PRESENT ILLNESS:  I had the pleasure of seeing Zayda Hawkins at the [facility] in [place] this afternoon.  She is a very pleasant, 86-year-old female with a history of chronic lower extremity venous insufficiency, paroxysmal atrial fibrillation, coronary artery disease and hypertension.  We have been following her for the last several years for chronic lower extremity edema.  She was placed on compression stockings a few years ago, and her symptoms have improved but over the past year she developed worsening edema on the right. She recently underwent unknown venous procedure at an outside clinic. Details of that procedure are unclear     She remains active and goes to the [facility] on a daily basis.  She walks up to an hour a day.      IMPRESSION AND PLAN:   1.  Bilateral lower extremity edema likely due to chronic venous insufficiency.   2.  Atrial fibrillation  3.  Coronary artery disease, stable   4.  Hypertension, stable.      She developed worsening right leg edema this past year although symptoms are now better with stronger stockings. I have asked her to send me records of the recent venous procedure. If she didn't have an ablation, I would recommend repeat competency ultrasound here and possible right GSV ablation.      She will continue to wear compression stockings. Follow up with vascular NP in 6 months    I appreciate the opportunity to be part of this patient's care.         DO BARKLEY MD           Orders Placed This Encounter   Procedures     Follow-Up with Cardiac Advanced Practice Provider       No orders of the defined types were placed in this encounter.      There are no discontinued medications.      Encounter Diagnoses   Name Primary?     Edema, unspecified type      Venous insufficiency Yes       CURRENT  MEDICATIONS:  Current Outpatient Medications   Medication Sig Dispense Refill     acetaminophen (TYLENOL) 325 MG tablet Take 2 tablets (650 mg) by mouth every 4 hours as needed for mild pain or fever       apixaban ANTICOAGULANT (ELIQUIS) 5 MG tablet Take 1 tablet (5 mg) by mouth 2 times daily 180 tablet 3     calcium carbonate-vitamin D 500-400 MG-UNIT TABS Take 1 tablet by mouth 2 times daily.         dorzolamide-timolol PF (COSOPT) 22.3-6.8 MG/ML opthalmic solution Place 1 drop into both eyes 2 times daily 10 mL vial       furosemide (LASIX) 20 MG tablet Take 20 mg by mouth 2 times daily Take 1 tablet (20 mg) in morning and 1 tablet (20 mg) in afternoon on Saturday and Sunday       furosemide (LASIX) 20 MG tablet Take 1 tablet (20 mg) by mouth daily (Patient taking differently: Take 20 mg by mouth five times a week ) 90 tablet 3     latanoprost (XALATAN) 0.005 % ophthalmic solution Place 1 drop into both eyes At Bedtime       lisinopril (PRINIVIL/ZESTRIL) 20 MG tablet Take 1 tablet (20 mg) by mouth daily 90 tablet 3     magnesium gluconate (MAGONATE) 500 MG tablet Take 500 mg by mouth daily.         metFORMIN (GLUCOPHAGE-XR) 500 MG 24 hr tablet Take 500 mg by mouth every morning        potassium chloride ER (K-DUR/KLOR-CON M) 10 MEQ CR tablet Take 2 tablets (20meq) in the AM, and 1 tablet (10meq) in the PM. 270 tablet 3     potassium chloride ER (K-DUR/KLOR-CON M) 20 MEQ CR tablet Take by mouth daily Take 2 tablets (40 mEq) in the AM and 1 tablet in the PM on Saturday and Sundays when Lasix is doubled.       sertraline (ZOLOFT) 50 MG tablet Take 25 mg by mouth every evening as needed (Take a half tablet qpm prn)       SUMAtriptan Succinate (IMITREX PO) Take 25 mg by mouth as needed.         timolol (TIMOPTIC) 0.5 % ophthalmic solution Place 1 drop into both eyes every morning        fish oil-omega-3 fatty acids 1000 MG capsule Take 1 g by mouth daily       multivitamin, therapeutic (THERA-VIT) TABS Take 1 tablet  by mouth daily.           ALLERGIES     Allergies   Allergen Reactions     Aspirin      Coumadin [Warfarin]      Spontaneous retroperitoneal bleed.     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     Venous insufficiency        PAST SURGICAL HISTORY:  Past Surgical History:   Procedure Laterality Date     ANESTHESIA CARDIOVERSION  7/23/2012    Procedure: ANESTHESIA CARDIOVERSION;;  Surgeon: Provider, Generic Anesthesia;  Location:  ANESTHESIA -  - DO NOT SCHEDULE     BLEPHAROPLASTY  2005     Cataract Extraction with IOL Bilateral 2012     H ABLATION AV NODE  11/14/2018     IMPLANT PACEMAKER  11/2012    Dual chamber     REPLACE PACEMAKER GENERATOR  11/14/2018    upgrade to CRT       FAMILY HISTORY:  Family History   Problem Relation Age of Onset     Heart Disease Mother 65        mi     Heart Disease Father 45        pnemonia     Heart Disease Brother      Heart Disease Sister      Heart Disease Brother        SOCIAL HISTORY:  Social History     Socioeconomic History     Marital status:      Spouse name: None     Number of children: None     Years of education: None     Highest education level: None   Occupational History     None   Social Needs     Financial resource strain: None     Food insecurity:     Worry: None     Inability: None     Transportation needs:     Medical: None     Non-medical: None   Tobacco Use     Smoking status: Never Smoker     Smokeless tobacco: Never Used   Substance and Sexual Activity     Alcohol  "use: No     Drug use: No     Sexual activity: None   Lifestyle     Physical activity:     Days per week: None     Minutes per session: None     Stress: None   Relationships     Social connections:     Talks on phone: None     Gets together: None     Attends [employer] service: None     Active member of club or organization: None     Attends meetings of clubs or organizations: None     Relationship status: None     Intimate partner violence:     Fear of current or ex partner: None     Emotionally abused: None     Physically abused: None     Forced sexual activity: None   Other Topics Concern     Parent/sibling w/ CABG, MI or angioplasty before 65F 55M? Not Asked      Service Not Asked     Blood Transfusions Not Asked     Caffeine Concern Yes     Comment: no caffeine intake     Occupational Exposure Not Asked     Hobby Hazards Not Asked     Sleep Concern Not Asked     Stress Concern Not Asked     Weight Concern Not Asked     Special Diet Yes     Comment: no sugar, salt or fats      Back Care Not Asked     Exercise Yes     Comment: treadmill, swimming daily      Bike Helmet Not Asked     Seat Belt Yes     Self-Exams Not Asked   Social History Narrative     None       Review of Systems:  Skin:  Negative       Eyes:  Positive for   cataract extraction of both eyes  ENT:  Negative      Respiratory:  Negative       Cardiovascular:    Positive for;palpitations;chest pain;edema chest pain does not last long  Gastroenterology: Negative      Genitourinary:  Positive for urinary frequency due to water pill  Musculoskeletal:  Positive for arthritis right knee pain  Neurologic:  Negative      Psychiatric:  Positive for excessive stress;anxiety;depression    Heme/Lymph/Imm:  Negative      Endocrine:  Positive for diabetes      Physical Exam:  Vitals: /70 (BP Location: Right arm, Patient Position: Chair, Cuff Size: Adult Regular)   Pulse 72   Ht 1.626 m (5' 4\")   Wt 60.5 kg (133 lb 6.4 oz)   BMI 22.90 kg/m  "     Constitutional:  cooperative thin      Skin:  warm and dry to the touch          Head:  normocephalic        Eyes:           Lymph:No Cervical lymphadenopathy present     ENT:  no pallor or cyanosis        Neck:  JVP normal        Respiratory:  clear to auscultation         Cardiac:   irregularly irregular rhythm              pulses full and equal                                        GI:  abdomen soft;non-tender        Extremities and Muscular Skeletal:      bilateral LE edema;1+          Neurological:  no gross motor deficits        Psych:  Alert and Oriented x 3        CC  Mary Domínguez, APRN CNP  [address]  [place] [address]

## 2019-07-30 NOTE — LETTER
7/30/2019    [facility] [address]  [facility] [address]    RE: Zayda Damonhaddam       Dear Colleague,    I had the pleasure of seeing Zayda Hawkins in the [facility].    HPI and Plan:   Service Date: 03/07/2018      REASON FOR VISIT:  Follow up for lower extremity edema and chronic venous insufficiency.      HISTORY OF PRESENT ILLNESS:  I had the pleasure of seeing Zayda Hawkins at the [facility] in [place] this afternoon.  She is a very pleasant, 86-year-old female with a history of chronic lower extremity venous insufficiency, paroxysmal atrial fibrillation, coronary artery disease and hypertension.  We have been following her for the last several years for chronic lower extremity edema.  She was placed on compression stockings a few years ago, and her symptoms have improved but over the past year she developed worsening edema on the right. She recently underwent unknown venous procedure at an outside clinic. Details of that procedure are unclear     She remains active and goes to the [facility] on a daily basis.  She walks up to an hour a day.      IMPRESSION AND PLAN:   1.  Bilateral lower extremity edema likely due to chronic venous insufficiency.   2.  Atrial fibrillation  3.  Coronary artery disease, stable   4.  Hypertension, stable.      She developed worsening right leg edema this past year although symptoms are now better with stronger stockings. I have asked her to send me records of the recent venous procedure. If she didn't have an ablation, I would recommend repeat competency ultrasound here and possible right GSV ablation.      She will continue to wear compression stockings. Follow up with vascular NP in 6 months    I appreciate the opportunity to be part of this patient's care.         DO BARKLEY MD           Orders Placed This Encounter   Procedures     Follow-Up with Cardiac Advanced  Practice Provider       No orders of the defined types were placed in this encounter.      There are no discontinued medications.      Encounter Diagnoses   Name Primary?     Edema, unspecified type      Venous insufficiency Yes       CURRENT MEDICATIONS:  Current Outpatient Medications   Medication Sig Dispense Refill     acetaminophen (TYLENOL) 325 MG tablet Take 2 tablets (650 mg) by mouth every 4 hours as needed for mild pain or fever       apixaban ANTICOAGULANT (ELIQUIS) 5 MG tablet Take 1 tablet (5 mg) by mouth 2 times daily 180 tablet 3     calcium carbonate-vitamin D 500-400 MG-UNIT TABS Take 1 tablet by mouth 2 times daily.         dorzolamide-timolol PF (COSOPT) 22.3-6.8 MG/ML opthalmic solution Place 1 drop into both eyes 2 times daily 10 mL vial       furosemide (LASIX) 20 MG tablet Take 20 mg by mouth 2 times daily Take 1 tablet (20 mg) in morning and 1 tablet (20 mg) in afternoon on Saturday and Sunday       furosemide (LASIX) 20 MG tablet Take 1 tablet (20 mg) by mouth daily (Patient taking differently: Take 20 mg by mouth five times a week ) 90 tablet 3     latanoprost (XALATAN) 0.005 % ophthalmic solution Place 1 drop into both eyes At Bedtime       lisinopril (PRINIVIL/ZESTRIL) 20 MG tablet Take 1 tablet (20 mg) by mouth daily 90 tablet 3     magnesium gluconate (MAGONATE) 500 MG tablet Take 500 mg by mouth daily.         metFORMIN (GLUCOPHAGE-XR) 500 MG 24 hr tablet Take 500 mg by mouth every morning        potassium chloride ER (K-DUR/KLOR-CON M) 10 MEQ CR tablet Take 2 tablets (20meq) in the AM, and 1 tablet (10meq) in the PM. 270 tablet 3     potassium chloride ER (K-DUR/KLOR-CON M) 20 MEQ CR tablet Take by mouth daily Take 2 tablets (40 mEq) in the AM and 1 tablet in the PM on Saturday and Sundays when Lasix is doubled.       sertraline (ZOLOFT) 50 MG tablet Take 25 mg by mouth every evening as needed (Take a half tablet qpm prn)       SUMAtriptan Succinate (IMITREX PO) Take 25 mg by mouth as  needed.         timolol (TIMOPTIC) 0.5 % ophthalmic solution Place 1 drop into both eyes every morning        fish oil-omega-3 fatty acids 1000 MG capsule Take 1 g by mouth daily       multivitamin, therapeutic (THERA-VIT) TABS Take 1 tablet by mouth daily.           ALLERGIES     Allergies   Allergen Reactions     Aspirin      Coumadin [Warfarin]      Spontaneous retroperitoneal bleed.     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     Venous insufficiency        PAST SURGICAL HISTORY:  Past Surgical History:   Procedure Laterality Date     ANESTHESIA CARDIOVERSION  7/23/2012    Procedure: ANESTHESIA CARDIOVERSION;;  Surgeon: Provider, Generic Anesthesia;  Location:  ANESTHESIA - RH - DO NOT SCHEDULE     BLEPHAROPLASTY  2005     Cataract Extraction with IOL Bilateral 2012     H ABLATION AV NODE  11/14/2018     IMPLANT PACEMAKER  11/2012    Dual chamber     REPLACE PACEMAKER GENERATOR  11/14/2018    upgrade to CRT       FAMILY HISTORY:  Family History   Problem Relation Age of Onset     Heart Disease Mother 65        mi     Heart Disease Father 45        pnemonia     Heart Disease Brother      Heart Disease Sister      Heart Disease Brother        SOCIAL HISTORY:  Social History     Socioeconomic History     Marital status:      Spouse name: None     Number of children: None     Years of education: None     Highest education level: None   Occupational History     None   Social Needs     Financial resource strain: None      Food insecurity:     Worry: None     Inability: None     Transportation needs:     Medical: None     Non-medical: None   Tobacco Use     Smoking status: Never Smoker     Smokeless tobacco: Never Used   Substance and Sexual Activity     Alcohol use: No     Drug use: No     Sexual activity: None   Lifestyle     Physical activity:     Days per week: None     Minutes per session: None     Stress: None   Relationships     Social connections:     Talks on phone: None     Gets together: None     Attends Latter day service: None     Active member of club or organization: None     Attends meetings of clubs or organizations: None     Relationship status: None     Intimate partner violence:     Fear of current or ex partner: None     Emotionally abused: None     Physically abused: None     Forced sexual activity: None   Other Topics Concern     Parent/sibling w/ CABG, MI or angioplasty before 65F 55M? Not Asked      Service Not Asked     Blood Transfusions Not Asked     Caffeine Concern Yes     Comment: no caffeine intake     Occupational Exposure Not Asked     Hobby Hazards Not Asked     Sleep Concern Not Asked     Stress Concern Not Asked     Weight Concern Not Asked     Special Diet Yes     Comment: no sugar, salt or fats      Back Care Not Asked     Exercise Yes     Comment: treadmill, swimming daily      Bike Helmet Not Asked     Seat Belt Yes     Self-Exams Not Asked   Social History Narrative     None       Review of Systems:  Skin:  Negative       Eyes:  Positive for   cataract extraction of both eyes  ENT:  Negative      Respiratory:  Negative       Cardiovascular:    Positive for;palpitations;chest pain;edema chest pain does not last long  Gastroenterology: Negative      Genitourinary:  Positive for urinary frequency due to water pill  Musculoskeletal:  Positive for arthritis right knee pain  Neurologic:  Negative      Psychiatric:  Positive for excessive stress;anxiety;depression    Heme/Lymph/Imm:  Negative   "    Endocrine:  Positive for diabetes      Physical Exam:  Vitals: /70 (BP Location: Right arm, Patient Position: Chair, Cuff Size: Adult Regular)   Pulse 72   Ht 1.626 m (5' 4\")   Wt 60.5 kg (133 lb 6.4 oz)   BMI 22.90 kg/m       Constitutional:  cooperative thin      Skin:  warm and dry to the touch          Head:  normocephalic        Eyes:           Lymph:No Cervical lymphadenopathy present     ENT:  no pallor or cyanosis        Neck:  JVP normal        Respiratory:  clear to auscultation         Cardiac:   irregularly irregular rhythm              pulses full and equal                                        GI:  abdomen soft;non-tender        Extremities and Muscular Skeletal:      bilateral LE edema;1+          Neurological:  no gross motor deficits        Psych:  Alert and Oriented x 3                  Thank you for allowing me to participate in the care of your patient.    Sincerely,     Kingsley Brandon MD, MD     [facility]    "

## 2019-07-30 NOTE — LETTER
7/30/2019    [facility] [address]  [facility] [address]    RE: Zayda Damonhaddam       Dear Colleague,    I had the pleasure of seeing Zayda Hawkins in the [facility].    HPI and Plan:   Service Date: 03/07/2018      REASON FOR VISIT:  Follow up for lower extremity edema and chronic venous insufficiency.      HISTORY OF PRESENT ILLNESS:  I had the pleasure of seeing Zayda Hawkins at the [facility] in [place] this afternoon.  She is a very pleasant, 86-year-old female with a history of chronic lower extremity venous insufficiency, paroxysmal atrial fibrillation, coronary artery disease and hypertension.  We have been following her for the last several years for chronic lower extremity edema.  She was placed on compression stockings a few years ago, and her symptoms have improved but over the past year she developed worsening edema on the right. She recently underwent unknown venous procedure at an outside clinic. Details of that procedure are unclear     She remains active and goes to the [facility] on a daily basis.  She walks up to an hour a day.      IMPRESSION AND PLAN:   1.  Bilateral lower extremity edema likely due to chronic venous insufficiency.   2.  Atrial fibrillation  3.  Coronary artery disease, stable   4.  Hypertension, stable.      She developed worsening right leg edema this past year although symptoms are now better with stronger stockings. I have asked her to send me records of the recent venous procedure. If she didn't have an ablation, I would recommend repeat competency ultrasound here and possible right GSV ablation.      She will continue to wear compression stockings. Follow up with vascular NP in 6 months    I appreciate the opportunity to be part of this patient's care.         DO BARKLEY MD           Orders Placed This Encounter   Procedures     Follow-Up with Cardiac Advanced  Practice Provider       No orders of the defined types were placed in this encounter.      There are no discontinued medications.      Encounter Diagnoses   Name Primary?     Edema, unspecified type      Venous insufficiency Yes       CURRENT MEDICATIONS:  Current Outpatient Medications   Medication Sig Dispense Refill     acetaminophen (TYLENOL) 325 MG tablet Take 2 tablets (650 mg) by mouth every 4 hours as needed for mild pain or fever       apixaban ANTICOAGULANT (ELIQUIS) 5 MG tablet Take 1 tablet (5 mg) by mouth 2 times daily 180 tablet 3     calcium carbonate-vitamin D 500-400 MG-UNIT TABS Take 1 tablet by mouth 2 times daily.         dorzolamide-timolol PF (COSOPT) 22.3-6.8 MG/ML opthalmic solution Place 1 drop into both eyes 2 times daily 10 mL vial       furosemide (LASIX) 20 MG tablet Take 20 mg by mouth 2 times daily Take 1 tablet (20 mg) in morning and 1 tablet (20 mg) in afternoon on Saturday and Sunday       furosemide (LASIX) 20 MG tablet Take 1 tablet (20 mg) by mouth daily (Patient taking differently: Take 20 mg by mouth five times a week ) 90 tablet 3     latanoprost (XALATAN) 0.005 % ophthalmic solution Place 1 drop into both eyes At Bedtime       lisinopril (PRINIVIL/ZESTRIL) 20 MG tablet Take 1 tablet (20 mg) by mouth daily 90 tablet 3     magnesium gluconate (MAGONATE) 500 MG tablet Take 500 mg by mouth daily.         metFORMIN (GLUCOPHAGE-XR) 500 MG 24 hr tablet Take 500 mg by mouth every morning        potassium chloride ER (K-DUR/KLOR-CON M) 10 MEQ CR tablet Take 2 tablets (20meq) in the AM, and 1 tablet (10meq) in the PM. 270 tablet 3     potassium chloride ER (K-DUR/KLOR-CON M) 20 MEQ CR tablet Take by mouth daily Take 2 tablets (40 mEq) in the AM and 1 tablet in the PM on Saturday and Sundays when Lasix is doubled.       sertraline (ZOLOFT) 50 MG tablet Take 25 mg by mouth every evening as needed (Take a half tablet qpm prn)       SUMAtriptan Succinate (IMITREX PO) Take 25 mg by mouth as  needed.         timolol (TIMOPTIC) 0.5 % ophthalmic solution Place 1 drop into both eyes every morning        fish oil-omega-3 fatty acids 1000 MG capsule Take 1 g by mouth daily       multivitamin, therapeutic (THERA-VIT) TABS Take 1 tablet by mouth daily.           ALLERGIES     Allergies   Allergen Reactions     Aspirin      Coumadin [Warfarin]      Spontaneous retroperitoneal bleed.     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     Venous insufficiency        PAST SURGICAL HISTORY:  Past Surgical History:   Procedure Laterality Date     ANESTHESIA CARDIOVERSION  7/23/2012    Procedure: ANESTHESIA CARDIOVERSION;;  Surgeon: Provider, Generic Anesthesia;  Location:  ANESTHESIA - [facility] - DO NOT SCHEDULE     BLEPHAROPLASTY  2005     Cataract Extraction with IOL Bilateral 2012     H ABLATION AV NODE  11/14/2018     IMPLANT PACEMAKER  11/2012    Dual chamber     REPLACE PACEMAKER GENERATOR  11/14/2018    upgrade to CRT       FAMILY HISTORY:  Family History   Problem Relation Age of Onset     Heart Disease Mother 65        mi     Heart Disease Father 45        pnemonia     Heart Disease Brother      Heart Disease Sister      Heart Disease Brother        SOCIAL HISTORY:  Social History     Socioeconomic History     Marital status:      Spouse name: None     Number of children: None     Years of education: None     Highest education level: None   Occupational History     None   Social Needs     Financial resource strain: None      Food insecurity:     Worry: None     Inability: None     Transportation needs:     Medical: None     Non-medical: None   Tobacco Use     Smoking status: Never Smoker     Smokeless tobacco: Never Used   Substance and Sexual Activity     Alcohol use: No     Drug use: No     Sexual activity: None   Lifestyle     Physical activity:     Days per week: None     Minutes per session: None     Stress: None   Relationships     Social connections:     Talks on phone: None     Gets together: None     Attends Scientology service: None     Active member of club or organization: None     Attends meetings of clubs or organizations: None     Relationship status: None     Intimate partner violence:     Fear of current or ex partner: None     Emotionally abused: None     Physically abused: None     Forced sexual activity: None   Other Topics Concern     Parent/sibling w/ CABG, MI or angioplasty before 65F 55M? Not Asked      Service Not Asked     Blood Transfusions Not Asked     Caffeine Concern Yes     Comment: no caffeine intake     Occupational Exposure Not Asked     Hobby Hazards Not Asked     Sleep Concern Not Asked     Stress Concern Not Asked     Weight Concern Not Asked     Special Diet Yes     Comment: no sugar, salt or fats      Back Care Not Asked     Exercise Yes     Comment: treadmill, swimming daily      Bike Helmet Not Asked     Seat Belt Yes     Self-Exams Not Asked   Social History Narrative     None       Review of Systems:  Skin:  Negative       Eyes:  Positive for   cataract extraction of both eyes  ENT:  Negative      Respiratory:  Negative       Cardiovascular:    Positive for;palpitations;chest pain;edema chest pain does not last long  Gastroenterology: Negative      Genitourinary:  Positive for urinary frequency due to water pill  Musculoskeletal:  Positive for arthritis right knee pain  Neurologic:  Negative      Psychiatric:  Positive for excessive stress;anxiety;depression    Heme/Lymph/Imm:  Negative   "    Endocrine:  Positive for diabetes      Physical Exam:  Vitals: /70 (BP Location: Right arm, Patient Position: Chair, Cuff Size: Adult Regular)   Pulse 72   Ht 1.626 m (5' 4\")   Wt 60.5 kg (133 lb 6.4 oz)   BMI 22.90 kg/m       Constitutional:  cooperative thin      Skin:  warm and dry to the touch          Head:  normocephalic        Eyes:           Lymph:No Cervical lymphadenopathy present     ENT:  no pallor or cyanosis        Neck:  JVP normal        Respiratory:  clear to auscultation         Cardiac:   irregularly irregular rhythm              pulses full and equal                                        GI:  abdomen soft;non-tender        Extremities and Muscular Skeletal:      bilateral LE edema;1+          Neurological:  no gross motor deficits        Psych:  Alert and Oriented x 3        CC  BARBARA Huntley CNP  [address]  [place] [address]                Thank you for allowing me to participate in the care of your patient.      Sincerely,     Kingsley Brandon MD, MD     [facility]    cc:   BARBARA Huntley CNP  [address]  [place] [address]        "

## 2019-08-02 ENCOUNTER — TRANSFERRED RECORDS (OUTPATIENT)
Dept: HEALTH INFORMATION MANAGEMENT | Facility: CLINIC | Age: 84
End: 2019-08-02

## 2019-08-05 ENCOUNTER — TELEPHONE (OUTPATIENT)
Dept: CARDIOLOGY | Facility: CLINIC | Age: 84
End: 2019-08-05

## 2019-08-05 DIAGNOSIS — R60.9 EDEMA, UNSPECIFIED TYPE: Primary | ICD-10-CM

## 2019-08-05 DIAGNOSIS — I87.2 VENOUS INSUFFICIENCY: ICD-10-CM

## 2019-08-05 NOTE — TELEPHONE ENCOUNTER
Received records from [facility]. Pt did have right GSV ablation and bilateral sclerotherapy.     Last saw Dr. Brandon 7/30/19  She developed worsening right leg edema this past year although symptoms are now better with stronger stockings. I have asked her to send me records of the recent venous procedure. If she didn't have an ablation, I would recommend repeat competency ultrasound here and possible right GSV ablation.        Copy of records place in Dr. Robison mailbox to be reviewed. Will route to Dr. Brandon to update

## 2019-08-12 NOTE — TELEPHONE ENCOUNTER
Spoke to Mary Ann, patients , regarding Dr. Brandon's recommendation below. Mary Ann verbalized understanding and requested to be transferred to scheduling. Venous comp ordered.

## 2019-08-21 ENCOUNTER — ANCILLARY PROCEDURE (OUTPATIENT)
Dept: VASCULAR ULTRASOUND | Facility: CLINIC | Age: 84
End: 2019-08-21
Attending: INTERNAL MEDICINE
Payer: COMMERCIAL

## 2019-08-21 DIAGNOSIS — R60.9 EDEMA, UNSPECIFIED TYPE: ICD-10-CM

## 2019-08-21 DIAGNOSIS — I87.2 VENOUS INSUFFICIENCY: ICD-10-CM

## 2019-08-21 PROCEDURE — 93970 EXTREMITY STUDY: CPT | Mod: 26 | Performed by: INTERNAL MEDICINE

## 2019-08-26 ENCOUNTER — TELEPHONE (OUTPATIENT)
Dept: CARDIOLOGY | Facility: CLINIC | Age: 84
End: 2019-08-26

## 2019-08-26 DIAGNOSIS — I87.2 VENOUS (PERIPHERAL) INSUFFICIENCY: Primary | ICD-10-CM

## 2019-08-26 NOTE — TELEPHONE ENCOUNTER
RN will send venous comp results to Dr. Brandon for review.       8/21/19 OV Dr. Brandon  Impression:  1. Right leg: No DVT. Deep vein reflux in the common femoral vein (3.2  seconds). GSV is occluded. Severe reflux in an anterior accessory  saphenous vein (4.6 seconds, 3.7 mm). Incidentally found synovial cyst  2.3 x 1.5 cm popliteal fossa.    2. Left leg: No DVT. Severe deep vein reflux in the femoral vein (4.1  seconds) and popliteal vein (5.5 seconds). GSV not visualized,  previous stripping.    If clinically indicated, patient would be a candidate for right AASV  VenaSeal closure.          8/5/19 telephone encounter  Spoke to Mary Ann, patients , regarding Dr. Brandon's recommendation below. Mary Ann verbalized understanding and requested to be transferred to scheduling. Venous comp ordered.                  9:33 AM      Shahid Edwards, RN contacted Mary Ann (Emergency Contact)    August 10, 2019   Kingsley Brandon MD   to [facility] Team 4         11:14 PM   Note      Recommend repeat competency ultrasound when convenient to see if she has residual reflex.      August 5, 2019              11:36 AM   Shahid Edwards, RN routed this conversation to Kingsley Brandon MD Rossman, Jacqlyn, RN           11:33 AM   Note      Received records from [facility]. Pt did have right GSV ablation and bilateral sclerotherapy.      Last saw Dr. Brandon 7/30/19  She developed worsening right leg edema this past year although symptoms are now better with stronger stockings. I have asked her to send me records of the recent venous procedure. If she didn't have an ablation, I would recommend repeat competency ultrasound here and possible right GSV ablation.          Copy of records place in Dr. Robison mailbox to be reviewed. Will route to Dr. Brnadon to update

## 2019-08-27 NOTE — TELEPHONE ENCOUNTER
Order placed for follow up in vein clinic with Dr. Goldstein or Dr. Price. Contacted patient's  to review Dr. Brandon's recommendations. Spoke with Mary Ann about Dr. Brandon's recommendations. OV made with MAGALY Estrada to discuss possible vein procedure. No further questions.

## 2019-09-18 ENCOUNTER — OFFICE VISIT (OUTPATIENT)
Dept: CARDIOLOGY | Facility: CLINIC | Age: 84
End: 2019-09-18
Payer: COMMERCIAL

## 2019-09-18 VITALS
BODY MASS INDEX: 22.59 KG/M2 | HEART RATE: 80 BPM | HEIGHT: 64 IN | SYSTOLIC BLOOD PRESSURE: 168 MMHG | DIASTOLIC BLOOD PRESSURE: 87 MMHG | WEIGHT: 132.3 LBS

## 2019-09-18 DIAGNOSIS — I83.891 VARICOSE VEINS OF LEG WITH SWELLING, RIGHT: ICD-10-CM

## 2019-09-18 DIAGNOSIS — I87.2 VENOUS (PERIPHERAL) INSUFFICIENCY: Primary | ICD-10-CM

## 2019-09-18 PROCEDURE — 99214 OFFICE O/P EST MOD 30 MIN: CPT | Performed by: NURSE PRACTITIONER

## 2019-09-18 RX ORDER — TRIAMCINOLONE ACETONIDE 1 MG/G
CREAM TOPICAL 2 TIMES DAILY
COMMUNITY

## 2019-09-18 ASSESSMENT — MIFFLIN-ST. JEOR: SCORE: 1025.11

## 2019-09-18 NOTE — LETTER
9/18/2019    [place] [facility] [address]  [facility] [address]    RE: Zayda Hawkins       Dear Colleague,    I had the pleasure of seeing Zayda Hawkins in the [facility].    Vein and Edema Clinic Progress Note  Zayda Hawkins MRN# [id number]   YOB: 1932 Age: 86 year old          Assessment and Plan:       1. Venous insufficiency    Status post right great saphenous vein ablation with bilateral sclerotherapy at outside clinic approximately 3 months ago    Residual right lower extremity edema    Venous competency study showed incompetent right anterior accessory saphenous vein    Proceed with right anterior accessory saphenous vein VenaSeal    Follow-up 1 month post    2. Atrial fibrillation    Status post AV node ablation and upgrade to BiV PPM 11/2018 due to difficultly to rate control    Eliquis 5 mg twice daily    3. CVA    Eliquis 5 mg twice daily         History of Presenting Illness:      Zayda Hawkins is a very pleasant 86 year old patient who presents today with the aid of a [facility] , Mary Ann.  She has a past medical history significant for chronic bilateral lower extremity venous insufficiency, paroxysmal atrial fibrillation, heart failure with reduced EF, diabetes, coronary artery disease, and hypertension.     In July 2019 she was evaluated by Dr. Brandon in the venous treatment clinic.  She had noted that she had a previous right great saphenous vein ablation plus bilateral sclerotherapy 2 to 3 months ago.  Given residual right lower extremity edema repeat venous competency study was completed showing reflux of the right anterior accessory saphenous vein that would be amenable to VenaSeal.    Discussed the results of the venous insufficiency study with the patient via the .  She wishes to proceed with the right anterior accessory ablation.    History of ulcer: No   History of  "edema: Yes  History of compression stockings: Yes  History of leg discomfort requiring analgesics: No  History of itching, skin change or restless legs: Yes             Review of Systems:     Review of Systems:  Skin:  Negative rash;bruising   Eyes:  Positive for cataracts  ENT:  Negative    Respiratory:  Negative    Cardiovascular:  Negative Positive for;palpitations;chest pain;edema  Gastroenterology: Negative poor appetite  Genitourinary:  Positive for urinary frequency  Musculoskeletal:  Positive for arthritis  Neurologic:  Negative headaches  Psychiatric:  Positive for excessive stress;anxiety;depression  Heme/Lymph/Imm:  Negative bleeding disorder  Endocrine:  Positive for diabetes              Physical Exam:     Vitals: BP (!) 168/87   Pulse 80   Ht 1.626 m (5' 4\")   Wt 60 kg (132 lb 4.8 oz)   BMI 22.71 kg/m     Constitutional:  cooperative thin      Skin:  warm and dry to the touch        Head:  normocephalic        Eyes:  pupils equal and round        ENT:  no pallor or cyanosis        Neck:  JVP normal        Chest:  clear to auscultation basal rales      Cardiac: regular rhythm irregularly irregular rhythm     systolic murmur          Abdomen:  not assessed this visit        Vascular: pulses full and equal                                      Extremities and Back:      Right lower extremity has hemosiderin deposits    Neurological:  no gross motor deficits             Medications:     Current Outpatient Medications   Medication Sig Dispense Refill     acetaminophen (TYLENOL) 325 MG tablet Take 2 tablets (650 mg) by mouth every 4 hours as needed for mild pain or fever       apixaban ANTICOAGULANT (ELIQUIS) 5 MG tablet Take 1 tablet (5 mg) by mouth 2 times daily 180 tablet 3     calcium carbonate-vitamin D 500-400 MG-UNIT TABS Take 1 tablet by mouth 2 times daily.         dorzolamide-timolol PF (COSOPT) 22.3-6.8 MG/ML opthalmic solution Place 1 drop into both eyes 2 times daily 10 mL vial       " furosemide (LASIX) 20 MG tablet Take 20 mg by mouth 2 times daily Take 1 tablet (20 mg) in morning and 1 tablet (20 mg) in afternoon on Saturday and Sunday       furosemide (LASIX) 20 MG tablet Take 1 tablet (20 mg) by mouth daily (Patient taking differently: Take 20 mg by mouth five times a week ) 90 tablet 3     latanoprost (XALATAN) 0.005 % ophthalmic solution Place 1 drop into both eyes At Bedtime       lisinopril (PRINIVIL/ZESTRIL) 20 MG tablet Take 1 tablet (20 mg) by mouth daily 90 tablet 3     magnesium gluconate (MAGONATE) 500 MG tablet Take 500 mg by mouth daily.         metFORMIN (GLUCOPHAGE-XR) 500 MG 24 hr tablet Take 500 mg by mouth every morning        multivitamin, therapeutic (THERA-VIT) TABS Take 1 tablet by mouth daily.         potassium chloride ER (K-DUR/KLOR-CON M) 10 MEQ CR tablet Take 2 tablets (20meq) in the AM, and 1 tablet (10meq) in the PM. 270 tablet 3     potassium chloride ER (K-DUR/KLOR-CON M) 20 MEQ CR tablet Take by mouth daily Take 2 tablets (40 mEq) in the AM and 1 tablet in the PM on Saturday and Sundays when Lasix is doubled.       sertraline (ZOLOFT) 50 MG tablet Take 25 mg by mouth every evening as needed (Take a half tablet qpm prn)       timolol (TIMOPTIC) 0.5 % ophthalmic solution Place 1 drop into both eyes every morning        triamcinolone (KENALOG) 0.1 % external cream Apply topically 2 times daily       fish oil-omega-3 fatty acids 1000 MG capsule Take 1 g by mouth daily       SUMAtriptan Succinate (IMITREX PO) Take 25 mg by mouth as needed.               Family History   Problem Relation Age of Onset     Heart Disease Mother 65        mi     Heart Disease Father 45        pnemonia     Heart Disease Brother      Heart Disease Sister      Heart Disease Brother        Social History     Socioeconomic History     Marital status:      Spouse name: Not on file     Number of children: Not on file     Years of education: Not on file     Highest education level: Not on  file   Occupational History     Not on file   Social Needs     Financial resource strain: Not on file     Food insecurity:     Worry: Not on file     Inability: Not on file     Transportation needs:     Medical: Not on file     Non-medical: Not on file   Tobacco Use     Smoking status: Never Smoker     Smokeless tobacco: Never Used   Substance and Sexual Activity     Alcohol use: No     Drug use: No     Sexual activity: Not on file   Lifestyle     Physical activity:     Days per week: Not on file     Minutes per session: Not on file     Stress: Not on file   Relationships     Social connections:     Talks on phone: Not on file     Gets together: Not on file     Attends Jainism service: Not on file     Active member of club or organization: Not on file     Attends meetings of clubs or organizations: Not on file     Relationship status: Not on file     Intimate partner violence:     Fear of current or ex partner: Not on file     Emotionally abused: Not on file     Physically abused: Not on file     Forced sexual activity: Not on file   Other Topics Concern     Parent/sibling w/ CABG, MI or angioplasty before 65F 55M? Not Asked      Service Not Asked     Blood Transfusions Not Asked     Caffeine Concern Yes     Comment: no caffeine intake     Occupational Exposure Not Asked     Hobby Hazards Not Asked     Sleep Concern Not Asked     Stress Concern Not Asked     Weight Concern Not Asked     Special Diet Yes     Comment: no sugar, salt or fats      Back Care Not Asked     Exercise Yes     Comment: treadmill, swimming daily      Bike Helmet Not Asked     Seat Belt Yes     Self-Exams Not Asked   Social History Narrative     Not on file            Past Medical History:     Past Medical History:   Diagnosis Date     Atrial fibrillation (H)     not on anticoagulation, hx RP bleed     CAD (coronary artery disease)     CT angio: mild lesion in the LAD, as well as 1st diagonal, and mild plaque in the proximal and  mid  RCA     CVA (cerebral vascular accident) (H) 10/2018    Acute left posterior circulation ischemic stroke      Diabetes mellitus (H)      Hyperlipidemia      Hypertension      Retroperitoneal bleed 7/27/2012     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     Venous insufficiency               Past Surgical History:     Past Surgical History:   Procedure Laterality Date     ANESTHESIA CARDIOVERSION  7/23/2012    Procedure: ANESTHESIA CARDIOVERSION;;  Surgeon: Provider, Generic Anesthesia;  Location:  ANESTHESIA - RH - DO NOT SCHEDULE     BLEPHAROPLASTY  2005     Cataract Extraction with IOL Bilateral 2012     H ABLATION AV NODE  11/14/2018     IMPLANT PACEMAKER  11/2012    Dual chamber     REPLACE PACEMAKER GENERATOR  11/14/2018    upgrade to CRT              Allergies:   Aspirin; Coumadin [warfarin]; and Penicillins       Data:   All laboratory data reviewed:    LAST CHOLESTEROL:  Lab Results   Component Value Date    CHOL 125 09/27/2018     Lab Results   Component Value Date    HDL 65 09/27/2018     Lab Results   Component Value Date    LDL 51 09/27/2018     Lab Results   Component Value Date    TRIG 47 09/27/2018     Lab Results   Component Value Date    CHOLHDLRATIO 2.2 03/10/2015       LAST BMP:  Lab Results   Component Value Date     07/29/2019      Lab Results   Component Value Date    POTASSIUM 4.0 07/29/2019     Lab Results   Component Value Date    CHLORIDE 108 07/29/2019     Lab Results   Component Value Date    MADDISON 8.7 07/29/2019     Lab Results   Component Value Date    CO2 25 07/29/2019     Lab Results   Component Value Date    BUN 19 07/29/2019     Lab Results   Component Value Date    CR 0.96 07/29/2019     Lab Results   Component Value Date    GLC 90 07/29/2019       LAST CBC:  Lab Results   Component Value Date    WBC 5.6 11/14/2018     Lab Results   Component Value Date    RBC 4.90 11/14/2018     Lab Results   Component Value Date    HGB 12.8 11/26/2018     Lab Results   Component Value Date     HCT 43.4 11/14/2018     Lab Results   Component Value Date    MCV 89 11/14/2018     Lab Results   Component Value Date    MCH 28.6 11/14/2018     Lab Results   Component Value Date    MCHC 32.3 11/14/2018     Lab Results   Component Value Date    RDW 13.8 11/14/2018     Lab Results   Component Value Date     11/14/2018         Thank you for allowing me to participate in the care of your patient.    Sincerely,     BARBARA HOLBROOK [facility]

## 2019-09-18 NOTE — PATIENT INSTRUCTIONS
Today's Recommendations    1. Right lower extremity vein ablation  2. Follow up in 1 month post procedure  3. Continue all other medications without changes.    Please send a [employer] message or call [phone] with questions or concerns.     Scheduling number [phone].

## 2019-09-18 NOTE — PROGRESS NOTES
Vein and Edema Clinic Progress Note  Zayda Hawkins MRN# [id number]   YOB: 1932 Age: 86 year old          Assessment and Plan:       1. Venous insufficiency    Status post right great saphenous vein ablation with bilateral sclerotherapy at outside clinic approximately 3 months ago    Residual right lower extremity edema    Venous competency study showed incompetent right anterior accessory saphenous vein    Proceed with right anterior accessory saphenous vein VenaSeal    Follow-up 1 month post    2. Atrial fibrillation    Status post AV node ablation and upgrade to BiV PPM 11/2018 due to difficultly to rate control    Eliquis 5 mg twice daily    3. CVA    Eliquis 5 mg twice daily         History of Presenting Illness:      Zayda Hawkins is a very pleasant 86 year old patient who presents today with the aid of a [facility] , Mary Ann.  She has a past medical history significant for chronic bilateral lower extremity venous insufficiency, paroxysmal atrial fibrillation, heart failure with reduced EF, diabetes, coronary artery disease, and hypertension.     In July 2019 she was evaluated by Dr. Brandon in the venous treatment clinic.  She had noted that she had a previous right great saphenous vein ablation plus bilateral sclerotherapy 2 to 3 months ago.  Given residual right lower extremity edema repeat venous competency study was completed showing reflux of the right anterior accessory saphenous vein that would be amenable to VenaSeal.    Discussed the results of the venous insufficiency study with the patient via the .  She wishes to proceed with the right anterior accessory ablation.    History of ulcer: No   History of edema: Yes  History of compression stockings: Yes  History of leg discomfort requiring analgesics: No  History of itching, skin change or restless legs: Yes             Review of Systems:     Review of Systems:  Skin:  Negative rash;bruising   Eyes:   "Positive for cataracts  ENT:  Negative    Respiratory:  Negative    Cardiovascular:  Negative Positive for;palpitations;chest pain;edema  Gastroenterology: Negative poor appetite  Genitourinary:  Positive for urinary frequency  Musculoskeletal:  Positive for arthritis  Neurologic:  Negative headaches  Psychiatric:  Positive for excessive stress;anxiety;depression  Heme/Lymph/Imm:  Negative bleeding disorder  Endocrine:  Positive for diabetes              Physical Exam:     Vitals: BP (!) 168/87   Pulse 80   Ht 1.626 m (5' 4\")   Wt 60 kg (132 lb 4.8 oz)   BMI 22.71 kg/m    Constitutional:  cooperative thin      Skin:  warm and dry to the touch        Head:  normocephalic        Eyes:  pupils equal and round        ENT:  no pallor or cyanosis        Neck:  JVP normal        Chest:  clear to auscultation basal rales      Cardiac: regular rhythm irregularly irregular rhythm     systolic murmur          Abdomen:  not assessed this visit        Vascular: pulses full and equal                                      Extremities and Back:      Right lower extremity has hemosiderin deposits    Neurological:  no gross motor deficits             Medications:     Current Outpatient Medications   Medication Sig Dispense Refill     acetaminophen (TYLENOL) 325 MG tablet Take 2 tablets (650 mg) by mouth every 4 hours as needed for mild pain or fever       apixaban ANTICOAGULANT (ELIQUIS) 5 MG tablet Take 1 tablet (5 mg) by mouth 2 times daily 180 tablet 3     calcium carbonate-vitamin D 500-400 MG-UNIT TABS Take 1 tablet by mouth 2 times daily.         dorzolamide-timolol PF (COSOPT) 22.3-6.8 MG/ML opthalmic solution Place 1 drop into both eyes 2 times daily 10 mL vial       furosemide (LASIX) 20 MG tablet Take 20 mg by mouth 2 times daily Take 1 tablet (20 mg) in morning and 1 tablet (20 mg) in afternoon on Saturday and Sunday       furosemide (LASIX) 20 MG tablet Take 1 tablet (20 mg) by mouth daily (Patient taking differently: " Take 20 mg by mouth five times a week ) 90 tablet 3     latanoprost (XALATAN) 0.005 % ophthalmic solution Place 1 drop into both eyes At Bedtime       lisinopril (PRINIVIL/ZESTRIL) 20 MG tablet Take 1 tablet (20 mg) by mouth daily 90 tablet 3     magnesium gluconate (MAGONATE) 500 MG tablet Take 500 mg by mouth daily.         metFORMIN (GLUCOPHAGE-XR) 500 MG 24 hr tablet Take 500 mg by mouth every morning        multivitamin, therapeutic (THERA-VIT) TABS Take 1 tablet by mouth daily.         potassium chloride ER (K-DUR/KLOR-CON M) 10 MEQ CR tablet Take 2 tablets (20meq) in the AM, and 1 tablet (10meq) in the PM. 270 tablet 3     potassium chloride ER (K-DUR/KLOR-CON M) 20 MEQ CR tablet Take by mouth daily Take 2 tablets (40 mEq) in the AM and 1 tablet in the PM on Saturday and Sundays when Lasix is doubled.       sertraline (ZOLOFT) 50 MG tablet Take 25 mg by mouth every evening as needed (Take a half tablet qpm prn)       timolol (TIMOPTIC) 0.5 % ophthalmic solution Place 1 drop into both eyes every morning        triamcinolone (KENALOG) 0.1 % external cream Apply topically 2 times daily       fish oil-omega-3 fatty acids 1000 MG capsule Take 1 g by mouth daily       SUMAtriptan Succinate (IMITREX PO) Take 25 mg by mouth as needed.               Family History   Problem Relation Age of Onset     Heart Disease Mother 65        mi     Heart Disease Father 45        pnemonia     Heart Disease Brother      Heart Disease Sister      Heart Disease Brother        Social History     Socioeconomic History     Marital status:      Spouse name: Not on file     Number of children: Not on file     Years of education: Not on file     Highest education level: Not on file   Occupational History     Not on file   Social Needs     Financial resource strain: Not on file     Food insecurity:     Worry: Not on file     Inability: Not on file     Transportation needs:     Medical: Not on file     Non-medical: Not on file    Tobacco Use     Smoking status: Never Smoker     Smokeless tobacco: Never Used   Substance and Sexual Activity     Alcohol use: No     Drug use: No     Sexual activity: Not on file   Lifestyle     Physical activity:     Days per week: Not on file     Minutes per session: Not on file     Stress: Not on file   Relationships     Social connections:     Talks on phone: Not on file     Gets together: Not on file     Attends Zoroastrian service: Not on file     Active member of club or organization: Not on file     Attends meetings of clubs or organizations: Not on file     Relationship status: Not on file     Intimate partner violence:     Fear of current or ex partner: Not on file     Emotionally abused: Not on file     Physically abused: Not on file     Forced sexual activity: Not on file   Other Topics Concern     Parent/sibling w/ CABG, MI or angioplasty before 65F 55M? Not Asked      Service Not Asked     Blood Transfusions Not Asked     Caffeine Concern Yes     Comment: no caffeine intake     Occupational Exposure Not Asked     Hobby Hazards Not Asked     Sleep Concern Not Asked     Stress Concern Not Asked     Weight Concern Not Asked     Special Diet Yes     Comment: no sugar, salt or fats      Back Care Not Asked     Exercise Yes     Comment: treadmill, swimming daily      Bike Helmet Not Asked     Seat Belt Yes     Self-Exams Not Asked   Social History Narrative     Not on file            Past Medical History:     Past Medical History:   Diagnosis Date     Atrial fibrillation (H)     not on anticoagulation, hx RP bleed     CAD (coronary artery disease)     CT angio: mild lesion in the LAD, as well as 1st diagonal, and mild plaque in the proximal and  mid RCA     CVA (cerebral vascular accident) (H) 10/2018    Acute left posterior circulation ischemic stroke      Diabetes mellitus (H)      Hyperlipidemia      Hypertension      Retroperitoneal bleed 7/27/2012     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      Venous insufficiency               Past Surgical History:     Past Surgical History:   Procedure Laterality Date     ANESTHESIA CARDIOVERSION  7/23/2012    Procedure: ANESTHESIA CARDIOVERSION;;  Surgeon: Provider, Generic Anesthesia;  Location:  ANESTHESIA - RH - DO NOT SCHEDULE     BLEPHAROPLASTY  2005     Cataract Extraction with IOL Bilateral 2012     H ABLATION AV NODE  11/14/2018     IMPLANT PACEMAKER  11/2012    Dual chamber     REPLACE PACEMAKER GENERATOR  11/14/2018    upgrade to CRT              Allergies:   Aspirin; Coumadin [warfarin]; and Penicillins       Data:   All laboratory data reviewed:    LAST CHOLESTEROL:  Lab Results   Component Value Date    CHOL 125 09/27/2018     Lab Results   Component Value Date    HDL 65 09/27/2018     Lab Results   Component Value Date    LDL 51 09/27/2018     Lab Results   Component Value Date    TRIG 47 09/27/2018     Lab Results   Component Value Date    CHOLHDLRATIO 2.2 03/10/2015       LAST BMP:  Lab Results   Component Value Date     07/29/2019      Lab Results   Component Value Date    POTASSIUM 4.0 07/29/2019     Lab Results   Component Value Date    CHLORIDE 108 07/29/2019     Lab Results   Component Value Date    MADDISON 8.7 07/29/2019     Lab Results   Component Value Date    CO2 25 07/29/2019     Lab Results   Component Value Date    BUN 19 07/29/2019     Lab Results   Component Value Date    CR 0.96 07/29/2019     Lab Results   Component Value Date    GLC 90 07/29/2019       LAST CBC:  Lab Results   Component Value Date    WBC 5.6 11/14/2018     Lab Results   Component Value Date    RBC 4.90 11/14/2018     Lab Results   Component Value Date    HGB 12.8 11/26/2018     Lab Results   Component Value Date    HCT 43.4 11/14/2018     Lab Results   Component Value Date    MCV 89 11/14/2018     Lab Results   Component Value Date    MCH 28.6 11/14/2018     Lab Results   Component Value Date    MCHC 32.3 11/14/2018     Lab Results   Component Value Date    RDW  13.8 11/14/2018     Lab Results   Component Value Date     11/14/2018         SIXTO MCFARLAND, APRN CNP, APRN, CNP

## 2019-10-17 DIAGNOSIS — I83.891 VARICOSE VEINS OF LEG WITH EDEMA, RIGHT: Primary | ICD-10-CM

## 2019-11-21 ENCOUNTER — ANCILLARY PROCEDURE (OUTPATIENT)
Dept: CARDIOLOGY | Facility: CLINIC | Age: 84
End: 2019-11-21
Attending: INTERNAL MEDICINE
Payer: COMMERCIAL

## 2019-11-21 DIAGNOSIS — Z95.0 PACEMAKER: ICD-10-CM

## 2019-11-21 PROCEDURE — 93294 REM INTERROG EVL PM/LDLS PM: CPT | Performed by: INTERNAL MEDICINE

## 2019-11-27 LAB
MDC_IDC_EPISODE_DTM: NORMAL
MDC_IDC_EPISODE_DURATION: 280 S
MDC_IDC_EPISODE_DURATION: 2928 S
MDC_IDC_EPISODE_DURATION: 42 S
MDC_IDC_EPISODE_DURATION: 44 S
MDC_IDC_EPISODE_DURATION: 46 S
MDC_IDC_EPISODE_DURATION: 7626 S
MDC_IDC_EPISODE_DURATION: NORMAL S
MDC_IDC_EPISODE_ID: NORMAL
MDC_IDC_EPISODE_TYPE: NORMAL
MDC_IDC_LEAD_IMPLANT_DT: NORMAL
MDC_IDC_LEAD_LOCATION: NORMAL
MDC_IDC_LEAD_LOCATION_DETAIL_1: NORMAL
MDC_IDC_LEAD_MFG: NORMAL
MDC_IDC_LEAD_MODEL: NORMAL
MDC_IDC_LEAD_POLARITY_TYPE: NORMAL
MDC_IDC_LEAD_SERIAL: NORMAL
MDC_IDC_MSMT_BATTERY_DTM: NORMAL
MDC_IDC_MSMT_BATTERY_REMAINING_LONGEVITY: 99 MO
MDC_IDC_MSMT_BATTERY_REMAINING_PERCENTAGE: 95.5 %
MDC_IDC_MSMT_BATTERY_RRT_TRIGGER: NORMAL
MDC_IDC_MSMT_BATTERY_STATUS: NORMAL
MDC_IDC_MSMT_BATTERY_VOLTAGE: 2.99 V
MDC_IDC_MSMT_LEADCHNL_LV_IMPEDANCE_VALUE: 940 OHM
MDC_IDC_MSMT_LEADCHNL_LV_LEAD_CHANNEL_STATUS: NORMAL
MDC_IDC_MSMT_LEADCHNL_LV_PACING_THRESHOLD_AMPLITUDE: 1.62 V
MDC_IDC_MSMT_LEADCHNL_LV_PACING_THRESHOLD_PULSEWIDTH: 0.5 MS
MDC_IDC_MSMT_LEADCHNL_RA_IMPEDANCE_VALUE: 390 OHM
MDC_IDC_MSMT_LEADCHNL_RA_LEAD_CHANNEL_STATUS: NORMAL
MDC_IDC_MSMT_LEADCHNL_RA_SENSING_INTR_AMPL: 0.7 MV
MDC_IDC_MSMT_LEADCHNL_RV_IMPEDANCE_VALUE: 490 OHM
MDC_IDC_MSMT_LEADCHNL_RV_LEAD_CHANNEL_STATUS: NORMAL
MDC_IDC_MSMT_LEADCHNL_RV_PACING_THRESHOLD_AMPLITUDE: 0.5 V
MDC_IDC_MSMT_LEADCHNL_RV_PACING_THRESHOLD_PULSEWIDTH: 0.5 MS
MDC_IDC_MSMT_LEADCHNL_RV_SENSING_INTR_AMPL: 10.8 MV
MDC_IDC_PG_IMPLANT_DTM: NORMAL
MDC_IDC_PG_MFG: NORMAL
MDC_IDC_PG_MODEL: NORMAL
MDC_IDC_PG_SERIAL: NORMAL
MDC_IDC_PG_TYPE: NORMAL
MDC_IDC_SESS_CLINIC_NAME: NORMAL
MDC_IDC_SESS_DTM: NORMAL
MDC_IDC_SESS_REPROGRAMMED: NO
MDC_IDC_SESS_TYPE: NORMAL
MDC_IDC_SET_BRADY_AT_MODE_SWITCH_MODE: NORMAL
MDC_IDC_SET_BRADY_AT_MODE_SWITCH_RATE: 160 {BEATS}/MIN
MDC_IDC_SET_BRADY_LOWRATE: 60 {BEATS}/MIN
MDC_IDC_SET_BRADY_MAX_SENSOR_RATE: 120 {BEATS}/MIN
MDC_IDC_SET_BRADY_MAX_TRACKING_RATE: 120 {BEATS}/MIN
MDC_IDC_SET_BRADY_MODE: NORMAL
MDC_IDC_SET_BRADY_PAV_DELAY_HIGH: 100 MS
MDC_IDC_SET_BRADY_PAV_DELAY_LOW: 200 MS
MDC_IDC_SET_BRADY_SAV_DELAY_HIGH: 100 MS
MDC_IDC_SET_BRADY_SAV_DELAY_LOW: 170 MS
MDC_IDC_SET_CRT_LVRV_DELAY: 0 MS
MDC_IDC_SET_CRT_PACED_CHAMBERS: NORMAL
MDC_IDC_SET_LEADCHNL_LV_PACING_AMPLITUDE: 2.62
MDC_IDC_SET_LEADCHNL_LV_PACING_ANODE_ELECTRODE_1: NORMAL
MDC_IDC_SET_LEADCHNL_LV_PACING_ANODE_LOCATION_1: NORMAL
MDC_IDC_SET_LEADCHNL_LV_PACING_CAPTURE_MODE: NORMAL
MDC_IDC_SET_LEADCHNL_LV_PACING_CATHODE_ELECTRODE_1: NORMAL
MDC_IDC_SET_LEADCHNL_LV_PACING_CATHODE_LOCATION_1: NORMAL
MDC_IDC_SET_LEADCHNL_LV_PACING_POLARITY: NORMAL
MDC_IDC_SET_LEADCHNL_LV_PACING_PULSEWIDTH: 0.5 MS
MDC_IDC_SET_LEADCHNL_RA_PACING_AMPLITUDE: 2.5 V
MDC_IDC_SET_LEADCHNL_RA_PACING_ANODE_ELECTRODE_1: NORMAL
MDC_IDC_SET_LEADCHNL_RA_PACING_ANODE_LOCATION_1: NORMAL
MDC_IDC_SET_LEADCHNL_RA_PACING_CAPTURE_MODE: NORMAL
MDC_IDC_SET_LEADCHNL_RA_PACING_CATHODE_ELECTRODE_1: NORMAL
MDC_IDC_SET_LEADCHNL_RA_PACING_CATHODE_LOCATION_1: NORMAL
MDC_IDC_SET_LEADCHNL_RA_PACING_POLARITY: NORMAL
MDC_IDC_SET_LEADCHNL_RA_PACING_PULSEWIDTH: 0.5 MS
MDC_IDC_SET_LEADCHNL_RA_SENSING_ADAPTATION_MODE: NORMAL
MDC_IDC_SET_LEADCHNL_RA_SENSING_ANODE_ELECTRODE_1: NORMAL
MDC_IDC_SET_LEADCHNL_RA_SENSING_ANODE_LOCATION_1: NORMAL
MDC_IDC_SET_LEADCHNL_RA_SENSING_CATHODE_ELECTRODE_1: NORMAL
MDC_IDC_SET_LEADCHNL_RA_SENSING_CATHODE_LOCATION_1: NORMAL
MDC_IDC_SET_LEADCHNL_RA_SENSING_POLARITY: NORMAL
MDC_IDC_SET_LEADCHNL_RA_SENSING_SENSITIVITY: 0.3 MV
MDC_IDC_SET_LEADCHNL_RV_PACING_AMPLITUDE: 2 V
MDC_IDC_SET_LEADCHNL_RV_PACING_ANODE_ELECTRODE_1: NORMAL
MDC_IDC_SET_LEADCHNL_RV_PACING_ANODE_LOCATION_1: NORMAL
MDC_IDC_SET_LEADCHNL_RV_PACING_CAPTURE_MODE: NORMAL
MDC_IDC_SET_LEADCHNL_RV_PACING_CATHODE_ELECTRODE_1: NORMAL
MDC_IDC_SET_LEADCHNL_RV_PACING_CATHODE_LOCATION_1: NORMAL
MDC_IDC_SET_LEADCHNL_RV_PACING_POLARITY: NORMAL
MDC_IDC_SET_LEADCHNL_RV_PACING_PULSEWIDTH: 0.5 MS
MDC_IDC_SET_LEADCHNL_RV_SENSING_ADAPTATION_MODE: NORMAL
MDC_IDC_SET_LEADCHNL_RV_SENSING_ANODE_ELECTRODE_1: NORMAL
MDC_IDC_SET_LEADCHNL_RV_SENSING_ANODE_LOCATION_1: NORMAL
MDC_IDC_SET_LEADCHNL_RV_SENSING_CATHODE_ELECTRODE_1: NORMAL
MDC_IDC_SET_LEADCHNL_RV_SENSING_CATHODE_LOCATION_1: NORMAL
MDC_IDC_SET_LEADCHNL_RV_SENSING_POLARITY: NORMAL
MDC_IDC_SET_LEADCHNL_RV_SENSING_SENSITIVITY: 0.5 MV
MDC_IDC_STAT_AT_BURDEN_PERCENT: 99 %
MDC_IDC_STAT_AT_DTM_END: NORMAL
MDC_IDC_STAT_AT_DTM_START: NORMAL
MDC_IDC_STAT_AT_MODE_SW_COUNT: 13
MDC_IDC_STAT_AT_MODE_SW_COUNT_PER_DAY: 0
MDC_IDC_STAT_AT_MODE_SW_MAX_DURATION: NORMAL S
MDC_IDC_STAT_AT_MODE_SW_PERCENT_TIME: 100 %
MDC_IDC_STAT_BRADY_AP_VP_PERCENT: 29 %
MDC_IDC_STAT_BRADY_AP_VS_PERCENT: 0 %
MDC_IDC_STAT_BRADY_AS_VP_PERCENT: 71 %
MDC_IDC_STAT_BRADY_AS_VS_PERCENT: 0 %
MDC_IDC_STAT_BRADY_DTM_END: NORMAL
MDC_IDC_STAT_BRADY_DTM_START: NORMAL
MDC_IDC_STAT_BRADY_RA_PERCENT_PACED: 1 %
MDC_IDC_STAT_CRT_DTM_END: NORMAL
MDC_IDC_STAT_CRT_DTM_START: NORMAL
MDC_IDC_STAT_CRT_PERCENT_PACED: 99 %
MDC_IDC_STAT_HEART_RATE_ATRIAL_MAX: 330 {BEATS}/MIN
MDC_IDC_STAT_HEART_RATE_ATRIAL_MEAN: 295 {BEATS}/MIN
MDC_IDC_STAT_HEART_RATE_ATRIAL_MIN: 40 {BEATS}/MIN
MDC_IDC_STAT_HEART_RATE_DTM_END: NORMAL
MDC_IDC_STAT_HEART_RATE_DTM_START: NORMAL
MDC_IDC_STAT_HEART_RATE_VENTRICULAR_MAX: 130 {BEATS}/MIN
MDC_IDC_STAT_HEART_RATE_VENTRICULAR_MEAN: 118 {BEATS}/MIN
MDC_IDC_STAT_HEART_RATE_VENTRICULAR_MIN: 80 {BEATS}/MIN

## 2019-12-03 ENCOUNTER — TELEPHONE (OUTPATIENT)
Dept: CARDIOLOGY | Facility: CLINIC | Age: 84
End: 2019-12-03

## 2019-12-03 DIAGNOSIS — I83.891 VARICOSE VEINS OF LEG WITH EDEMA, RIGHT: Primary | ICD-10-CM

## 2019-12-03 RX ORDER — DIAZEPAM 5 MG
TABLET ORAL
Qty: 2 TABLET | Refills: 0 | Status: SHIPPED | OUTPATIENT
Start: 2019-12-03 | End: 2020-02-03

## 2019-12-03 RX ORDER — DIAZEPAM 5 MG
5 TABLET ORAL EVERY 6 HOURS PRN
Qty: 2 TABLET | Refills: 0 | Status: SHIPPED | OUTPATIENT
Start: 2019-12-03 | End: 2019-12-03

## 2019-12-05 ENCOUNTER — TELEPHONE (OUTPATIENT)
Dept: CARDIOLOGY | Facility: CLINIC | Age: 84
End: 2019-12-05

## 2019-12-05 NOTE — TELEPHONE ENCOUNTER
Called patient's  Mary Ann to review Pre- Ablation orders:    Mary Ann took prep instructions and will review with patient today.     Patient will increase fluid the day before the procedure.  Patient will hold diuretic until after the ablation. HOLD LASIX  Patient will not wear compression stockings the day before or the day of the ablation.  Valium was explained to patient and ordered to pharmacy of choice. RN sent to [facility] in [place]  Patient aware to bring Valium to clinic.  Patient will have a  to bring them home.  Follow-up ultrasound for Wednesday scheduled.  Follow-up OV for 1 month scheduled.     Allegra 180 MG BID x2 weeks    Patient's   has no questions.

## 2019-12-06 ENCOUNTER — TELEPHONE (OUTPATIENT)
Dept: CARDIOLOGY | Facility: CLINIC | Age: 84
End: 2019-12-06

## 2019-12-06 NOTE — TELEPHONE ENCOUNTER
Received call from patient's son, Chris, stating patient's pharmacy was changing.  He stated the [facility] [place] location was closing and the patient was transferring all medications over to the [facility] in [place] at [address]  Request was made to update patient's preferred pharmacy in her chart.    Pt's pharmacy was updated in the chart.  Chris was called back and message was left notifying him that this was completed.  Instructed him to make sure all medications were transferred to the [facility] pharmacy and that they would reach out to the clinic when refills were needed.  Instructed him to call back to the clinic if assistance with this was needed or if he had any questions.      CHICHO Parks

## 2019-12-09 ENCOUNTER — ANCILLARY PROCEDURE (OUTPATIENT)
Dept: VASCULAR ULTRASOUND | Facility: CLINIC | Age: 84
End: 2019-12-09
Attending: NURSE PRACTITIONER
Payer: COMMERCIAL

## 2019-12-09 VITALS
RESPIRATION RATE: 16 BRPM | OXYGEN SATURATION: 97 % | SYSTOLIC BLOOD PRESSURE: 155 MMHG | DIASTOLIC BLOOD PRESSURE: 82 MMHG | HEART RATE: 69 BPM

## 2019-12-09 DIAGNOSIS — I83.891 VARICOSE VEINS OF LEG WITH EDEMA, RIGHT: ICD-10-CM

## 2019-12-09 PROCEDURE — 36482 ENDOVEN THER CHEM ADHES 1ST: CPT | Mod: RT | Performed by: INTERNAL MEDICINE

## 2019-12-09 NOTE — PROGRESS NOTES
Successful R) AASV VenaSeal completed. 5 mg valium given per Dr. Price. Patient tolerated procedure well.

## 2019-12-11 ENCOUNTER — ANCILLARY PROCEDURE (OUTPATIENT)
Dept: VASCULAR ULTRASOUND | Facility: CLINIC | Age: 84
End: 2019-12-11
Attending: NURSE PRACTITIONER
Payer: COMMERCIAL

## 2019-12-11 DIAGNOSIS — I87.2 VENOUS (PERIPHERAL) INSUFFICIENCY: ICD-10-CM

## 2019-12-11 DIAGNOSIS — I83.891 VARICOSE VEINS OF LEG WITH SWELLING, RIGHT: ICD-10-CM

## 2019-12-11 PROCEDURE — 93971 EXTREMITY STUDY: CPT | Mod: RT | Performed by: INTERNAL MEDICINE

## 2019-12-12 ENCOUNTER — TELEPHONE (OUTPATIENT)
Dept: CARDIOLOGY | Facility: CLINIC | Age: 84
End: 2019-12-12

## 2019-12-26 DIAGNOSIS — I10 ESSENTIAL HYPERTENSION: ICD-10-CM

## 2019-12-26 RX ORDER — LISINOPRIL 20 MG/1
20 TABLET ORAL DAILY
Qty: 90 TABLET | Refills: 0 | Status: SHIPPED | OUTPATIENT
Start: 2019-12-26 | End: 2021-01-01

## 2020-02-03 ENCOUNTER — ANCILLARY PROCEDURE (OUTPATIENT)
Dept: CARDIOLOGY | Facility: CLINIC | Age: 85
End: 2020-02-03
Attending: INTERNAL MEDICINE
Payer: COMMERCIAL

## 2020-02-03 VITALS
DIASTOLIC BLOOD PRESSURE: 80 MMHG | BODY MASS INDEX: 22.9 KG/M2 | HEIGHT: 64 IN | HEART RATE: 79 BPM | SYSTOLIC BLOOD PRESSURE: 175 MMHG | WEIGHT: 134.1 LBS

## 2020-02-03 DIAGNOSIS — Z98.890 S/P AV NODAL ABLATION: ICD-10-CM

## 2020-02-03 DIAGNOSIS — Z95.0 CARDIAC PACEMAKER IN SITU: Primary | ICD-10-CM

## 2020-02-03 DIAGNOSIS — I87.2 VENOUS (PERIPHERAL) INSUFFICIENCY: ICD-10-CM

## 2020-02-03 DIAGNOSIS — Z95.0 CARDIAC PACEMAKER IN SITU: ICD-10-CM

## 2020-02-03 DIAGNOSIS — I50.22 CHRONIC SYSTOLIC CONGESTIVE HEART FAILURE (H): Primary | ICD-10-CM

## 2020-02-03 PROCEDURE — 99214 OFFICE O/P EST MOD 30 MIN: CPT | Mod: 25 | Performed by: NURSE PRACTITIONER

## 2020-02-03 RX ORDER — FUROSEMIDE 20 MG
20 TABLET ORAL DAILY
COMMUNITY
End: 2021-01-01 | Stop reason: DRUGHIGH

## 2020-02-03 ASSESSMENT — MIFFLIN-ST. JEOR: SCORE: 1028.27

## 2020-02-03 NOTE — PATIENT INSTRUCTIONS
Today's Recommendations    1. Please check blood pressure 1 hour after morning medications. Write down BP values and call our clinic in 1 week.   2. Continue all other medications without changes.    Please send a [employer] message or call [phone] with questions or concerns.     Scheduling number [phone].

## 2020-02-03 NOTE — LETTER
2/3/2020    [place] RE[facility] [address]  [facility] [address]    RE: Zayda Hawkins       Dear Colleague,    I had the pleasure of seeing Zayda Hawkins in the [facility].    Cardiology Clinic Progress Note  Zayda Hawkins MRN# [id number]   YOB: 1932 Age: 87 year old     Primary cardiologist: Dr. Brandon    Reason for visit: Post venous ablation follow up    History of presenting illness:    Zayda Hawkins, a pleasant 87 year old patient who is originally from [place] and presents today with a [facility]  named Mary Ann. She has a past medical history significant for atrial fibrillation (previously on Tikosyn, but failed therapy and is subsequently status post AV kim ablation and upgrade to a biventricular permanent pacemaker), coronary disease, hypertension, venous insufficiency, CVA, and tachybradycardia syndrome status post permanent pacemaker.    While previously on Warfarin she suffered a spontaneous retroperitoneal bleed and anticoagulation was discontinued.  Subsequently, she suffered a CVA in September 2018 and was re-trialed on anticoagulation with Eliquis 5 mg twice daily.  In October 2018 she presented with heart failure and was found in atrial fibrillation with RVR.  Previously she was maintained in sinus rhythm Tikyson, but this was discontinued and was elected to pursue rate control strategy.  Ultimately, she underwent AV kim ablation and a permanent pacemaker upgrade to BiV device November 2018.     Today in clinic she reports that her right lower extremity symptoms of heaviness and edema are improved since her ablation.  Her blood pressure is elevated today and it was on last visit and the patient states this is attributed to being in the doctor's office and it raises her blood pressure.  Her home blood pressure is usually less than 140 systolic.         Assessment and Plan:  "    ASSESSMENT:    1. Venous insufficiency    Status post right great saphenous vein ablation with bilateral sclerotherapy at outside clinic     Residual right lower extremity edema and venous competency study showed incompetent right anterior accessory saphenous vein    Status post right AASV VenaSeal 12/9/2019 with symptomatic improvement    Continues compliance with compression stockings    2. Acute systolic and chronic diastolic heart failure    Compensated    Maintained on furosemide 20 mg twice daily on Saturday and Sunday and 20 mg in the morning and 10 mg in the evening Monday through Friday, lisinopril 20 mg daily.    Of note the patient is not on beta-blockers due to fatigue    3. Atrial fibrillation    Anticoagulated on apixaban 5 mg twice daily    4. Hypertension    Suboptimal control    Currently on lisinopril 40 mg daily    PLAN:     1. Monitor home blood pressure 1 hour post morning medications and call clinic with the aid of son to report values.  2. Follow-up with venous treatment clinic as needed  3. Follow-up with [facility] clinic with a basic metabolic panel in 3 months          Review of Systems:     Review of Systems:  Skin:  Negative rash;bruising   Eyes:  Positive for cataracts  ENT:  Negative    Respiratory:  Positive for cough  Cardiovascular:  Negative Positive for;edema  Gastroenterology: Negative poor appetite  Genitourinary:  Positive for urinary frequency  Musculoskeletal:  Positive for arthritis  Neurologic:  Negative headaches  Psychiatric:  Positive for excessive stress;anxiety;depression  Heme/Lymph/Imm:  Negative bleeding disorder  Endocrine:  Positive for diabetes            Physical Exam:     Vitals: BP (!) 175/80   Pulse 79   Ht 1.626 m (5' 4\")   Wt 60.8 kg (134 lb 1.6 oz)   BMI 23.02 kg/m     Constitutional:  cooperative thin      Skin:  warm and dry to the touch        Head:  normocephalic        Eyes:  pupils equal and round        ENT:  no pallor or cyanosis        Neck:  " JVP normal        Chest:  clear to auscultation basal rales      Cardiac: regular rhythm irregularly irregular rhythm     systolic murmur systolic murmur        Abdomen:  not assessed this visit        Vascular: pulses full and equal                                      Extremities and Back:  normal muscle strength and tone   Right lower extremity has hemosiderin deposits    Neurological:  no gross motor deficits               Medications:     Current Outpatient Medications   Medication Sig Dispense Refill     acetaminophen (TYLENOL) 325 MG tablet Take 2 tablets (650 mg) by mouth every 4 hours as needed for mild pain or fever       apixaban ANTICOAGULANT (ELIQUIS) 5 MG tablet Take 1 tablet (5 mg) by mouth 2 times daily 180 tablet 3     calcium carbonate-vitamin D 500-400 MG-UNIT TABS Take 1 tablet by mouth 2 times daily.         dorzolamide-timolol PF (COSOPT) 22.3-6.8 MG/ML opthalmic solution Place 1 drop into both eyes 2 times daily 10 mL vial       furosemide (LASIX) 20 MG tablet Take 20 mg by mouth daily Monday through Friday . 5 X weekly       furosemide (LASIX) 20 MG tablet Take 20 mg by mouth 2 times daily Take 1 tablet (20 mg) in morning and 1 tablet (20 mg) in afternoon on Saturday and Sunday       latanoprost (XALATAN) 0.005 % ophthalmic solution Place 1 drop into both eyes At Bedtime       lisinopril (PRINIVIL/ZESTRIL) 20 MG tablet Take 1 tablet (20 mg) by mouth daily 90 tablet 0     magnesium gluconate (MAGONATE) 500 MG tablet Take 500 mg by mouth daily.         metFORMIN (GLUCOPHAGE-XR) 500 MG 24 hr tablet Take 500 mg by mouth every morning        potassium chloride ER (K-DUR/KLOR-CON M) 10 MEQ CR tablet Take 2 tablets (20meq) in the AM, and 1 tablet (10meq) in the PM. 270 tablet 3     potassium chloride ER (K-DUR/KLOR-CON M) 20 MEQ CR tablet Take by mouth daily Take 2 tablets (40 mEq) in the AM and 1 tablet in the PM on Saturday and Sundays when Lasix is doubled.       sertraline (ZOLOFT) 50 MG tablet  Take 25 mg by mouth every evening as needed (Take a half tablet qpm prn)       SUMAtriptan Succinate (IMITREX PO) Take 25 mg by mouth as needed.         timolol (TIMOPTIC) 0.5 % ophthalmic solution Place 1 drop into both eyes every morning        triamcinolone (KENALOG) 0.1 % external cream Apply topically 2 times daily       fish oil-omega-3 fatty acids 1000 MG capsule Take 1 g by mouth daily       multivitamin, therapeutic (THERA-VIT) TABS Take 1 tablet by mouth daily.           Family History   Problem Relation Age of Onset     Heart Disease Mother 65        mi     Heart Disease Father 45        pnemonia     Heart Disease Brother      Heart Disease Sister      Heart Disease Brother        Social History     Socioeconomic History     Marital status:      Spouse name: Not on file     Number of children: Not on file     Years of education: Not on file     Highest education level: Not on file   Occupational History     Not on file   Social Needs     Financial resource strain: Not on file     Food insecurity:     Worry: Not on file     Inability: Not on file     Transportation needs:     Medical: Not on file     Non-medical: Not on file   Tobacco Use     Smoking status: Never Smoker     Smokeless tobacco: Never Used   Substance and Sexual Activity     Alcohol use: No     Drug use: No     Sexual activity: Not on file   Lifestyle     Physical activity:     Days per week: Not on file     Minutes per session: Not on file     Stress: Not on file   Relationships     Social connections:     Talks on phone: Not on file     Gets together: Not on file     Attends Jain service: Not on file     Active member of club or organization: Not on file     Attends meetings of clubs or organizations: Not on file     Relationship status: Not on file     Intimate partner violence:     Fear of current or ex partner: Not on file     Emotionally abused: Not on file     Physically abused: Not on file     Forced sexual activity: Not  on file   Other Topics Concern     Parent/sibling w/ CABG, MI or angioplasty before 65F 55M? Not Asked      Service Not Asked     Blood Transfusions Not Asked     Caffeine Concern Yes     Comment: no caffeine intake     Occupational Exposure Not Asked     Hobby Hazards Not Asked     Sleep Concern Not Asked     Stress Concern Not Asked     Weight Concern Not Asked     Special Diet Yes     Comment: no sugar, salt or fats      Back Care Not Asked     Exercise Yes     Comment: treadmill, swimming daily      Bike Helmet Not Asked     Seat Belt Yes     Self-Exams Not Asked   Social History Narrative     Not on file            Past Medical History:     Past Medical History:   Diagnosis Date     Atrial fibrillation (H)     not on anticoagulation, hx RP bleed     CAD (coronary artery disease)     CT angio: mild lesion in the LAD, as well as 1st diagonal, and mild plaque in the proximal and  mid RCA     CVA (cerebral vascular accident) (H) 10/2018    Acute left posterior circulation ischemic stroke      Diabetes mellitus (H)      Hyperlipidemia      Hypertension      Retroperitoneal bleed 7/27/2012     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     Venous insufficiency               Past Surgical History:     Past Surgical History:   Procedure Laterality Date     ANESTHESIA CARDIOVERSION  7/23/2012    Procedure: ANESTHESIA CARDIOVERSION;;  Surgeon: Provider, Generic Anesthesia;  Location:  ANESTHESIA - [facility] - DO NOT SCHEDULE     BLEPHAROPLASTY  2005     Cataract Extraction with IOL Bilateral 2012     H ABLATION AV NODE  11/14/2018     IMPLANT PACEMAKER  11/2012    Dual chamber     REPLACE PACEMAKER GENERATOR  11/14/2018    upgrade to CRT              Allergies:   Aspirin; Coumadin [warfarin]; and Penicillins       Data:   All laboratory data reviewed:    Recent Labs   Lab Test 01/07/19  1239 11/12/18  1103 10/29/18  0913 09/27/18  0558 02/06/17  1101 03/10/15  1142 02/13/15  07/13/12  1525   LDL  --   --   --  51  --  69*  75   < >  --    HDL  --   --   --  65  --  66 66   < >  --    NHDL  --   --   --  60  --   --  95  --   --    CHOL  --   --   --  125  --  145 161   < >  --    TRIG  --   --   --  47  --  48 100   < >  --    TSH  --   --  2.06  --  1.40  --   --   --  1.85   NTBNP 5,232* 2,901* 2,830*  --   --   --   --   --   --     < > = values in this interval not displayed.       Lab Results   Component Value Date    WBC 5.6 11/14/2018    RBC 4.90 11/14/2018    HGB 12.8 11/26/2018    HCT 43.4 11/14/2018    MCV 89 11/14/2018    MCH 28.6 11/14/2018    MCHC 32.3 11/14/2018    RDW 13.8 11/14/2018     11/14/2018       Lab Results   Component Value Date     07/29/2019    POTASSIUM 4.0 07/29/2019    CHLORIDE 108 07/29/2019    CO2 25 07/29/2019    ANIONGAP 7 07/29/2019    GLC 90 07/29/2019    BUN 19 07/29/2019    CR 0.96 07/29/2019    GFRESTIMATED 54 (L) 07/29/2019    GFRESTBLACK 62 07/29/2019    MADDISON 8.7 07/29/2019      Lab Results   Component Value Date     (H) 09/27/2018     (H) 09/27/2018       Lab Results   Component Value Date    A1C 6.5 (H) 09/27/2018       Lab Results   Component Value Date    INR 1.05 09/26/2018    INR 1.04 10/26/2016         BARBARA HOLBROOK CNP  [facility]  Pager: [phone]  RN phone: [phone]    Thank you for allowing me to participate in the care of your patient.    Sincerely,     BARBARA HOLBROOK CNP     [facility]

## 2020-02-03 NOTE — PROGRESS NOTES
Cardiology Clinic Progress Note  Zayda Hawkins MRN# [id number]   YOB: 1932 Age: 87 year old     Primary cardiologist: Dr. Brandon    Reason for visit: Post venous ablation follow up    History of presenting illness:    Zayda Hawkins, a pleasant 87 year old patient who is originally from [facility] and presents today with a [facility]  named Mary Ann. She has a past medical history significant for atrial fibrillation (previously on Tikosyn, but failed therapy and is subsequently status post AV kim ablation and upgrade to a biventricular permanent pacemaker), coronary disease, hypertension, venous insufficiency, CVA, and tachybradycardia syndrome status post permanent pacemaker.    While previously on Warfarin she suffered a spontaneous retroperitoneal bleed and anticoagulation was discontinued.  Subsequently, she suffered a CVA in September 2018 and was re-trialed on anticoagulation with Eliquis 5 mg twice daily.  In October 2018 she presented with heart failure and was found in atrial fibrillation with RVR.  Previously she was maintained in sinus rhythm Tikyson, but this was discontinued and was elected to pursue rate control strategy.  Ultimately, she underwent AV kim ablation and a permanent pacemaker upgrade to BiV device November 2018.     Today in clinic she reports that her right lower extremity symptoms of heaviness and edema are improved since her ablation.  Her blood pressure is elevated today and it was on last visit and the patient states this is attributed to being in the doctor's office and it raises her blood pressure.  Her home blood pressure is usually less than 140 systolic.         Assessment and Plan:     ASSESSMENT:    1. Venous insufficiency    Status post right great saphenous vein ablation with bilateral sclerotherapy at outside clinic     Residual right lower extremity edema and venous competency study showed incompetent right anterior accessory saphenous  "vein    Status post right AASV VenaSeal 12/9/2019 with symptomatic improvement    Continues compliance with compression stockings    2. Acute systolic and chronic diastolic heart failure    Compensated    Maintained on furosemide 20 mg twice daily on Saturday and Sunday and 20 mg in the morning and 10 mg in the evening Monday through Friday, lisinopril 20 mg daily.    Of note the patient is not on beta-blockers due to fatigue    3. Atrial fibrillation    Anticoagulated on apixaban 5 mg twice daily    4. Hypertension    Suboptimal control    Currently on lisinopril 40 mg daily    PLAN:     1. Monitor home blood pressure 1 hour post morning medications and call clinic with the aid of son to report values.  2. Follow-up with venous treatment clinic as needed  3. Follow-up with [facility] clinic with a basic metabolic panel in 3 months          Review of Systems:     Review of Systems:  Skin:  Negative rash;bruising   Eyes:  Positive for cataracts  ENT:  Negative    Respiratory:  Positive for cough  Cardiovascular:  Negative Positive for;edema  Gastroenterology: Negative poor appetite  Genitourinary:  Positive for urinary frequency  Musculoskeletal:  Positive for arthritis  Neurologic:  Negative headaches  Psychiatric:  Positive for excessive stress;anxiety;depression  Heme/Lymph/Imm:  Negative bleeding disorder  Endocrine:  Positive for diabetes            Physical Exam:     Vitals: BP (!) 175/80   Pulse 79   Ht 1.626 m (5' 4\")   Wt 60.8 kg (134 lb 1.6 oz)   BMI 23.02 kg/m    Constitutional:  cooperative thin      Skin:  warm and dry to the touch        Head:  normocephalic        Eyes:  pupils equal and round        ENT:  no pallor or cyanosis        Neck:  JVP normal        Chest:  clear to auscultation basal rales      Cardiac: regular rhythm irregularly irregular rhythm     systolic murmur systolic murmur        Abdomen:  not assessed this visit        Vascular: pulses full and equal                                  "     Extremities and Back:  normal muscle strength and tone   Right lower extremity has hemosiderin deposits    Neurological:  no gross motor deficits               Medications:     Current Outpatient Medications   Medication Sig Dispense Refill     acetaminophen (TYLENOL) 325 MG tablet Take 2 tablets (650 mg) by mouth every 4 hours as needed for mild pain or fever       apixaban ANTICOAGULANT (ELIQUIS) 5 MG tablet Take 1 tablet (5 mg) by mouth 2 times daily 180 tablet 3     calcium carbonate-vitamin D 500-400 MG-UNIT TABS Take 1 tablet by mouth 2 times daily.         dorzolamide-timolol PF (COSOPT) 22.3-6.8 MG/ML opthalmic solution Place 1 drop into both eyes 2 times daily 10 mL vial       furosemide (LASIX) 20 MG tablet Take 20 mg by mouth daily Monday through Friday . 5 X weekly       furosemide (LASIX) 20 MG tablet Take 20 mg by mouth 2 times daily Take 1 tablet (20 mg) in morning and 1 tablet (20 mg) in afternoon on Saturday and Sunday       latanoprost (XALATAN) 0.005 % ophthalmic solution Place 1 drop into both eyes At Bedtime       lisinopril (PRINIVIL/ZESTRIL) 20 MG tablet Take 1 tablet (20 mg) by mouth daily 90 tablet 0     magnesium gluconate (MAGONATE) 500 MG tablet Take 500 mg by mouth daily.         metFORMIN (GLUCOPHAGE-XR) 500 MG 24 hr tablet Take 500 mg by mouth every morning        potassium chloride ER (K-DUR/KLOR-CON M) 10 MEQ CR tablet Take 2 tablets (20meq) in the AM, and 1 tablet (10meq) in the PM. 270 tablet 3     potassium chloride ER (K-DUR/KLOR-CON M) 20 MEQ CR tablet Take by mouth daily Take 2 tablets (40 mEq) in the AM and 1 tablet in the PM on Saturday and Sundays when Lasix is doubled.       sertraline (ZOLOFT) 50 MG tablet Take 25 mg by mouth every evening as needed (Take a half tablet qpm prn)       SUMAtriptan Succinate (IMITREX PO) Take 25 mg by mouth as needed.         timolol (TIMOPTIC) 0.5 % ophthalmic solution Place 1 drop into both eyes every morning        triamcinolone  (KENALOG) 0.1 % external cream Apply topically 2 times daily       fish oil-omega-3 fatty acids 1000 MG capsule Take 1 g by mouth daily       multivitamin, therapeutic (THERA-VIT) TABS Take 1 tablet by mouth daily.           Family History   Problem Relation Age of Onset     Heart Disease Mother 65        mi     Heart Disease Father 45        pnemonia     Heart Disease Brother      Heart Disease Sister      Heart Disease Brother        Social History     Socioeconomic History     Marital status:      Spouse name: Not on file     Number of children: Not on file     Years of education: Not on file     Highest education level: Not on file   Occupational History     Not on file   Social Needs     Financial resource strain: Not on file     Food insecurity:     Worry: Not on file     Inability: Not on file     Transportation needs:     Medical: Not on file     Non-medical: Not on file   Tobacco Use     Smoking status: Never Smoker     Smokeless tobacco: Never Used   Substance and Sexual Activity     Alcohol use: No     Drug use: No     Sexual activity: Not on file   Lifestyle     Physical activity:     Days per week: Not on file     Minutes per session: Not on file     Stress: Not on file   Relationships     Social connections:     Talks on phone: Not on file     Gets together: Not on file     Attends Baptism service: Not on file     Active member of club or organization: Not on file     Attends meetings of clubs or organizations: Not on file     Relationship status: Not on file     Intimate partner violence:     Fear of current or ex partner: Not on file     Emotionally abused: Not on file     Physically abused: Not on file     Forced sexual activity: Not on file   Other Topics Concern     Parent/sibling w/ CABG, MI or angioplasty before 65F 55M? Not Asked      Service Not Asked     Blood Transfusions Not Asked     Caffeine Concern Yes     Comment: no caffeine intake     Occupational Exposure Not Asked      Hobby Hazards Not Asked     Sleep Concern Not Asked     Stress Concern Not Asked     Weight Concern Not Asked     Special Diet Yes     Comment: no sugar, salt or fats      Back Care Not Asked     Exercise Yes     Comment: treadmill, swimming daily      Bike Helmet Not Asked     Seat Belt Yes     Self-Exams Not Asked   Social History Narrative     Not on file            Past Medical History:     Past Medical History:   Diagnosis Date     Atrial fibrillation (H)     not on anticoagulation, hx RP bleed     CAD (coronary artery disease)     CT angio: mild lesion in the LAD, as well as 1st diagonal, and mild plaque in the proximal and  mid RCA     CVA (cerebral vascular accident) (H) 10/2018    Acute left posterior circulation ischemic stroke      Diabetes mellitus (H)      Hyperlipidemia      Hypertension      Retroperitoneal bleed 7/27/2012     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     Venous insufficiency               Past Surgical History:     Past Surgical History:   Procedure Laterality Date     ANESTHESIA CARDIOVERSION  7/23/2012    Procedure: ANESTHESIA CARDIOVERSION;;  Surgeon: Provider, Generic Anesthesia;  Location:  ANESTHESIA - [facility] - DO NOT SCHEDULE     BLEPHAROPLASTY  2005     Cataract Extraction with IOL Bilateral 2012     H ABLATION AV NODE  11/14/2018     IMPLANT PACEMAKER  11/2012    Dual chamber     REPLACE PACEMAKER GENERATOR  11/14/2018    upgrade to CRT              Allergies:   Aspirin; Coumadin [warfarin]; and Penicillins       Data:   All laboratory data reviewed:    Recent Labs   Lab Test 01/07/19  1239 11/12/18  1103 10/29/18  0913 09/27/18  0558 02/06/17  1101 03/10/15  1142 02/13/15  07/13/12  1525   LDL  --   --   --  51  --  69* 75   < >  --    HDL  --   --   --  65  --  66 66   < >  --    NHDL  --   --   --  60  --   --  95  --   --    CHOL  --   --   --  125  --  145 161   < >  --    TRIG  --   --   --  47  --  48 100   < >  --    TSH  --   --  2.06  --  1.40  --   --   --  1.85    NTBNP 5,232* 2,901* 2,830*  --   --   --   --   --   --     < > = values in this interval not displayed.       Lab Results   Component Value Date    WBC 5.6 11/14/2018    RBC 4.90 11/14/2018    HGB 12.8 11/26/2018    HCT 43.4 11/14/2018    MCV 89 11/14/2018    MCH 28.6 11/14/2018    MCHC 32.3 11/14/2018    RDW 13.8 11/14/2018     11/14/2018       Lab Results   Component Value Date     07/29/2019    POTASSIUM 4.0 07/29/2019    CHLORIDE 108 07/29/2019    CO2 25 07/29/2019    ANIONGAP 7 07/29/2019    GLC 90 07/29/2019    BUN 19 07/29/2019    CR 0.96 07/29/2019    GFRESTIMATED 54 (L) 07/29/2019    GFRESTBLACK 62 07/29/2019    MADDISON 8.7 07/29/2019      Lab Results   Component Value Date     (H) 09/27/2018     (H) 09/27/2018       Lab Results   Component Value Date    A1C 6.5 (H) 09/27/2018       Lab Results   Component Value Date    INR 1.05 09/26/2018    INR 1.04 10/26/2016         BARBARA HOLBROOK [facility]  Pager: [phone]  RN phone: [phone]

## 2020-02-04 LAB
MDC_IDC_LEAD_IMPLANT_DT: NORMAL
MDC_IDC_LEAD_LOCATION: NORMAL
MDC_IDC_LEAD_LOCATION_DETAIL_1: NORMAL
MDC_IDC_LEAD_MFG: NORMAL
MDC_IDC_LEAD_MODEL: NORMAL
MDC_IDC_LEAD_POLARITY_TYPE: NORMAL
MDC_IDC_LEAD_SERIAL: NORMAL
MDC_IDC_PG_IMPLANT_DTM: NORMAL
MDC_IDC_PG_MFG: NORMAL
MDC_IDC_PG_MODEL: NORMAL
MDC_IDC_PG_SERIAL: NORMAL
MDC_IDC_PG_TYPE: NORMAL
MDC_IDC_SESS_CLINIC_NAME: NORMAL
MDC_IDC_SESS_DTM: NORMAL
MDC_IDC_SESS_TYPE: NORMAL

## 2020-02-14 ENCOUNTER — ANCILLARY PROCEDURE (OUTPATIENT)
Dept: CARDIOLOGY | Facility: CLINIC | Age: 85
End: 2020-02-14
Attending: INTERNAL MEDICINE
Payer: COMMERCIAL

## 2020-02-14 DIAGNOSIS — Z98.890 S/P AV NODAL ABLATION: ICD-10-CM

## 2020-02-14 DIAGNOSIS — Z95.0 CARDIAC PACEMAKER IN SITU: ICD-10-CM

## 2020-02-14 PROCEDURE — 93281 PM DEVICE PROGR EVAL MULTI: CPT | Performed by: INTERNAL MEDICINE

## 2020-02-25 LAB
MDC_IDC_LEAD_IMPLANT_DT: NORMAL
MDC_IDC_LEAD_LOCATION: NORMAL
MDC_IDC_LEAD_LOCATION_DETAIL_1: NORMAL
MDC_IDC_LEAD_MFG: NORMAL
MDC_IDC_LEAD_MODEL: NORMAL
MDC_IDC_LEAD_POLARITY_TYPE: NORMAL
MDC_IDC_LEAD_SERIAL: NORMAL
MDC_IDC_MSMT_BATTERY_REMAINING_LONGEVITY: 103 MO
MDC_IDC_MSMT_BATTERY_STATUS: NORMAL
MDC_IDC_MSMT_BATTERY_VOLTAGE: 2.98 V
MDC_IDC_MSMT_LEADCHNL_LV_IMPEDANCE_VALUE: 1050 OHM
MDC_IDC_MSMT_LEADCHNL_LV_PACING_THRESHOLD_AMPLITUDE: 1.5 V
MDC_IDC_MSMT_LEADCHNL_LV_PACING_THRESHOLD_AMPLITUDE: 1.5 V
MDC_IDC_MSMT_LEADCHNL_LV_PACING_THRESHOLD_PULSEWIDTH: 0.5 MS
MDC_IDC_MSMT_LEADCHNL_LV_PACING_THRESHOLD_PULSEWIDTH: 0.5 MS
MDC_IDC_MSMT_LEADCHNL_RA_IMPEDANCE_VALUE: 425 OHM
MDC_IDC_MSMT_LEADCHNL_RA_SENSING_INTR_AMPL: 0.6 MV
MDC_IDC_MSMT_LEADCHNL_RV_IMPEDANCE_VALUE: 537.5 OHM
MDC_IDC_MSMT_LEADCHNL_RV_PACING_THRESHOLD_AMPLITUDE: 0.75 V
MDC_IDC_MSMT_LEADCHNL_RV_PACING_THRESHOLD_AMPLITUDE: 0.75 V
MDC_IDC_MSMT_LEADCHNL_RV_PACING_THRESHOLD_PULSEWIDTH: 0.5 MS
MDC_IDC_MSMT_LEADCHNL_RV_PACING_THRESHOLD_PULSEWIDTH: 0.5 MS
MDC_IDC_MSMT_LEADCHNL_RV_SENSING_INTR_AMPL: 7.5 MV
MDC_IDC_PG_IMPLANT_DTM: NORMAL
MDC_IDC_PG_MFG: NORMAL
MDC_IDC_PG_MODEL: NORMAL
MDC_IDC_PG_SERIAL: NORMAL
MDC_IDC_PG_TYPE: NORMAL
MDC_IDC_SESS_CLINIC_NAME: NORMAL
MDC_IDC_SESS_DTM: NORMAL
MDC_IDC_SESS_TYPE: NORMAL
MDC_IDC_SET_BRADY_HYSTRATE: NORMAL
MDC_IDC_SET_BRADY_LOWRATE: 60 {BEATS}/MIN
MDC_IDC_SET_BRADY_MAX_SENSOR_RATE: 120 {BEATS}/MIN
MDC_IDC_SET_BRADY_MODE: NORMAL
MDC_IDC_SET_BRADY_NIGHT_RATE: NORMAL
MDC_IDC_SET_CRT_LVRV_DELAY: 0 MS
MDC_IDC_SET_CRT_PACED_CHAMBERS: NORMAL
MDC_IDC_SET_LEADCHNL_LV_PACING_AMPLITUDE: 2.25 V
MDC_IDC_SET_LEADCHNL_LV_PACING_ANODE_ELECTRODE_1: NORMAL
MDC_IDC_SET_LEADCHNL_LV_PACING_ANODE_LOCATION_1: NORMAL
MDC_IDC_SET_LEADCHNL_LV_PACING_CAPTURE_MODE: NORMAL
MDC_IDC_SET_LEADCHNL_LV_PACING_CATHODE_ELECTRODE_1: NORMAL
MDC_IDC_SET_LEADCHNL_LV_PACING_CATHODE_LOCATION_1: NORMAL
MDC_IDC_SET_LEADCHNL_LV_PACING_POLARITY: NORMAL
MDC_IDC_SET_LEADCHNL_LV_PACING_PULSEWIDTH: 0.5 MS
MDC_IDC_SET_LEADCHNL_RA_PACING_ANODE_ELECTRODE_1: NORMAL
MDC_IDC_SET_LEADCHNL_RA_PACING_ANODE_LOCATION_1: NORMAL
MDC_IDC_SET_LEADCHNL_RA_PACING_CATHODE_ELECTRODE_1: NORMAL
MDC_IDC_SET_LEADCHNL_RA_PACING_CATHODE_LOCATION_1: NORMAL
MDC_IDC_SET_LEADCHNL_RA_PACING_POLARITY: NORMAL
MDC_IDC_SET_LEADCHNL_RA_SENSING_ANODE_ELECTRODE_1: NORMAL
MDC_IDC_SET_LEADCHNL_RA_SENSING_ANODE_LOCATION_1: NORMAL
MDC_IDC_SET_LEADCHNL_RA_SENSING_CATHODE_ELECTRODE_1: NORMAL
MDC_IDC_SET_LEADCHNL_RA_SENSING_CATHODE_LOCATION_1: NORMAL
MDC_IDC_SET_LEADCHNL_RA_SENSING_POLARITY: NORMAL
MDC_IDC_SET_LEADCHNL_RV_PACING_AMPLITUDE: 2 V
MDC_IDC_SET_LEADCHNL_RV_PACING_ANODE_ELECTRODE_1: NORMAL
MDC_IDC_SET_LEADCHNL_RV_PACING_ANODE_LOCATION_1: NORMAL
MDC_IDC_SET_LEADCHNL_RV_PACING_CAPTURE_MODE: NORMAL
MDC_IDC_SET_LEADCHNL_RV_PACING_CATHODE_ELECTRODE_1: NORMAL
MDC_IDC_SET_LEADCHNL_RV_PACING_CATHODE_LOCATION_1: NORMAL
MDC_IDC_SET_LEADCHNL_RV_PACING_POLARITY: NORMAL
MDC_IDC_SET_LEADCHNL_RV_PACING_PULSEWIDTH: 0.5 MS
MDC_IDC_SET_LEADCHNL_RV_SENSING_ANODE_ELECTRODE_1: NORMAL
MDC_IDC_SET_LEADCHNL_RV_SENSING_ANODE_LOCATION_1: NORMAL
MDC_IDC_SET_LEADCHNL_RV_SENSING_CATHODE_ELECTRODE_1: NORMAL
MDC_IDC_SET_LEADCHNL_RV_SENSING_CATHODE_LOCATION_1: NORMAL
MDC_IDC_SET_LEADCHNL_RV_SENSING_POLARITY: NORMAL
MDC_IDC_SET_LEADCHNL_RV_SENSING_SENSITIVITY: 0.5 MV
MDC_IDC_STAT_AT_MODE_SW_COUNT: 0
MDC_IDC_STAT_BRADY_RA_PERCENT_PACED: 0.11 %
MDC_IDC_STAT_BRADY_RV_PERCENT_PACED: 99.39 %

## 2020-05-18 ENCOUNTER — ANCILLARY PROCEDURE (OUTPATIENT)
Dept: CARDIOLOGY | Facility: CLINIC | Age: 85
End: 2020-05-18
Attending: INTERNAL MEDICINE
Payer: COMMERCIAL

## 2020-05-18 DIAGNOSIS — Z98.890 S/P AV NODAL ABLATION: ICD-10-CM

## 2020-05-18 DIAGNOSIS — Z95.0 CARDIAC PACEMAKER IN SITU: ICD-10-CM

## 2020-05-18 PROCEDURE — 93296 REM INTERROG EVL PM/IDS: CPT | Performed by: INTERNAL MEDICINE

## 2020-05-18 PROCEDURE — 93294 REM INTERROG EVL PM/LDLS PM: CPT | Performed by: INTERNAL MEDICINE

## 2020-05-26 LAB
MDC_IDC_LEAD_IMPLANT_DT: NORMAL
MDC_IDC_LEAD_LOCATION: NORMAL
MDC_IDC_LEAD_LOCATION_DETAIL_1: NORMAL
MDC_IDC_LEAD_MFG: NORMAL
MDC_IDC_LEAD_MODEL: NORMAL
MDC_IDC_LEAD_POLARITY_TYPE: NORMAL
MDC_IDC_LEAD_SERIAL: NORMAL
MDC_IDC_MSMT_BATTERY_DTM: NORMAL
MDC_IDC_MSMT_BATTERY_REMAINING_LONGEVITY: 112 MO
MDC_IDC_MSMT_BATTERY_REMAINING_PERCENTAGE: 95.5 %
MDC_IDC_MSMT_BATTERY_RRT_TRIGGER: NORMAL
MDC_IDC_MSMT_BATTERY_STATUS: NORMAL
MDC_IDC_MSMT_BATTERY_VOLTAGE: 2.99 V
MDC_IDC_MSMT_LEADCHNL_LV_IMPEDANCE_VALUE: 900 OHM
MDC_IDC_MSMT_LEADCHNL_LV_LEAD_CHANNEL_STATUS: NORMAL
MDC_IDC_MSMT_LEADCHNL_LV_PACING_THRESHOLD_AMPLITUDE: 1.5 V
MDC_IDC_MSMT_LEADCHNL_LV_PACING_THRESHOLD_PULSEWIDTH: 0.5 MS
MDC_IDC_MSMT_LEADCHNL_RV_IMPEDANCE_VALUE: 450 OHM
MDC_IDC_MSMT_LEADCHNL_RV_LEAD_CHANNEL_STATUS: NORMAL
MDC_IDC_MSMT_LEADCHNL_RV_PACING_THRESHOLD_AMPLITUDE: 0.5 V
MDC_IDC_MSMT_LEADCHNL_RV_PACING_THRESHOLD_PULSEWIDTH: 0.5 MS
MDC_IDC_MSMT_LEADCHNL_RV_SENSING_INTR_AMPL: 9 MV
MDC_IDC_PG_IMPLANT_DTM: NORMAL
MDC_IDC_PG_MFG: NORMAL
MDC_IDC_PG_MODEL: NORMAL
MDC_IDC_PG_SERIAL: NORMAL
MDC_IDC_PG_TYPE: NORMAL
MDC_IDC_SESS_CLINIC_NAME: NORMAL
MDC_IDC_SESS_DTM: NORMAL
MDC_IDC_SESS_REPROGRAMMED: NO
MDC_IDC_SESS_TYPE: NORMAL
MDC_IDC_SET_BRADY_LOWRATE: 60 {BEATS}/MIN
MDC_IDC_SET_BRADY_MAX_SENSOR_RATE: 120 {BEATS}/MIN
MDC_IDC_SET_BRADY_MODE: NORMAL
MDC_IDC_SET_CRT_LVRV_DELAY: 0 MS
MDC_IDC_SET_CRT_PACED_CHAMBERS: NORMAL
MDC_IDC_SET_LEADCHNL_LV_PACING_AMPLITUDE: 2.5 V
MDC_IDC_SET_LEADCHNL_LV_PACING_ANODE_ELECTRODE_1: NORMAL
MDC_IDC_SET_LEADCHNL_LV_PACING_ANODE_LOCATION_1: NORMAL
MDC_IDC_SET_LEADCHNL_LV_PACING_CAPTURE_MODE: NORMAL
MDC_IDC_SET_LEADCHNL_LV_PACING_CATHODE_ELECTRODE_1: NORMAL
MDC_IDC_SET_LEADCHNL_LV_PACING_CATHODE_LOCATION_1: NORMAL
MDC_IDC_SET_LEADCHNL_LV_PACING_POLARITY: NORMAL
MDC_IDC_SET_LEADCHNL_LV_PACING_PULSEWIDTH: 0.5 MS
MDC_IDC_SET_LEADCHNL_RA_PACING_POLARITY: NORMAL
MDC_IDC_SET_LEADCHNL_RA_SENSING_POLARITY: NORMAL
MDC_IDC_SET_LEADCHNL_RV_PACING_AMPLITUDE: 2 V
MDC_IDC_SET_LEADCHNL_RV_PACING_ANODE_ELECTRODE_1: NORMAL
MDC_IDC_SET_LEADCHNL_RV_PACING_ANODE_LOCATION_1: NORMAL
MDC_IDC_SET_LEADCHNL_RV_PACING_CAPTURE_MODE: NORMAL
MDC_IDC_SET_LEADCHNL_RV_PACING_CATHODE_ELECTRODE_1: NORMAL
MDC_IDC_SET_LEADCHNL_RV_PACING_CATHODE_LOCATION_1: NORMAL
MDC_IDC_SET_LEADCHNL_RV_PACING_POLARITY: NORMAL
MDC_IDC_SET_LEADCHNL_RV_PACING_PULSEWIDTH: 0.5 MS
MDC_IDC_SET_LEADCHNL_RV_SENSING_ADAPTATION_MODE: NORMAL
MDC_IDC_SET_LEADCHNL_RV_SENSING_ANODE_ELECTRODE_1: NORMAL
MDC_IDC_SET_LEADCHNL_RV_SENSING_ANODE_LOCATION_1: NORMAL
MDC_IDC_SET_LEADCHNL_RV_SENSING_CATHODE_ELECTRODE_1: NORMAL
MDC_IDC_SET_LEADCHNL_RV_SENSING_CATHODE_LOCATION_1: NORMAL
MDC_IDC_SET_LEADCHNL_RV_SENSING_POLARITY: NORMAL
MDC_IDC_SET_LEADCHNL_RV_SENSING_SENSITIVITY: 0.5 MV
MDC_IDC_STAT_AT_DTM_END: NORMAL
MDC_IDC_STAT_AT_DTM_START: NORMAL
MDC_IDC_STAT_BRADY_DTM_END: NORMAL
MDC_IDC_STAT_BRADY_DTM_START: NORMAL
MDC_IDC_STAT_CRT_DTM_END: NORMAL
MDC_IDC_STAT_CRT_DTM_START: NORMAL
MDC_IDC_STAT_CRT_PERCENT_PACED: 98 %
MDC_IDC_STAT_HEART_RATE_DTM_END: NORMAL
MDC_IDC_STAT_HEART_RATE_DTM_START: NORMAL
MDC_IDC_STAT_HEART_RATE_VENTRICULAR_MAX: 290 {BEATS}/MIN
MDC_IDC_STAT_HEART_RATE_VENTRICULAR_MEAN: 75 {BEATS}/MIN
MDC_IDC_STAT_HEART_RATE_VENTRICULAR_MIN: 50 {BEATS}/MIN

## 2020-07-22 ENCOUNTER — APPOINTMENT (OUTPATIENT)
Dept: GENERAL RADIOLOGY | Facility: CLINIC | Age: 85
End: 2020-07-22
Attending: EMERGENCY MEDICINE
Payer: COMMERCIAL

## 2020-07-22 ENCOUNTER — HOSPITAL ENCOUNTER (EMERGENCY)
Facility: CLINIC | Age: 85
Discharge: HOME | End: 2020-07-22
Attending: EMERGENCY MEDICINE | Admitting: EMERGENCY MEDICINE
Payer: COMMERCIAL

## 2020-07-22 ENCOUNTER — APPOINTMENT (OUTPATIENT)
Dept: CT IMAGING | Facility: CLINIC | Age: 85
End: 2020-07-22
Attending: EMERGENCY MEDICINE
Payer: COMMERCIAL

## 2020-07-22 VITALS
DIASTOLIC BLOOD PRESSURE: 61 MMHG | RESPIRATION RATE: 16 BRPM | HEART RATE: 60 BPM | SYSTOLIC BLOOD PRESSURE: 151 MMHG | TEMPERATURE: 98.7 F | OXYGEN SATURATION: 100 %

## 2020-07-22 DIAGNOSIS — Z79.01 ANTICOAGULATED: ICD-10-CM

## 2020-07-22 DIAGNOSIS — S70.02XA CONTUSION OF LEFT HIP AND THIGH, INITIAL ENCOUNTER: ICD-10-CM

## 2020-07-22 DIAGNOSIS — S70.12XA CONTUSION OF LEFT HIP AND THIGH, INITIAL ENCOUNTER: ICD-10-CM

## 2020-07-22 DIAGNOSIS — S00.83XA CONTUSION OF FOREHEAD, INITIAL ENCOUNTER: ICD-10-CM

## 2020-07-22 DIAGNOSIS — S09.90XA CLOSED HEAD INJURY, INITIAL ENCOUNTER: ICD-10-CM

## 2020-07-22 DIAGNOSIS — S50.02XA CONTUSION OF LEFT ELBOW, INITIAL ENCOUNTER: ICD-10-CM

## 2020-07-22 LAB
ALBUMIN SERPL-MCNC: 3.2 G/DL (ref 3.4–5)
ALP SERPL-CCNC: 143 U/L (ref 40–150)
ALT SERPL W P-5'-P-CCNC: 21 U/L (ref 0–50)
ANION GAP SERPL CALCULATED.3IONS-SCNC: 6 MMOL/L (ref 3–14)
APTT PPP: 44 SEC (ref 22–37)
AST SERPL W P-5'-P-CCNC: 28 U/L (ref 0–45)
BASOPHILS # BLD AUTO: 0 10E9/L (ref 0–0.2)
BASOPHILS NFR BLD AUTO: 0.4 %
BILIRUB SERPL-MCNC: 1.1 MG/DL (ref 0.2–1.3)
BUN SERPL-MCNC: 20 MG/DL (ref 7–30)
CALCIUM SERPL-MCNC: 8.2 MG/DL (ref 8.5–10.1)
CHLORIDE SERPL-SCNC: 102 MMOL/L (ref 94–109)
CO2 SERPL-SCNC: 28 MMOL/L (ref 20–32)
CREAT SERPL-MCNC: 0.98 MG/DL (ref 0.52–1.04)
DIFFERENTIAL METHOD BLD: ABNORMAL
EOSINOPHIL # BLD AUTO: 0 10E9/L (ref 0–0.7)
EOSINOPHIL NFR BLD AUTO: 0.3 %
ERYTHROCYTE [DISTWIDTH] IN BLOOD BY AUTOMATED COUNT: 17.2 % (ref 10–15)
GFR SERPL CREATININE-BSD FRML MDRD: 51 ML/MIN/{1.73_M2}
GLUCOSE SERPL-MCNC: 170 MG/DL (ref 70–99)
HCT VFR BLD AUTO: 33.8 % (ref 35–47)
HGB BLD-MCNC: 10 G/DL (ref 11.7–15.7)
IMM GRANULOCYTES # BLD: 0 10E9/L (ref 0–0.4)
IMM GRANULOCYTES NFR BLD: 0.4 %
INR PPP: 1.45 (ref 0.86–1.14)
LYMPHOCYTES # BLD AUTO: 1.3 10E9/L (ref 0.8–5.3)
LYMPHOCYTES NFR BLD AUTO: 18.9 %
MCH RBC QN AUTO: 24.5 PG (ref 26.5–33)
MCHC RBC AUTO-ENTMCNC: 29.6 G/DL (ref 31.5–36.5)
MCV RBC AUTO: 83 FL (ref 78–100)
MONOCYTES # BLD AUTO: 1 10E9/L (ref 0–1.3)
MONOCYTES NFR BLD AUTO: 14.6 %
NEUTROPHILS # BLD AUTO: 4.5 10E9/L (ref 1.6–8.3)
NEUTROPHILS NFR BLD AUTO: 65.4 %
NRBC # BLD AUTO: 0 10*3/UL
NRBC BLD AUTO-RTO: 0 /100
PLATELET # BLD AUTO: 180 10E9/L (ref 150–450)
POTASSIUM SERPL-SCNC: 3.8 MMOL/L (ref 3.4–5.3)
PROT SERPL-MCNC: 7.1 G/DL (ref 6.8–8.8)
RBC # BLD AUTO: 4.08 10E12/L (ref 3.8–5.2)
SODIUM SERPL-SCNC: 136 MMOL/L (ref 133–144)
WBC # BLD AUTO: 6.9 10E9/L (ref 4–11)

## 2020-07-22 PROCEDURE — 73562 X-RAY EXAM OF KNEE 3: CPT | Mod: LT

## 2020-07-22 PROCEDURE — 73080 X-RAY EXAM OF ELBOW: CPT | Mod: LT

## 2020-07-22 PROCEDURE — 85730 THROMBOPLASTIN TIME PARTIAL: CPT | Performed by: EMERGENCY MEDICINE

## 2020-07-22 PROCEDURE — 99285 EMERGENCY DEPT VISIT HI MDM: CPT | Mod: 25

## 2020-07-22 PROCEDURE — 85610 PROTHROMBIN TIME: CPT | Performed by: EMERGENCY MEDICINE

## 2020-07-22 PROCEDURE — 85025 COMPLETE CBC W/AUTO DIFF WBC: CPT | Performed by: EMERGENCY MEDICINE

## 2020-07-22 PROCEDURE — 73502 X-RAY EXAM HIP UNI 2-3 VIEWS: CPT

## 2020-07-22 PROCEDURE — 70450 CT HEAD/BRAIN W/O DYE: CPT

## 2020-07-22 PROCEDURE — 80053 COMPREHEN METABOLIC PANEL: CPT | Performed by: EMERGENCY MEDICINE

## 2020-07-22 ASSESSMENT — ENCOUNTER SYMPTOMS
ARTHRALGIAS: 1
NECK PAIN: 0
LIGHT-HEADEDNESS: 0
HEADACHES: 0
DIZZINESS: 0
NECK STIFFNESS: 0

## 2020-07-22 NOTE — ED AVS SNAPSHOT
[facility]  [address]  [facility] [address]  Phone:  [phone]  Fax:  [phone]                                    Zayda Hawkins   MRN: [id number]    Department:  [facility]   Date of Visit:  7/22/2020           After Visit Summary Signature Page    I have received my discharge instructions, and my questions have been answered. I have discussed any challenges I see with this plan with the nurse or doctor.    ..........................................................................................................................................  Patient/Patient Representative Signature      ..........................................................................................................................................  Patient Representative Print Name and Relationship to Patient    ..................................................               ................................................  Date                                   Time    ..........................................................................................................................................  Reviewed by Signature/Title    ...................................................              ..............................................  Date                                               Time          22EPIC Rev 08/18

## 2020-07-23 NOTE — ED TRIAGE NOTES
2 days ago slipped in the bathroom and injured left hip, thigh, forearm,eblow and left eye.  No LOC. Lives independently at [facility]. Pain 8/10.

## 2020-07-23 NOTE — DISCHARGE INSTRUCTIONS
Discharge Instructions  Head Injury    You have been seen today for a head injury. Your evaluation included a history and physical examination. You may have had a CT (CAT) scan performed, though most head injuries do not require a scan. Based on this evaluation, your provider today does not feel that your head injury is serious.    Generally, every Emergency Department visit should have a follow-up clinic visit with either a primary or a specialty clinic/provider. Please follow-up as instructed by your emergency provider today.  Return to the Emergency Department if:  You are confused or you are not acting right.  Your headache gets worse or you start to have a really bad headache even with your recommended treatment plan.  You vomit (throw up) more than once.  You have a seizure.  You have trouble walking.  You have weakness or paralysis (cannot move) in an arm or a leg.  You have blood or fluid coming from your ears or nose.  You have new symptoms or anything that worries you.    Sleeping:  It is okay for you to sleep, but someone should wake you up if instructed by your provider, and someone should check on you at your usual time to wake up.     Activity:  Do not drive for at least 24 hours.  Do not drive if you have dizzy spells or trouble concentrating, or remembering things.  Do not return to any contact sports until cleared by your regular provider.     MORE INFORMATION:    Concussion:  A concussion is a minor head injury that may cause temporary problems with the way the brain works. Although concussions are important, they are generally not an emergency or a reason that a person needs to be hospitalized. Some concussion symptoms include confusion, amnesia (forgetful), nausea (sick to your stomach) and vomiting (throwing up), dizziness, fatigue, memory or concentration problems, irritability and sleep problems. For most people, concussions are mild and temporary but some will have more severe and persistent  symptoms that require on-going care and treatment.  CT Scans: Your evaluation today may have included a CT scan (CAT scan) to look for things like bleeding or a skull fracture (broken bone).  CT scans involve radiation and too many CT scans can cause serious health problems like cancer, especially in children.  Because of this, your provider may not have ordered a CT scan today if they think you are at low risk for a serious or life threatening problem.    If you were given a prescription for medicine here today, be sure to read all of the information (including the package insert) that comes with your prescription.  This will include important information about the medicine, its side effects, and any warnings that you need to know about.  The pharmacist who fills the prescription can provide more information and answer questions you may have about the medicine.  If you have questions or concerns that the pharmacist cannot address, please call or return to the Emergency Department.     Remember that you can always come back to the Emergency Department if you are not able to see your regular provider in the amount of time listed above, if you get any new symptoms, or if there is anything that worries you.     Discharge Instructions  Extremity Injury    You were seen today for an injury to an extremity (arm, hand, leg, or foot). You may have a bruise, strain, or fracture (broken bone). There is no evidence of fracture on today's xrays.     Generally, every Emergency Department visit should have a follow-up clinic visit with either a primary or a specialty clinic/provider. Please follow-up as instructed by your emergency provider today.  Return to the Emergency Department right away if:  Your pain seems to change or get worse or there is pain in a new area that wasn t evaluated today.  Your extremity becomes pale, cool, blue, or numb or tingling past the injury.  You have more drainage, redness or pain in the area of  the cut or abrasion.  You have pain that you cannot control with the medicine recommended or prescribed here, or you have pain that seems too much for your injury.  Your child (who is injured) will not stop crying or is much more fussy than normal.  You have new symptoms or anything that worries you.    What to Expect:  Your swelling and pain may be worse the day after your injury, but should not be severe and should start getting better after that. You should not have new symptoms and your pain should not get worse.  You may start to get a bruise over the injured area or below the injured area (bruising can follow gravity).  Your movement and strength should get better with time.  Some injuries may not show up until after you have left the Emergency Department so it is important to follow-up as directed.  Your injury may prevent you from working.  Follow-up with your regular provider to get a work release note.  Pain medications or your injury may make it unsafe to drive or operate machinery.    Home Care:  RICE: Rest, Ice, Compression, Elevation  Rest: Rest your injured area for at least 1-2 days. After that you may start using your extremity again as long as there is not too much pain.   Ice: Apply ice your injured area for 15 minutes at a time, at least 3 times a day. Use a cloth between the ice bag and your skin to prevent frostbite. Do not sleep with an ice pack or heating pad on, since this can cause burns or skin injury.  Compression: You may use an elastic bandage (Ace  Wrap) if it makes you more comfortable. Wrap it just tight enough to provide light compression, like a new pair of socks feels. Loosen the bandage if you have swelling past the bandage.  Elevation: Raise the injured area above the level of your heart as much as possible in the first 1-2 days.    Use Tylenol  (acetaminophen), Motrin (ibuprofen), or Advil  (ibuprofen) for your pain unless you have an allergy or are told not to use these  medications by your provider.  Take the medications as instructed on the package. Tylenol  (acetaminophen) is in many prescription medicines and non-prescription medicines--check all of your medicines to be sure you aren t taking more than 3000 mg per day.  Please follow any other instructions that were discussed with you by your provider.    Stretching/Exercises:  You may have been provided with instructions for stretching or exercises. If your injury was to your arm or shoulder and your provider put you in a sling or an immobilizer, it is important that you take off your immobilizer within 3 days and stretch/move your shoulder, unless your provider specifically tells you to not move your shoulder.  This is to prevent further injury such as a  frozen shoulder .     If you were given a prescription for medicine here today, be sure to read all of the information (including the package insert) that comes with your prescription.  This will include important information about the medicine, its side effects, and any warnings that you need to know about.  The pharmacist who fills the prescription can provide more information and answer questions you may have about the medicine.  If you have questions or concerns that the pharmacist cannot address, please call or return to the Emergency Department.     Remember that you can always come back to the Emergency Department if you are not able to see your regular provider in the amount of time listed above, if you get any new symptoms, or if there is anything that worries you.

## 2020-07-23 NOTE — ED PROVIDER NOTES
History   Chief Complaint  Fall    The history is provided by the patient and a relative.      Zayda Hawkins is a 87 year old female with a history of atrial fibrillation, hypertension, stroke, and CHF who presents with her son, who is acting as a , for evaluation fallowing a fall. The patient states that two days ago she lost her balance and slipped in the bathroom, causing her to hit her head and land on her left side. She did not lose consciousness and was able to get up on her own and ambulate. The patient denies any headache, visual disturbance, chest pain, or lightheadedness prior to the fall. Since then, she has been experiencing pain in her face, left hip, left thigh, left forearm, elbow, and buttocks. She took tylenol at 1000 this morning with some relief. The patient denies any neck pain. Of note, she lives alone and is on Eliquis.     Allergies  Aspirin  Coumadin [Warfarin]  Penicillins    Medications  Eliquis   Lasix  Lisinopril  magnesium gluconate  metFORMIN  Imitrex  sertraline   SUMAtriptan Succinate (IMITREX PO)    Past Medical History  Atrial fibrillation  Hematoma  ACS  Hypertension  Hyperlipidemia  CAD  Venous insuffiencey  Stroke  CHF  Diabetes mellitus  Retroperitoneal bleed  Tachy-susan syndrome    Past Surgical History  Cardioversion  Blepharoplasty  Cataract extraction  H ablation AV note  Pacemaker implant    Family History  Mother: MI  Father: heart disease and pneumonia    Social History  Negative for tobacco use.  Negative for alcohol use.  Negative for drug use.  Marital Status:     Review of Systems   Eyes: Negative for visual disturbance.   Cardiovascular: Negative for chest pain.   Musculoskeletal: Positive for arthralgias. Negative for neck pain and neck stiffness.   Neurological: Negative for dizziness, syncope, light-headedness and headaches.   All other systems reviewed and are negative.    Physical Exam     Patient Vitals for the past 24 hrs:   BP  Temp Temp src Heart Rate Resp SpO2   07/22/20 1941 (!) 163/63 98.7  F (37.1  C) Oral 60 16 99 %     Physical Exam    General: Elderly female sitting upright  Eyes: PERRL, Conjunctive within normal limits. EOMI.  HENT: Mild tenderness over the left lower forehead with small hematoma. No skull deformity. No scalp tenderness. No hemotympanum. Moist mucous membranes, oropharynx clear.   Neck: Nontender. Normal AROM.  CV: Normal S1S2, no murmur, rub or gallop. Regular rate and rhythm. Left DP and PT pulses intact.   Resp: Clear to auscultation bilaterally, no wheezes, rales or rhonchi. Normal respiratory effort.  GI: Abdomen is soft, nontender and nondistended. No palpable masses. No rebound or guarding.  MSK: Bilateral lower extremity distal edema. Soft tissue swelling over the left hip which is tender touch. Able to range at the left hip but not without pain. Nontender over the remainder of the LLE. Normal AROM of the left knee.  Left elbow is mildly tender posteriorly with mild associated soft tissue swelling, no palpable crepitus or deformity, normal AROM with no pain with ranging.   Skin: Warm and dry. Ecchymoses over the left forehead and left hip. Superficial abrasion left anterior knee.  Neuro: Alert and oriented. Responds appropriately to all questions and commands. No focal findings appreciated. Normal muscle tone.  Psych: Normal mood and affect. Pleasant.    Emergency Department Course     Imaging:  Radiology findings were communicated with the patient who voiced understanding of the findings.    XR Pelvis and Hip left 1 View:   Moderate to severe degenerative changes in the lower lumbar spine. Mild degenerative changes in the SI joints and pubic symphysis and hips. Diffuse bone demineralization. No evidence of fracture. As per radiology.     XR Knee left 3 views:   Chondrocalcinosis in the medial and lateral compartments. Diffuse bone demineralization. Mild tricompartmental osteoarthrosis. No evidence of  fracture or effusion. There is probably at least one calcified intra-articular body in the posterior    aspect of the joint. Otherwise negative. As per radiology.     Elbow XR, G/E 3 views, left:   There is diffuse bone demineralization which would make detection of a subtle fracture difficult. There is no direct evidence of fracture. However, there is a questionable elbow joint effusion which indicates that there may be an occult   fracture. Follow-up radiographs in 7-10 days recommended. As per radiology.     CT Head without contrast:   1.  Probable small right occipital scalp hematoma. No acute intercranial hemorrhage or calvarial fracture.   2.  Stable age-related changes.   3.  Development of a small chronic right PCA infarct since 2018. As per radiology.    Laboratory:  Laboratory findings were communicated with the patient who voiced understanding of the findings.    CBC: WBC: 6.9, HGB: 10.0 (L), PLT: 180  CMP: Glucose 170 (H), Calcium 8.2 (L), albumin 3.2 (L), GFR estimate 51 (L), o/w WNL (Creatinine: 0.98)  INR: 1.45 (H)  PTT: 44 (H)    Emergency Department Course:  Past medical records, nursing notes, and vitals reviewed.    2004 I physically examined the patient as documented above.     IV was inserted and blood was drawn for laboratory testing, results above.     The patient was sent for radiographs while in the emergency department, results above.      I rechecked the patient and discussed the findings of their workup thus far.     Findings and plan explained to the Patient. Patient discharged home with instructions regarding supportive care, medications, and reasons to return. The importance of close follow-up was reviewed.     I personally reviewed the laboratory and imaging results with the Patient and answered all related questions prior to discharge.     Impression & Plan   Medical Decision Making:  Zayda Hawkins is a 87 year old female who presents to the ER for evaluation of a head  injury.  History is consistent with a mechanical fall and subsequent head injury.  Physical examination reveals contusions of her forehead, left hip, left thigh, and left elbow.  Specifically, there are not signs of basilar skull fracture (manzano sign, raccoon eyes, hemotympanum, otorrhea, or rhinorrhea).  At present, there are no alterations in mental status, nor evidence of abnormal neurologic findings.  She is on anticoagulation, complicating their current presentation. CT imaging was felt required.  Results of this image (as above), reveal no acute intercranial hemorrhage or skull fracture. She had no xray abnormalities to suggest fracture. I suspect soft tissue contusions.     On re-evaluation there were no alterations in mental status or development of new neurologic findings were noted.  Given the above history, examination, and emergency department course, I feel outpatient management is reasonable at this time with very close follow-up.  We discussed the possible, yet small risk, of delayed hemorrhage, and that this is more common in individuals on blood thinning medications.  Nonetheless, I have recommended close monitoring for symptoms of worsening/severe headache, vomiting, visual changes, numbness/tingling/weakness to extremities, or confusion, which should prompt immediate return to the ER.  Closed head injury instructions were discussed including both physical (no running/jumping or heavy physical activities) and cognitive rest until seen in follow-up by their primary care provider.  Recommended follow-up in 2 days for re-evaluation.  I have encouraged  Tylenol as needed for symptom control, and will avoid opiate medications which may cloud mental status examination. Patient felt comfortable with this plan of care and questions were answered prior to discharge.    Diagnosis:    ICD-10-CM    1. Closed head injury, initial encounter  S09.90XA    2. Contusion of forehead, initial encounter  S00.83XA     3. Contusion of left hip and thigh, initial encounter  S70.02XA     S70.12XA    4. Contusion of left elbow, initial encounter  S50.02XA    5. Anticoagulated  Z79.01        Disposition:  Discharged to home.    Scribe Disclosure:  I, Mckay Rasheed, am serving as a scribe at 8:04 PM on 7/22/2020 to document services personally performed by Elizabeth Castro MD based on my observations and the provider's statements to me.      Elizabeth Castro MD  07/23/20 1550

## 2020-08-24 ENCOUNTER — ANCILLARY PROCEDURE (OUTPATIENT)
Dept: CARDIOLOGY | Facility: CLINIC | Age: 85
End: 2020-08-24
Attending: INTERNAL MEDICINE
Payer: COMMERCIAL

## 2020-08-24 DIAGNOSIS — Z95.0 CARDIAC PACEMAKER IN SITU: ICD-10-CM

## 2020-08-24 PROCEDURE — 93296 REM INTERROG EVL PM/IDS: CPT | Performed by: INTERNAL MEDICINE

## 2020-08-24 PROCEDURE — 93294 REM INTERROG EVL PM/LDLS PM: CPT | Performed by: INTERNAL MEDICINE

## 2020-08-27 DIAGNOSIS — I48.20 CHRONIC ATRIAL FIBRILLATION (H): ICD-10-CM

## 2020-09-06 LAB
MDC_IDC_LEAD_IMPLANT_DT: NORMAL
MDC_IDC_LEAD_LOCATION: NORMAL
MDC_IDC_LEAD_LOCATION_DETAIL_1: NORMAL
MDC_IDC_LEAD_MFG: NORMAL
MDC_IDC_LEAD_MODEL: NORMAL
MDC_IDC_LEAD_POLARITY_TYPE: NORMAL
MDC_IDC_LEAD_SERIAL: NORMAL
MDC_IDC_MSMT_BATTERY_DTM: NORMAL
MDC_IDC_MSMT_BATTERY_REMAINING_LONGEVITY: 113 MO
MDC_IDC_MSMT_BATTERY_REMAINING_PERCENTAGE: 95.5 %
MDC_IDC_MSMT_BATTERY_RRT_TRIGGER: NORMAL
MDC_IDC_MSMT_BATTERY_STATUS: NORMAL
MDC_IDC_MSMT_BATTERY_VOLTAGE: 2.99 V
MDC_IDC_MSMT_LEADCHNL_LV_IMPEDANCE_VALUE: 890 OHM
MDC_IDC_MSMT_LEADCHNL_LV_LEAD_CHANNEL_STATUS: NORMAL
MDC_IDC_MSMT_LEADCHNL_LV_PACING_THRESHOLD_AMPLITUDE: 1.62 V
MDC_IDC_MSMT_LEADCHNL_LV_PACING_THRESHOLD_PULSEWIDTH: 0.5 MS
MDC_IDC_MSMT_LEADCHNL_RV_IMPEDANCE_VALUE: 530 OHM
MDC_IDC_MSMT_LEADCHNL_RV_LEAD_CHANNEL_STATUS: NORMAL
MDC_IDC_MSMT_LEADCHNL_RV_PACING_THRESHOLD_AMPLITUDE: 0.5 V
MDC_IDC_MSMT_LEADCHNL_RV_PACING_THRESHOLD_PULSEWIDTH: 0.5 MS
MDC_IDC_MSMT_LEADCHNL_RV_SENSING_INTR_AMPL: 12 MV
MDC_IDC_PG_IMPLANT_DTM: NORMAL
MDC_IDC_PG_MFG: NORMAL
MDC_IDC_PG_MODEL: NORMAL
MDC_IDC_PG_SERIAL: NORMAL
MDC_IDC_PG_TYPE: NORMAL
MDC_IDC_SESS_CLINIC_NAME: NORMAL
MDC_IDC_SESS_DTM: NORMAL
MDC_IDC_SESS_REPROGRAMMED: NO
MDC_IDC_SESS_TYPE: NORMAL
MDC_IDC_SET_BRADY_LOWRATE: 60 {BEATS}/MIN
MDC_IDC_SET_BRADY_MAX_SENSOR_RATE: 120 {BEATS}/MIN
MDC_IDC_SET_BRADY_MODE: NORMAL
MDC_IDC_SET_CRT_LVRV_DELAY: 0 MS
MDC_IDC_SET_CRT_PACED_CHAMBERS: NORMAL
MDC_IDC_SET_LEADCHNL_LV_PACING_AMPLITUDE: 2.62
MDC_IDC_SET_LEADCHNL_LV_PACING_ANODE_ELECTRODE_1: NORMAL
MDC_IDC_SET_LEADCHNL_LV_PACING_ANODE_LOCATION_1: NORMAL
MDC_IDC_SET_LEADCHNL_LV_PACING_CAPTURE_MODE: NORMAL
MDC_IDC_SET_LEADCHNL_LV_PACING_CATHODE_ELECTRODE_1: NORMAL
MDC_IDC_SET_LEADCHNL_LV_PACING_CATHODE_LOCATION_1: NORMAL
MDC_IDC_SET_LEADCHNL_LV_PACING_POLARITY: NORMAL
MDC_IDC_SET_LEADCHNL_LV_PACING_PULSEWIDTH: 0.5 MS
MDC_IDC_SET_LEADCHNL_RA_PACING_POLARITY: NORMAL
MDC_IDC_SET_LEADCHNL_RA_SENSING_POLARITY: NORMAL
MDC_IDC_SET_LEADCHNL_RV_PACING_AMPLITUDE: 2 V
MDC_IDC_SET_LEADCHNL_RV_PACING_ANODE_ELECTRODE_1: NORMAL
MDC_IDC_SET_LEADCHNL_RV_PACING_ANODE_LOCATION_1: NORMAL
MDC_IDC_SET_LEADCHNL_RV_PACING_CAPTURE_MODE: NORMAL
MDC_IDC_SET_LEADCHNL_RV_PACING_CATHODE_ELECTRODE_1: NORMAL
MDC_IDC_SET_LEADCHNL_RV_PACING_CATHODE_LOCATION_1: NORMAL
MDC_IDC_SET_LEADCHNL_RV_PACING_POLARITY: NORMAL
MDC_IDC_SET_LEADCHNL_RV_PACING_PULSEWIDTH: 0.5 MS
MDC_IDC_SET_LEADCHNL_RV_SENSING_ADAPTATION_MODE: NORMAL
MDC_IDC_SET_LEADCHNL_RV_SENSING_ANODE_ELECTRODE_1: NORMAL
MDC_IDC_SET_LEADCHNL_RV_SENSING_ANODE_LOCATION_1: NORMAL
MDC_IDC_SET_LEADCHNL_RV_SENSING_CATHODE_ELECTRODE_1: NORMAL
MDC_IDC_SET_LEADCHNL_RV_SENSING_CATHODE_LOCATION_1: NORMAL
MDC_IDC_SET_LEADCHNL_RV_SENSING_POLARITY: NORMAL
MDC_IDC_SET_LEADCHNL_RV_SENSING_SENSITIVITY: 0.5 MV
MDC_IDC_STAT_AT_DTM_END: NORMAL
MDC_IDC_STAT_AT_DTM_START: NORMAL
MDC_IDC_STAT_BRADY_DTM_END: NORMAL
MDC_IDC_STAT_BRADY_DTM_START: NORMAL
MDC_IDC_STAT_CRT_DTM_END: NORMAL
MDC_IDC_STAT_CRT_DTM_START: NORMAL
MDC_IDC_STAT_CRT_PERCENT_PACED: 99 %
MDC_IDC_STAT_HEART_RATE_DTM_END: NORMAL
MDC_IDC_STAT_HEART_RATE_DTM_START: NORMAL
MDC_IDC_STAT_HEART_RATE_VENTRICULAR_MAX: 190 {BEATS}/MIN
MDC_IDC_STAT_HEART_RATE_VENTRICULAR_MEAN: 73 {BEATS}/MIN
MDC_IDC_STAT_HEART_RATE_VENTRICULAR_MIN: 50 {BEATS}/MIN

## 2020-11-12 ENCOUNTER — VIRTUAL VISIT (OUTPATIENT)
Dept: CARDIOLOGY | Facility: CLINIC | Age: 85
End: 2020-11-12
Payer: COMMERCIAL

## 2020-11-12 DIAGNOSIS — Z98.890 S/P AV NODAL ABLATION: ICD-10-CM

## 2020-11-12 DIAGNOSIS — I48.20 CHRONIC ATRIAL FIBRILLATION (H): Primary | ICD-10-CM

## 2020-11-12 DIAGNOSIS — I50.22 CHRONIC SYSTOLIC CONGESTIVE HEART FAILURE (H): ICD-10-CM

## 2020-11-12 DIAGNOSIS — I10 ESSENTIAL HYPERTENSION: ICD-10-CM

## 2020-11-12 DIAGNOSIS — I87.2 VENOUS (PERIPHERAL) INSUFFICIENCY: ICD-10-CM

## 2020-11-12 DIAGNOSIS — R60.9 EDEMA, UNSPECIFIED TYPE: ICD-10-CM

## 2020-11-12 DIAGNOSIS — I83.891 VARICOSE VEINS OF LEG WITH EDEMA, RIGHT: ICD-10-CM

## 2020-11-12 DIAGNOSIS — Z95.0 CARDIAC PACEMAKER IN SITU: ICD-10-CM

## 2020-11-12 PROCEDURE — 99212 OFFICE O/P EST SF 10 MIN: CPT | Mod: TEL | Performed by: INTERNAL MEDICINE

## 2020-11-12 NOTE — LETTER
"11/12/2020    [place] [facility] [address]  [facility] [address]    RE: Zayda Hawkins       Dear Colleague,    I had the pleasure of seeing Zayda Hawkins in the [facility].    Zayda Hawkins is a 87 year old female who is being evaluated via a billable telephone visit.      The patient has been notified of following:     \"This telephone visit will be conducted via a call between you and your physician/provider. We have found that certain health care needs can be provided without the need for a physical exam.  This service lets us provide the care you need with a short phone conversation.  If a prescription is necessary we can send it directly to your pharmacy.  If lab work is needed we can place an order for that and you can then stop by our lab to have the test done at a later time.    Telephone visits are billed at different rates depending on your insurance coverage. During this emergency period, for some insurers they may be billed the same as an in-person visit.  Please reach out to your insurance provider with any questions.    If during the course of the call the physician/provider feels a telephone visit is not appropriate, you will not be charged for this service.\"    Patient has given verbal consent for Telephone visit?  Yes    What phone number would you like to be contacted at? [phone] - Mary Ann (kanwal Yuan), then she will 3rd party call patient.    How would you like to obtain your AVS? Mail a copy    Vitals - Patient Reported  Weight (Patient Reported): 59.9 kg (132 lb)  Height (Patient Reported): 162.6 cm (5' 4\")  BMI (Based on Pt Reported Ht/Wt): 22.66      Review Of Systems  Skin: NEGATIVE  Eyes:Ears/Nose/Throat: NEGATIVE  Respiratory: NEGATIVE  Cardiovascular:  Edema, it has improved since PCP increased Lasix.   Some sharp pains around device site.  Gastrointestinal: " NEGATIVE  Genitourinary:NEGATIVE  Musculoskeletal: NEGATIVE  Neurologic: NEGATIVE  Psychiatric: NEGATIVE  Hematologic/Lymphatic/Immunologic: NEGATIVE  Endocrine:  Diabetes    SHANKAR Maya    I reviewed the above ROS    Phone call duration: 10 minutes    HISTORY OF PRESENT ILLNESS:  Zayda Hawkins is a very pleasant, 87-year-old female with a history of chronic lower extremity venous insufficiency, paroxysmal atrial fibrillation, coronary artery disease and hypertension.  We have been following her for the last several years for chronic lower extremity edema.  She was placed on compression stockings a few years ago, and her symptoms have improved but she developed worsening edema on the right. She underwent right GSV ablation and bilateral sclerotherapy at an outside clinic. More recently she had right AASV venoseal. Her edema initially improved but she developed worsening symptoms in the last few months. Her lasix was increased by PCP with improved symptoms.      IMPRESSION AND PLAN:   1.  Bilateral lower extremity edema ldue to chronic venous insufficiency.   2.  Atrial fibrillation  3.  Coronary artery disease, stable   4.  Hypertension, stable.      She is status post multiple prior procedures for venous insufficiency.  I agree with increased lasix dose. She will continue to wear compression stockings. Follow up with vascular NP in 6 months     I appreciate the opportunity to be part of this patient's care.     Sincerely,     Kingsley Brandon MD, MD     [facility]

## 2020-11-12 NOTE — PROGRESS NOTES
"Zayda Hawkins is a 87 year old female who is being evaluated via a billable telephone visit.      The patient has been notified of following:     \"This telephone visit will be conducted via a call between you and your physician/provider. We have found that certain health care needs can be provided without the need for a physical exam.  This service lets us provide the care you need with a short phone conversation.  If a prescription is necessary we can send it directly to your pharmacy.  If lab work is needed we can place an order for that and you can then stop by our lab to have the test done at a later time.    Telephone visits are billed at different rates depending on your insurance coverage. During this emergency period, for some insurers they may be billed the same as an in-person visit.  Please reach out to your insurance provider with any questions.    If during the course of the call the physician/provider feels a telephone visit is not appropriate, you will not be charged for this service.\"    Patient has given verbal consent for Telephone visit?  Yes    What phone number would you like to be contacted at? [phone] - Mary Ann (kanwal Yuan), then she will 3rd party call patient.    How would you like to obtain your AVS? Mail a copy    Vitals - Patient Reported  Weight (Patient Reported): 59.9 kg (132 lb)  Height (Patient Reported): 162.6 cm (5' 4\")  BMI (Based on Pt Reported Ht/Wt): 22.66      Review Of Systems  Skin: NEGATIVE  Eyes:Ears/Nose/Throat: NEGATIVE  Respiratory: NEGATIVE  Cardiovascular:  Edema, it has improved since PCP increased Lasix.   Some sharp pains around device site.  Gastrointestinal: NEGATIVE  Genitourinary:NEGATIVE  Musculoskeletal: NEGATIVE  Neurologic: NEGATIVE  Psychiatric: NEGATIVE  Hematologic/Lymphatic/Immunologic: NEGATIVE  Endocrine:  Diabetes    SHANKAR Maya    I reviewed the above ROS    Phone call duration: 10 minutes      HISTORY OF PRESENT " ILLNESS:  Zayda Hawkins is a very pleasant, 87-year-old female with a history of chronic lower extremity venous insufficiency, paroxysmal atrial fibrillation, coronary artery disease and hypertension.  We have been following her for the last several years for chronic lower extremity edema.  She was placed on compression stockings a few years ago, and her symptoms have improved but she developed worsening edema on the right. She underwent right GSV ablation and bilateral sclerotherapy at an outside clinic. More recently she had right AASV venoseal. Her edema initially improved but she developed worsening symptoms in the last few months. Her lasix was increased by PCP with improved symptoms.      IMPRESSION AND PLAN:   1.  Bilateral lower extremity edema ldue to chronic venous insufficiency.   2.  Atrial fibrillation  3.  Coronary artery disease, stable   4.  Hypertension, stable.      She is status post multiple prior procedures for venous insufficiency.  I agree with increased lasix dose. She will continue to wear compression stockings. Follow up with vascular NP in 6 months     I appreciate the opportunity to be part of this patient's care.         DO BARKLEY MD

## 2020-11-23 ENCOUNTER — ANCILLARY PROCEDURE (OUTPATIENT)
Dept: CARDIOLOGY | Facility: CLINIC | Age: 85
End: 2020-11-23
Attending: INTERNAL MEDICINE
Payer: COMMERCIAL

## 2020-11-23 DIAGNOSIS — Z95.0 CARDIAC PACEMAKER IN SITU: ICD-10-CM

## 2020-11-23 DIAGNOSIS — I48.20 CHRONIC ATRIAL FIBRILLATION (H): ICD-10-CM

## 2020-11-23 PROCEDURE — 93294 REM INTERROG EVL PM/LDLS PM: CPT | Performed by: INTERNAL MEDICINE

## 2020-11-23 PROCEDURE — 93296 REM INTERROG EVL PM/IDS: CPT | Performed by: INTERNAL MEDICINE

## 2020-12-01 LAB
MDC_IDC_LEAD_IMPLANT_DT: NORMAL
MDC_IDC_LEAD_LOCATION: NORMAL
MDC_IDC_LEAD_LOCATION_DETAIL_1: NORMAL
MDC_IDC_LEAD_MFG: NORMAL
MDC_IDC_LEAD_MODEL: NORMAL
MDC_IDC_LEAD_POLARITY_TYPE: NORMAL
MDC_IDC_LEAD_SERIAL: NORMAL
MDC_IDC_MSMT_BATTERY_DTM: NORMAL
MDC_IDC_MSMT_BATTERY_REMAINING_LONGEVITY: 110 MO
MDC_IDC_MSMT_BATTERY_REMAINING_PERCENTAGE: 95.5 %
MDC_IDC_MSMT_BATTERY_RRT_TRIGGER: NORMAL
MDC_IDC_MSMT_BATTERY_STATUS: NORMAL
MDC_IDC_MSMT_BATTERY_VOLTAGE: 2.99 V
MDC_IDC_MSMT_LEADCHNL_LV_IMPEDANCE_VALUE: 790 OHM
MDC_IDC_MSMT_LEADCHNL_LV_LEAD_CHANNEL_STATUS: NORMAL
MDC_IDC_MSMT_LEADCHNL_LV_PACING_THRESHOLD_AMPLITUDE: 1.62 V
MDC_IDC_MSMT_LEADCHNL_LV_PACING_THRESHOLD_PULSEWIDTH: 0.5 MS
MDC_IDC_MSMT_LEADCHNL_RV_IMPEDANCE_VALUE: 460 OHM
MDC_IDC_MSMT_LEADCHNL_RV_LEAD_CHANNEL_STATUS: NORMAL
MDC_IDC_MSMT_LEADCHNL_RV_PACING_THRESHOLD_AMPLITUDE: 0.62 V
MDC_IDC_MSMT_LEADCHNL_RV_PACING_THRESHOLD_PULSEWIDTH: 0.5 MS
MDC_IDC_MSMT_LEADCHNL_RV_SENSING_INTR_AMPL: 5 MV
MDC_IDC_PG_IMPLANT_DTM: NORMAL
MDC_IDC_PG_MFG: NORMAL
MDC_IDC_PG_MODEL: NORMAL
MDC_IDC_PG_SERIAL: NORMAL
MDC_IDC_PG_TYPE: NORMAL
MDC_IDC_SESS_CLINIC_NAME: NORMAL
MDC_IDC_SESS_DTM: NORMAL
MDC_IDC_SESS_REPROGRAMMED: NO
MDC_IDC_SESS_TYPE: NORMAL
MDC_IDC_SET_BRADY_LOWRATE: 60 {BEATS}/MIN
MDC_IDC_SET_BRADY_MAX_SENSOR_RATE: 120 {BEATS}/MIN
MDC_IDC_SET_BRADY_MODE: NORMAL
MDC_IDC_SET_CRT_LVRV_DELAY: 0 MS
MDC_IDC_SET_CRT_PACED_CHAMBERS: NORMAL
MDC_IDC_SET_LEADCHNL_LV_PACING_AMPLITUDE: 2.62
MDC_IDC_SET_LEADCHNL_LV_PACING_ANODE_ELECTRODE_1: NORMAL
MDC_IDC_SET_LEADCHNL_LV_PACING_ANODE_LOCATION_1: NORMAL
MDC_IDC_SET_LEADCHNL_LV_PACING_CAPTURE_MODE: NORMAL
MDC_IDC_SET_LEADCHNL_LV_PACING_CATHODE_ELECTRODE_1: NORMAL
MDC_IDC_SET_LEADCHNL_LV_PACING_CATHODE_LOCATION_1: NORMAL
MDC_IDC_SET_LEADCHNL_LV_PACING_POLARITY: NORMAL
MDC_IDC_SET_LEADCHNL_LV_PACING_PULSEWIDTH: 0.5 MS
MDC_IDC_SET_LEADCHNL_RA_PACING_POLARITY: NORMAL
MDC_IDC_SET_LEADCHNL_RA_SENSING_POLARITY: NORMAL
MDC_IDC_SET_LEADCHNL_RV_PACING_AMPLITUDE: 2 V
MDC_IDC_SET_LEADCHNL_RV_PACING_ANODE_ELECTRODE_1: NORMAL
MDC_IDC_SET_LEADCHNL_RV_PACING_ANODE_LOCATION_1: NORMAL
MDC_IDC_SET_LEADCHNL_RV_PACING_CAPTURE_MODE: NORMAL
MDC_IDC_SET_LEADCHNL_RV_PACING_CATHODE_ELECTRODE_1: NORMAL
MDC_IDC_SET_LEADCHNL_RV_PACING_CATHODE_LOCATION_1: NORMAL
MDC_IDC_SET_LEADCHNL_RV_PACING_POLARITY: NORMAL
MDC_IDC_SET_LEADCHNL_RV_PACING_PULSEWIDTH: 0.5 MS
MDC_IDC_SET_LEADCHNL_RV_SENSING_ADAPTATION_MODE: NORMAL
MDC_IDC_SET_LEADCHNL_RV_SENSING_ANODE_ELECTRODE_1: NORMAL
MDC_IDC_SET_LEADCHNL_RV_SENSING_ANODE_LOCATION_1: NORMAL
MDC_IDC_SET_LEADCHNL_RV_SENSING_CATHODE_ELECTRODE_1: NORMAL
MDC_IDC_SET_LEADCHNL_RV_SENSING_CATHODE_LOCATION_1: NORMAL
MDC_IDC_SET_LEADCHNL_RV_SENSING_POLARITY: NORMAL
MDC_IDC_SET_LEADCHNL_RV_SENSING_SENSITIVITY: 0.5 MV
MDC_IDC_STAT_AT_DTM_END: NORMAL
MDC_IDC_STAT_AT_DTM_START: NORMAL
MDC_IDC_STAT_BRADY_DTM_END: NORMAL
MDC_IDC_STAT_BRADY_DTM_START: NORMAL
MDC_IDC_STAT_CRT_DTM_END: NORMAL
MDC_IDC_STAT_CRT_DTM_START: NORMAL
MDC_IDC_STAT_CRT_PERCENT_PACED: 99 %
MDC_IDC_STAT_HEART_RATE_DTM_END: NORMAL
MDC_IDC_STAT_HEART_RATE_DTM_START: NORMAL
MDC_IDC_STAT_HEART_RATE_VENTRICULAR_MAX: 200 {BEATS}/MIN
MDC_IDC_STAT_HEART_RATE_VENTRICULAR_MEAN: 73 {BEATS}/MIN
MDC_IDC_STAT_HEART_RATE_VENTRICULAR_MIN: 50 {BEATS}/MIN

## 2020-12-21 ENCOUNTER — MEDICAL CORRESPONDENCE (OUTPATIENT)
Dept: HEALTH INFORMATION MANAGEMENT | Facility: CLINIC | Age: 85
End: 2020-12-21

## 2021-01-01 ENCOUNTER — LAB (OUTPATIENT)
Dept: LAB | Facility: CLINIC | Age: 86
End: 2021-01-01
Payer: COMMERCIAL

## 2021-01-01 ENCOUNTER — TELEPHONE (OUTPATIENT)
Dept: CARDIOLOGY | Facility: CLINIC | Age: 86
End: 2021-01-01
Payer: COMMERCIAL

## 2021-01-01 ENCOUNTER — TELEPHONE (OUTPATIENT)
Dept: CARDIOLOGY | Facility: CLINIC | Age: 86
End: 2021-01-01

## 2021-01-01 ENCOUNTER — HEALTH MAINTENANCE LETTER (OUTPATIENT)
Age: 86
End: 2021-01-01

## 2021-01-01 ENCOUNTER — ANCILLARY PROCEDURE (OUTPATIENT)
Dept: CARDIOLOGY | Facility: CLINIC | Age: 86
End: 2021-01-01
Attending: INTERNAL MEDICINE
Payer: COMMERCIAL

## 2021-01-01 ENCOUNTER — LAB (OUTPATIENT)
Dept: INFUSION THERAPY | Facility: CLINIC | Age: 86
End: 2021-01-01
Attending: INTERNAL MEDICINE
Payer: COMMERCIAL

## 2021-01-01 ENCOUNTER — OFFICE VISIT (OUTPATIENT)
Dept: CARDIOLOGY | Facility: CLINIC | Age: 86
End: 2021-01-01
Payer: COMMERCIAL

## 2021-01-01 ENCOUNTER — LAB (OUTPATIENT)
Dept: LAB | Facility: CLINIC | Age: 86
End: 2021-01-01
Attending: INTERNAL MEDICINE
Payer: COMMERCIAL

## 2021-01-01 ENCOUNTER — HOSPITAL ENCOUNTER (EMERGENCY)
Facility: CLINIC | Age: 86
Discharge: HOME | End: 2021-03-07
Attending: EMERGENCY MEDICINE | Admitting: EMERGENCY MEDICINE
Payer: COMMERCIAL

## 2021-01-01 ENCOUNTER — HOSPITAL ENCOUNTER (OUTPATIENT)
Dept: CT IMAGING | Facility: CLINIC | Age: 86
Discharge: HOME | End: 2021-11-15
Attending: FAMILY MEDICINE | Admitting: FAMILY MEDICINE
Payer: COMMERCIAL

## 2021-01-01 VITALS
HEIGHT: 64 IN | OXYGEN SATURATION: 96 % | RESPIRATION RATE: 20 BRPM | HEART RATE: 59 BPM | TEMPERATURE: 98.2 F | WEIGHT: 124 LBS | BODY MASS INDEX: 21.17 KG/M2 | DIASTOLIC BLOOD PRESSURE: 78 MMHG | SYSTOLIC BLOOD PRESSURE: 191 MMHG

## 2021-01-01 VITALS
TEMPERATURE: 98 F | SYSTOLIC BLOOD PRESSURE: 153 MMHG | HEART RATE: 66 BPM | DIASTOLIC BLOOD PRESSURE: 74 MMHG | OXYGEN SATURATION: 98 % | RESPIRATION RATE: 16 BRPM

## 2021-01-01 VITALS
BODY MASS INDEX: 22.77 KG/M2 | WEIGHT: 133.4 LBS | HEART RATE: 76 BPM | SYSTOLIC BLOOD PRESSURE: 162 MMHG | HEIGHT: 64 IN | DIASTOLIC BLOOD PRESSURE: 79 MMHG

## 2021-01-01 VITALS — SYSTOLIC BLOOD PRESSURE: 158 MMHG | HEART RATE: 60 BPM | DIASTOLIC BLOOD PRESSURE: 77 MMHG | OXYGEN SATURATION: 95 %

## 2021-01-01 VITALS
HEART RATE: 64 BPM | OXYGEN SATURATION: 97 % | HEIGHT: 64 IN | SYSTOLIC BLOOD PRESSURE: 138 MMHG | BODY MASS INDEX: 25.78 KG/M2 | DIASTOLIC BLOOD PRESSURE: 70 MMHG | WEIGHT: 151 LBS

## 2021-01-01 VITALS
OXYGEN SATURATION: 99 % | HEIGHT: 64 IN | DIASTOLIC BLOOD PRESSURE: 83 MMHG | BODY MASS INDEX: 24.24 KG/M2 | SYSTOLIC BLOOD PRESSURE: 160 MMHG | HEART RATE: 71 BPM | WEIGHT: 142 LBS

## 2021-01-01 VITALS
OXYGEN SATURATION: 98 % | HEART RATE: 64 BPM | BODY MASS INDEX: 20.6 KG/M2 | WEIGHT: 120 LBS | DIASTOLIC BLOOD PRESSURE: 84 MMHG | SYSTOLIC BLOOD PRESSURE: 191 MMHG

## 2021-01-01 DIAGNOSIS — Z95.0 CARDIAC PACEMAKER IN SITU: ICD-10-CM

## 2021-01-01 DIAGNOSIS — I44.2 COMPLETE HEART BLOCK (H): Primary | ICD-10-CM

## 2021-01-01 DIAGNOSIS — Z98.890 HX OF ATRIOVENTRICULAR NODE ABLATION: ICD-10-CM

## 2021-01-01 DIAGNOSIS — I10 ESSENTIAL HYPERTENSION: ICD-10-CM

## 2021-01-01 DIAGNOSIS — R60.9 EDEMA, UNSPECIFIED TYPE: Primary | ICD-10-CM

## 2021-01-01 DIAGNOSIS — R60.9 EDEMA, UNSPECIFIED TYPE: ICD-10-CM

## 2021-01-01 DIAGNOSIS — R41.89 COGNITIVE DECLINE: ICD-10-CM

## 2021-01-01 DIAGNOSIS — I50.22 CHRONIC SYSTOLIC CONGESTIVE HEART FAILURE (H): ICD-10-CM

## 2021-01-01 DIAGNOSIS — I50.22 CHRONIC SYSTOLIC CONGESTIVE HEART FAILURE (H): Primary | ICD-10-CM

## 2021-01-01 DIAGNOSIS — I44.2 COMPLETE HEART BLOCK (H): ICD-10-CM

## 2021-01-01 DIAGNOSIS — R04.0 EPISTAXIS: ICD-10-CM

## 2021-01-01 LAB
ANION GAP SERPL CALCULATED.3IONS-SCNC: 5 MMOL/L (ref 3–14)
ANION GAP SERPL CALCULATED.3IONS-SCNC: 7 MMOL/L (ref 3–14)
ANION GAP SERPL CALCULATED.3IONS-SCNC: 9 MMOL/L (ref 3–14)
BUN SERPL-MCNC: 37 MG/DL (ref 7–30)
BUN SERPL-MCNC: 38 MG/DL (ref 7–30)
BUN SERPL-MCNC: 56 MG/DL (ref 7–30)
CALCIUM SERPL-MCNC: 8.7 MG/DL (ref 8.5–10.1)
CALCIUM SERPL-MCNC: 8.8 MG/DL (ref 8.5–10.1)
CALCIUM SERPL-MCNC: 8.8 MG/DL (ref 8.5–10.1)
CHLORIDE BLD-SCNC: 102 MMOL/L (ref 94–109)
CHLORIDE BLD-SCNC: 103 MMOL/L (ref 94–109)
CHLORIDE BLD-SCNC: 104 MMOL/L (ref 94–109)
CO2 SERPL-SCNC: 26 MMOL/L (ref 20–32)
CO2 SERPL-SCNC: 27 MMOL/L (ref 20–32)
CO2 SERPL-SCNC: 29 MMOL/L (ref 20–32)
CREAT SERPL-MCNC: 1.15 MG/DL (ref 0.52–1.04)
CREAT SERPL-MCNC: 1.23 MG/DL (ref 0.52–1.04)
CREAT SERPL-MCNC: 1.54 MG/DL (ref 0.52–1.04)
GFR SERPL CREATININE-BSD FRML MDRD: 30 ML/MIN/1.73M2
GFR SERPL CREATININE-BSD FRML MDRD: 39 ML/MIN/1.73M2
GFR SERPL CREATININE-BSD FRML MDRD: 43 ML/MIN/1.73M2
GLUCOSE BLD-MCNC: 165 MG/DL (ref 70–99)
GLUCOSE BLD-MCNC: 227 MG/DL (ref 70–99)
GLUCOSE BLD-MCNC: 227 MG/DL (ref 70–99)
MDC_IDC_LEAD_IMPLANT_DT: NORMAL
MDC_IDC_LEAD_LOCATION: NORMAL
MDC_IDC_LEAD_LOCATION_DETAIL_1: NORMAL
MDC_IDC_LEAD_MFG: NORMAL
MDC_IDC_LEAD_MODEL: NORMAL
MDC_IDC_LEAD_POLARITY_TYPE: NORMAL
MDC_IDC_LEAD_SERIAL: NORMAL
MDC_IDC_MSMT_BATTERY_DTM: NORMAL
MDC_IDC_MSMT_BATTERY_REMAINING_LONGEVITY: 103 MO
MDC_IDC_MSMT_BATTERY_REMAINING_LONGEVITY: 110 MO
MDC_IDC_MSMT_BATTERY_REMAINING_LONGEVITY: 112 MO
MDC_IDC_MSMT_BATTERY_REMAINING_LONGEVITY: 113 MO
MDC_IDC_MSMT_BATTERY_REMAINING_PERCENTAGE: 95.5 %
MDC_IDC_MSMT_BATTERY_RRT_TRIGGER: NORMAL
MDC_IDC_MSMT_BATTERY_STATUS: NORMAL
MDC_IDC_MSMT_BATTERY_VOLTAGE: 2.99 V
MDC_IDC_MSMT_LEADCHNL_LV_IMPEDANCE_VALUE: 737.5 OHM
MDC_IDC_MSMT_LEADCHNL_LV_IMPEDANCE_VALUE: 780 OHM
MDC_IDC_MSMT_LEADCHNL_LV_IMPEDANCE_VALUE: 830 OHM
MDC_IDC_MSMT_LEADCHNL_LV_IMPEDANCE_VALUE: 840 OHM
MDC_IDC_MSMT_LEADCHNL_LV_LEAD_CHANNEL_STATUS: NORMAL
MDC_IDC_MSMT_LEADCHNL_LV_PACING_THRESHOLD_AMPLITUDE: 1.25 V
MDC_IDC_MSMT_LEADCHNL_LV_PACING_THRESHOLD_AMPLITUDE: 1.5 V
MDC_IDC_MSMT_LEADCHNL_LV_PACING_THRESHOLD_AMPLITUDE: 1.5 V
MDC_IDC_MSMT_LEADCHNL_LV_PACING_THRESHOLD_PULSEWIDTH: 0.5 MS
MDC_IDC_MSMT_LEADCHNL_RV_IMPEDANCE_VALUE: 410 OHM
MDC_IDC_MSMT_LEADCHNL_RV_IMPEDANCE_VALUE: 412.5 OHM
MDC_IDC_MSMT_LEADCHNL_RV_IMPEDANCE_VALUE: 440 OHM
MDC_IDC_MSMT_LEADCHNL_RV_IMPEDANCE_VALUE: 440 OHM
MDC_IDC_MSMT_LEADCHNL_RV_LEAD_CHANNEL_STATUS: NORMAL
MDC_IDC_MSMT_LEADCHNL_RV_PACING_THRESHOLD_AMPLITUDE: 0.38 V
MDC_IDC_MSMT_LEADCHNL_RV_PACING_THRESHOLD_AMPLITUDE: 0.5 V
MDC_IDC_MSMT_LEADCHNL_RV_PACING_THRESHOLD_PULSEWIDTH: 0.5 MS
MDC_IDC_MSMT_LEADCHNL_RV_SENSING_INTR_AMPL: 6.5 MV
MDC_IDC_MSMT_LEADCHNL_RV_SENSING_INTR_AMPL: 6.8 MV
MDC_IDC_MSMT_LEADCHNL_RV_SENSING_INTR_AMPL: 9 MV
MDC_IDC_MSMT_LEADCHNL_RV_SENSING_INTR_AMPL: 9 MV
MDC_IDC_PG_IMPLANT_DTM: NORMAL
MDC_IDC_PG_MFG: NORMAL
MDC_IDC_PG_MODEL: NORMAL
MDC_IDC_PG_SERIAL: NORMAL
MDC_IDC_PG_TYPE: NORMAL
MDC_IDC_SESS_CLINIC_NAME: NORMAL
MDC_IDC_SESS_DTM: NORMAL
MDC_IDC_SESS_REPROGRAMMED: NO
MDC_IDC_SESS_TYPE: NORMAL
MDC_IDC_SET_BRADY_HYSTRATE: NORMAL
MDC_IDC_SET_BRADY_LOWRATE: 60 {BEATS}/MIN
MDC_IDC_SET_BRADY_MAX_SENSOR_RATE: 120 {BEATS}/MIN
MDC_IDC_SET_BRADY_MODE: NORMAL
MDC_IDC_SET_BRADY_NIGHT_RATE: NORMAL
MDC_IDC_SET_CRT_LVRV_DELAY: 0 MS
MDC_IDC_SET_CRT_PACED_CHAMBERS: NORMAL
MDC_IDC_SET_LEADCHNL_LV_PACING_AMPLITUDE: 2.25 V
MDC_IDC_SET_LEADCHNL_LV_PACING_ANODE_ELECTRODE_1: NORMAL
MDC_IDC_SET_LEADCHNL_LV_PACING_ANODE_LOCATION_1: NORMAL
MDC_IDC_SET_LEADCHNL_LV_PACING_CAPTURE_MODE: NORMAL
MDC_IDC_SET_LEADCHNL_LV_PACING_CATHODE_ELECTRODE_1: NORMAL
MDC_IDC_SET_LEADCHNL_LV_PACING_CATHODE_LOCATION_1: NORMAL
MDC_IDC_SET_LEADCHNL_LV_PACING_POLARITY: NORMAL
MDC_IDC_SET_LEADCHNL_LV_PACING_PULSEWIDTH: 0.5 MS
MDC_IDC_SET_LEADCHNL_RA_PACING_POLARITY: NORMAL
MDC_IDC_SET_LEADCHNL_RA_SENSING_POLARITY: NORMAL
MDC_IDC_SET_LEADCHNL_RV_PACING_AMPLITUDE: 2 V
MDC_IDC_SET_LEADCHNL_RV_PACING_ANODE_ELECTRODE_1: NORMAL
MDC_IDC_SET_LEADCHNL_RV_PACING_ANODE_LOCATION_1: NORMAL
MDC_IDC_SET_LEADCHNL_RV_PACING_CAPTURE_MODE: NORMAL
MDC_IDC_SET_LEADCHNL_RV_PACING_CATHODE_ELECTRODE_1: NORMAL
MDC_IDC_SET_LEADCHNL_RV_PACING_CATHODE_LOCATION_1: NORMAL
MDC_IDC_SET_LEADCHNL_RV_PACING_POLARITY: NORMAL
MDC_IDC_SET_LEADCHNL_RV_PACING_PULSEWIDTH: 0.5 MS
MDC_IDC_SET_LEADCHNL_RV_SENSING_ADAPTATION_MODE: NORMAL
MDC_IDC_SET_LEADCHNL_RV_SENSING_ANODE_ELECTRODE_1: NORMAL
MDC_IDC_SET_LEADCHNL_RV_SENSING_ANODE_LOCATION_1: NORMAL
MDC_IDC_SET_LEADCHNL_RV_SENSING_CATHODE_ELECTRODE_1: NORMAL
MDC_IDC_SET_LEADCHNL_RV_SENSING_CATHODE_LOCATION_1: NORMAL
MDC_IDC_SET_LEADCHNL_RV_SENSING_POLARITY: NORMAL
MDC_IDC_SET_LEADCHNL_RV_SENSING_SENSITIVITY: 0.5 MV
MDC_IDC_STAT_AT_DTM_END: NORMAL
MDC_IDC_STAT_AT_DTM_START: NORMAL
MDC_IDC_STAT_AT_MODE_SW_COUNT: 0
MDC_IDC_STAT_BRADY_DTM_END: NORMAL
MDC_IDC_STAT_BRADY_DTM_START: NORMAL
MDC_IDC_STAT_BRADY_RA_PERCENT_PACED: 0 %
MDC_IDC_STAT_BRADY_RV_PERCENT_PACED: 99.58 %
MDC_IDC_STAT_CRT_DTM_END: NORMAL
MDC_IDC_STAT_CRT_DTM_START: NORMAL
MDC_IDC_STAT_CRT_PERCENT_PACED: 99 %
MDC_IDC_STAT_HEART_RATE_DTM_END: NORMAL
MDC_IDC_STAT_HEART_RATE_DTM_START: NORMAL
MDC_IDC_STAT_HEART_RATE_VENTRICULAR_MAX: 210 {BEATS}/MIN
MDC_IDC_STAT_HEART_RATE_VENTRICULAR_MAX: 210 {BEATS}/MIN
MDC_IDC_STAT_HEART_RATE_VENTRICULAR_MAX: 220 {BEATS}/MIN
MDC_IDC_STAT_HEART_RATE_VENTRICULAR_MEAN: 73 {BEATS}/MIN
MDC_IDC_STAT_HEART_RATE_VENTRICULAR_MIN: 50 {BEATS}/MIN
POTASSIUM BLD-SCNC: 3.2 MMOL/L (ref 3.4–5.3)
POTASSIUM BLD-SCNC: 4.1 MMOL/L (ref 3.4–5.3)
POTASSIUM BLD-SCNC: 4.1 MMOL/L (ref 3.4–5.3)
SODIUM SERPL-SCNC: 136 MMOL/L (ref 133–144)
SODIUM SERPL-SCNC: 137 MMOL/L (ref 133–144)
SODIUM SERPL-SCNC: 139 MMOL/L (ref 133–144)

## 2021-01-01 PROCEDURE — 96374 THER/PROPH/DIAG INJ IV PUSH: CPT

## 2021-01-01 PROCEDURE — 36415 COLL VENOUS BLD VENIPUNCTURE: CPT

## 2021-01-01 PROCEDURE — 80048 BASIC METABOLIC PNL TOTAL CA: CPT | Performed by: INTERNAL MEDICINE

## 2021-01-01 PROCEDURE — 93296 REM INTERROG EVL PM/IDS: CPT | Performed by: INTERNAL MEDICINE

## 2021-01-01 PROCEDURE — 80048 BASIC METABOLIC PNL TOTAL CA: CPT | Mod: GW | Performed by: INTERNAL MEDICINE

## 2021-01-01 PROCEDURE — 250N000011 HC RX IP 250 OP 636: Performed by: INTERNAL MEDICINE

## 2021-01-01 PROCEDURE — 70450 CT HEAD/BRAIN W/O DYE: CPT

## 2021-01-01 PROCEDURE — 30905 CONTROL OF NOSEBLEED: CPT

## 2021-01-01 PROCEDURE — 93280 PM DEVICE PROGR EVAL DUAL: CPT | Performed by: INTERNAL MEDICINE

## 2021-01-01 PROCEDURE — 93294 REM INTERROG EVL PM/LDLS PM: CPT | Performed by: INTERNAL MEDICINE

## 2021-01-01 PROCEDURE — 30903 CONTROL OF NOSEBLEED: CPT

## 2021-01-01 PROCEDURE — 36415 COLL VENOUS BLD VENIPUNCTURE: CPT | Performed by: INTERNAL MEDICINE

## 2021-01-01 PROCEDURE — 99214 OFFICE O/P EST MOD 30 MIN: CPT | Performed by: INTERNAL MEDICINE

## 2021-01-01 PROCEDURE — 99284 EMERGENCY DEPT VISIT MOD MDM: CPT | Mod: 25

## 2021-01-01 PROCEDURE — 36415 COLL VENOUS BLD VENIPUNCTURE: CPT | Mod: GW | Performed by: INTERNAL MEDICINE

## 2021-01-01 RX ORDER — CEPHALEXIN 500 MG/1
500 CAPSULE ORAL 2 TIMES DAILY
Qty: 10 CAPSULE | Refills: 0 | Status: SHIPPED | OUTPATIENT
Start: 2021-01-01 | End: 2021-01-01

## 2021-01-01 RX ORDER — MAGNESIUM OXIDE 400 MG/1
400 TABLET ORAL DAILY
COMMUNITY
Start: 2021-01-01

## 2021-01-01 RX ORDER — FUROSEMIDE 10 MG/ML
40 INJECTION INTRAMUSCULAR; INTRAVENOUS DAILY
Status: CANCELLED
Start: 2021-01-01

## 2021-01-01 RX ORDER — FERROUS GLUCONATE 324(38)MG
TABLET ORAL
COMMUNITY
Start: 2021-01-01

## 2021-01-01 RX ORDER — BUMETANIDE 1 MG/1
1 TABLET ORAL 2 TIMES DAILY
Qty: 90 TABLET | Refills: 3 | Status: SHIPPED | OUTPATIENT
Start: 2021-01-01

## 2021-01-01 RX ORDER — BLOOD SUGAR DIAGNOSTIC
STRIP MISCELLANEOUS
COMMUNITY
Start: 2020-11-30

## 2021-01-01 RX ORDER — LANCETS 30 GAUGE
EACH MISCELLANEOUS
COMMUNITY
Start: 2021-01-01

## 2021-01-01 RX ORDER — CEPHALEXIN 500 MG/1
500 CAPSULE ORAL 2 TIMES DAILY
Qty: 10 CAPSULE | Refills: 0 | Status: SHIPPED | OUTPATIENT
Start: 2021-01-01

## 2021-01-01 RX ORDER — CARVEDILOL 3.12 MG/1
3.12 TABLET ORAL
COMMUNITY
Start: 2021-01-01 | End: 2022-06-08

## 2021-01-01 RX ORDER — FUROSEMIDE 40 MG
40 TABLET ORAL DAILY
COMMUNITY
End: 2021-01-01

## 2021-01-01 RX ORDER — METOLAZONE 2.5 MG/1
2.5 TABLET ORAL DAILY
Qty: 3 TABLET | Refills: 0 | Status: SHIPPED | OUTPATIENT
Start: 2021-01-01

## 2021-01-01 RX ORDER — FUROSEMIDE 10 MG/ML
40 INJECTION INTRAMUSCULAR; INTRAVENOUS DAILY
Status: DISCONTINUED | OUTPATIENT
Start: 2021-01-01 | End: 2021-01-01 | Stop reason: HOSPADM

## 2021-01-01 RX ORDER — LIDOCAINE HYDROCHLORIDE 40 MG/ML
5 INJECTION, SOLUTION RETROBULBAR ONCE
Status: DISCONTINUED | OUTPATIENT
Start: 2021-01-01 | End: 2021-01-01 | Stop reason: HOSPADM

## 2021-01-01 RX ORDER — SPIRONOLACTONE 25 MG/1
25 TABLET ORAL DAILY
COMMUNITY
Start: 2021-01-01 | End: 2022-07-22

## 2021-01-01 RX ORDER — TORSEMIDE 20 MG/1
20 TABLET ORAL 2 TIMES DAILY
COMMUNITY
Start: 2021-01-01 | End: 2021-01-01

## 2021-01-01 RX ORDER — LISINOPRIL 40 MG/1
40 TABLET ORAL DAILY
Qty: 90 TABLET | Refills: 3 | Status: SHIPPED | OUTPATIENT
Start: 2021-01-01

## 2021-01-01 RX ORDER — TRANEXAMIC ACID 100 MG/ML
500 INJECTION, SOLUTION INTRAVENOUS ONCE
Status: DISCONTINUED | OUTPATIENT
Start: 2021-01-01 | End: 2021-01-01 | Stop reason: HOSPADM

## 2021-01-01 RX ORDER — OXYMETAZOLINE HYDROCHLORIDE 0.05 G/100ML
2 SPRAY NASAL ONCE
Status: DISCONTINUED | OUTPATIENT
Start: 2021-01-01 | End: 2021-01-01 | Stop reason: HOSPADM

## 2021-01-01 RX ORDER — SPIRONOLACTONE 25 MG/1
12.5 TABLET ORAL DAILY
Qty: 90 TABLET | Refills: 3 | Status: SHIPPED | OUTPATIENT
Start: 2021-01-01 | End: 2021-01-01

## 2021-01-01 RX ORDER — POTASSIUM CHLORIDE 750 MG/1
20 TABLET, EXTENDED RELEASE ORAL DAILY
COMMUNITY
Start: 2021-01-01

## 2021-01-01 RX ADMIN — FUROSEMIDE 40 MG: 10 INJECTION, SOLUTION INTRAVENOUS at 14:45

## 2021-01-01 RX ADMIN — FUROSEMIDE 40 MG: 10 INJECTION, SOLUTION INTRAVENOUS at 14:16

## 2021-01-01 ASSESSMENT — MIFFLIN-ST. JEOR
SCORE: 977.46
SCORE: 1020.1
SCORE: 1059.11
SCORE: 1099.93

## 2021-01-01 ASSESSMENT — PAIN SCALES - GENERAL: PAINLEVEL: NO PAIN (0)

## 2021-01-19 ENCOUNTER — HOSPITAL ENCOUNTER (OUTPATIENT)
Dept: CARDIOLOGY | Facility: CLINIC | Age: 86
Discharge: HOME | End: 2021-01-19
Attending: FAMILY MEDICINE | Admitting: FAMILY MEDICINE
Payer: COMMERCIAL

## 2021-01-19 DIAGNOSIS — I48.20 CHRONIC A-FIB (H): ICD-10-CM

## 2021-01-19 DIAGNOSIS — I50.42 SYSTOLIC AND DIASTOLIC CHF, CHRONIC (H): ICD-10-CM

## 2021-01-19 PROCEDURE — 93306 TTE W/DOPPLER COMPLETE: CPT

## 2021-01-19 PROCEDURE — 93306 TTE W/DOPPLER COMPLETE: CPT | Mod: 26 | Performed by: INTERNAL MEDICINE

## 2021-02-02 PROBLEM — E11.9 DIABETES MELLITUS, TYPE 2 (H): Status: ACTIVE | Noted: 2021-01-01

## 2021-02-02 NOTE — PROGRESS NOTES
HPI and Plan:   See dictation    Orders Placed This Encounter   Procedures     Follow-Up with Cardiologist       Orders Placed This Encounter   Medications     lisinopril (ZESTRIL) 40 MG tablet     Sig: Take 1 tablet (40 mg) by mouth daily     Dispense:  90 tablet     Refill:  3     furosemide (LASIX) 40 MG tablet     Sig: Take 40 mg by mouth daily       Medications Discontinued During This Encounter   Medication Reason     lisinopril (PRINIVIL/ZESTRIL) 20 MG tablet      furosemide (LASIX) 20 MG tablet Dose adjustment     furosemide (LASIX) 20 MG tablet Dose adjustment         Encounter Diagnosis   Name Primary?     Essential hypertension        CURRENT MEDICATIONS:  Current Outpatient Medications   Medication Sig Dispense Refill     acetaminophen (TYLENOL) 325 MG tablet Take 2 tablets (650 mg) by mouth every 4 hours as needed for mild pain or fever       apixaban ANTICOAGULANT (ELIQUIS ANTICOAGULANT) 5 MG tablet Take 1 tablet (5 mg) by mouth 2 times daily 180 tablet 3     calcium carbonate-vitamin D 500-400 MG-UNIT TABS Take 1 tablet by mouth 2 times daily.         dorzolamide-timolol PF (COSOPT) 22.3-6.8 MG/ML opthalmic solution Place 1 drop into both eyes 2 times daily 10 mL vial       furosemide (LASIX) 40 MG tablet Take 40 mg by mouth daily       latanoprost (XALATAN) 0.005 % ophthalmic solution Place 1 drop into both eyes At Bedtime       lisinopril (ZESTRIL) 40 MG tablet Take 1 tablet (40 mg) by mouth daily 90 tablet 3     magnesium gluconate (MAGONATE) 500 MG tablet Take 500 mg by mouth daily.         metFORMIN (GLUCOPHAGE-XR) 500 MG 24 hr tablet Take 500 mg by mouth every morning        potassium chloride ER (K-DUR/KLOR-CON M) 10 MEQ CR tablet Take 2 tablets (20meq) in the AM, and 1 tablet (10meq) in the PM. 270 tablet 3     potassium chloride ER (K-DUR/KLOR-CON M) 20 MEQ CR tablet Take by mouth daily Take 2 tablets (40 mEq) in the AM and 1 tablet in the PM on Saturday and Sundays when Lasix is doubled.        sertraline (ZOLOFT) 50 MG tablet Take 25 mg by mouth every evening as needed (Take a half tablet qpm prn)       SUMAtriptan Succinate (IMITREX PO) Take 25 mg by mouth as needed.         timolol (TIMOPTIC) 0.5 % ophthalmic solution Place 1 drop into both eyes every morning        triamcinolone (KENALOG) 0.1 % external cream Apply topically 2 times daily         ALLERGIES     Allergies   Allergen Reactions     Aspirin      Coumadin [Warfarin]      Spontaneous retroperitoneal bleed.     Penicillins        PAST MEDICAL HISTORY:  Past Medical History:   Diagnosis Date     Atrial fibrillation (H)      Bilateral lower extremity edema      CAD (coronary artery disease)     CT angio: mild lesion in the LAD, as well as 1st diagonal, and mild plaque in the proximal and  mid RCA     Chronic systolic congestive heart failure (H)      Chronic systolic congestive heart failure (H)      CVA (cerebral vascular accident) (H) 10/2018    Acute left posterior circulation ischemic stroke      Diabetes mellitus, type 2 (H)      Hx of atrioventricular node ablation 2018     Hyperlipidemia      Hypertension      Retroperitoneal bleed 07/27/2012     Retroperitoneal bleed     Spontaneous while on warfarin     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     Venous (peripheral) insufficiency     right GSV ablation with bilateral sclerotherapy at outside clinic; s/p right AASV VenaSeal (12/9/2019),     Venous insufficiency        PAST SURGICAL HISTORY:  Past Surgical History:   Procedure Laterality Date     ANESTHESIA CARDIOVERSION  7/23/2012    Procedure: ANESTHESIA CARDIOVERSION;;  Surgeon: Provider, Generic Anesthesia;  Location:  ANESTHESIA -  - DO NOT SCHEDULE     BLEPHAROPLASTY  2005     Cataract Extraction with IOL Bilateral 2012     H ABLATION AV NODE  11/14/2018     IMPLANT PACEMAKER  11/2012    Dual chamber     REPLACE PACEMAKER GENERATOR  11/14/2018    upgrade to CRT       FAMILY HISTORY:  Family History   Problem Relation Age of  Onset     Heart Disease Mother 65        mi     Heart Disease Father 45        pnemonia     Heart Disease Brother      Heart Disease Sister      Heart Disease Brother        SOCIAL HISTORY:  Social History     Socioeconomic History     Marital status:      Spouse name: None     Number of children: None     Years of education: None     Highest education level: None   Occupational History     None   Social Needs     Financial resource strain: None     Food insecurity     Worry: None     Inability: None     Transportation needs     Medical: None     Non-medical: None   Tobacco Use     Smoking status: Never Smoker     Smokeless tobacco: Never Used   Substance and Sexual Activity     Alcohol use: No     Drug use: No     Sexual activity: None   Lifestyle     Physical activity     Days per week: None     Minutes per session: None     Stress: None   Relationships     Social connections     Talks on phone: None     Gets together: None     Attends [employer] service: None     Active member of club or organization: None     Attends meetings of clubs or organizations: None     Relationship status: None     Intimate partner violence     Fear of current or ex partner: None     Emotionally abused: None     Physically abused: None     Forced sexual activity: None   Other Topics Concern     Parent/sibling w/ CABG, MI or angioplasty before 65F 55M? Not Asked      Service Not Asked     Blood Transfusions Not Asked     Caffeine Concern Yes     Comment: no caffeine intake     Occupational Exposure Not Asked     Hobby Hazards Not Asked     Sleep Concern Not Asked     Stress Concern Not Asked     Weight Concern Not Asked     Special Diet Yes     Comment: no sugar, salt or fats      Back Care Not Asked     Exercise Yes     Comment: treadmill, swimming daily      Bike Helmet Not Asked     Seat Belt Yes     Self-Exams Not Asked   Social History Narrative     None       Review of Systems:  Skin:  Negative rash;bruising dry    Eyes:  Positive for cataracts cataract extraction of both eyes  ENT:  Negative      Respiratory:  Negative       Cardiovascular:  Negative Positive for;edema    Gastroenterology: Negative poor appetite slight improvement   Genitourinary:  Positive for urinary frequency due to water pill  Musculoskeletal:  Positive for arthritis right knee pain  Neurologic:  Negative headaches h/o hx, none recently - Imitrex   Psychiatric:  Positive for excessive stress;anxiety;depression mild   Heme/Lymph/Imm:  Negative bleeding disorder    Endocrine:  Positive for diabetes      Physical Exam:  Vitals: BP (!) 191/84   Pulse 64   Wt 54.4 kg (120 lb)   SpO2 98%   BMI 20.60 kg/m      Constitutional:  cooperative        Skin:             Head:           Eyes:           Lymph:      ENT:           Neck:           Respiratory:            Cardiac:                                                           GI:           Extremities and Muscular Skeletal:                 Neurological:           Psych:           CC  No referring provider defined for this encounter.

## 2021-02-02 NOTE — PROGRESS NOTES
Service Date: 02/02/2021      REASON FOR CONSULTATION:  Followup for chronic heart failure with preserved ejection fraction and lower extremity edema.      PRIMARY CARE PHYSICIAN:  Alton Willoughby MD      HISTORY OF PRESENT ILLNESS:  I had the pleasure of seeing Zayda Hawkins at the [facility] in [place] this afternoon.  She was accompanied by her daughter-in-law, who is an internist here at the [place].  I have been closely following Zayda with regards to lower extremity venous insufficiency.  She also has extensive cardiac history, which I will summarize here.      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  She has atrial fibrillation and was on anticoagulation with warfarin, but developed spontaneous retroperitoneal bleed.  She was taken off of warfarin.  She unfortunately had a CVA in 2018 and was subsequently started on Eliquis.  Fortunately, she has not had any hemorrhagic complications from that.  She was hospitalized in 2018 with heart failure and AFib with RVR.  She had difficulty with rate control and ultimately underwent AV kim ablation and upgrade to biventricular St. Ion pacemaker.  Prior to that, her ejection fraction was in the 40%.  The decline in the EF was thought to be tach-induced.  After her device upgrade, the patient's LV function has since recovered.      She has a remote history of nonobstructive CAD based on CT scan.  Her last stress test was from 2014 and it showed no inducible ischemia.      With regards to her lower extremity venous disease, she underwent right GSV ablation and bilateral sclerotherapy at an outside clinic.  She also apparently had a right anterior accessory saphenous vein VenaSeal.  With those procedures, her edema improved.  She has been on Lasix and I last evaluated her via telephone visit in 11/2020.  At that time, she was stable with regards to her edema.  She subsequently  developed what sounds like heart failure symptoms.  Her edema has worsened.  Her Lasix was subsequently increased to 40 mg twice a day.  With that, her edema has improved.  She is now on Lasix 20 mg daily.  She also had a repeat echocardiogram, which I have reviewed.  The echo was performed 2 weeks ago.  It revealed preserved LV function with an EF of 55%-60%.  She has moderate mitral and aortic regurgitation, which are stable.  She also has severe tricuspid valve regurgitation.  The tricuspid valve regurgitation has worsened since her last echocardiogram from 2018.  Her central venous pressure is elevated.      Despite this, she continues to not endorse significant dyspnea at this point.  No palpitations, presyncope or syncope.  Her blood pressure is significantly elevated in the office today.  She tells me her blood pressure recordings at home have been in the 40s.      ASSESSMENT AND PLAN:   1.  Heart failure with preserved ejection fraction.   2.  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   3.  Chronic atrial fibrillation, status post AV kim ablation and permanent BiV pacemaker implantation.   4.  History of nonobstructive coronary artery disease, stable.   5.  Hypertension.   6.  Known bilateral lower extremity edema due to chronic lower extremity venous insufficiency.      I have reviewed the patient's recent transthoracic echocardiogram findings with her and her daughter-in-law.  I am quite pleased with the normalization of her ejection fraction.  However, her valvular heart disease has progressed slightly, especially her tricuspid valve regurgitation is now severe.  Her central venous pressure was also significantly elevated, suggesting right heart failure at the time of the echocardiogram.  She continues to have at least 2+ pitting edema below the knee with chronic venous stasis change.  She apparently had cellulitis and was treated with oral antibiotics.  There is no evidence of  cellulitis at this point.      Her valvular heart disease with the exception of her TR remains stable.  There is no indication for intervention with her left-sided valvular disease at this point.  I do not believe she is a candidate for tricuspid valve surgery at this point, but we should be able to stabilize her symptoms with medications at this point.  We need to get her blood pressure under better control.  I will increase her lisinopril to 40 mg daily.  I have asked her to use compression stockings on a daily basis.  She will continue furosemide 40 mg daily.      I will see her back in 3 months.  If her edema does not improve, we will consider perhaps a different diuretic regimen.  We will also consider right heart catheterization to assess her filling pressures.      I appreciate the opportunity to participate in her care.      cc:   [place] [address]         DO BARKLEY MD             D: 2021   T: 2021   MT: al      Name:     CANDIDO MYERSHADDAM JOVANNA   MRN:      [id number]        Account:      [id number]   :      1932           Service Date: 2021      Document: [id number]

## 2021-02-02 NOTE — LETTER
2/2/2021    Alton Willoughby  [address]  [place] [address]    RE: Zayda Hawkins       Dear Colleague,    I had the pleasure of seeing Zayda Hawkins in the [facility].    HPI and Plan:   See dictation    Orders Placed This Encounter   Procedures     Follow-Up with Cardiologist       Orders Placed This Encounter   Medications     lisinopril (ZESTRIL) 40 MG tablet     Sig: Take 1 tablet (40 mg) by mouth daily     Dispense:  90 tablet     Refill:  3     furosemide (LASIX) 40 MG tablet     Sig: Take 40 mg by mouth daily       Medications Discontinued During This Encounter   Medication Reason     lisinopril (PRINIVIL/ZESTRIL) 20 MG tablet      furosemide (LASIX) 20 MG tablet Dose adjustment     furosemide (LASIX) 20 MG tablet Dose adjustment         Encounter Diagnosis   Name Primary?     Essential hypertension        CURRENT MEDICATIONS:  Current Outpatient Medications   Medication Sig Dispense Refill     acetaminophen (TYLENOL) 325 MG tablet Take 2 tablets (650 mg) by mouth every 4 hours as needed for mild pain or fever       apixaban ANTICOAGULANT (ELIQUIS ANTICOAGULANT) 5 MG tablet Take 1 tablet (5 mg) by mouth 2 times daily 180 tablet 3     calcium carbonate-vitamin D 500-400 MG-UNIT TABS Take 1 tablet by mouth 2 times daily.         dorzolamide-timolol PF (COSOPT) 22.3-6.8 MG/ML opthalmic solution Place 1 drop into both eyes 2 times daily 10 mL vial       furosemide (LASIX) 40 MG tablet Take 40 mg by mouth daily       latanoprost (XALATAN) 0.005 % ophthalmic solution Place 1 drop into both eyes At Bedtime       lisinopril (ZESTRIL) 40 MG tablet Take 1 tablet (40 mg) by mouth daily 90 tablet 3     magnesium gluconate (MAGONATE) 500 MG tablet Take 500 mg by mouth daily.         metFORMIN (GLUCOPHAGE-XR) 500 MG 24 hr tablet Take 500 mg by mouth every morning        potassium chloride ER (K-DUR/KLOR-CON M) 10 MEQ CR tablet Take 2 tablets (20meq) in the  AM, and 1 tablet (10meq) in the PM. 270 tablet 3     potassium chloride ER (K-DUR/KLOR-CON M) 20 MEQ CR tablet Take by mouth daily Take 2 tablets (40 mEq) in the AM and 1 tablet in the PM on Saturday and Sundays when Lasix is doubled.       sertraline (ZOLOFT) 50 MG tablet Take 25 mg by mouth every evening as needed (Take a half tablet qpm prn)       SUMAtriptan Succinate (IMITREX PO) Take 25 mg by mouth as needed.         timolol (TIMOPTIC) 0.5 % ophthalmic solution Place 1 drop into both eyes every morning        triamcinolone (KENALOG) 0.1 % external cream Apply topically 2 times daily         ALLERGIES     Allergies   Allergen Reactions     Aspirin      Coumadin [Warfarin]      Spontaneous retroperitoneal bleed.     Penicillins        PAST MEDICAL HISTORY:  Past Medical History:   Diagnosis Date     Atrial fibrillation (H)      Bilateral lower extremity edema      CAD (coronary artery disease)     CT angio: mild lesion in the LAD, as well as 1st diagonal, and mild plaque in the proximal and  mid RCA     Chronic systolic congestive heart failure (H)      Chronic systolic congestive heart failure (H)      CVA (cerebral vascular accident) (H) 10/2018    Acute left posterior circulation ischemic stroke      Diabetes mellitus, type 2 (H)      Hx of atrioventricular node ablation 2018     Hyperlipidemia      Hypertension      Retroperitoneal bleed 07/27/2012     Retroperitoneal bleed     Spontaneous while on warfarin     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     Venous (peripheral) insufficiency     right GSV ablation with bilateral sclerotherapy at outside clinic; s/p right AASV VenaSeal (12/9/2019),     Venous insufficiency        PAST SURGICAL HISTORY:  Past Surgical History:   Procedure Laterality Date     ANESTHESIA CARDIOVERSION  7/23/2012    Procedure: ANESTHESIA CARDIOVERSION;;  Surgeon: Provider, Generic Anesthesia;  Location:  ANESTHESIA - [facility] - DO NOT SCHEDULE     BLEPHAROPLASTY  2005     Cataract  Extraction with IOL Bilateral 2012     H ABLATION AV NODE  11/14/2018     IMPLANT PACEMAKER  11/2012    Dual chamber     REPLACE PACEMAKER GENERATOR  11/14/2018    upgrade to CRT       FAMILY HISTORY:  Family History   Problem Relation Age of Onset     Heart Disease Mother 65        mi     Heart Disease Father 45        pnemonia     Heart Disease Brother      Heart Disease Sister      Heart Disease Brother        SOCIAL HISTORY:  Social History     Socioeconomic History     Marital status:      Spouse name: None     Number of children: None     Years of education: None     Highest education level: None   Occupational History     None   Social Needs     Financial resource strain: None     Food insecurity     Worry: None     Inability: None     Transportation needs     Medical: None     Non-medical: None   Tobacco Use     Smoking status: Never Smoker     Smokeless tobacco: Never Used   Substance and Sexual Activity     Alcohol use: No     Drug use: No     Sexual activity: None   Lifestyle     Physical activity     Days per week: None     Minutes per session: None     Stress: None   Relationships     Social connections     Talks on phone: None     Gets together: None     Attends Baptism service: None     Active member of club or organization: None     Attends meetings of clubs or organizations: None     Relationship status: None     Intimate partner violence     Fear of current or ex partner: None     Emotionally abused: None     Physically abused: None     Forced sexual activity: None   Other Topics Concern     Parent/sibling w/ CABG, MI or angioplasty before 65F 55M? Not Asked      Service Not Asked     Blood Transfusions Not Asked     Caffeine Concern Yes     Comment: no caffeine intake     Occupational Exposure Not Asked     Hobby Hazards Not Asked     Sleep Concern Not Asked     Stress Concern Not Asked     Weight Concern Not Asked     Special Diet Yes     Comment: no sugar, salt or fats      Back  Care Not Asked     Exercise Yes     Comment: treadmill, swimming daily      Bike Helmet Not Asked     Seat Belt Yes     Self-Exams Not Asked   Social History Narrative     None       Review of Systems:  Skin:  Negative rash;bruising dry   Eyes:  Positive for cataracts cataract extraction of both eyes  ENT:  Negative      Respiratory:  Negative       Cardiovascular:  Negative Positive for;edema    Gastroenterology: Negative poor appetite slight improvement   Genitourinary:  Positive for urinary frequency due to water pill  Musculoskeletal:  Positive for arthritis right knee pain  Neurologic:  Negative headaches h/o hx, none recently - Imitrex   Psychiatric:  Positive for excessive stress;anxiety;depression mild   Heme/Lymph/Imm:  Negative bleeding disorder    Endocrine:  Positive for diabetes      Physical Exam:  Vitals: BP (!) 191/84   Pulse 64   Wt 54.4 kg (120 lb)   SpO2 98%   BMI 20.60 kg/m      Constitutional:  cooperative        Thank you for allowing me to participate in the care of your patient.    Sincerely,     Kingsley Brandon MD, MD     [facility]    cc:   No referring provider defined for this encounter.

## 2021-03-07 NOTE — ED PROVIDER NOTES
"  History   Chief Complaint:  Epistaxis     HPI   Zayda Hawkins is a 88 year old female with history of atrial fibrillation on Eliquis, CAD, chronic systolic CHF, hypertension, hyperlipidemia, CVA, and type 2 diabetes who presents via EMS with epistaxis from her right nostril since about 8 AM this morning and has been unable to control the bleeding at home. She has been experiencing intermittent nose bleeds over the past month.    Review of Systems   HENT: Positive for nosebleeds.    All other systems reviewed and are negative.        Allergies:  Aspirin  Coumadin [Warfarin]  Penicillins    Medications:  Eliquis  Lasix  Lisinopril  Metformin  Sertraline  Imitrex    Past Medical History:    Atrial fibrillation  CAD  Chronic systolic CHF  CVA  Type 2 diabetes  Hyperlipidemia  Hypertension  Retroperitoneal bleed  tachybrady syndrome  Venous insufficiency   Hematoma      Past Surgical History:    Anesthesia cardioversion  Blepharoplasty  Ablation AV node  Implant pacemaker  Replace pacemaker generator      Family History:    Mother - MI  Brother, sister, father - heart disease    Social History:  The patient was accompanied to the ED by family.     Physical Exam     Patient Vitals for the past 24 hrs:   BP Temp Temp src Pulse Resp SpO2 Height Weight   03/07/21 1027 (!) 190/85 98.2  F (36.8  C) Oral 66 20 97 % 1.626 m (5' 4\") 56.2 kg (124 lb)       Physical Exam    Eyes: Normal conjunctiva, No  Discharge  HEENT: Left nare no active bleeding.  Right nare dried blood fills the nasal passage once cleared with blowing her nose there is ongoing mild pulsatile bleeding from the superior lateral aspect of the nasal cavity approximately 1/2-2/3 back in the cavity.  No large clots.  In the posterior pharynx there is ongoing bleeding running down the right side of the posterior oropharynx.  CV: ppi, regular   Resp: speakingwithout any resp distress  Skin: warm dry well perfused  Neuro: Alert, no gross motor or sensory " deficits,  gait stable          Emergency Department Course     Procedures       Emergency Department Course:    Reviewed:  1045 I reviewed nursing notes, vitals, past medical history and care everywhere    Assessments:  1050 I obtained history and examined the patient as noted above.   1116 I rechecked the patient. Packing placed over right nostril.   1152 I rechecked the patient.   1208 I rechecked the patient.     Disposition:  The patient was discharged to home.       Impression & Plan         Medical Decision Making:   Zayda Hawkins is a 88 year old female who presents for evaluation of epistaxis complicated by Eliquis.  Direct pressure and vasoconstrictors with topical TXA did not achieve hemostasis.  Obvious vessel in the superior lateral portion of the nasal cavity attempted cauterization but too brisk of blood flow.  Required packing initially with a anterior Rhino Rocket however the balloon did not extend up superiorly enough to give us hemostasis and so was removed and a Merisel was placed.  This achieved hemostasis.  We will follow up with ENT Monday or Tuesday and was given a prescription of Keflex.  Patient and her son are comfortable agreeable with plan understands what to watch out for when return here to the emergency department.    Diagnosis:    ICD-10-CM    1. Epistaxis  R04.0        Discharge Medications:  New Prescriptions    CEPHALEXIN (KEFLEX) 500 MG CAPSULE    Take 1 capsule (500 mg) by mouth 2 times daily for 5 days       Scribe Disclosure:  Keke CORNELIUS, am serving as a scribe at 10:57 AM on 3/7/2021 to document services personally performed by Omar Edgar MD based on my observations and the provider's statements to me.              Omar Edgar MD  03/07/21 [id number]

## 2021-03-07 NOTE — ED NOTES
Ambulated patient around er with no complications and a steady gate. Patient states she feels good.

## 2021-03-07 NOTE — ED NOTES
Bed: ED18  Expected date: 3/7/21  Expected time: 10:16 AM  Means of arrival: Ambulance  Comments:  BV1  88yoF epistaxis

## 2021-03-31 NOTE — TELEPHONE ENCOUNTER
Bedside shift change report given to Brenda Lugo RN  (oncoming nurse) by Alison Browning RN (offgoing nurse). Report included the following information SBAR. Received message from patient's son, Alvaro, stating patient has had complaints of very rapid heart beats in the last 4-5 days.  He is wondering if we can do a pacemaker interrogation to see if any irregular heart rhythms have occurred.     Called and spoke with Alvaro who stated that these episodes have occurred approximately 3x per day for the last 4-5 days.  Patient can feel the rapid heart beat and becomes short of breath.  He stated the episodes last for about 5-10 minutes.      Alvaro is on his way to his mother's house now and will send a device transmission when he arrives.   He will call the clinic with any questions or issues with transmitting, device and tech phone number provided.     CHICHO Parks        Addendum 1630: Remote transmission received.  No arrhythmia episodes logged by device in the last 4-5 days (only item logged was from an alert on 3/22/21 for NSVT lasting 6 beats in the 160's).  Called patient's son, Alvaro, and reviewed this information with him.  He asked what could potentially be causing the fast beats.  Explained the patient's device is set to consider heart rates less than 120 as normal, so it is possible that her heart rate was elevated but below 120.  Explained that patient does have rate response on her device.  Alvaro is unsure if patient was active at time of events.  He will have her keep track of when these episodes occur and what she was doing and will reach out to Dr. Brandon and his team if he has further questions or would like to set up cardiology follow-up.   CHICHO Parks      Addendum 1640: Spoke with Alvaro again who stated that he spoke with his mother and that the episodes occurred after she bent over to pick things up off the floor.  He stated she has always had this sensation with this movement and it is not out of the ordinary for her.  He said that they will continue to monitor and will notify the clinic if things change or they have further questions.  CHICHO Parks

## 2021-04-01 NOTE — TELEPHONE ENCOUNTER
Pt son La called and LM stating that pt has LE edema in both legs now and wondering if ok to proceed with ablation on Wednesday.  Pt is scheduled for an AV Node ablation.  Prior to calling pt son it was noted the pt had an OV with Tiffany on Monday for the edema.  Discussed that the OV was good, so that pt can be seen.  La states that they still do not want to f/u with Imtiaz Del Angel.  Asked if pt weight was up and states that pt has documented that she is 144 each day, pt is still not being honest with her weight. Asked if pt was SOB and La states that no she is not SOB and was actually rearranging her clothes today with no problems.  Discussed with Kristin for her thoughts and she did state that pt should not change anything, but wear her stockings and see Tiffany on Monday as planned.  Pt son states that pt does have stockings that she can wear. Pt will then have ablation on Wednesday 11/14. Ricarda   
[[id number]]

## 2021-09-29 NOTE — PROGRESS NOTES
HPI and Plan:   See dictation    Orders Placed This Encounter   Procedures     Basic metabolic panel     Follow-Up with Cardiologist       Orders Placed This Encounter   Medications     torsemide (DEMADEX) 20 MG tablet     Sig: Take 20 mg by mouth     carvedilol (COREG) 3.125 MG tablet     Sig: Take 3.125 mg by mouth     COMPRESSION STOCKINGS     Si each daily     Dispense:  2 each     Refill:  1     Thigh high stockings     spironolactone (ALDACTONE) 25 MG tablet     Sig: Take 0.5 tablets (12.5 mg) by mouth daily     Dispense:  90 tablet     Refill:  3     COMPRESSION STOCKINGS     Si each daily     Dispense:  2 each     Refill:  0       There are no discontinued medications.      Encounter Diagnoses   Name Primary?     Essential hypertension      Edema, unspecified type Yes       CURRENT MEDICATIONS:  Current Outpatient Medications   Medication Sig Dispense Refill     acetaminophen (TYLENOL) 325 MG tablet Take 2 tablets (650 mg) by mouth every 4 hours as needed for mild pain or fever       apixaban ANTICOAGULANT (ELIQUIS ANTICOAGULANT) 5 MG tablet Take 1 tablet (5 mg) by mouth 2 times daily 180 tablet 3     calcium carbonate-vitamin D 500-400 MG-UNIT TABS Take 1 tablet by mouth 2 times daily.         carvedilol (COREG) 3.125 MG tablet Take 3.125 mg by mouth       COMPRESSION STOCKINGS 1 each daily 2 each 1     COMPRESSION STOCKINGS 1 each daily 2 each 0     dorzolamide-timolol PF (COSOPT) 22.3-6.8 MG/ML opthalmic solution Place 1 drop into both eyes 2 times daily 10 mL vial       latanoprost (XALATAN) 0.005 % ophthalmic solution Place 1 drop into both eyes At Bedtime       lisinopril (ZESTRIL) 40 MG tablet Take 1 tablet (40 mg) by mouth daily 90 tablet 3     magnesium gluconate (MAGONATE) 500 MG tablet Take 500 mg by mouth daily.         metFORMIN (GLUCOPHAGE-XR) 500 MG 24 hr tablet Take 500 mg by mouth every morning        potassium chloride ER (K-DUR/KLOR-CON M) 10 MEQ CR tablet Take 2 tablets (20meq) in  the AM, and 1 tablet (10meq) in the PM. 270 tablet 3     sertraline (ZOLOFT) 50 MG tablet Take 25 mg by mouth every evening as needed (Take a half tablet qpm prn)       spironolactone (ALDACTONE) 25 MG tablet Take 0.5 tablets (12.5 mg) by mouth daily 90 tablet 3     SUMAtriptan Succinate (IMITREX PO) Take 25 mg by mouth as needed.         timolol (TIMOPTIC) 0.5 % ophthalmic solution Place 1 drop into both eyes every morning        torsemide (DEMADEX) 20 MG tablet Take 20 mg by mouth       triamcinolone (KENALOG) 0.1 % external cream Apply topically 2 times daily       cephALEXin (KEFLEX) 500 MG capsule Take 1 capsule (500 mg) by mouth 2 times daily (Patient not taking: Reported on 9/29/2021) 10 capsule 0     furosemide (LASIX) 40 MG tablet Take 40 mg by mouth daily (Patient not taking: Reported on 9/29/2021)       potassium chloride ER (K-DUR/KLOR-CON M) 20 MEQ CR tablet Take by mouth daily Take 2 tablets (40 mEq) in the AM and 1 tablet in the PM on Saturday and Sundays when Lasix is doubled. (Patient not taking: Reported on 9/29/2021)         ALLERGIES     Allergies   Allergen Reactions     Aspirin      Coumadin [Warfarin]      Spontaneous retroperitoneal bleed.     Penicillins        PAST MEDICAL HISTORY:  Past Medical History:   Diagnosis Date     Atrial fibrillation (H)      Bilateral lower extremity edema      CAD (coronary artery disease)     CT angio: mild lesion in the LAD, as well as 1st diagonal, and mild plaque in the proximal and  mid RCA     Chronic systolic congestive heart failure (H)      Chronic systolic congestive heart failure (H)      CVA (cerebral vascular accident) (H) 10/2018    Acute left posterior circulation ischemic stroke      Diabetes mellitus, type 2 (H)      Hx of atrioventricular node ablation 2018     Hyperlipidemia      Hypertension      Retroperitoneal bleed 07/27/2012     Retroperitoneal bleed     Spontaneous while on warfarin     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     Venous  (peripheral) insufficiency     right GSV ablation with bilateral sclerotherapy at outside clinic; s/p right AASV VenaSeal (12/9/2019),     Venous insufficiency        PAST SURGICAL HISTORY:  Past Surgical History:   Procedure Laterality Date     ANESTHESIA CARDIOVERSION  7/23/2012    Procedure: ANESTHESIA CARDIOVERSION;;  Surgeon: Provider, Generic Anesthesia;  Location:  ANESTHESIA - [facility] - DO NOT SCHEDULE     BLEPHAROPLASTY  2005     Cataract Extraction with IOL Bilateral 2012     H ABLATION AV NODE  11/14/2018     IMPLANT PACEMAKER  11/2012    Dual chamber     REPLACE PACEMAKER GENERATOR  11/14/2018    upgrade to CRT       FAMILY HISTORY:  Family History   Problem Relation Age of Onset     Heart Disease Mother 65        mi     Heart Disease Father 45        pnemonia     Heart Disease Brother      Heart Disease Sister      Heart Disease Brother        SOCIAL HISTORY:  Social History     Socioeconomic History     Marital status:      Spouse name: None     Number of children: None     Years of education: None     Highest education level: None   Occupational History     None   Tobacco Use     Smoking status: Never Smoker     Smokeless tobacco: Never Used   Substance and Sexual Activity     Alcohol use: No     Drug use: No     Sexual activity: None   Other Topics Concern     Parent/sibling w/ CABG, MI or angioplasty before 65F 55M? Not Asked      Service Not Asked     Blood Transfusions Not Asked     Caffeine Concern Yes     Comment: no caffeine intake     Occupational Exposure Not Asked     Hobby Hazards Not Asked     Sleep Concern Not Asked     Stress Concern Not Asked     Weight Concern Not Asked     Special Diet Yes     Comment: no sugar, salt or fats      Back Care Not Asked     Exercise Yes     Comment: treadmill, swimming daily      Bike Helmet Not Asked     Seat Belt Yes     Self-Exams Not Asked   Social History Narrative     None     Social Determinants of Health     Financial Resource Strain:   "    Difficulty of Paying Living Expenses:    Food Insecurity:      Worried About Running Out of Food in the Last Year:      Ran Out of Food in the Last Year:    Transportation Needs:      Lack of Transportation (Medical):      Lack of Transportation (Non-Medical):    Physical Activity:      Days of Exercise per Week:      Minutes of Exercise per Session:    Stress:      Feeling of Stress :    Social Connections:      Frequency of Communication with Friends and Family:      Frequency of Social Gatherings with Friends and Family:      Attends L[employer] Services:      Active Member of Clubs or Organizations:      Attends Club or Organization Meetings:      Marital Status:    Intimate Partner Violence:      Fear of Current or Ex-Partner:      Emotionally Abused:      Physically Abused:      Sexually Abused:        Review of Systems:  Skin:  Negative       Eyes:  Negative      ENT:  Negative      Respiratory:  Negative shortness of breath;dyspnea on exertion;cough     Cardiovascular:  Negative;palpitations;chest pain;dizziness;lightheadedness Positive for;edema    Gastroenterology: Negative      Genitourinary:  Negative      Musculoskeletal:  Negative      Neurologic:  Negative headaches    Psychiatric:  Negative      Heme/Lymph/Imm:  Positive for allergies    Endocrine:  Positive for diabetes pre-diabetec    Physical Exam:  Vitals: BP (!) 162/79   Pulse 76   Ht 1.626 m (5' 4\")   Wt 60.5 kg (133 lb 6.4 oz)   BMI 22.90 kg/m      Constitutional:  cooperative        Skin:  warm and dry to the touch          Head:  normocephalic        Eyes:           Lymph:      ENT:  no pallor or cyanosis        Neck:    JVP 10-12      Respiratory:  clear to auscultation         Cardiac: regular rhythm       grade 2;holosystolic murmur        pulses full and equal                                        GI:  abdomen soft;non-tender        Extremities and Muscular Skeletal:      bilateral LE edema;2+;telangiectasia          Neurological: "  no gross motor deficits        Psych:  Alert and Oriented x 3        CC  Kingsley Brandon MD  [id number]  [place] [id number]

## 2021-09-29 NOTE — PROGRESS NOTES
Service Date: 09/29/2021    REASON FOR CONSULTATION:  Followup for heart failure with preserved ejection fraction.    HISTORY OF PRESENT ILLNESS:  I had the pleasure of seeing Zayda Barry at the [facility] in [place] this morning.  She was accompanied by her son.  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  She has had previous venous ablation and superficial venous ablation as well.  Please see my note dated 02/2021 for details regarding her medical history.    When I last saw her, she was having worsening lower extremity edema.  We started her on torsemide.  Her blood pressure was also elevated.  As such, I increased her lisinopril to 40 mg daily.  She was subsequently seen by her primary doctor, and carvedilol was added to her regimen.    Since our last visit, her edema has improved but she continues to have significant lower extremity swelling.  Additionally, she has declined quite a bit over the past year.  Her memory has diminished quite significantly, per the son.  Her most recent labs were from this summer, and her creatinine at that time was 0.98.  Hemoglobin was 10.  Her most recent echocardiogram from January of this year showed preserved LV function as well as moderate MR and AI as well as severe TR.    The patient herself does not endorse significant cardiopulmonary symptoms.    IMPRESSION AND PLAN:    1.  Heart failure with preserved ejection fraction.  2.  Multivalvular heart disease.  3.  Chronic atrial fibrillation, status post AV kim ablation and permanent pacemaker implantation.  4.  History of nonobstructive CAD.  5.   Hypertension.  6.  Known lower extremity venous insufficiency.  7.  Chronic bilateral lower extremity edema, likely secondary to chronic venous insufficiency and right-sided valvular heart disease.    Although her edema has improved, she continues to have significant lower extremity edema all the way to her thighs.  Her blood pressure is also still elevated.  As such, we will add spironolactone to her regimen to improve blood pressure but also to help with the swelling.  I have given her a prescription for thigh-high compression stockings.  We will check her electrolytes next week.  I will see her in 1 month to reassess her lower extremity symptoms.  I told the patient and her son that she might need an IV water pill to relieve the lower extremity edema if symptoms persist.    Given advanced age and declining memory, she is not a candidate for any surgery with regards to her valvular regurgitations.    I appreciate the opportunity to participate in her care.    Kingsley Brandon MD        D: 2021   T: 2021   MT: noble    Name:     JOVANNA DE LEÓN  MRN:      [id number]        Account:      [id number]   :      1932           Service Date: 2021       Document: [id number]

## 2021-09-29 NOTE — LETTER
2021    Alton Willoughby  [address]  [place] [address]    RE: Zayda Hawkins       Dear Colleague,    I had the pleasure of seeing Zayda Hawkins in the [facility].    HPI and Plan:   See dictation    Orders Placed This Encounter   Procedures     Basic metabolic panel     Follow-Up with Cardiologist       Orders Placed This Encounter   Medications     torsemide (DEMADEX) 20 MG tablet     Sig: Take 20 mg by mouth     carvedilol (COREG) 3.125 MG tablet     Sig: Take 3.125 mg by mouth     COMPRESSION STOCKINGS     Si each daily     Dispense:  2 each     Refill:  1     Thigh high stockings     spironolactone (ALDACTONE) 25 MG tablet     Sig: Take 0.5 tablets (12.5 mg) by mouth daily     Dispense:  90 tablet     Refill:  3     COMPRESSION STOCKINGS     Si each daily     Dispense:  2 each     Refill:  0       There are no discontinued medications.      Encounter Diagnoses   Name Primary?     Essential hypertension      Edema, unspecified type Yes       CURRENT MEDICATIONS:  Current Outpatient Medications   Medication Sig Dispense Refill     acetaminophen (TYLENOL) 325 MG tablet Take 2 tablets (650 mg) by mouth every 4 hours as needed for mild pain or fever       apixaban ANTICOAGULANT (ELIQUIS ANTICOAGULANT) 5 MG tablet Take 1 tablet (5 mg) by mouth 2 times daily 180 tablet 3     calcium carbonate-vitamin D 500-400 MG-UNIT TABS Take 1 tablet by mouth 2 times daily.         carvedilol (COREG) 3.125 MG tablet Take 3.125 mg by mouth       COMPRESSION STOCKINGS 1 each daily 2 each 1     COMPRESSION STOCKINGS 1 each daily 2 each 0     dorzolamide-timolol PF (COSOPT) 22.3-6.8 MG/ML opthalmic solution Place 1 drop into both eyes 2 times daily 10 mL vial       latanoprost (XALATAN) 0.005 % ophthalmic solution Place 1 drop into both eyes At Bedtime       lisinopril (ZESTRIL) 40 MG tablet Take 1 tablet (40 mg) by mouth daily 90  tablet 3     magnesium gluconate (MAGONATE) 500 MG tablet Take 500 mg by mouth daily.         metFORMIN (GLUCOPHAGE-XR) 500 MG 24 hr tablet Take 500 mg by mouth every morning        potassium chloride ER (K-DUR/KLOR-CON M) 10 MEQ CR tablet Take 2 tablets (20meq) in the AM, and 1 tablet (10meq) in the PM. 270 tablet 3     sertraline (ZOLOFT) 50 MG tablet Take 25 mg by mouth every evening as needed (Take a half tablet qpm prn)       spironolactone (ALDACTONE) 25 MG tablet Take 0.5 tablets (12.5 mg) by mouth daily 90 tablet 3     SUMAtriptan Succinate (IMITREX PO) Take 25 mg by mouth as needed.         timolol (TIMOPTIC) 0.5 % ophthalmic solution Place 1 drop into both eyes every morning        torsemide (DEMADEX) 20 MG tablet Take 20 mg by mouth       triamcinolone (KENALOG) 0.1 % external cream Apply topically 2 times daily       cephALEXin (KEFLEX) 500 MG capsule Take 1 capsule (500 mg) by mouth 2 times daily (Patient not taking: Reported on 9/29/2021) 10 capsule 0     furosemide (LASIX) 40 MG tablet Take 40 mg by mouth daily (Patient not taking: Reported on 9/29/2021)       potassium chloride ER (K-DUR/KLOR-CON M) 20 MEQ CR tablet Take by mouth daily Take 2 tablets (40 mEq) in the AM and 1 tablet in the PM on Saturday and Sundays when Lasix is doubled. (Patient not taking: Reported on 9/29/2021)         ALLERGIES     Allergies   Allergen Reactions     Aspirin      Coumadin [Warfarin]      Spontaneous retroperitoneal bleed.     Penicillins        PAST MEDICAL HISTORY:  Past Medical History:   Diagnosis Date     Atrial fibrillation (H)      Bilateral lower extremity edema      CAD (coronary artery disease)     CT angio: mild lesion in the LAD, as well as 1st diagonal, and mild plaque in the proximal and  mid RCA     Chronic systolic congestive heart failure (H)      Chronic systolic congestive heart failure (H)      CVA (cerebral vascular accident) (H) 10/2018    Acute left posterior circulation ischemic stroke       Diabetes mellitus, type 2 (H)      Hx of atrioventricular node ablation 2018     Hyperlipidemia      Hypertension      Retroperitoneal bleed 07/27/2012     Retroperitoneal bleed     Spontaneous while on warfarin     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     Venous (peripheral) insufficiency     right GSV ablation with bilateral sclerotherapy at outside clinic; s/p right AASV VenaSeal (12/9/2019),     Venous insufficiency        PAST SURGICAL HISTORY:  Past Surgical History:   Procedure Laterality Date     ANESTHESIA CARDIOVERSION  7/23/2012    Procedure: ANESTHESIA CARDIOVERSION;;  Surgeon: Provider, Generic Anesthesia;  Location:  ANESTHESIA - [facility] - DO NOT SCHEDULE     BLEPHAROPLASTY  2005     Cataract Extraction with IOL Bilateral 2012     H ABLATION AV NODE  11/14/2018     IMPLANT PACEMAKER  11/2012    Dual chamber     REPLACE PACEMAKER GENERATOR  11/14/2018    upgrade to CRT       FAMILY HISTORY:  Family History   Problem Relation Age of Onset     Heart Disease Mother 65        mi     Heart Disease Father 45        pnemonia     Heart Disease Brother      Heart Disease Sister      Heart Disease Brother        SOCIAL HISTORY:  Social History     Socioeconomic History     Marital status:      Spouse name: None     Number of children: None     Years of education: None     Highest education level: None   Occupational History     None   Tobacco Use     Smoking status: Never Smoker     Smokeless tobacco: Never Used   Substance and Sexual Activity     Alcohol use: No     Drug use: No     Sexual activity: None   Other Topics Concern     Parent/sibling w/ CABG, MI or angioplasty before 65F 55M? Not Asked      Service Not Asked     Blood Transfusions Not Asked     Caffeine Concern Yes     Comment: no caffeine intake     Occupational Exposure Not Asked     Hobby Hazards Not Asked     Sleep Concern Not Asked     Stress Concern Not Asked     Weight Concern Not Asked     Special Diet Yes     Comment: no sugar,  "salt or fats      Back Care Not Asked     Exercise Yes     Comment: treadmill, swimming daily      Bike Helmet Not Asked     Seat Belt Yes     Self-Exams Not Asked   Social History Narrative     None     Social Determinants of Health     Financial Resource Strain:      Difficulty of Paying Living Expenses:    Food Insecurity:      Worried About Running Out of Food in the Last Year:      Ran Out of Food in the Last Year:    Transportation Needs:      Lack of Transportation (Medical):      Lack of Transportation (Non-Medical):    Physical Activity:      Days of Exercise per Week:      Minutes of Exercise per Session:    Stress:      Feeling of Stress :    Social Connections:      Frequency of Communication with Friends and Family:      Frequency of Social Gatherings with Friends and Family:      Attends [employer] Services:      Active Member of Clubs or Organizations:      Attends Club or Organization Meetings:      Marital Status:    Intimate Partner Violence:      Fear of Current or Ex-Partner:      Emotionally Abused:      Physically Abused:      Sexually Abused:        Review of Systems:  Skin:  Negative       Eyes:  Negative      ENT:  Negative      Respiratory:  Negative shortness of breath;dyspnea on exertion;cough     Cardiovascular:  Negative;palpitations;chest pain;dizziness;lightheadedness Positive for;edema    Gastroenterology: Negative      Genitourinary:  Negative      Musculoskeletal:  Negative      Neurologic:  Negative headaches    Psychiatric:  Negative      Heme/Lymph/Imm:  Positive for allergies    Endocrine:  Positive for diabetes pre-diabetec    Physical Exam:  Vitals: BP (!) 162/79   Pulse 76   Ht 1.626 m (5' 4\")   Wt 60.5 kg (133 lb 6.4 oz)   BMI 22.90 kg/m      Constitutional:  cooperative        Skin:  warm and dry to the touch          Head:  normocephalic        Eyes:           Lymph:      ENT:  no pallor or cyanosis        Neck:    JVP 10-12      Respiratory:  clear to auscultation     "     Cardiac: regular rhythm       grade 2;holosystolic murmur        pulses full and equal                                        GI:  abdomen soft;non-tender        Extremities and Muscular Skeletal:      bilateral LE edema;2+;telangiectasia          Neurological:  no gross motor deficits        Psych:  Alert and Oriented x 3        CC  Kingsley Brandon MD  [phone]  [place] [address]                  Thank you for allowing me to participate in the care of your patient.      Sincerely,     Kingsley Brandon MD, MD     [facility]  cc:   Kingsley Brandon MD  [phone]  [place] [address]

## 2021-10-01 NOTE — TELEPHONE ENCOUNTER
VM received from patient's son, stating that PCP initiated spirolactone in visit in August 2021 and has been taking 25mg daily. Would like to know if they should take 12.5 or 25mg.     Reviewed with Dr. Brandon who states spirolactone was not on medication list on OV 9/29/21, unaware that PCP had started. If patient has been tolerating 25mg, stay on this dose and keep scheduled labs at the end of Oct.    Telephoned patient's son Chris to relay these recommendations. He verbalized understanding.    Roma Min RN  [facility]

## 2021-10-28 NOTE — PROGRESS NOTES
Service Date: 10/28/2021    REASON FOR CONSULTATION:  Heart failure with preserved ejection fraction.    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  Please see my note dated 02/2021 for details regarding her medical history.    Over the past year, she has been struggling with fluid retention.  We have been escalating her diuretic regimen.  Despite this, she continues to accumulate fluid.  She is currently on torsemide 40 mg twice daily.  Despite this, she has significant edema all the way to her thighs.  During her last visit with me, she was also hypertensive and I added spironolactone to her regimen.  Her blood pressure has improved somewhat, although is still not at goal.  She has gained approximately 10 pounds since her last admission.    Additionally, the patient's dementia has progressed.  She is currently at an advanced age.  The patient's son, who is her durable power of , is not sure what her long-term goals of care are.    In addition to the lower extremity edema, the patient is also endorsing significant exertional dyspnea.  No palpitations, presyncope or syncope.    IMPRESSION AND PLAN:    1.  Heart failure with preserved ejection fraction.  2.  Multivalvular heart disease.  3.  Chronic atrial fibrillation, status post AV kim ablation and permanent pacemaker implantation.  4.  History of nonobstructive CAD.  5.  Hypertension.  6.  Lower extremity edema.  7.  Advanced dementia.    Unfortunately, she continues to accumulate fluid.  Her heart  failure symptoms have worsened over the last month despite escalation of diuretic program.  She has also gained at least 10 pounds.    At this point, the only way we can get rid of fluid in her lower extremities and to improve her dyspnea is to initiate IV diuretics.  I would have liked the patient to be admitted to the hospital, preferably [place], which is close to her nursing home, but we were having bed placement issues.  Our next option is to utilize our [facility] in [place].  We will have her go there this afternoon and have furosemide 40 mg injection x1.  We will check electrolytes tomorrow and then give another dose.  She will continue the torsemide 40 mg twice daily.  They will continue to weigh her in the nursing home.  If weight is unchanged and her symptoms persist, then we will most likely admit her to the hospital either over the weekend or Monday for more aggressive diuresis.    I have encouraged that the patient and family have goals of care discussion with the patient's primary care doctor, who knows them very well.  She is not a candidate for valvular intervention, and therefore, I suspect both her dementia and her heart failure will continue to progress in the upcoming weeks to months.    I will see her in approximately 1-2 months for closer followup if necessary.    Kingsley Brandon MD        D: 10/28/2021   T: 10/28/2021   MT: noble    Name:     CANDIDO MYERSJOVANNA HANNA  MRN:      [id number]        Account:      [id number]   :      1932           Service Date: 10/28/2021       Document: [id number]

## 2021-10-28 NOTE — LETTER
10/28/2021    Alton Willoughby  [address]  [place] [address]    RE: Zayda Hawkins       Dear Colleague,    I had the pleasure of seeing Zayda Hawkins in the [facility].    HPI and Plan:   See dictation    Orders Placed This Encounter   Procedures     Basic metabolic panel     Follow-Up with Cardiologist       Orders Placed This Encounter   Medications     apixaban ANTICOAGULANT (ELIQUIS) 2.5 MG tablet     Sig: Take 2.5 mg by mouth 2 times daily     magnesium oxide (MAG-OX) 400 MG tablet     Sig: Take 400 mg by mouth daily     Lancets MISC     Sig: Test once daily varying time, DX e11.9, 90 day supply     blood glucose (ACCU-CHEK GUIDE) test strip     Sig: Use to test bg 1 time daily. Use as directed.     ferrous gluconate (FERGON) 324 (38 Fe) MG tablet     Sig: Take 1 Tablet by mouth daily with breakfast.     diclofenac (VOLTAREN) 1 % topical gel     Sig: Place 2 g onto the skin     OYSTER SHELL CALCIUM + D3 500-400 MG-UNIT TABS     Sig: Take 1 tablet by mouth 2 times daily     spironolactone (ALDACTONE) 25 MG tablet     Sig: Take 25 mg by mouth daily       Medications Discontinued During This Encounter   Medication Reason     furosemide (LASIX) 40 MG tablet      apixaban ANTICOAGULANT (ELIQUIS ANTICOAGULANT) 5 MG tablet Medication Reconciliation Clean Up     spironolactone (ALDACTONE) 25 MG tablet Medication Reconciliation Clean Up         Encounter Diagnoses   Name Primary?     Essential hypertension      Edema, unspecified type        CURRENT MEDICATIONS:  Current Outpatient Medications   Medication Sig Dispense Refill     apixaban ANTICOAGULANT (ELIQUIS) 2.5 MG tablet Take 2.5 mg by mouth 2 times daily       blood glucose (ACCU-CHEK GUIDE) test strip Use to test bg 1 time daily. Use as directed.       calcium carbonate-vitamin D 500-400 MG-UNIT TABS Take 1 tablet by mouth 2 times daily.         carvedilol (COREG) 3.125 MG  tablet Take 3.125 mg by mouth       diclofenac (VOLTAREN) 1 % topical gel Place 2 g onto the skin       dorzolamide-timolol PF (COSOPT) 22.3-6.8 MG/ML opthalmic solution Place 1 drop into both eyes 2 times daily 10 mL vial       ferrous gluconate (FERGON) 324 (38 Fe) MG tablet Take 1 Tablet by mouth daily with breakfast.       Lancets MISC Test once daily varying time, DX e11.9, 90 day supply       latanoprost (XALATAN) 0.005 % ophthalmic solution Place 1 drop into both eyes At Bedtime       lisinopril (ZESTRIL) 40 MG tablet Take 1 tablet (40 mg) by mouth daily 90 tablet 3     magnesium gluconate (MAGONATE) 500 MG tablet Take 500 mg by mouth daily.         metFORMIN (GLUCOPHAGE-XR) 500 MG 24 hr tablet Take 500 mg by mouth every morning        potassium chloride ER (K-DUR/KLOR-CON M) 10 MEQ CR tablet Take 2 tablets (20meq) in the AM, and 1 tablet (10meq) in the PM. 270 tablet 3     potassium chloride ER (K-DUR/KLOR-CON M) 20 MEQ CR tablet Take by mouth daily Take 2 tablets (40 mEq) in the AM and 1 tablet in the PM on Saturday and Sundays when Lasix is doubled.       sertraline (ZOLOFT) 50 MG tablet Take 25 mg by mouth every evening as needed (Take a half tablet qpm prn)       spironolactone (ALDACTONE) 25 MG tablet Take 25 mg by mouth daily       SUMAtriptan Succinate (IMITREX PO) Take 25 mg by mouth as needed.         torsemide (DEMADEX) 20 MG tablet Take 20 mg by mouth 2 times daily       acetaminophen (TYLENOL) 325 MG tablet Take 2 tablets (650 mg) by mouth every 4 hours as needed for mild pain or fever (Patient not taking: Reported on 10/28/2021)       cephALEXin (KEFLEX) 500 MG capsule Take 1 capsule (500 mg) by mouth 2 times daily (Patient not taking: Reported on 10/28/2021) 10 capsule 0     COMPRESSION STOCKINGS 1 each daily (Patient not taking: Reported on 10/28/2021) 2 each 1     COMPRESSION STOCKINGS 1 each daily (Patient not taking: Reported on 10/28/2021) 2 each 0     magnesium oxide (MAG-OX) 400 MG  tablet Take 400 mg by mouth daily       OYSTER SHELL CALCIUM + D3 500-400 MG-UNIT TABS Take 1 tablet by mouth 2 times daily       timolol (TIMOPTIC) 0.5 % ophthalmic solution Place 1 drop into both eyes every morning  (Patient not taking: Reported on 10/28/2021)       triamcinolone (KENALOG) 0.1 % external cream Apply topically 2 times daily (Patient not taking: Reported on 10/28/2021)         ALLERGIES     Allergies   Allergen Reactions     Aspirin      Coumadin [Warfarin]      Spontaneous retroperitoneal bleed.     Penicillins        PAST MEDICAL HISTORY:  Past Medical History:   Diagnosis Date     Atrial fibrillation (H)      Bilateral lower extremity edema      CAD (coronary artery disease)     CT angio: mild lesion in the LAD, as well as 1st diagonal, and mild plaque in the proximal and  mid RCA     Chronic systolic congestive heart failure (H)      Chronic systolic congestive heart failure (H)      CVA (cerebral vascular accident) (H) 10/2018    Acute left posterior circulation ischemic stroke      Diabetes mellitus, type 2 (H)      Hx of atrioventricular node ablation 2018     Hyperlipidemia      Hypertension      Retroperitoneal bleed 07/27/2012     Retroperitoneal bleed     Spontaneous while on warfarin     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     Venous (peripheral) insufficiency     right GSV ablation with bilateral sclerotherapy at outside clinic; s/p right AASV VenaSeal (12/9/2019),     Venous insufficiency        PAST SURGICAL HISTORY:  Past Surgical History:   Procedure Laterality Date     ANESTHESIA CARDIOVERSION  7/23/2012    Procedure: ANESTHESIA CARDIOVERSION;;  Surgeon: Provider, Generic Anesthesia;  Location:  ANESTHESIA -  - DO NOT SCHEDULE     BLEPHAROPLASTY  2005     Cataract Extraction with IOL Bilateral 2012     H ABLATION AV NODE  11/14/2018     IMPLANT PACEMAKER  11/2012    Dual chamber     REPLACE PACEMAKER GENERATOR  11/14/2018    upgrade to CRT       FAMILY HISTORY:  Family History    Problem Relation Age of Onset     Heart Disease Mother 65        mi     Heart Disease Father 45        pnemonia     Heart Disease Brother      Heart Disease Sister      Heart Disease Brother        SOCIAL HISTORY:  Social History     Socioeconomic History     Marital status:      Spouse name: Not on file     Number of children: Not on file     Years of education: Not on file     Highest education level: Not on file   Occupational History     Not on file   Tobacco Use     Smoking status: Never Smoker     Smokeless tobacco: Never Used   Substance and Sexual Activity     Alcohol use: No     Drug use: No     Sexual activity: Not on file   Other Topics Concern     Parent/sibling w/ CABG, MI or angioplasty before 65F 55M? Not Asked      Service Not Asked     Blood Transfusions Not Asked     Caffeine Concern Yes     Comment: no caffeine intake     Occupational Exposure Not Asked     Hobby Hazards Not Asked     Sleep Concern Not Asked     Stress Concern Not Asked     Weight Concern Not Asked     Special Diet Yes     Comment: no sugar, salt or fats      Back Care Not Asked     Exercise Yes     Comment: treadmill, swimming daily      Bike Helmet Not Asked     Seat Belt Yes     Self-Exams Not Asked   Social History Narrative     Not on file     Social Determinants of Health     Financial Resource Strain:      Difficulty of Paying Living Expenses:    Food Insecurity:      Worried About Running Out of Food in the Last Year:      Ran Out of Food in the Last Year:    Transportation Needs:      Lack of Transportation (Medical):      Lack of Transportation (Non-Medical):    Physical Activity:      Days of Exercise per Week:      Minutes of Exercise per Session:    Stress:      Feeling of Stress :    Social Connections:      Frequency of Communication with Friends and Family:      Frequency of Social Gatherings with Friends and Family:      Attends [employer] Services:      Active Member of Clubs or Organizations:   "    Attends Club or Organization Meetings:      Marital Status:    Intimate Partner Violence:      Fear of Current or Ex-Partner:      Emotionally Abused:      Physically Abused:      Sexually Abused:        Review of Systems:  Skin:  Negative rash;bruising dry   Eyes:  Positive for glasses cataract extraction of both eyes  ENT:  Negative      Respiratory:  Positive for dyspnea on exertion     Cardiovascular:  palpitations;chest pain;dizziness;lightheadedness;Negative for;fatigue Positive for;edema uses ace wraps  Gastroenterology: Negative      Genitourinary:  Positive for urinary frequency;incontinence due to water pill  Musculoskeletal:  Positive for arthritis    Neurologic:  Negative      Psychiatric:  Positive for depression    Heme/Lymph/Imm:  Positive for allergies    Endocrine:  Positive for diabetes Type II    Physical Exam:  Vitals: BP (!) 160/83   Pulse 71   Ht 1.626 m (5' 4\")   Wt 64.4 kg (142 lb)   SpO2 99%   BMI 24.37 kg/m      Constitutional:           Skin:             Head:           Eyes:           Lymph:      ENT:           Neck:           Respiratory:            Cardiac:                                                           GI:           Extremities and Muscular Skeletal:                 Neurological:           Psych:           CC  Kingsley Brandon MD  [id number]  [place] [id number]                  Thank you for allowing me to participate in the care of your patient.      Sincerely,     Kingsley Brandon MD, MD     [facility]  cc:   Kingsley Brandon MD  [address]  [place] [id number]        "

## 2021-10-28 NOTE — PROGRESS NOTES
Ordered IVP lasix 40 mg for today and tomorrow, 10/28 & 10/29, per Dr Brandon.   Infusion will draw BMP prior to IVP 10/29.    Mamie Sidhu RN 1:15 PM 10/28/21

## 2021-10-28 NOTE — PROGRESS NOTES
Infusion Nursing Note:  Zayda Hawkins presents today for IV lasix.    Patient seen by provider today: Yes: Dr. Brandon   present during visit today: Not Applicable.    Note: N/A.      Intravenous Access:  Peripheral IV placed.    Treatment Conditions:  Not Applicable.      Post Infusion Assessment:  Patient tolerated infusion without incident.  Blood return noted pre and post infusion.  Site patent and intact, free from redness, edema or discomfort.  No evidence of extravasations.  Access discontinued per protocol.       Discharge Plan:   Discharge instructions reviewed with: Patient.  Patient and/or family verbalized understanding of discharge instructions and all questions answered.  Copy of AVS reviewed with patient and/or family.  Patient will return 10/29/21 for next appointment.  Patient discharged in stable condition accompanied by: self.  Departure Mode: Ambulatory.      Rita Collazo RN

## 2021-10-28 NOTE — NURSING NOTE
Per Dr. Brandon, [facility] BMP prior to IV push tomorrow. [facility] with [facility] RN who facilitated appt.    Patient and son waited in exam room-appts reviewed face to face. Will contact him tomorrow to schedule follow-up appts.    Roma Min RN  [facility]

## 2021-10-28 NOTE — PROGRESS NOTES
HPI and Plan:   See dictation    Orders Placed This Encounter   Procedures     Basic metabolic panel     Follow-Up with Cardiologist       Orders Placed This Encounter   Medications     apixaban ANTICOAGULANT (ELIQUIS) 2.5 MG tablet     Sig: Take 2.5 mg by mouth 2 times daily     magnesium oxide (MAG-OX) 400 MG tablet     Sig: Take 400 mg by mouth daily     Lancets MISC     Sig: Test once daily varying time, DX e11.9, 90 day supply     blood glucose (ACCU-CHEK GUIDE) test strip     Sig: Use to test bg 1 time daily. Use as directed.     ferrous gluconate (FERGON) 324 (38 Fe) MG tablet     Sig: Take 1 Tablet by mouth daily with breakfast.     diclofenac (VOLTAREN) 1 % topical gel     Sig: Place 2 g onto the skin     OYSTER SHELL CALCIUM + D3 500-400 MG-UNIT TABS     Sig: Take 1 tablet by mouth 2 times daily     spironolactone (ALDACTONE) 25 MG tablet     Sig: Take 25 mg by mouth daily       Medications Discontinued During This Encounter   Medication Reason     furosemide (LASIX) 40 MG tablet      apixaban ANTICOAGULANT (ELIQUIS ANTICOAGULANT) 5 MG tablet Medication Reconciliation Clean Up     spironolactone (ALDACTONE) 25 MG tablet Medication Reconciliation Clean Up         Encounter Diagnoses   Name Primary?     Essential hypertension      Edema, unspecified type        CURRENT MEDICATIONS:  Current Outpatient Medications   Medication Sig Dispense Refill     apixaban ANTICOAGULANT (ELIQUIS) 2.5 MG tablet Take 2.5 mg by mouth 2 times daily       blood glucose (ACCU-CHEK GUIDE) test strip Use to test bg 1 time daily. Use as directed.       calcium carbonate-vitamin D 500-400 MG-UNIT TABS Take 1 tablet by mouth 2 times daily.         carvedilol (COREG) 3.125 MG tablet Take 3.125 mg by mouth       diclofenac (VOLTAREN) 1 % topical gel Place 2 g onto the skin       dorzolamide-timolol PF (COSOPT) 22.3-6.8 MG/ML opthalmic solution Place 1 drop into both eyes 2 times daily 10 mL vial       ferrous gluconate (FERGON) 324 (38  Fe) MG tablet Take 1 Tablet by mouth daily with breakfast.       Lancets MISC Test once daily varying time, DX e11.9, 90 day supply       latanoprost (XALATAN) 0.005 % ophthalmic solution Place 1 drop into both eyes At Bedtime       lisinopril (ZESTRIL) 40 MG tablet Take 1 tablet (40 mg) by mouth daily 90 tablet 3     magnesium gluconate (MAGONATE) 500 MG tablet Take 500 mg by mouth daily.         metFORMIN (GLUCOPHAGE-XR) 500 MG 24 hr tablet Take 500 mg by mouth every morning        potassium chloride ER (K-DUR/KLOR-CON M) 10 MEQ CR tablet Take 2 tablets (20meq) in the AM, and 1 tablet (10meq) in the PM. 270 tablet 3     potassium chloride ER (K-DUR/KLOR-CON M) 20 MEQ CR tablet Take by mouth daily Take 2 tablets (40 mEq) in the AM and 1 tablet in the PM on Saturday and Sundays when Lasix is doubled.       sertraline (ZOLOFT) 50 MG tablet Take 25 mg by mouth every evening as needed (Take a half tablet qpm prn)       spironolactone (ALDACTONE) 25 MG tablet Take 25 mg by mouth daily       SUMAtriptan Succinate (IMITREX PO) Take 25 mg by mouth as needed.         torsemide (DEMADEX) 20 MG tablet Take 20 mg by mouth 2 times daily       acetaminophen (TYLENOL) 325 MG tablet Take 2 tablets (650 mg) by mouth every 4 hours as needed for mild pain or fever (Patient not taking: Reported on 10/28/2021)       cephALEXin (KEFLEX) 500 MG capsule Take 1 capsule (500 mg) by mouth 2 times daily (Patient not taking: Reported on 10/28/2021) 10 capsule 0     COMPRESSION STOCKINGS 1 each daily (Patient not taking: Reported on 10/28/2021) 2 each 1     COMPRESSION STOCKINGS 1 each daily (Patient not taking: Reported on 10/28/2021) 2 each 0     magnesium oxide (MAG-OX) 400 MG tablet Take 400 mg by mouth daily       OYSTER SHELL CALCIUM + D3 500-400 MG-UNIT TABS Take 1 tablet by mouth 2 times daily       timolol (TIMOPTIC) 0.5 % ophthalmic solution Place 1 drop into both eyes every morning  (Patient not taking: Reported on 10/28/2021)        triamcinolone (KENALOG) 0.1 % external cream Apply topically 2 times daily (Patient not taking: Reported on 10/28/2021)         ALLERGIES     Allergies   Allergen Reactions     Aspirin      Coumadin [Warfarin]      Spontaneous retroperitoneal bleed.     Penicillins        PAST MEDICAL HISTORY:  Past Medical History:   Diagnosis Date     Atrial fibrillation (H)      Bilateral lower extremity edema      CAD (coronary artery disease)     CT angio: mild lesion in the LAD, as well as 1st diagonal, and mild plaque in the proximal and  mid RCA     Chronic systolic congestive heart failure (H)      Chronic systolic congestive heart failure (H)      CVA (cerebral vascular accident) (H) 10/2018    Acute left posterior circulation ischemic stroke      Diabetes mellitus, type 2 (H)      Hx of atrioventricular node ablation 2018     Hyperlipidemia      Hypertension      Retroperitoneal bleed 07/27/2012     Retroperitoneal bleed     Spontaneous while on warfarin     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     Venous (peripheral) insufficiency     right GSV ablation with bilateral sclerotherapy at outside clinic; s/p right AASV VenaSeal (12/9/2019),     Venous insufficiency        PAST SURGICAL HISTORY:  Past Surgical History:   Procedure Laterality Date     ANESTHESIA CARDIOVERSION  7/23/2012    Procedure: ANESTHESIA CARDIOVERSION;;  Surgeon: Provider, Generic Anesthesia;  Location:  [facility]IA - [facility] - DO NOT SCHEDULE     BLEPHAROPLASTY  2005     Cataract Extraction with IOL Bilateral 2012     H ABLATION AV NODE  11/14/2018     IMPLANT PACEMAKER  11/2012    Dual chamber     REPLACE PACEMAKER GENERATOR  11/14/2018    upgrade to CRT       FAMILY HISTORY:  Family History   Problem Relation Age of Onset     Heart Disease Mother 65        mi     Heart Disease Father 45        pnemonia     Heart Disease Brother      Heart Disease Sister      Heart Disease Brother        SOCIAL HISTORY:  Social History     Socioeconomic History      Marital status:      Spouse name: Not on file     Number of children: Not on file     Years of education: Not on file     Highest education level: Not on file   Occupational History     Not on file   Tobacco Use     Smoking status: Never Smoker     Smokeless tobacco: Never Used   Substance and Sexual Activity     Alcohol use: No     Drug use: No     Sexual activity: Not on file   Other Topics Concern     Parent/sibling w/ CABG, MI or angioplasty before 65F 55M? Not Asked      Service Not Asked     Blood Transfusions Not Asked     Caffeine Concern Yes     Comment: no caffeine intake     Occupational Exposure Not Asked     Hobby Hazards Not Asked     Sleep Concern Not Asked     Stress Concern Not Asked     Weight Concern Not Asked     Special Diet Yes     Comment: no sugar, salt or fats      Back Care Not Asked     Exercise Yes     Comment: treadmill, swimming daily      Bike Helmet Not Asked     Seat Belt Yes     Self-Exams Not Asked   Social History Narrative     Not on file     Social Determinants of Health     Financial Resource Strain:      Difficulty of Paying Living Expenses:    Food Insecurity:      Worried About Running Out of Food in the Last Year:      Ran Out of Food in the Last Year:    Transportation Needs:      Lack of Transportation (Medical):      Lack of Transportation (Non-Medical):    Physical Activity:      Days of Exercise per Week:      Minutes of Exercise per Session:    Stress:      Feeling of Stress :    Social Connections:      Frequency of Communication with Friends and Family:      Frequency of Social Gatherings with Friends and Family:      Attends Den[employer]ational Services:      Active Member of Clubs or Organizations:      Attends Club or Organization Meetings:      Marital Status:    Intimate Partner Violence:      Fear of Current or Ex-Partner:      Emotionally Abused:      Physically Abused:      Sexually Abused:        Review of Systems:  Skin:  Negative rash;bruising dry  "  Eyes:  Positive for glasses cataract extraction of both eyes  ENT:  Negative      Respiratory:  Positive for dyspnea on exertion     Cardiovascular:  palpitations;chest pain;dizziness;lightheadedness;Negative for;fatigue Positive for;edema uses ace wraps  Gastroenterology: Negative      Genitourinary:  Positive for urinary frequency;incontinence due to water pill  Musculoskeletal:  Positive for arthritis    Neurologic:  Negative      Psychiatric:  Positive for depression    Heme/Lymph/Imm:  Positive for allergies    Endocrine:  Positive for diabetes Type II    Physical Exam:  Vitals: BP (!) 160/83   Pulse 71   Ht 1.626 m (5' 4\")   Wt 64.4 kg (142 lb)   SpO2 99%   BMI 24.37 kg/m      Constitutional:           Skin:             Head:           Eyes:           Lymph:      ENT:           Neck:           Respiratory:            Cardiac:                                                           GI:           Extremities and Muscular Skeletal:                 Neurological:           Psych:           CC  Kingsley Brandon MD  [id number]  [place] [id number]              "

## 2021-10-29 NOTE — PROGRESS NOTES
Infusion Nursing Note:  Zayda Velizddam presents today for BMP lab and Lasix.    Patient seen by provider today: No   present during visit today: Yes, Language: Family member interpreted.     Note: N/A.      Intravenous Access:  Labs drawn without difficulty.  Peripheral IV placed.    Treatment Conditions:  Lab Results   Component Value Date     10/29/2021    POTASSIUM 4.1 10/29/2021    MAG 2.1 10/13/2018    CR 1.15 (H) 10/29/2021    MADDISON 8.7 10/29/2021    BILITOTAL 1.1 07/22/2020    ALBUMIN 3.2 (L) 07/22/2020    ALT 21 07/22/2020    AST 28 07/22/2020         Post Infusion Assessment:  Patient tolerated infusion without incident.  Blood return noted pre and post infusion.  Site patent and intact, free from redness, edema or discomfort.  No evidence of extravasations.  Access discontinued per protocol.       Discharge Plan:    Patient will follow up with her provider regarding need for next appointment.  Patient discharged in stable condition accompanied by: Family.  Departure Mode: Ambulatory.      Julianne Ruiz RN

## 2021-12-02 NOTE — LETTER
12/2/2021    Alton Willoughby  [address]  [place] [address]    RE: Zayda Hawkins       Dear Colleague,    I had the pleasure of seeing Zayda Hawkins in the [facility].  HPI and Plan:     REASON FOR CONSULTATION:  Heart failure with preserved ejection fraction.    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  Please see my note dated [id number] for details regarding her medical history.    I recommended hospital admission during our last visit for her decompensated heart failure.  However given the ongoing pandemic and lack of bed issues the patient and family was not comfortable with this.  We sent her for an outpatient IV diuretic infusion.  She said couple of infusions and despite this her weight continues to go up.  She has gained additional 10 pounds since her last visit and is now having shortness of breath along with worsening lower extremity edema.    IMPRESSION AND PLAN:    1.  Heart failure with preserved ejection fraction.  2.  Multivalvular heart disease.  3.  Chronic atrial fibrillation, status post AV kim ablation and permanent pacemaker implantation.  4.  History of nonobstructive CAD.  5.  Hypertension.  6.  Lower extremity edema.  7.  Advanced dementia.    The patient has end-stage heart failure.  She also has end-stage dementia.  The patient's son and I had a js discussion.  The patient is currently being worked up for hospice.  I believe this is  appropriate.    I told him the only way we can get rid of fluid is hospitalization which will most likely be prolonged.  They are not interested this.  As such I will discontinue the torsemide and switch her to Bumex to see if we can get better diuresis.  Additionally we will give her metolazone for 3 days.  We will have her come back next week for kidney function test.    Moving forward, the goal will be comfort care.      Kingsley Brandon MD      Orders Placed This Encounter   Procedures     Basic metabolic panel       Orders Placed This Encounter   Medications     bumetanide (BUMEX) 1 MG tablet     Sig: Take 1 tablet (1 mg) by mouth 2 times daily     Dispense:  90 tablet     Refill:  3     metolazone (ZAROXOLYN) 2.5 MG tablet     Sig: Take 1 tablet (2.5 mg) by mouth daily     Dispense:  3 tablet     Refill:  0       Medications Discontinued During This Encounter   Medication Reason     COMPRESSION STOCKINGS Medication Reconciliation Clean Up     torsemide (DEMADEX) 20 MG tablet          Encounter Diagnosis   Name Primary?     Edema, unspecified type        CURRENT MEDICATIONS:  Current Outpatient Medications   Medication Sig Dispense Refill     acetaminophen (TYLENOL) 325 MG tablet Take 2 tablets (650 mg) by mouth every 4 hours as needed for mild pain or fever       apixaban ANTICOAGULANT (ELIQUIS) 2.5 MG tablet Take 2.5 mg by mouth 2 times daily       blood glucose (ACCU-CHEK GUIDE) test strip Use to test bg 1 time daily. Use as directed.       bumetanide (BUMEX) 1 MG tablet Take 1 tablet (1 mg) by mouth 2 times daily 90 tablet 3     calcium carbonate-vitamin D 500-400 MG-UNIT TABS Take 1 tablet by mouth 2 times daily.         carvedilol (COREG) 3.125 MG tablet Take 3.125 mg by mouth       diclofenac (VOLTAREN) 1 % topical gel Place 2 g onto the skin       dorzolamide-timolol PF (COSOPT) 22.3-6.8 MG/ML opthalmic solution Place 1 drop into both eyes 2 times daily 10 mL vial       ferrous gluconate (FERGON) 324  (38 Fe) MG tablet Take 1 Tablet by mouth daily with breakfast.       Lancets MISC Test once daily varying time, DX e11.9, 90 day supply       latanoprost (XALATAN) 0.005 % ophthalmic solution Place 1 drop into both eyes At Bedtime       lisinopril (ZESTRIL) 40 MG tablet Take 1 tablet (40 mg) by mouth daily 90 tablet 3     magnesium gluconate (MAGONATE) 500 MG tablet Take 500 mg by mouth daily.         magnesium oxide (MAG-OX) 400 MG tablet Take 400 mg by mouth daily       metFORMIN (GLUCOPHAGE-XR) 500 MG 24 hr tablet Take 500 mg by mouth every morning        metolazone (ZAROXOLYN) 2.5 MG tablet Take 1 tablet (2.5 mg) by mouth daily 3 tablet 0     OYSTER SHELL CALCIUM + D3 500-400 MG-UNIT TABS Take 1 tablet by mouth 2 times daily       potassium chloride ER (K-DUR/KLOR-CON M) 10 MEQ CR tablet Take 2 tablets (20meq) in the AM, and 1 tablet (10meq) in the PM. 270 tablet 3     potassium chloride ER (K-DUR/KLOR-CON M) 20 MEQ CR tablet Take by mouth daily Take 2 tablets (40 mEq) in the AM and 1 tablet in the PM on Saturday and Sundays when Lasix is doubled.       sertraline (ZOLOFT) 50 MG tablet Take 25 mg by mouth every evening as needed (Take a half tablet qpm prn)       spironolactone (ALDACTONE) 25 MG tablet Take 25 mg by mouth daily       SUMAtriptan Succinate (IMITREX PO) Take 25 mg by mouth as needed.         cephALEXin (KEFLEX) 500 MG capsule Take 1 capsule (500 mg) by mouth 2 times daily (Patient not taking: Reported on 10/28/2021) 10 capsule 0     COMPRESSION STOCKINGS 1 each daily (Patient not taking: Reported on 10/28/2021) 2 each 0     timolol (TIMOPTIC) 0.5 % ophthalmic solution Place 1 drop into both eyes every morning  (Patient not taking: Reported on 10/28/2021)       triamcinolone (KENALOG) 0.1 % external cream Apply topically 2 times daily (Patient not taking: Reported on 10/28/2021)         ALLERGIES     Allergies   Allergen Reactions     Aspirin      Coumadin [Warfarin]      Spontaneous  retroperitoneal bleed.     Penicillins        PAST MEDICAL HISTORY:  Past Medical History:   Diagnosis Date     Atrial fibrillation (H)      Bilateral lower extremity edema      CAD (coronary artery disease)     CT angio: mild lesion in the LAD, as well as 1st diagonal, and mild plaque in the proximal and  mid RCA     Chronic systolic congestive heart failure (H)      Chronic systolic congestive heart failure (H)      CVA (cerebral vascular accident) (H) 10/2018    Acute left posterior circulation ischemic stroke      Diabetes mellitus, type 2 (H)      Hx of atrioventricular node ablation 2018     Hyperlipidemia      Hypertension      Retroperitoneal bleed 07/27/2012     Retroperitoneal bleed     Spontaneous while on warfarin     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     Venous (peripheral) insufficiency     right GSV ablation with bilateral sclerotherapy at outside clinic; s/p right AASV VenaSeal (12/9/2019),     Venous insufficiency        PAST SURGICAL HISTORY:  Past Surgical History:   Procedure Laterality Date     ANESTHESIA CARDIOVERSION  7/23/2012    Procedure: ANESTHESIA CARDIOVERSION;;  Surgeon: Provider, Generic Anesthesia;  Location:  ANESTHESIA -  - DO NOT SCHEDULE     BLEPHAROPLASTY  2005     Cataract Extraction with IOL Bilateral 2012     H ABLATION AV NODE  11/14/2018     IMPLANT PACEMAKER  11/2012    Dual chamber     REPLACE PACEMAKER GENERATOR  11/14/2018    upgrade to CRT       FAMILY HISTORY:  Family History   Problem Relation Age of Onset     Heart Disease Mother 65        mi     Heart Disease Father 45        pnemonia     Heart Disease Brother      Heart Disease Sister      Heart Disease Brother        SOCIAL HISTORY:  Social History     Socioeconomic History     Marital status:      Spouse name: None     Number of children: None     Years of education: None     Highest education level: None   Occupational History     None   Tobacco Use     Smoking status: Never Smoker     Smokeless  "tobacco: Never Used   Substance and Sexual Activity     Alcohol use: No     Drug use: No     Sexual activity: None   Other Topics Concern     Parent/sibling w/ CABG, MI or angioplasty before 65F 55M? Not Asked      Service Not Asked     Blood Transfusions Not Asked     Caffeine Concern Yes     Comment: no caffeine intake     Occupational Exposure Not Asked     Hobby Hazards Not Asked     Sleep Concern Not Asked     Stress Concern Not Asked     Weight Concern Not Asked     Special Diet Yes     Comment: no sugar, salt or fats      Back Care Not Asked     Exercise Yes     Comment: treadmill, swimming daily      Bike Helmet Not Asked     Seat Belt Yes     Self-Exams Not Asked   Social History Narrative     None     Social Determinants of Health     Financial Resource Strain: Not on file   Food Insecurity: Not on file   Transportation Needs: Not on file   Physical Activity: Not on file   Stress: Not on file   Social Connections: Not on file   Intimate Partner Violence: Not on file   Housing Stability: Not on file       Review of Systems:  Skin:  Negative       Eyes:  Positive for glasses    ENT:  Negative      Respiratory:  Positive for dyspnea on exertion     Cardiovascular:    edema;Positive for;fatigue    Gastroenterology: Negative      Genitourinary:  Positive for urinary frequency;incontinence    Musculoskeletal:  Negative      Neurologic:  Negative      Psychiatric:  Negative      Heme/Lymph/Imm:  Negative      Endocrine:  Positive for diabetes      Physical Exam:  Vitals: /70 (BP Location: Right arm, Patient Position: Sitting, Cuff Size: Adult Small)   Pulse 64   Ht 1.626 m (5' 4\")   Wt 68.5 kg (151 lb)   LMP  (LMP Unknown)   SpO2 97%   Breastfeeding No   BMI 25.92 kg/m      Constitutional:  cooperative        Skin:  warm and dry to the touch          Head:  normocephalic        Eyes:           Lymph:      ENT:  no pallor or cyanosis        Neck:    JVP 10-12      Respiratory:  clear to " auscultation         Cardiac: regular rhythm       grade 2;holosystolic murmur        pulses full and equal                                        GI:  abdomen soft;non-tender        Extremities and Muscular Skeletal:      bilateral LE edema;2+;telangiectasia          Neurological:  no gross motor deficits        Psych:  Alert and Oriented x 3          Kingsley Brandon MD, MD   [facility]

## 2021-12-02 NOTE — PROGRESS NOTES
HPI and Plan:     REASON FOR CONSULTATION:  Heart failure with preserved ejection fraction.    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  Please see my note dated [id number] for details regarding her medical history.    I recommended hospital admission during our last visit for her decompensated heart failure.  However given the ongoing pandemic and lack of bed issues the patient and family was not comfortable with this.  We sent her for an outpatient IV diuretic infusion.  She said couple of infusions and despite this her weight continues to go up.  She has gained additional 10 pounds since her last visit and is now having shortness of breath along with worsening lower extremity edema.    IMPRESSION AND PLAN:    1.  Heart failure with preserved ejection fraction.  2.  Multivalvular heart disease.  3.  Chronic atrial fibrillation, status post AV kim ablation and permanent pacemaker implantation.  4.  History of nonobstructive CAD.  5.  Hypertension.  6.  Lower extremity edema.  7.  Advanced dementia.    The patient has end-stage heart failure.  She also has end-stage dementia.  The patient's son and I had a js discussion.  The patient is currently being worked up for hospice.  I believe this is appropriate.    I told him the only way we can get rid of fluid is hospitalization which will most likely be prolonged.  They are not interested this.  As such I will discontinue the torsemide and switch her to Bumex to see if we can get better diuresis.   Additionally we will give her metolazone for 3 days.  We will have her come back next week for kidney function test.    Moving forward, the goal will be comfort care.      Kingsley Brandon MD      Orders Placed This Encounter   Procedures     Basic metabolic panel       Orders Placed This Encounter   Medications     bumetanide (BUMEX) 1 MG tablet     Sig: Take 1 tablet (1 mg) by mouth 2 times daily     Dispense:  90 tablet     Refill:  3     metolazone (ZAROXOLYN) 2.5 MG tablet     Sig: Take 1 tablet (2.5 mg) by mouth daily     Dispense:  3 tablet     Refill:  0       Medications Discontinued During This Encounter   Medication Reason     COMPRESSION STOCKINGS Medication Reconciliation Clean Up     torsemide (DEMADEX) 20 MG tablet          Encounter Diagnosis   Name Primary?     Edema, unspecified type        CURRENT MEDICATIONS:  Current Outpatient Medications   Medication Sig Dispense Refill     acetaminophen (TYLENOL) 325 MG tablet Take 2 tablets (650 mg) by mouth every 4 hours as needed for mild pain or fever       apixaban ANTICOAGULANT (ELIQUIS) 2.5 MG tablet Take 2.5 mg by mouth 2 times daily       blood glucose (ACCU-CHEK GUIDE) test strip Use to test bg 1 time daily. Use as directed.       bumetanide (BUMEX) 1 MG tablet Take 1 tablet (1 mg) by mouth 2 times daily 90 tablet 3     calcium carbonate-vitamin D 500-400 MG-UNIT TABS Take 1 tablet by mouth 2 times daily.         carvedilol (COREG) 3.125 MG tablet Take 3.125 mg by mouth       diclofenac (VOLTAREN) 1 % topical gel Place 2 g onto the skin       dorzolamide-timolol PF (COSOPT) 22.3-6.8 MG/ML opthalmic solution Place 1 drop into both eyes 2 times daily 10 mL vial       ferrous gluconate (FERGON) 324 (38 Fe) MG tablet Take 1 Tablet by mouth daily with breakfast.       Lancets MISC Test once daily varying time, DX e11.9, 90 day supply       latanoprost (XALATAN) 0.005 % ophthalmic solution Place 1 drop into both eyes At Bedtime       lisinopril (ZESTRIL)  40 MG tablet Take 1 tablet (40 mg) by mouth daily 90 tablet 3     magnesium gluconate (MAGONATE) 500 MG tablet Take 500 mg by mouth daily.         magnesium oxide (MAG-OX) 400 MG tablet Take 400 mg by mouth daily       metFORMIN (GLUCOPHAGE-XR) 500 MG 24 hr tablet Take 500 mg by mouth every morning        metolazone (ZAROXOLYN) 2.5 MG tablet Take 1 tablet (2.5 mg) by mouth daily 3 tablet 0     OYSTER SHELL CALCIUM + D3 500-400 MG-UNIT TABS Take 1 tablet by mouth 2 times daily       potassium chloride ER (K-DUR/KLOR-CON M) 10 MEQ CR tablet Take 2 tablets (20meq) in the AM, and 1 tablet (10meq) in the PM. 270 tablet 3     potassium chloride ER (K-DUR/KLOR-CON M) 20 MEQ CR tablet Take by mouth daily Take 2 tablets (40 mEq) in the AM and 1 tablet in the PM on Saturday and Sundays when Lasix is doubled.       sertraline (ZOLOFT) 50 MG tablet Take 25 mg by mouth every evening as needed (Take a half tablet qpm prn)       spironolactone (ALDACTONE) 25 MG tablet Take 25 mg by mouth daily       SUMAtriptan Succinate (IMITREX PO) Take 25 mg by mouth as needed.         cephALEXin (KEFLEX) 500 MG capsule Take 1 capsule (500 mg) by mouth 2 times daily (Patient not taking: Reported on 10/28/2021) 10 capsule 0     COMPRESSION STOCKINGS 1 each daily (Patient not taking: Reported on 10/28/2021) 2 each 0     timolol (TIMOPTIC) 0.5 % ophthalmic solution Place 1 drop into both eyes every morning  (Patient not taking: Reported on 10/28/2021)       triamcinolone (KENALOG) 0.1 % external cream Apply topically 2 times daily (Patient not taking: Reported on 10/28/2021)         ALLERGIES     Allergies   Allergen Reactions     Aspirin      Coumadin [Warfarin]      Spontaneous retroperitoneal bleed.     Penicillins        PAST MEDICAL HISTORY:  Past Medical History:   Diagnosis Date     Atrial fibrillation (H)      Bilateral lower extremity edema      CAD (coronary artery disease)     CT angio: mild lesion in the LAD, as well as 1st diagonal,  and mild plaque in the proximal and  mid RCA     Chronic systolic congestive heart failure (H)      Chronic systolic congestive heart failure (H)      CVA (cerebral vascular accident) (H) 10/2018    Acute left posterior circulation ischemic stroke      Diabetes mellitus, type 2 (H)      Hx of atrioventricular node ablation 2018     Hyperlipidemia      Hypertension      Retroperitoneal bleed 07/27/2012     Retroperitoneal bleed     Spontaneous while on warfarin     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     Venous (peripheral) insufficiency     right GSV ablation with bilateral sclerotherapy at outside clinic; s/p right AASV VenaSeal (12/9/2019),     Venous insufficiency        PAST SURGICAL HISTORY:  Past Surgical History:   Procedure Laterality Date     ANESTHESIA CARDIOVERSION  7/23/2012    Procedure: ANESTHESIA CARDIOVERSION;;  Surgeon: Provider, Generic Anesthesia;  Location:  ANESTHESIA -  - DO NOT SCHEDULE     BLEPHAROPLASTY  2005     Cataract Extraction with IOL Bilateral 2012     H ABLATION AV NODE  11/14/2018     IMPLANT PACEMAKER  11/2012    Dual chamber     REPLACE PACEMAKER GENERATOR  11/14/2018    upgrade to CRT       FAMILY HISTORY:  Family History   Problem Relation Age of Onset     Heart Disease Mother 65        mi     Heart Disease Father 45        pnemonia     Heart Disease Brother      Heart Disease Sister      Heart Disease Brother        SOCIAL HISTORY:  Social History     Socioeconomic History     Marital status:      Spouse name: None     Number of children: None     Years of education: None     Highest education level: None   Occupational History     None   Tobacco Use     Smoking status: Never Smoker     Smokeless tobacco: Never Used   Substance and Sexual Activity     Alcohol use: No     Drug use: No     Sexual activity: None   Other Topics Concern     Parent/sibling w/ CABG, MI or angioplasty before 65F 55M? Not Asked      Service Not Asked     Blood Transfusions Not Asked  "    Caffeine Concern Yes     Comment: no caffeine intake     Occupational Exposure Not Asked     Hobby Hazards Not Asked     Sleep Concern Not Asked     Stress Concern Not Asked     Weight Concern Not Asked     Special Diet Yes     Comment: no sugar, salt or fats      Back Care Not Asked     Exercise Yes     Comment: treadmill, swimming daily      Bike Helmet Not Asked     Seat Belt Yes     Self-Exams Not Asked   Social History Narrative     None     Social Determinants of Health     Financial Resource Strain: Not on file   Food Insecurity: Not on file   Transportation Needs: Not on file   Physical Activity: Not on file   Stress: Not on file   Social Connections: Not on file   Intimate Partner Violence: Not on file   Housing Stability: Not on file       Review of Systems:  Skin:  Negative       Eyes:  Positive for glasses    ENT:  Negative      Respiratory:  Positive for dyspnea on exertion     Cardiovascular:    edema;Positive for;fatigue    Gastroenterology: Negative      Genitourinary:  Positive for urinary frequency;incontinence    Musculoskeletal:  Negative      Neurologic:  Negative      Psychiatric:  Negative      Heme/Lymph/Imm:  Negative      Endocrine:  Positive for diabetes      Physical Exam:  Vitals: /70 (BP Location: Right arm, Patient Position: Sitting, Cuff Size: Adult Small)   Pulse 64   Ht 1.626 m (5' 4\")   Wt 68.5 kg (151 lb)   LMP  (LMP Unknown)   SpO2 97%   Breastfeeding No   BMI 25.92 kg/m      Constitutional:  cooperative        Skin:  warm and dry to the touch          Head:  normocephalic        Eyes:           Lymph:      ENT:  no pallor or cyanosis        Neck:    JVP 10-12      Respiratory:  clear to auscultation         Cardiac: regular rhythm       grade 2;holosystolic murmur        pulses full and equal                                        GI:  abdomen soft;non-tender        Extremities and Muscular Skeletal:      bilateral LE edema;2+;telangiectasia      "     Neurological:  no gross motor deficits        Psych:  Alert and Oriented x 3        CC  Kingsley Brandon MD  [id number]  [place] [id number]

## 2021-12-08 NOTE — TELEPHONE ENCOUNTER
Received call from patient's son, would like to schedule BMP. Patient took her 3 doses of metolazone, finishing yesterday. Appt scheduled-date, time and location reviewed.    Roma Min RN  [facility]

## 2021-12-14 NOTE — TELEPHONE ENCOUNTER
BMP results reviewed by Dr. Brandon. He would like patient to remain on 1mg Bumex daily, no additional metolazone required. He would like patient to start 20mEq of potassium daily and recheck labs 1 week after starting.    LM for patient's son relaying these recommendations, requested call back to confirm where Rx should be sent and to schedule lab appt.      ADDENDUM 12/15/21 - Patient's son returned call. Zayda has enrolled in hospice through [facility]. Requested I speak with them about medications and labs, they can complete.    Telephone hospice case manager Lyndsey ([phone]) to discuss recent BMP results and recommendations from Dr. Brandon. Left detailed VM and requested call back.      ADDENDUM 12/17/21-VM received from Lyndsey with fax number. Faxed order for KCl and BMP in 1 week to [facility] at [phone].      Roma Min RN  [facility]

## 2022-01-01 ENCOUNTER — ANCILLARY PROCEDURE (OUTPATIENT)
Dept: CARDIOLOGY | Facility: CLINIC | Age: 87
End: 2022-01-01
Attending: INTERNAL MEDICINE
Payer: COMMERCIAL

## 2022-01-01 DIAGNOSIS — Z95.0 PACEMAKER: ICD-10-CM

## 2022-01-01 LAB
MDC_IDC_LEAD_IMPLANT_DT: NORMAL
MDC_IDC_LEAD_LOCATION: NORMAL
MDC_IDC_LEAD_LOCATION_DETAIL_1: NORMAL
MDC_IDC_LEAD_MFG: NORMAL
MDC_IDC_LEAD_MODEL: NORMAL
MDC_IDC_LEAD_POLARITY_TYPE: NORMAL
MDC_IDC_LEAD_SERIAL: NORMAL
MDC_IDC_MSMT_BATTERY_DTM: NORMAL
MDC_IDC_MSMT_BATTERY_REMAINING_LONGEVITY: 104 MO
MDC_IDC_MSMT_BATTERY_REMAINING_PERCENTAGE: 95.5 %
MDC_IDC_MSMT_BATTERY_RRT_TRIGGER: NORMAL
MDC_IDC_MSMT_BATTERY_STATUS: NORMAL
MDC_IDC_MSMT_BATTERY_VOLTAGE: 2.98 V
MDC_IDC_MSMT_LEADCHNL_LV_IMPEDANCE_VALUE: 660 OHM
MDC_IDC_MSMT_LEADCHNL_LV_LEAD_CHANNEL_STATUS: NORMAL
MDC_IDC_MSMT_LEADCHNL_LV_PACING_THRESHOLD_AMPLITUDE: 1.75 V
MDC_IDC_MSMT_LEADCHNL_LV_PACING_THRESHOLD_PULSEWIDTH: 0.5 MS
MDC_IDC_MSMT_LEADCHNL_RV_IMPEDANCE_VALUE: 350 OHM
MDC_IDC_MSMT_LEADCHNL_RV_LEAD_CHANNEL_STATUS: NORMAL
MDC_IDC_MSMT_LEADCHNL_RV_PACING_THRESHOLD_AMPLITUDE: 0.62 V
MDC_IDC_MSMT_LEADCHNL_RV_PACING_THRESHOLD_PULSEWIDTH: 0.5 MS
MDC_IDC_MSMT_LEADCHNL_RV_SENSING_INTR_AMPL: 8.6 MV
MDC_IDC_PG_IMPLANT_DTM: NORMAL
MDC_IDC_PG_MFG: NORMAL
MDC_IDC_PG_MODEL: NORMAL
MDC_IDC_PG_SERIAL: NORMAL
MDC_IDC_PG_TYPE: NORMAL
MDC_IDC_SESS_CLINIC_NAME: NORMAL
MDC_IDC_SESS_DTM: NORMAL
MDC_IDC_SESS_REPROGRAMMED: NO
MDC_IDC_SESS_TYPE: NORMAL
MDC_IDC_SET_BRADY_LOWRATE: 60 {BEATS}/MIN
MDC_IDC_SET_BRADY_MAX_SENSOR_RATE: 120 {BEATS}/MIN
MDC_IDC_SET_BRADY_MODE: NORMAL
MDC_IDC_SET_CRT_LVRV_DELAY: 0 MS
MDC_IDC_SET_CRT_PACED_CHAMBERS: NORMAL
MDC_IDC_SET_LEADCHNL_LV_PACING_AMPLITUDE: 2.75 V
MDC_IDC_SET_LEADCHNL_LV_PACING_ANODE_ELECTRODE_1: NORMAL
MDC_IDC_SET_LEADCHNL_LV_PACING_ANODE_LOCATION_1: NORMAL
MDC_IDC_SET_LEADCHNL_LV_PACING_CAPTURE_MODE: NORMAL
MDC_IDC_SET_LEADCHNL_LV_PACING_CATHODE_ELECTRODE_1: NORMAL
MDC_IDC_SET_LEADCHNL_LV_PACING_CATHODE_LOCATION_1: NORMAL
MDC_IDC_SET_LEADCHNL_LV_PACING_POLARITY: NORMAL
MDC_IDC_SET_LEADCHNL_LV_PACING_PULSEWIDTH: 0.5 MS
MDC_IDC_SET_LEADCHNL_RA_PACING_POLARITY: NORMAL
MDC_IDC_SET_LEADCHNL_RA_SENSING_POLARITY: NORMAL
MDC_IDC_SET_LEADCHNL_RV_PACING_AMPLITUDE: 2 V
MDC_IDC_SET_LEADCHNL_RV_PACING_ANODE_ELECTRODE_1: NORMAL
MDC_IDC_SET_LEADCHNL_RV_PACING_ANODE_LOCATION_1: NORMAL
MDC_IDC_SET_LEADCHNL_RV_PACING_CAPTURE_MODE: NORMAL
MDC_IDC_SET_LEADCHNL_RV_PACING_CATHODE_ELECTRODE_1: NORMAL
MDC_IDC_SET_LEADCHNL_RV_PACING_CATHODE_LOCATION_1: NORMAL
MDC_IDC_SET_LEADCHNL_RV_PACING_POLARITY: NORMAL
MDC_IDC_SET_LEADCHNL_RV_PACING_PULSEWIDTH: 0.5 MS
MDC_IDC_SET_LEADCHNL_RV_SENSING_ADAPTATION_MODE: NORMAL
MDC_IDC_SET_LEADCHNL_RV_SENSING_ANODE_ELECTRODE_1: NORMAL
MDC_IDC_SET_LEADCHNL_RV_SENSING_ANODE_LOCATION_1: NORMAL
MDC_IDC_SET_LEADCHNL_RV_SENSING_CATHODE_ELECTRODE_1: NORMAL
MDC_IDC_SET_LEADCHNL_RV_SENSING_CATHODE_LOCATION_1: NORMAL
MDC_IDC_SET_LEADCHNL_RV_SENSING_POLARITY: NORMAL
MDC_IDC_SET_LEADCHNL_RV_SENSING_SENSITIVITY: 0.5 MV
MDC_IDC_STAT_AT_DTM_END: NORMAL
MDC_IDC_STAT_AT_DTM_START: NORMAL
MDC_IDC_STAT_BRADY_DTM_END: NORMAL
MDC_IDC_STAT_BRADY_DTM_START: NORMAL
MDC_IDC_STAT_CRT_DTM_END: NORMAL
MDC_IDC_STAT_CRT_DTM_START: NORMAL
MDC_IDC_STAT_CRT_PERCENT_PACED: 99 %
MDC_IDC_STAT_HEART_RATE_DTM_END: NORMAL
MDC_IDC_STAT_HEART_RATE_DTM_START: NORMAL
MDC_IDC_STAT_HEART_RATE_VENTRICULAR_MAX: 280 {BEATS}/MIN
MDC_IDC_STAT_HEART_RATE_VENTRICULAR_MEAN: 70 {BEATS}/MIN
MDC_IDC_STAT_HEART_RATE_VENTRICULAR_MIN: 50 {BEATS}/MIN

## 2022-01-01 PROCEDURE — 93294 REM INTERROG EVL PM/LDLS PM: CPT | Mod: GW | Performed by: INTERNAL MEDICINE

## 2022-01-01 PROCEDURE — 93296 REM INTERROG EVL PM/IDS: CPT | Mod: GW | Performed by: INTERNAL MEDICINE

## 2022-02-19 ENCOUNTER — HEALTH MAINTENANCE LETTER (OUTPATIENT)
Age: 87
End: 2022-02-19

## 2022-02-24 ENCOUNTER — TELEPHONE (OUTPATIENT)
Dept: CARDIOLOGY | Facility: CLINIC | Age: 87
End: 2022-02-24
Payer: COMMERCIAL

## 2022-02-24 NOTE — TELEPHONE ENCOUNTER
Dr. Rangel and Dr. Brandon,    Your patients family called today to let us know that she has passed away. Her son wanted me to send a note to both of you thanking you and your team for the excellent care you have given Anis throughout the years. Her whole family appreciated you both.

## (undated) RX ORDER — POTASSIUM CHLORIDE 1500 MG/1
TABLET, EXTENDED RELEASE ORAL
Status: DISPENSED
Start: 2018-11-14

## (undated) RX ORDER — CLINDAMYCIN PHOSPHATE 900 MG/50ML
INJECTION, SOLUTION INTRAVENOUS
Status: DISPENSED
Start: 2018-11-14

## (undated) RX ORDER — FENTANYL CITRATE 50 UG/ML
INJECTION, SOLUTION INTRAMUSCULAR; INTRAVENOUS
Status: DISPENSED
Start: 2018-11-14

## (undated) RX ORDER — LIDOCAINE HYDROCHLORIDE 10 MG/ML
INJECTION, SOLUTION EPIDURAL; INFILTRATION; INTRACAUDAL; PERINEURAL
Status: DISPENSED
Start: 2018-11-14

## (undated) RX ORDER — HEPARIN SODIUM 1000 [USP'U]/ML
INJECTION, SOLUTION INTRAVENOUS; SUBCUTANEOUS
Status: DISPENSED
Start: 2018-11-14

## (undated) RX ORDER — OXYCODONE AND ACETAMINOPHEN 5; 325 MG/1; MG/1
TABLET ORAL
Status: DISPENSED
Start: 2018-11-14

## (undated) RX ORDER — BUPIVACAINE HYDROCHLORIDE 2.5 MG/ML
INJECTION, SOLUTION EPIDURAL; INFILTRATION; INTRACAUDAL
Status: DISPENSED
Start: 2018-11-14